# Patient Record
Sex: FEMALE | Race: WHITE | NOT HISPANIC OR LATINO | Employment: UNEMPLOYED | URBAN - NONMETROPOLITAN AREA
[De-identification: names, ages, dates, MRNs, and addresses within clinical notes are randomized per-mention and may not be internally consistent; named-entity substitution may affect disease eponyms.]

---

## 2017-12-13 ENCOUNTER — HOSPITAL ENCOUNTER (EMERGENCY)
Facility: HOSPITAL | Age: 52
Discharge: HOME | End: 2017-12-13
Attending: EMERGENCY MEDICINE | Admitting: EMERGENCY MEDICINE

## 2017-12-13 ENCOUNTER — APPOINTMENT (OUTPATIENT)
Dept: CT IMAGING | Facility: HOSPITAL | Age: 52
End: 2017-12-13

## 2017-12-13 VITALS
DIASTOLIC BLOOD PRESSURE: 88 MMHG | TEMPERATURE: 98.3 F | HEIGHT: 66 IN | HEART RATE: 104 BPM | OXYGEN SATURATION: 93 % | BODY MASS INDEX: 47.09 KG/M2 | SYSTOLIC BLOOD PRESSURE: 154 MMHG | WEIGHT: 293 LBS | RESPIRATION RATE: 20 BRPM

## 2017-12-13 DIAGNOSIS — I10 UNCONTROLLED HYPERTENSION: ICD-10-CM

## 2017-12-13 DIAGNOSIS — N39.0 ACUTE UTI: ICD-10-CM

## 2017-12-13 DIAGNOSIS — K42.9 UMBILICAL HERNIA WITHOUT OBSTRUCTION AND WITHOUT GANGRENE: Primary | ICD-10-CM

## 2017-12-13 LAB
ALBUMIN SERPL-MCNC: 4.8 G/DL (ref 3.5–5)
ALBUMIN/GLOB SERPL: 1.4 G/DL (ref 1–2)
ALP SERPL-CCNC: 63 U/L (ref 38–126)
ALT SERPL W P-5'-P-CCNC: 37 U/L (ref 13–69)
ANION GAP SERPL CALCULATED.3IONS-SCNC: 15 MMOL/L
AST SERPL-CCNC: 38 U/L (ref 15–46)
BACTERIA UR QL AUTO: ABNORMAL /HPF
BASOPHILS # BLD AUTO: 0.11 10*3/MM3 (ref 0–0.2)
BASOPHILS NFR BLD AUTO: 0.5 % (ref 0–2.5)
BILIRUB SERPL-MCNC: 0.5 MG/DL (ref 0.2–1.3)
BILIRUB UR QL STRIP: NEGATIVE
BUN BLD-MCNC: 18 MG/DL (ref 7–20)
BUN/CREAT SERPL: 22.5 (ref 7.1–23.5)
CALCIUM SPEC-SCNC: 10.3 MG/DL (ref 8.4–10.2)
CHLORIDE SERPL-SCNC: 101 MMOL/L (ref 98–107)
CLARITY UR: CLEAR
CO2 SERPL-SCNC: 30 MMOL/L (ref 26–30)
COLOR UR: ABNORMAL
CREAT BLD-MCNC: 0.8 MG/DL (ref 0.6–1.3)
D-LACTATE SERPL-SCNC: 1.8 MMOL/L (ref 0.5–2)
DEPRECATED RDW RBC AUTO: 41.1 FL (ref 37–54)
EOSINOPHIL # BLD AUTO: 0.14 10*3/MM3 (ref 0–0.7)
EOSINOPHIL NFR BLD AUTO: 0.6 % (ref 0–7)
ERYTHROCYTE [DISTWIDTH] IN BLOOD BY AUTOMATED COUNT: 13.1 % (ref 11.5–14.5)
GFR SERPL CREATININE-BSD FRML MDRD: 75 ML/MIN/1.73
GLOBULIN UR ELPH-MCNC: 3.4 GM/DL
GLUCOSE BLD-MCNC: 104 MG/DL (ref 74–98)
GLUCOSE UR STRIP-MCNC: NEGATIVE MG/DL
HCT VFR BLD AUTO: 47.5 % (ref 37–47)
HGB BLD-MCNC: 15 G/DL (ref 12–16)
HGB UR QL STRIP.AUTO: ABNORMAL
HYALINE CASTS UR QL AUTO: ABNORMAL /LPF
IMM GRANULOCYTES # BLD: 0.09 10*3/MM3 (ref 0–0.06)
IMM GRANULOCYTES NFR BLD: 0.4 % (ref 0–0.6)
KETONES UR QL STRIP: NEGATIVE
LEUKOCYTE ESTERASE UR QL STRIP.AUTO: NEGATIVE
LIPASE SERPL-CCNC: 99 U/L (ref 23–300)
LYMPHOCYTES # BLD AUTO: 1.71 10*3/MM3 (ref 0.6–3.4)
LYMPHOCYTES NFR BLD AUTO: 7.6 % (ref 10–50)
MCH RBC QN AUTO: 27.2 PG (ref 27–31)
MCHC RBC AUTO-ENTMCNC: 31.6 G/DL (ref 30–37)
MCV RBC AUTO: 86.2 FL (ref 81–99)
MONOCYTES # BLD AUTO: 1.08 10*3/MM3 (ref 0–0.9)
MONOCYTES NFR BLD AUTO: 4.8 % (ref 0–12)
NEUTROPHILS # BLD AUTO: 19.41 10*3/MM3 (ref 2–6.9)
NEUTROPHILS NFR BLD AUTO: 86.1 % (ref 37–80)
NITRITE UR QL STRIP: POSITIVE
NRBC BLD MANUAL-RTO: 0 /100 WBC (ref 0–0)
PH UR STRIP.AUTO: 5.5 [PH] (ref 5–8)
PLATELET # BLD AUTO: 355 10*3/MM3 (ref 130–400)
PMV BLD AUTO: 10.9 FL (ref 6–12)
POTASSIUM BLD-SCNC: 4 MMOL/L (ref 3.5–5.1)
PROT SERPL-MCNC: 8.2 G/DL (ref 6.3–8.2)
PROT UR QL STRIP: NEGATIVE
RBC # BLD AUTO: 5.51 10*6/MM3 (ref 4.2–5.4)
RBC # UR: ABNORMAL /HPF
REF LAB TEST METHOD: ABNORMAL
SODIUM BLD-SCNC: 142 MMOL/L (ref 137–145)
SP GR UR STRIP: 1.02 (ref 1–1.03)
SQUAMOUS #/AREA URNS HPF: ABNORMAL /HPF
UROBILINOGEN UR QL STRIP: ABNORMAL
WBC NRBC COR # BLD: 22.54 10*3/MM3 (ref 4.8–10.8)
WBC UR QL AUTO: ABNORMAL /HPF

## 2017-12-13 PROCEDURE — 25010000002 ONDANSETRON PER 1 MG: Performed by: EMERGENCY MEDICINE

## 2017-12-13 PROCEDURE — 25010000002 FENTANYL CITRATE (PF) 100 MCG/2ML SOLUTION: Performed by: EMERGENCY MEDICINE

## 2017-12-13 PROCEDURE — 85025 COMPLETE CBC W/AUTO DIFF WBC: CPT | Performed by: EMERGENCY MEDICINE

## 2017-12-13 PROCEDURE — 80053 COMPREHEN METABOLIC PANEL: CPT | Performed by: EMERGENCY MEDICINE

## 2017-12-13 PROCEDURE — 25010000002 CEFTRIAXONE IN SWFI 1 GRAM/10ML IV PUSH SYRINGE (SIMPLE): Performed by: EMERGENCY MEDICINE

## 2017-12-13 PROCEDURE — 96376 TX/PRO/DX INJ SAME DRUG ADON: CPT

## 2017-12-13 PROCEDURE — 96361 HYDRATE IV INFUSION ADD-ON: CPT

## 2017-12-13 PROCEDURE — 87086 URINE CULTURE/COLONY COUNT: CPT

## 2017-12-13 PROCEDURE — 83605 ASSAY OF LACTIC ACID: CPT | Performed by: EMERGENCY MEDICINE

## 2017-12-13 PROCEDURE — 99284 EMERGENCY DEPT VISIT MOD MDM: CPT

## 2017-12-13 PROCEDURE — 96375 TX/PRO/DX INJ NEW DRUG ADDON: CPT

## 2017-12-13 PROCEDURE — 96374 THER/PROPH/DIAG INJ IV PUSH: CPT

## 2017-12-13 PROCEDURE — 83690 ASSAY OF LIPASE: CPT | Performed by: EMERGENCY MEDICINE

## 2017-12-13 PROCEDURE — 81001 URINALYSIS AUTO W/SCOPE: CPT

## 2017-12-13 PROCEDURE — 87186 SC STD MICRODIL/AGAR DIL: CPT

## 2017-12-13 PROCEDURE — 0 IOPAMIDOL 61 % SOLUTION: Performed by: EMERGENCY MEDICINE

## 2017-12-13 PROCEDURE — 74177 CT ABD & PELVIS W/CONTRAST: CPT

## 2017-12-13 PROCEDURE — 87077 CULTURE AEROBIC IDENTIFY: CPT

## 2017-12-13 PROCEDURE — 25010000002 KETOROLAC TROMETHAMINE PER 15 MG: Performed by: EMERGENCY MEDICINE

## 2017-12-13 RX ORDER — FENTANYL CITRATE 50 UG/ML
25 INJECTION, SOLUTION INTRAMUSCULAR; INTRAVENOUS ONCE
Status: COMPLETED | OUTPATIENT
Start: 2017-12-13 | End: 2017-12-13

## 2017-12-13 RX ORDER — ONDANSETRON 2 MG/ML
4 INJECTION INTRAMUSCULAR; INTRAVENOUS ONCE
Status: COMPLETED | OUTPATIENT
Start: 2017-12-13 | End: 2017-12-13

## 2017-12-13 RX ORDER — SODIUM CHLORIDE 0.9 % (FLUSH) 0.9 %
10 SYRINGE (ML) INJECTION AS NEEDED
Status: DISCONTINUED | OUTPATIENT
Start: 2017-12-13 | End: 2017-12-13 | Stop reason: HOSPADM

## 2017-12-13 RX ORDER — FENTANYL CITRATE 50 UG/ML
75 INJECTION, SOLUTION INTRAMUSCULAR; INTRAVENOUS ONCE
Status: COMPLETED | OUTPATIENT
Start: 2017-12-13 | End: 2017-12-13

## 2017-12-13 RX ORDER — KETOROLAC TROMETHAMINE 30 MG/ML
15 INJECTION, SOLUTION INTRAMUSCULAR; INTRAVENOUS ONCE
Status: COMPLETED | OUTPATIENT
Start: 2017-12-13 | End: 2017-12-13

## 2017-12-13 RX ORDER — SODIUM CHLORIDE 9 MG/ML
125 INJECTION, SOLUTION INTRAVENOUS CONTINUOUS
Status: DISCONTINUED | OUTPATIENT
Start: 2017-12-13 | End: 2017-12-13 | Stop reason: HOSPADM

## 2017-12-13 RX ORDER — CEPHALEXIN 500 MG/1
500 CAPSULE ORAL 4 TIMES DAILY
Qty: 20 CAPSULE | Refills: 0 | Status: SHIPPED | OUTPATIENT
Start: 2017-12-13 | End: 2017-12-18

## 2017-12-13 RX ADMIN — ONDANSETRON 4 MG: 2 INJECTION INTRAMUSCULAR; INTRAVENOUS at 16:44

## 2017-12-13 RX ADMIN — IOPAMIDOL 100 ML: 612 INJECTION, SOLUTION INTRAVENOUS at 18:01

## 2017-12-13 RX ADMIN — SODIUM CHLORIDE 125 ML/HR: 9 INJECTION, SOLUTION INTRAVENOUS at 16:54

## 2017-12-13 RX ADMIN — FENTANYL CITRATE 75 MCG: 50 INJECTION INTRAMUSCULAR; INTRAVENOUS at 16:45

## 2017-12-13 RX ADMIN — CEFTRIAXONE SODIUM 1000 MG: 1 INJECTION, POWDER, FOR SOLUTION INTRAMUSCULAR; INTRAVENOUS at 19:04

## 2017-12-13 RX ADMIN — FENTANYL CITRATE 25 MCG: 50 INJECTION INTRAMUSCULAR; INTRAVENOUS at 19:00

## 2017-12-13 RX ADMIN — KETOROLAC TROMETHAMINE 15 MG: 30 INJECTION, SOLUTION INTRAMUSCULAR at 18:59

## 2017-12-13 NOTE — ED PROVIDER NOTES
Subjective   History of Present Illness  TRIAGE CHIEF COMPLAINT:   Chief Complaint   Patient presents with   • Abdominal Pain         HPI: Jamai Hinton   is a 52 y.o. female   who presents to the emergency department complaining of Abdominal pain.  Patient with a history of periumbilical hernia, , cholecystectomy, hypertension, obesity.  Patient describes abdominal pain in the periumbilical and suprapubic region ongoing for at least the past 4 hours.  Also reports difficulty urinating.  Patient states symptoms are either due to her hernia or to a UTI.  States last time she experienced pain this severe she had a UTI.  Denies fever, chills, nausea, vomiting, abdominal distention, chest pain, shortness of breath, hematuria, diarrhea, constipation.  Has not taken anything for pain prior to arrival although she did take Pyridium which was left over from prior bladder infection.            Review of Systems   All other systems reviewed and are negative.      Past Medical History:   Diagnosis Date   • Hernia, abdominal        No Known Allergies    Past Surgical History:   Procedure Laterality Date   •  SECTION     • CHOLECYSTECTOMY         History reviewed. No pertinent family history.    Social History     Social History   • Marital status:      Spouse name: N/A   • Number of children: N/A   • Years of education: N/A     Social History Main Topics   • Smoking status: Never Smoker   • Smokeless tobacco: None   • Alcohol use No   • Drug use: No   • Sexual activity: Not Asked     Other Topics Concern   • None     Social History Narrative   • None           Objective   Physical Exam    CONSTITUTIONAL: Awake, oriented, appears uncomfortable but non-toxic.  Obese   HENT: Atraumatic, normocephalic, oral mucosa pink and moist, airway patent. Nares patent without drainage. External ears normal.   EYES: Conjunctiva clear, EOMI, PERRL   NECK: Trachea midline, non-tender, supple   CARDIOVASCULAR: Normal heart  rate, Normal rhythm, No murmurs, rubs, gallops   PULMONARY/CHEST: Clear to auscultation, no rhonchi, wheezes, or rales. Symmetrical breath sounds. Non-tender.   ABDOMINAL: Non-distended, soft, umbilical hernia with some tenderness but soft and reducible, no overlying skin changes.  Moderate suprapubic tenderness - no rebound or guarding. BS normal.   NEUROLOGIC: Non-focal, moving all four extremities, no gross sensory or motor deficits.   EXTREMITIES: No clubbing, cyanosis, or edema   SKIN: Warm, Dry, No erythema, No rash.     CT Abdomen Pelvis With Contrast   Final Result   1. Fatty liver.      2.  Large fat-containing ventral hernia..      Authenticated by Morris Gonzales MD on 12/13/2017 06:13:22 PM              EKG:         Procedures         ED Course  ED Course          ED COURSE / MEDICAL DECISION MAKING:   Nursing notes reviewed.    Patient is feeling better on reevaluation.  Workup noteworthy for UTI, leukocytosis, poorly controlled blood pressure.  Umbilical hernia is soft and reducible and does not contain any bowel.  Case discussed with Dr. Rogers who will follow-up with the patient in his office tomorrow.  Plan for antibiotics, oral hydration.  Patient instructed to return if worse or with any concerns.    DECISION TO DISCHARGE/ADMIT: see ED care timeline       Electronically signed by: Jovani Green MD, 12/13/2017 6:57 PM              [facility]    Final diagnoses:   Umbilical hernia without obstruction and without gangrene   Acute UTI   Uncontrolled hypertension            Jovani Green MD  12/13/17 [id number]

## 2017-12-14 ENCOUNTER — HOSPITAL ENCOUNTER (OUTPATIENT)
Facility: HOSPITAL | Age: 52
Setting detail: HOSPITAL OUTPATIENT SURGERY
Discharge: HOME | End: 2017-12-14
Attending: SURGERY | Admitting: SURGERY

## 2017-12-14 ENCOUNTER — ANESTHESIA (OUTPATIENT)
Dept: PERIOP | Facility: HOSPITAL | Age: 52
End: 2017-12-14

## 2017-12-14 ENCOUNTER — OFFICE VISIT (OUTPATIENT)
Dept: SURGERY | Facility: CLINIC | Age: 52
End: 2017-12-14

## 2017-12-14 ENCOUNTER — ANESTHESIA EVENT (OUTPATIENT)
Dept: PERIOP | Facility: HOSPITAL | Age: 52
End: 2017-12-14

## 2017-12-14 VITALS
TEMPERATURE: 98.8 F | SYSTOLIC BLOOD PRESSURE: 150 MMHG | HEIGHT: 66 IN | DIASTOLIC BLOOD PRESSURE: 80 MMHG | WEIGHT: 293 LBS | BODY MASS INDEX: 47.09 KG/M2 | OXYGEN SATURATION: 98 % | HEART RATE: 90 BPM

## 2017-12-14 VITALS
RESPIRATION RATE: 18 BRPM | TEMPERATURE: 98.6 F | OXYGEN SATURATION: 97 % | DIASTOLIC BLOOD PRESSURE: 63 MMHG | SYSTOLIC BLOOD PRESSURE: 128 MMHG | HEART RATE: 84 BPM

## 2017-12-14 DIAGNOSIS — R10.31 RIGHT LOWER QUADRANT ABDOMINAL PAIN: Primary | ICD-10-CM

## 2017-12-14 DIAGNOSIS — K43.9 VENTRAL HERNIA WITHOUT OBSTRUCTION OR GANGRENE: ICD-10-CM

## 2017-12-14 LAB
B-HCG UR QL: NEGATIVE
INTERNAL NEGATIVE CONTROL: NEGATIVE
INTERNAL POSITIVE CONTROL: POSITIVE
Lab: NORMAL

## 2017-12-14 PROCEDURE — C1713 ANCHOR/SCREW BN/BN,TIS/BN: HCPCS | Performed by: SURGERY

## 2017-12-14 PROCEDURE — 99244 OFF/OP CNSLTJ NEW/EST MOD 40: CPT | Performed by: SURGERY

## 2017-12-14 PROCEDURE — 25010000002 ONDANSETRON PER 1 MG: Performed by: NURSE ANESTHETIST, CERTIFIED REGISTERED

## 2017-12-14 PROCEDURE — 25010000002 KETOROLAC TROMETHAMINE PER 15 MG: Performed by: NURSE ANESTHETIST, CERTIFIED REGISTERED

## 2017-12-14 PROCEDURE — 82962 GLUCOSE BLOOD TEST: CPT

## 2017-12-14 PROCEDURE — 49561 PR REPAIR INCISIONAL HERNIA,STRANG: CPT | Performed by: SURGERY

## 2017-12-14 PROCEDURE — 25010000002 PROPOFOL 200 MG/20ML EMULSION: Performed by: NURSE ANESTHETIST, CERTIFIED REGISTERED

## 2017-12-14 PROCEDURE — 94799 UNLISTED PULMONARY SVC/PX: CPT

## 2017-12-14 PROCEDURE — 25010000002 SUCCINYLCHOLINE PER 20 MG: Performed by: NURSE ANESTHETIST, CERTIFIED REGISTERED

## 2017-12-14 PROCEDURE — 49568 PR IMPLANT MESH HERNIA REPAIR/DEBRIDEMENT CLOSURE: CPT | Performed by: SURGERY

## 2017-12-14 PROCEDURE — 25010000002 HYDROMORPHONE PER 4 MG

## 2017-12-14 PROCEDURE — 25010000002 DEXAMETHASONE PER 1 MG: Performed by: NURSE ANESTHETIST, CERTIFIED REGISTERED

## 2017-12-14 PROCEDURE — 25010000002 METOCLOPRAMIDE PER 10 MG: Performed by: NURSE ANESTHETIST, CERTIFIED REGISTERED

## 2017-12-14 PROCEDURE — 25010000002 FENTANYL CITRATE (PF) 100 MCG/2ML SOLUTION: Performed by: NURSE ANESTHETIST, CERTIFIED REGISTERED

## 2017-12-14 PROCEDURE — 25010000002 HYDROMORPHONE PER 4 MG: Performed by: NURSE ANESTHETIST, CERTIFIED REGISTERED

## 2017-12-14 PROCEDURE — 25010000002 MIDAZOLAM PER 1 MG: Performed by: NURSE ANESTHETIST, CERTIFIED REGISTERED

## 2017-12-14 PROCEDURE — C1781 MESH (IMPLANTABLE): HCPCS | Performed by: SURGERY

## 2017-12-14 DEVICE — VENTRIO ST HERNIA PATCH
Type: IMPLANTABLE DEVICE | Site: ABDOMEN | Status: FUNCTIONAL
Brand: VENTRIO ST HERNIA PATCH

## 2017-12-14 RX ORDER — MIDAZOLAM HYDROCHLORIDE 1 MG/ML
INJECTION INTRAMUSCULAR; INTRAVENOUS AS NEEDED
Status: DISCONTINUED | OUTPATIENT
Start: 2017-12-14 | End: 2017-12-14 | Stop reason: SURG

## 2017-12-14 RX ORDER — FAMOTIDINE 10 MG/ML
INJECTION, SOLUTION INTRAVENOUS
Status: DISCONTINUED
Start: 2017-12-14 | End: 2017-12-14 | Stop reason: HOSPADM

## 2017-12-14 RX ORDER — IBUPROFEN 600 MG/1
600 TABLET ORAL EVERY 6 HOURS PRN
Status: DISCONTINUED | OUTPATIENT
Start: 2017-12-14 | End: 2017-12-14 | Stop reason: HOSPADM

## 2017-12-14 RX ORDER — SUCCINYLCHOLINE CHLORIDE 20 MG/ML
INJECTION INTRAMUSCULAR; INTRAVENOUS AS NEEDED
Status: DISCONTINUED | OUTPATIENT
Start: 2017-12-14 | End: 2017-12-14 | Stop reason: SURG

## 2017-12-14 RX ORDER — HYDROCODONE BITARTRATE AND ACETAMINOPHEN 7.5; 325 MG/1; MG/1
1-2 TABLET ORAL EVERY 4 HOURS PRN
Qty: 20 TABLET | Refills: 0 | Status: SHIPPED | OUTPATIENT
Start: 2017-12-14

## 2017-12-14 RX ORDER — CLINDAMYCIN PHOSPHATE 900 MG/50ML
900 INJECTION, SOLUTION INTRAVENOUS ONCE
Status: COMPLETED | OUTPATIENT
Start: 2017-12-14 | End: 2017-12-14

## 2017-12-14 RX ORDER — FENTANYL CITRATE 50 UG/ML
INJECTION, SOLUTION INTRAMUSCULAR; INTRAVENOUS AS NEEDED
Status: DISCONTINUED | OUTPATIENT
Start: 2017-12-14 | End: 2017-12-14 | Stop reason: SURG

## 2017-12-14 RX ORDER — KETOROLAC TROMETHAMINE 30 MG/ML
INJECTION, SOLUTION INTRAMUSCULAR; INTRAVENOUS AS NEEDED
Status: DISCONTINUED | OUTPATIENT
Start: 2017-12-14 | End: 2017-12-14 | Stop reason: SURG

## 2017-12-14 RX ORDER — SODIUM CHLORIDE 9 MG/ML
INJECTION, SOLUTION INTRAVENOUS CONTINUOUS PRN
Status: DISCONTINUED | OUTPATIENT
Start: 2017-12-14 | End: 2017-12-14 | Stop reason: SURG

## 2017-12-14 RX ORDER — ONDANSETRON 2 MG/ML
4 INJECTION INTRAMUSCULAR; INTRAVENOUS ONCE AS NEEDED
Status: DISCONTINUED | OUTPATIENT
Start: 2017-12-14 | End: 2017-12-14 | Stop reason: HOSPADM

## 2017-12-14 RX ORDER — TOPIRAMATE 25 MG/1
25 TABLET ORAL
COMMUNITY
Start: 2015-04-30 | End: 2017-12-14

## 2017-12-14 RX ORDER — PROPOFOL 10 MG/ML
INJECTION, EMULSION INTRAVENOUS AS NEEDED
Status: DISCONTINUED | OUTPATIENT
Start: 2017-12-14 | End: 2017-12-14 | Stop reason: SURG

## 2017-12-14 RX ORDER — DEXAMETHASONE SODIUM PHOSPHATE 4 MG/ML
INJECTION, SOLUTION INTRA-ARTICULAR; INTRALESIONAL; INTRAMUSCULAR; INTRAVENOUS; SOFT TISSUE AS NEEDED
Status: DISCONTINUED | OUTPATIENT
Start: 2017-12-14 | End: 2017-12-14 | Stop reason: SURG

## 2017-12-14 RX ORDER — MAGNESIUM HYDROXIDE 1200 MG/15ML
LIQUID ORAL AS NEEDED
Status: DISCONTINUED | OUTPATIENT
Start: 2017-12-14 | End: 2017-12-14 | Stop reason: HOSPADM

## 2017-12-14 RX ORDER — NEOSTIGMINE METHYLSULFATE 5 MG/5 ML
SYRINGE (ML) INTRAVENOUS AS NEEDED
Status: DISCONTINUED | OUTPATIENT
Start: 2017-12-14 | End: 2017-12-14 | Stop reason: SURG

## 2017-12-14 RX ORDER — ONDANSETRON 4 MG/1
4 TABLET, FILM COATED ORAL ONCE AS NEEDED
Status: DISCONTINUED | OUTPATIENT
Start: 2017-12-14 | End: 2017-12-14 | Stop reason: HOSPADM

## 2017-12-14 RX ORDER — ROCURONIUM BROMIDE 10 MG/ML
INJECTION, SOLUTION INTRAVENOUS AS NEEDED
Status: DISCONTINUED | OUTPATIENT
Start: 2017-12-14 | End: 2017-12-14 | Stop reason: SURG

## 2017-12-14 RX ORDER — LABETALOL HYDROCHLORIDE 5 MG/ML
INJECTION, SOLUTION INTRAVENOUS AS NEEDED
Status: DISCONTINUED | OUTPATIENT
Start: 2017-12-14 | End: 2017-12-14 | Stop reason: SURG

## 2017-12-14 RX ORDER — HYDROCODONE BITARTRATE AND ACETAMINOPHEN 7.5; 325 MG/1; MG/1
1 TABLET ORAL ONCE AS NEEDED
Status: DISCONTINUED | OUTPATIENT
Start: 2017-12-14 | End: 2017-12-14 | Stop reason: HOSPADM

## 2017-12-14 RX ORDER — GLYCOPYRROLATE 0.2 MG/ML
INJECTION INTRAMUSCULAR; INTRAVENOUS AS NEEDED
Status: DISCONTINUED | OUTPATIENT
Start: 2017-12-14 | End: 2017-12-14 | Stop reason: SURG

## 2017-12-14 RX ORDER — SODIUM CHLORIDE, SODIUM LACTATE, POTASSIUM CHLORIDE, CALCIUM CHLORIDE 600; 310; 30; 20 MG/100ML; MG/100ML; MG/100ML; MG/100ML
1000 INJECTION, SOLUTION INTRAVENOUS CONTINUOUS PRN
Status: DISCONTINUED | OUTPATIENT
Start: 2017-12-14 | End: 2017-12-14 | Stop reason: HOSPADM

## 2017-12-14 RX ORDER — METOCLOPRAMIDE HYDROCHLORIDE 5 MG/ML
INJECTION INTRAMUSCULAR; INTRAVENOUS AS NEEDED
Status: DISCONTINUED | OUTPATIENT
Start: 2017-12-14 | End: 2017-12-14 | Stop reason: SURG

## 2017-12-14 RX ORDER — MEPERIDINE HYDROCHLORIDE 50 MG/ML
50 INJECTION INTRAMUSCULAR; INTRAVENOUS; SUBCUTANEOUS ONCE
Status: DISCONTINUED | OUTPATIENT
Start: 2017-12-14 | End: 2017-12-14 | Stop reason: HOSPADM

## 2017-12-14 RX ORDER — ONDANSETRON 2 MG/ML
INJECTION INTRAMUSCULAR; INTRAVENOUS AS NEEDED
Status: DISCONTINUED | OUTPATIENT
Start: 2017-12-14 | End: 2017-12-14 | Stop reason: SURG

## 2017-12-14 RX ORDER — PROMETHAZINE HYDROCHLORIDE 25 MG/ML
12.5 INJECTION, SOLUTION INTRAMUSCULAR; INTRAVENOUS ONCE AS NEEDED
Status: DISCONTINUED | OUTPATIENT
Start: 2017-12-14 | End: 2017-12-14 | Stop reason: HOSPADM

## 2017-12-14 RX ORDER — SODIUM CHLORIDE 0.9 % (FLUSH) 0.9 %
3 SYRINGE (ML) INJECTION AS NEEDED
Status: DISCONTINUED | OUTPATIENT
Start: 2017-12-14 | End: 2017-12-14 | Stop reason: HOSPADM

## 2017-12-14 RX ORDER — PROMETHAZINE HYDROCHLORIDE 25 MG/ML
6.25 INJECTION, SOLUTION INTRAMUSCULAR; INTRAVENOUS EVERY 6 HOURS PRN
Status: DISCONTINUED | OUTPATIENT
Start: 2017-12-14 | End: 2017-12-14 | Stop reason: HOSPADM

## 2017-12-14 RX ADMIN — FENTANYL CITRATE 50 MCG: 50 INJECTION, SOLUTION INTRAMUSCULAR; INTRAVENOUS at 17:11

## 2017-12-14 RX ADMIN — SODIUM CHLORIDE: 9 INJECTION, SOLUTION INTRAVENOUS at 17:46

## 2017-12-14 RX ADMIN — LIDOCAINE HYDROCHLORIDE 40 MG: 20 INJECTION, SOLUTION INTRAVENOUS at 17:11

## 2017-12-14 RX ADMIN — Medication 4 MG: at 17:52

## 2017-12-14 RX ADMIN — SUCCINYLCHOLINE CHLORIDE 140 MG: 20 INJECTION, SOLUTION INTRAMUSCULAR; INTRAVENOUS at 17:12

## 2017-12-14 RX ADMIN — FENTANYL CITRATE 50 MCG: 50 INJECTION, SOLUTION INTRAMUSCULAR; INTRAVENOUS at 17:09

## 2017-12-14 RX ADMIN — ONDANSETRON 4 MG: 2 INJECTION INTRAMUSCULAR; INTRAVENOUS at 17:28

## 2017-12-14 RX ADMIN — SODIUM CHLORIDE, POTASSIUM CHLORIDE, SODIUM LACTATE AND CALCIUM CHLORIDE 1000 ML: 600; 310; 30; 20 INJECTION, SOLUTION INTRAVENOUS at 16:40

## 2017-12-14 RX ADMIN — CLINDAMYCIN PHOSPHATE 900 MG: 900 INJECTION, SOLUTION INTRAVENOUS at 17:07

## 2017-12-14 RX ADMIN — FENTANYL CITRATE 50 MCG: 50 INJECTION, SOLUTION INTRAMUSCULAR; INTRAVENOUS at 17:23

## 2017-12-14 RX ADMIN — HYDROMORPHONE HYDROCHLORIDE 0.5 MG: 1 INJECTION, SOLUTION INTRAMUSCULAR; INTRAVENOUS; SUBCUTANEOUS at 18:34

## 2017-12-14 RX ADMIN — LABETALOL HYDROCHLORIDE 5 MG: 5 INJECTION, SOLUTION INTRAVENOUS at 17:09

## 2017-12-14 RX ADMIN — HYDROMORPHONE HYDROCHLORIDE 0.5 MG: 1 INJECTION, SOLUTION INTRAMUSCULAR; INTRAVENOUS; SUBCUTANEOUS at 18:22

## 2017-12-14 RX ADMIN — FAMOTIDINE 20 MG: 10 INJECTION, SOLUTION INTRAVENOUS at 16:40

## 2017-12-14 RX ADMIN — PROPOFOL 150 MG: 10 INJECTION, EMULSION INTRAVENOUS at 17:12

## 2017-12-14 RX ADMIN — FENTANYL CITRATE 50 MCG: 50 INJECTION, SOLUTION INTRAMUSCULAR; INTRAVENOUS at 17:28

## 2017-12-14 RX ADMIN — DEXAMETHASONE SODIUM PHOSPHATE 8 MG: 4 INJECTION, SOLUTION INTRAMUSCULAR; INTRAVENOUS at 17:28

## 2017-12-14 RX ADMIN — MIDAZOLAM HYDROCHLORIDE 2 MG: 1 INJECTION, SOLUTION INTRAMUSCULAR; INTRAVENOUS at 17:07

## 2017-12-14 RX ADMIN — LABETALOL HYDROCHLORIDE 5 MG: 5 INJECTION, SOLUTION INTRAVENOUS at 17:10

## 2017-12-14 RX ADMIN — GLYCOPYRROLATE 0.8 MG: 0.2 INJECTION, SOLUTION INTRAMUSCULAR; INTRAVENOUS at 17:52

## 2017-12-14 RX ADMIN — ROCURONIUM BROMIDE 20 MG: 10 INJECTION INTRAVENOUS at 17:27

## 2017-12-14 RX ADMIN — METOCLOPRAMIDE 10 MG: 5 INJECTION, SOLUTION INTRAMUSCULAR; INTRAVENOUS at 16:40

## 2017-12-14 RX ADMIN — SUGAMMADEX 200 MG: 100 INJECTION, SOLUTION INTRAVENOUS at 17:52

## 2017-12-14 RX ADMIN — KETOROLAC TROMETHAMINE 30 MG: 30 INJECTION, SOLUTION INTRAMUSCULAR at 17:30

## 2017-12-14 RX ADMIN — ROCURONIUM BROMIDE 10 MG: 10 INJECTION INTRAVENOUS at 17:11

## 2017-12-14 NOTE — ANESTHESIA PREPROCEDURE EVALUATION
Anesthesia Evaluation     Patient summary reviewed and Nursing notes reviewed   NPO Solid Status: > 8 hours  NPO Liquid Status: > 8 hours     Airway   Mallampati: II  TM distance: >3 FB  Neck ROM: full  possible difficult intubation and difficult intubation highly probable  Dental - normal exam   (+) edentulous    Pulmonary    (+) shortness of breath, sleep apnea ( mot treated), decreased breath sounds,   (-) not a smoker  Cardiovascular - normal exam  Exercise tolerance: poor (<4 METS)    ECG reviewed  Rhythm: regular    (+) hypertension ( not treated) poorly controlled, past MI ( unknown mi per ekg) , CAD ( morb obesity, jocelyn, htn pvd dm), HUERTA, PVD,       Neuro/Psych  GI/Hepatic/Renal/Endo    (+) morbid obesity, diabetes mellitus ( morb obesity inc fbs no meds) type 2 poorly controlled,     Musculoskeletal     (+) arthralgias, back pain, chronic pain, gait problem,   Abdominal   (+) obese,    Substance History      OB/GYN          Other   (+) arthritis     ROS/Med Hx Other: Labs reviewed   fbs 104 k 4.0  ekg sr with lateral and inferior infarct  Hx of med non compliance with lifestyle changes  Pt does not go to dr. Last time was 3 yrs ago  Low pain tolerance  Surgeon aware of abnormal ekg and wishes to proceed d/t emergency nature of pt's condition                                Anesthesia Plan    ASA 4 - emergent     general   (Risks and benefits discussed including risk of aspiration, recall and dental damage. All patient questions answered. Will continue with POC.Discussed risks with pt., pt extremely high intraop and postop risk for cv, resp, and neuro events. Surgeon wishes to proceed d/t emergency nature of pt's condition, aware of pt's abnormal ekg and lack of medical care.)  intravenous induction   Anesthetic plan and risks discussed with patient.

## 2017-12-14 NOTE — ANESTHESIA PROCEDURE NOTES
Airway  Urgency: elective    Difficult airway (morb obesity 350 lbs)    General Information and Staff    Patient location during procedure: OR  CRNA: NITHYA RODRIGUEZ    Indications and Patient Condition  Indications for airway management: airway protection    Preoxygenated: yes (5 min)  Mask assessment prior to intubation: abhilash circoid applies airway establlished.    Final Airway Details  Final airway type: endotracheal airway      Successful airway: ETT  Cuffed: yes   Successful intubation technique: direct laryngoscopy  Facilitating devices/methods: cricoid pressure and intubating stylet  Endotracheal tube insertion site: oral  Blade: Jesse  Blade size: #3  ETT size: 7.5 mm  Cormack-Lehane Classification: grade IIa - partial view of glottis  Placement verified by: chest auscultation, capnometry and palpation of cuff   Cuff volume (mL): 6  Measured from: lips  ETT to lips (cm): 21  Number of attempts at approach: 1    Additional Comments  Intubated by dvnt, cuff up with mov, bbs and expansion =, + etco, taped at lips, tolerated induction and intubation without adverse rx.

## 2017-12-14 NOTE — DISCHARGE INSTRUCTIONS
Please follow all post op instructions and follow up appointment time from your physician's office included in your discharge packet.  .   No pushing, pulling, tugging,  heavy lifting, or strenuous activity.  No major decision making, driving, or drinking alcoholic beverages for 24 hours. ( due to the medications you have  received)  Always use good hand hygiene/washing techniques.  NO driving while taking pain medications.  To assist you in voiding:  Drink plenty of fluids  Listen to running water while attempting to void.    If you are unable to urinate and you have an uncomfortable urge to void or it has been   6 hours since you were discharged, return to the Emergency Room

## 2017-12-14 NOTE — PLAN OF CARE
Problem: Perioperative Period (Adult)  Goal: Signs and Symptoms of Listed Potential Problems Will be Absent or Manageable (Perioperative Period)  Outcome: Ongoing (interventions implemented as appropriate)    12/14/17 1632   Perioperative Period   Problems Assessed (Perioperative Period) all   Problems Present (Perioperative Period) pain

## 2017-12-14 NOTE — ADDENDUM NOTE
Addendum  created 12/14/17 1817 by Indra Quintanilla, CRNA    Anesthesia Review and Sign - Ready for Procedure, Anesthesia Review and Sign - Signed

## 2017-12-14 NOTE — PROGRESS NOTES
Patient: Jamia Hinton    YOB: 1965    Date: 12/14/2017    Primary Care Provider: CLAUDIA Han    Reason for Consultation: ventral hernia    Chief complaint:   Chief Complaint   Patient presents with   • Abdominal Pain     possible hernia       Subjective .     History of present illness:  I saw the patient in the office today as a consultation for evaluation and treatment of an abnormal CT scan, showing a fatty liver and a large ventral hernia.  Patient states she has had a hernia for a couple of years.  She complains pain with pressure @ right lower abdominal area.  She states the pain is worse when she goes from a sitting to standing position. Ms. Hinton denies changes in bowel habits or difficulty urinating.   Patient has never had a heart attack and is up to date on her colonoscopy. This pain has been present for the past 24 hours, sharp in nature, progressively worse, not relieved.    The following portions of the patient's history were reviewed and updated as appropriate: allergies, current medications, past family history, past medical history, past social history, past surgical history and problem list.    Review of Systems   Constitutional: Negative for chills, fever and unexpected weight change.   HENT: Negative for hearing loss, trouble swallowing and voice change.    Eyes: Negative for visual disturbance.   Respiratory: Negative for apnea, cough, chest tightness, shortness of breath and wheezing.    Cardiovascular: Negative for chest pain, palpitations and leg swelling.   Gastrointestinal: Negative for abdominal distention, abdominal pain, anal bleeding, blood in stool, constipation, diarrhea, nausea, rectal pain and vomiting.   Endocrine: Negative for cold intolerance and heat intolerance.   Genitourinary: Negative for difficulty urinating, dysuria and flank pain.   Musculoskeletal: Negative for back pain and gait problem.   Skin: Negative for color change, rash and wound.    Neurological: Negative for dizziness, syncope, speech difficulty, weakness, light-headedness, numbness and headaches.   Hematological: Negative for adenopathy. Does not bruise/bleed easily.   Psychiatric/Behavioral: Negative for confusion. The patient is not nervous/anxious.        History:  Past Medical History:   Diagnosis Date   • Hernia, abdominal        Past Surgical History:   Procedure Laterality Date   •  SECTION     • CHOLECYSTECTOMY         Family History   Problem Relation Age of Onset   • No Known Problems Mother    • No Known Problems Father        Social History   Substance Use Topics   • Smoking status: Never Smoker   • Smokeless tobacco: Never Used   • Alcohol use No       Allergies:  No Known Allergies    Medications:    Current Outpatient Prescriptions:   •  cephalexin (KEFLEX) 500 MG capsule, Take 1 capsule by mouth 4 (Four) Times a Day for 5 days., Disp: 20 capsule, Rfl: 0  No current facility-administered medications for this visit.     Objective     Vital Signs:   Temp:  [97.9 °F (36.6 °C)-98.8 °F (37.1 °C)] 98.8 °F (37.1 °C)  Heart Rate:  [] 90  Resp:  [18-20] 20  BP: (150-186)/() 150/80    Physical Exam:   General Appearance:    Alert, cooperative, she is in distress with supraumbilical pain, morbidly obese at 320 lbs per office scale   Head:    Normocephalic, without obvious abnormality, atraumatic   Eyes:            Lids and lashes normal, conjunctivae and sclerae normal, no   icterus, no pallor, corneas clear, PERRLA   Ears:    Ears appear intact with no abnormalities noted   Throat:   No oral lesions, no thrush, oral mucosa moist   Neck:   No adenopathy, supple, trachea midline, no thyromegaly, no   carotid bruit, no JVD   Lungs:     Clear to auscultation,respirations regular, even and                  unlabored    Heart:    Regular rhythm and normal rate, normal S1 and S2, no            murmur   Abdomen:     no masses, no organomegaly, soft non-tender,  non-distended, no guarding, there is evidence of a tender supraumbilical hernia incisional in nature incarcerated   Extremities:   Moves all extremities well, no edema, no cyanosis, no             redness   Pulses:   Pulses palpable and equal bilaterally   Skin:   No bleeding, bruising or rash   Lymph nodes:   No palpable adenopathy   Neurologic:   Cranial nerves 2 - 12 grossly intact, sensation intact        Results Review:   I reviewed the patient's new clinical results.  I reviewed the patient's new imaging results and agree with the interpretation.  I reviewed the patient's other test results and agree with the interpretation    Review of Systems was reviewed and confirmed as accurate today.    Assessment/Plan :    1. Right lower quadrant abdominal pain    2. Ventral hernia without obstruction or gangrene        I had a detailed and extensive discussion with the patient in the office and they understand that they need to undergo hernia repair with mesh.  Full risks and benefits of operative versus nonoperative intervention were discussed with the patient and these included things such as nonresolution of symptoms and possible worsening of symptoms without surgical intervention versus infection, bleeding, possible recurrent hernia, possible postoperative neuralgia from nerve damage or involvement with scar tissue, etc.  The patient understands, agrees, and had no questions for me at the end of the office visit.     I discussed the patients findings and my recommendations with patient. I did discuss the situation over the phone with the radiologist and reviewed her CT scan, I did discuss the situation over the phone last night with the ER physician. I did specifically talk to the patient today about the fact that her morbid obesity puts her at higher risk for perioperative complications such as hernia recurrence, DVT, pulmonary problems, etc.  She understands and agrees.    Electronically signed by Bassam CONDE  MD Ken  12/14/17      Scribed for Bassam Rogers MD by Nichelle Hill. 12/14/2017  3:37 PM

## 2017-12-14 NOTE — ANESTHESIA POSTPROCEDURE EVALUATION
Patient: Jamia Hinton    Procedure Summary     Date Anesthesia Start Anesthesia Stop Room / Location    12/14/17 1707  [facility] / [facility]       Procedure Diagnosis Surgeon Provider     INCISIONAL HERNIA REPAIR WITH MESH (N/A Abdomen) Ventral hernia without obstruction or gangrene  (Ventral hernia without obstruction or gangrene [K43.9]) MD Indra Buenrostro CRNA          Anesthesia Type: general  Last vitals  BP   160/86 (12/14/17 1625)   Temp   99.2 °F (37.3 °C) (12/14/17 1625)   Pulse   100 (12/14/17 1625)   Resp   18 (12/14/17 1625)     SpO2   97 % (12/14/17 1625)     Post Anesthesia Care and Evaluation    Patient location during evaluation: PACU  Patient participation: complete - patient participated  Level of consciousness: awake  Pain score: 0  Pain management: adequate  Airway patency: patent  Anesthetic complications: No anesthetic complications  PONV Status: none  Cardiovascular status: acceptable  Respiratory status: acceptable and face mask  Hydration status: acceptable    Comments: vsss resp spont, reflexes intact, responsive, report given to pacu nurse

## 2017-12-14 NOTE — OP NOTE
PATIENT:    Jamia Hinton    DATE OF SURGERY:   12/14/2017    PHYSICIAN:    Bassam Rogers MD    REFERRING PHYSICIAN:  CLAUDIA Han    YOB: 1965    PREOPERATIVE DIAGNOSIS:  Incarcerated incisional hernia    POSTOPERATIVE DIAGNOSIS:  Incarcerated incisional hernia    PROCEDURE:       1) Incisional herniorraphy    2) Reduction of incarceration   3) Placement of 11x14 dual sided Ventrio Bard mesh   4) Resection of the umbilicus    HISTORY:  The patient presents to me for evaluation and treatment of a history significant for an incarcerated incisional hernia, she was very tender to palpation in the office on evaluation and has been brought for surgical repair.  They are here now today for urgent repair.    ANESTHESIA:  General endotracheal.    OPERATIVE PROCEDURE:  The patient was taken to the operating room, placed in the supine position, and given general endotracheal anesthesia per the Anesthesia service.  The patient was prepped and draped in the normal sterile fashion and the patient was given preoperative IV antibiotics.  An appropriate timeout per the nursing staff was performed prior to the incision.  I did meet with the patient preoperatively and marked them accordingly.    Because of the patient's morbid obesity and the fact that the skin around the umbilicus was very thin, I decided to resect the umbilicus with a large elliptical incision and this was further dissected down the hernia defect itself. We were able to enter the hernia sac without difficulty at the area of the fascial defect, there was evidence of incarceration noted and we had to resect incarcerated omentum with Tania clamps and silk ties.  The remainder of the incarcerated hernia was reduced without difficulty. I was able to bluntly dissect some chronic attachments to the anterior abdominal wall around the hernia sac and felt I had excellent exposure performed.    I then placed a 11x14 piece of Ventrio dual sided  Marlex mesh and sutured it in place accordingly and a multi-quadrant fashion with 0 Prolene suture in a 4 quadrant fashion.  Tacker was then used to tack the mesh to the anterior abdominal wall further. I felt I had excellent hernia repair and the defect was clearly covered.    Closure was then attended to with a 3-0 Vicryl suture on the wound and then the skin was closed with interrupted 3-0 nylon vertical mattress suture.  Pressure type dressing was applied. Hemostasis was previously intact.    The patient was stable at this point in time and subsequently transferred back to the recovery room in stable condition.    PLAN:  I did have a detailed and extensive discussion with the patient's family in the consultation room postoperatively.    Bassam Rogers MD

## 2017-12-14 NOTE — PLAN OF CARE
Problem: Perioperative Period (Adult)  Intervention: Promote Pulmonary Hygiene and Secretion Clearance    12/14/17 1811   Incentive Spirometer   Administration (Incentive Spirometer) done independently per patient   Activity   Activity Type bedrest   Promote Aggressive Pulmonary Hygiene/Secretion Management   Cough And Deep Breathing done independently per patient   Positioning   Head Of Bed (HOB) Position HOB at 20-30 degrees       Intervention: Monitor/Manage Pain    12/14/17 1811   Manage Acute Burn Pain   Pain Management Interventions pillow support;cold applied       Intervention: Prevent/Manage Post-surgical Infection    12/14/17 1811   Safety Interventions   Infection Prevention rest/sleep promoted       Intervention: Prevent Jael-procedural Injury    12/14/17 1811   Positioning   Positioning semi-Fowlers;foot of bed elevated   Head Of Bed (HOB) Position HOB at 20-30 degrees       Intervention: Prevent/Manage DVT/VTE Risk    12/14/17 1811   Support Surgical/Anesthesia Recovery   Venous Thromboembolism Prevent/Manage plantar flexion performed       Intervention: Monitor/Manage Postoperative Bleeding    12/14/17 1811   Safety Interventions   Bleeding Management dressing monitored       Intervention: Promote Normothermia    12/14/17 1811   Cardiac Interventions   Warming Thermoregulation Maintenance skin exposure time minimized;warm blankets applied         Goal: Signs and Symptoms of Listed Potential Problems Will be Absent or Manageable (Perioperative Period)  Outcome: Ongoing (interventions implemented as appropriate)    12/14/17 1811   Perioperative Period   Problems Assessed (Perioperative Period) all   Problems Present (Perioperative Period) pain

## 2017-12-15 LAB
BACTERIA SPEC AEROBE CULT: ABNORMAL
BACTERIA SPEC AEROBE CULT: ABNORMAL

## 2017-12-18 LAB — GLUCOSE BLDC GLUCOMTR-MCNC: 89 MG/DL (ref 70–130)

## 2017-12-20 LAB
LAB AP CASE REPORT: NORMAL
Lab: NORMAL
PATH REPORT.FINAL DX SPEC: NORMAL

## 2017-12-28 ENCOUNTER — OFFICE VISIT (OUTPATIENT)
Dept: SURGERY | Facility: CLINIC | Age: 52
End: 2017-12-28

## 2017-12-28 VITALS
WEIGHT: 293 LBS | SYSTOLIC BLOOD PRESSURE: 130 MMHG | OXYGEN SATURATION: 98 % | HEART RATE: 80 BPM | HEIGHT: 66 IN | DIASTOLIC BLOOD PRESSURE: 77 MMHG | TEMPERATURE: 98.8 F | BODY MASS INDEX: 47.09 KG/M2

## 2017-12-28 DIAGNOSIS — Z48.89 POSTOPERATIVE VISIT: Primary | ICD-10-CM

## 2017-12-28 PROCEDURE — 99024 POSTOP FOLLOW-UP VISIT: CPT | Performed by: SURGERY

## 2017-12-28 NOTE — PROGRESS NOTES
Patient: Jamia Hinton    YOB: 1965    Date: 12/28/2017    Primary Care Provider: CLAUDIA Han    Reason for Consultation: Follow-up hernia    Chief Complaint:   Chief Complaint   Patient presents with   • Post-op     s/p umbilical hernia       History of present illness:  I saw the patient in the office today as a followup from their recent umbilical hernia repair.  They state that they have done well.  Patient is back to a normal diet and denies any nausea, pain or bowel issues. She states that she feels markedly better.    The following portions of the patient's history were reviewed and updated as appropriate: allergies, current medications, past family history, past medical history, past social history, past surgical history and problem list.    Vital Signs:   Temp:  [98.8 °F (37.1 °C)] 98.8 °F (37.1 °C)  Heart Rate:  [80] 80  BP: (130)/(77) 130/77    Physical Exam:   General Appearance:    Alert, cooperative, in no acute distress   Abdomen:     no masses, no organomegaly, soft non-tender, non-distended, no guarding, wounds are well healed, no evidence of recurrent hernia, sutures removed today in the office, wound clean and dry and without infection or drainage     Results Review:   I reviewed the patient's new clinical results.  I reviewed the patient's new imaging results and agree with the interpretation.    Assessment / Plan:    1. Postoperative visit        I did discuss the situation with the patient today in the office and they have done well from their recent herniorraphy.  I have released the patient back to normal activity, they understand that they need to be careful about heavy lifting.  I need to see the patient back in the office only if they are having further problems, they know to call me if they are.    Electronically signed by Bassam Rogers MD  12/28/17          Scribed for Bassam Rogers MD by Nichelle Hill. 12/28/2017  2:07 PM

## 2018-09-09 ENCOUNTER — OFFICE VISIT (OUTPATIENT)
Dept: PRIMARY CARE CLINIC | Age: 53
End: 2018-09-09
Payer: COMMERCIAL

## 2018-09-09 VITALS
WEIGHT: 290 LBS | SYSTOLIC BLOOD PRESSURE: 150 MMHG | BODY MASS INDEX: 46.81 KG/M2 | HEART RATE: 70 BPM | DIASTOLIC BLOOD PRESSURE: 96 MMHG | TEMPERATURE: 97.5 F | OXYGEN SATURATION: 97 %

## 2018-09-09 DIAGNOSIS — M70.31 RADIOHUMERAL BURSITIS OF RIGHT ELBOW: Primary | ICD-10-CM

## 2018-09-09 DIAGNOSIS — L73.9 FOLLICULITIS: ICD-10-CM

## 2018-09-09 PROCEDURE — 96372 THER/PROPH/DIAG INJ SC/IM: CPT | Performed by: NURSE PRACTITIONER

## 2018-09-09 PROCEDURE — 99213 OFFICE O/P EST LOW 20 MIN: CPT | Performed by: NURSE PRACTITIONER

## 2018-09-09 PROCEDURE — G8419 CALC BMI OUT NRM PARAM NOF/U: HCPCS | Performed by: NURSE PRACTITIONER

## 2018-09-09 PROCEDURE — 3017F COLORECTAL CA SCREEN DOC REV: CPT | Performed by: NURSE PRACTITIONER

## 2018-09-09 PROCEDURE — 4004F PT TOBACCO SCREEN RCVD TLK: CPT | Performed by: NURSE PRACTITIONER

## 2018-09-09 PROCEDURE — G8427 DOCREV CUR MEDS BY ELIG CLIN: HCPCS | Performed by: NURSE PRACTITIONER

## 2018-09-09 RX ORDER — METHYLPREDNISOLONE 4 MG/1
4 TABLET ORAL SEE ADMIN INSTRUCTIONS
Qty: 1 KIT | Refills: 0 | Status: SHIPPED | OUTPATIENT
Start: 2018-09-09 | End: 2018-09-15

## 2018-09-09 RX ORDER — CEFTRIAXONE 1 G/1
1 INJECTION, POWDER, FOR SOLUTION INTRAMUSCULAR; INTRAVENOUS ONCE
Status: COMPLETED | OUTPATIENT
Start: 2018-09-09 | End: 2018-09-09

## 2018-09-09 RX ORDER — METHYLPREDNISOLONE SODIUM SUCCINATE 125 MG/2ML
125 INJECTION, POWDER, LYOPHILIZED, FOR SOLUTION INTRAMUSCULAR; INTRAVENOUS ONCE
Status: COMPLETED | OUTPATIENT
Start: 2018-09-09 | End: 2018-09-09

## 2018-09-09 RX ORDER — MELOXICAM 15 MG/1
15 TABLET ORAL DAILY
Qty: 30 TABLET | Refills: 3 | Status: SHIPPED | OUTPATIENT
Start: 2018-09-09 | End: 2019-07-09 | Stop reason: ALTCHOICE

## 2018-09-09 RX ORDER — KETOROLAC TROMETHAMINE 30 MG/ML
60 INJECTION, SOLUTION INTRAMUSCULAR; INTRAVENOUS ONCE
Status: COMPLETED | OUTPATIENT
Start: 2018-09-09 | End: 2018-09-09

## 2018-09-09 RX ORDER — CEPHALEXIN 500 MG/1
500 CAPSULE ORAL 2 TIMES DAILY
Qty: 14 CAPSULE | Refills: 0 | Status: SHIPPED | OUTPATIENT
Start: 2018-09-09 | End: 2018-09-16

## 2018-09-09 RX ADMIN — METHYLPREDNISOLONE SODIUM SUCCINATE 125 MG: 125 INJECTION, POWDER, LYOPHILIZED, FOR SOLUTION INTRAMUSCULAR; INTRAVENOUS at 14:56

## 2018-09-09 RX ADMIN — CEFTRIAXONE 1 G: 1 INJECTION, POWDER, FOR SOLUTION INTRAMUSCULAR; INTRAVENOUS at 14:53

## 2018-09-09 RX ADMIN — KETOROLAC TROMETHAMINE 60 MG: 30 INJECTION, SOLUTION INTRAMUSCULAR; INTRAVENOUS at 14:54

## 2018-09-09 NOTE — PROGRESS NOTES
Subjective:      Patient ID: Thu Beard is a 45 y.o. female. HPI  C/O neck pain that started a few weeks ago. Neck area is tender to the touch. A few days ago her left elbow started hurting as well. Rates pain 8/10, constant. The sensation has progressed to sharp pains into little finger. Pain is worsened/aggravated by weight. Pain is relieved with rest on arm of chair. Denies injury or trauma. Denies N/T, trauma or injury. She has not taken anything to relieve symptoms. Review of Systems   Musculoskeletal: Positive for arthralgias and neck pain. Objective:   Physical Exam   Constitutional: She is oriented to person, place, and time. She appears well-developed and well-nourished. HENT:   Head: Normocephalic. Right Ear: External ear normal.   Left Ear: External ear normal.   Eyes: Pupils are equal, round, and reactive to light. Right eye exhibits no discharge. Left eye exhibits no discharge. No scleral icterus. Neck: Normal range of motion. Neck supple. No thyromegaly present. Cardiovascular: Normal rate, regular rhythm, normal heart sounds and intact distal pulses. Exam reveals no gallop and no friction rub. No murmur heard. Pulmonary/Chest: Effort normal and breath sounds normal. No respiratory distress. She has no wheezes. She has no rales. Abdominal: Soft. She exhibits no mass. There is no tenderness. Musculoskeletal: Normal range of motion. She exhibits no edema or deformity. Right elbow: She exhibits swelling. Tenderness found. Lateral epicondyle tenderness noted. Neurological: She is alert and oriented to person, place, and time. Skin: Skin is warm and dry. Lesion noted. No rash noted. There is erythema. Psychiatric: She has a normal mood and affect.  Her behavior is normal. Judgment and thought content normal.       Assessment:      Folliculitis, bursitis   Plan:      tordol  Rocephin  Solu medrol  Mobic  Keflex  Medrol dose pack        Latisha Ervin

## 2019-07-08 ENCOUNTER — NURSE TRIAGE (OUTPATIENT)
Dept: OTHER | Facility: CLINIC | Age: 54
End: 2019-07-08

## 2019-07-09 ENCOUNTER — HOSPITAL ENCOUNTER (OUTPATIENT)
Facility: HOSPITAL | Age: 54
Discharge: HOME | End: 2019-07-09
Payer: COMMERCIAL

## 2019-07-09 ENCOUNTER — OFFICE VISIT (OUTPATIENT)
Dept: PRIMARY CARE CLINIC | Age: 54
End: 2019-07-09
Payer: COMMERCIAL

## 2019-07-09 VITALS
DIASTOLIC BLOOD PRESSURE: 89 MMHG | SYSTOLIC BLOOD PRESSURE: 132 MMHG | HEART RATE: 72 BPM | WEIGHT: 279 LBS | HEIGHT: 66 IN | TEMPERATURE: 97.8 F | BODY MASS INDEX: 44.84 KG/M2 | OXYGEN SATURATION: 96 %

## 2019-07-09 DIAGNOSIS — Z12.31 ENCOUNTER FOR SCREENING MAMMOGRAM FOR BREAST CANCER: ICD-10-CM

## 2019-07-09 DIAGNOSIS — F32.A ANXIETY AND DEPRESSION: ICD-10-CM

## 2019-07-09 DIAGNOSIS — R07.9 CHEST PAIN, UNSPECIFIED TYPE: ICD-10-CM

## 2019-07-09 DIAGNOSIS — F41.9 ANXIETY AND DEPRESSION: Primary | ICD-10-CM

## 2019-07-09 DIAGNOSIS — G47.00 INSOMNIA, UNSPECIFIED TYPE: ICD-10-CM

## 2019-07-09 DIAGNOSIS — F41.9 ANXIETY AND DEPRESSION: ICD-10-CM

## 2019-07-09 DIAGNOSIS — R42 VERTIGO: ICD-10-CM

## 2019-07-09 DIAGNOSIS — F32.A ANXIETY AND DEPRESSION: Primary | ICD-10-CM

## 2019-07-09 LAB
A/G RATIO: 1.5 (ref 0.8–2)
ALBUMIN SERPL-MCNC: 4.6 G/DL (ref 3.4–4.8)
ALP BLD-CCNC: 71 U/L (ref 25–100)
ALT SERPL-CCNC: 22 U/L (ref 4–36)
ANION GAP SERPL CALCULATED.3IONS-SCNC: 13 MMOL/L (ref 3–16)
AST SERPL-CCNC: 26 U/L (ref 8–33)
BILIRUB SERPL-MCNC: 0.9 MG/DL (ref 0.3–1.2)
BUN BLDV-MCNC: 15 MG/DL (ref 6–20)
CALCIUM SERPL-MCNC: 9.9 MG/DL (ref 8.5–10.5)
CHLORIDE BLD-SCNC: 101 MMOL/L (ref 98–107)
CHOLESTEROL, TOTAL: 253 MG/DL (ref 0–200)
CO2: 26 MMOL/L (ref 20–30)
CREAT SERPL-MCNC: 0.9 MG/DL (ref 0.4–1.2)
GFR AFRICAN AMERICAN: >59
GFR NON-AFRICAN AMERICAN: >60
GLOBULIN: 3 G/DL
GLUCOSE BLD-MCNC: 102 MG/DL (ref 74–106)
HCT VFR BLD CALC: 45.8 % (ref 37–47)
HDLC SERPL-MCNC: 57 MG/DL (ref 40–60)
HEMOGLOBIN: 15.2 G/DL (ref 11.5–16.5)
LDL CHOLESTEROL CALCULATED: 166 MG/DL
MCH RBC QN AUTO: 27.7 PG (ref 27–32)
MCHC RBC AUTO-ENTMCNC: 33.2 G/DL (ref 31–35)
MCV RBC AUTO: 83.6 FL (ref 80–100)
PDW BLD-RTO: 13.4 % (ref 11–16)
PLATELET # BLD: 278 K/UL (ref 150–400)
PMV BLD AUTO: 10.8 FL (ref 6–10)
POTASSIUM SERPL-SCNC: 4.2 MMOL/L (ref 3.4–5.1)
RBC # BLD: 5.48 M/UL (ref 3.8–5.8)
SODIUM BLD-SCNC: 140 MMOL/L (ref 136–145)
TOTAL PROTEIN: 7.6 G/DL (ref 6.4–8.3)
TRIGL SERPL-MCNC: 149 MG/DL (ref 0–249)
TROPONIN: <0.3 NG/ML
TSH REFLEX: 2.57 UIU/ML (ref 0.35–5.5)
VLDLC SERPL CALC-MCNC: 30 MG/DL
WBC # BLD: 7.8 K/UL (ref 4–11)

## 2019-07-09 PROCEDURE — 1036F TOBACCO NON-USER: CPT | Performed by: NURSE PRACTITIONER

## 2019-07-09 PROCEDURE — 80061 LIPID PANEL: CPT

## 2019-07-09 PROCEDURE — 99214 OFFICE O/P EST MOD 30 MIN: CPT | Performed by: NURSE PRACTITIONER

## 2019-07-09 PROCEDURE — 80053 COMPREHEN METABOLIC PANEL: CPT

## 2019-07-09 PROCEDURE — G8427 DOCREV CUR MEDS BY ELIG CLIN: HCPCS | Performed by: NURSE PRACTITIONER

## 2019-07-09 PROCEDURE — G8417 CALC BMI ABV UP PARAM F/U: HCPCS | Performed by: NURSE PRACTITIONER

## 2019-07-09 PROCEDURE — 36415 COLL VENOUS BLD VENIPUNCTURE: CPT

## 2019-07-09 PROCEDURE — 84484 ASSAY OF TROPONIN QUANT: CPT

## 2019-07-09 PROCEDURE — 84443 ASSAY THYROID STIM HORMONE: CPT

## 2019-07-09 PROCEDURE — 3017F COLORECTAL CA SCREEN DOC REV: CPT | Performed by: NURSE PRACTITIONER

## 2019-07-09 PROCEDURE — 85027 COMPLETE CBC AUTOMATED: CPT

## 2019-07-09 RX ORDER — HYDROXYZINE HYDROCHLORIDE 25 MG/1
25 TABLET, FILM COATED ORAL NIGHTLY PRN
Qty: 30 TABLET | Refills: 0 | Status: SHIPPED | OUTPATIENT
Start: 2019-07-09 | End: 2019-08-07 | Stop reason: SDUPTHER

## 2019-07-09 RX ORDER — MECLIZINE HCL 12.5 MG/1
12.5 TABLET ORAL 2 TIMES DAILY PRN
Qty: 30 TABLET | Refills: 0 | Status: SHIPPED | OUTPATIENT
Start: 2019-07-09 | End: 2020-02-04

## 2019-07-09 RX ORDER — CITALOPRAM 20 MG/1
20 TABLET ORAL DAILY
Qty: 30 TABLET | Refills: 0 | Status: SHIPPED | OUTPATIENT
Start: 2019-07-09 | End: 2019-08-07 | Stop reason: SDUPTHER

## 2019-07-10 ENCOUNTER — TELEPHONE (OUTPATIENT)
Dept: PRIMARY CARE CLINIC | Age: 54
End: 2019-07-10

## 2019-07-10 NOTE — TELEPHONE ENCOUNTER
----- Message from TERRELL Ramos CNP sent at 7/9/2019 12:00 PM EDT -----  Let pt know that her labs were normal. Her cholesterol is elevated so she needs to follow low carb, low cholesterol diet and keep trying to lose weight. F/U DANIEL as scheduled for further orders if needed.

## 2019-07-12 ENCOUNTER — HOSPITAL ENCOUNTER (OUTPATIENT)
Dept: MAMMOGRAPHY | Facility: HOSPITAL | Age: 54
Discharge: HOME | End: 2019-07-12
Payer: COMMERCIAL

## 2019-07-12 DIAGNOSIS — Z12.31 ENCOUNTER FOR SCREENING MAMMOGRAM FOR BREAST CANCER: ICD-10-CM

## 2019-07-12 PROCEDURE — 77063 BREAST TOMOSYNTHESIS BI: CPT

## 2019-07-12 ASSESSMENT — ENCOUNTER SYMPTOMS
CONSTIPATION: 0
DIARRHEA: 0
EYE REDNESS: 0
ABDOMINAL PAIN: 0
WHEEZING: 0
CHEST TIGHTNESS: 1
SHORTNESS OF BREATH: 0
BACK PAIN: 0
PHOTOPHOBIA: 0
COLOR CHANGE: 0
NAUSEA: 0
ABDOMINAL DISTENTION: 0
COUGH: 0
SORE THROAT: 0

## 2019-07-19 DIAGNOSIS — R92.8 ABNORMAL MAMMOGRAM OF LEFT BREAST: Primary | ICD-10-CM

## 2019-07-22 ENCOUNTER — HOSPITAL ENCOUNTER (OUTPATIENT)
Dept: ULTRASOUND IMAGING | Facility: HOSPITAL | Age: 54
Discharge: HOME | End: 2019-07-22
Payer: COMMERCIAL

## 2019-07-22 DIAGNOSIS — R92.8 ABNORMAL MAMMOGRAM OF LEFT BREAST: ICD-10-CM

## 2019-07-22 PROCEDURE — 76642 ULTRASOUND BREAST LIMITED: CPT

## 2019-07-24 ENCOUNTER — TELEPHONE (OUTPATIENT)
Dept: SURGERY | Age: 54
End: 2019-07-24

## 2019-07-24 DIAGNOSIS — N63.0 BREAST NODULE: Primary | ICD-10-CM

## 2019-08-02 ENCOUNTER — OFFICE VISIT (OUTPATIENT)
Dept: SURGERY | Age: 54
End: 2019-08-02
Payer: COMMERCIAL

## 2019-08-02 VITALS
BODY MASS INDEX: 45.38 KG/M2 | TEMPERATURE: 98.4 F | RESPIRATION RATE: 16 BRPM | HEIGHT: 66 IN | WEIGHT: 282.4 LBS | HEART RATE: 70 BPM | DIASTOLIC BLOOD PRESSURE: 83 MMHG | SYSTOLIC BLOOD PRESSURE: 141 MMHG

## 2019-08-02 DIAGNOSIS — R92.8 ABNORMAL MAMMOGRAM OF LEFT BREAST: Primary | ICD-10-CM

## 2019-08-02 PROCEDURE — G8427 DOCREV CUR MEDS BY ELIG CLIN: HCPCS | Performed by: SURGERY

## 2019-08-02 PROCEDURE — 99213 OFFICE O/P EST LOW 20 MIN: CPT

## 2019-08-02 PROCEDURE — G8417 CALC BMI ABV UP PARAM F/U: HCPCS | Performed by: SURGERY

## 2019-08-02 PROCEDURE — 76942 ECHO GUIDE FOR BIOPSY: CPT | Performed by: SURGERY

## 2019-08-02 PROCEDURE — 99203 OFFICE O/P NEW LOW 30 MIN: CPT

## 2019-08-02 PROCEDURE — 99243 OFF/OP CNSLTJ NEW/EST LOW 30: CPT | Performed by: SURGERY

## 2019-08-07 ENCOUNTER — OFFICE VISIT (OUTPATIENT)
Dept: PRIMARY CARE CLINIC | Age: 54
End: 2019-08-07
Payer: COMMERCIAL

## 2019-08-07 VITALS
OXYGEN SATURATION: 94 % | SYSTOLIC BLOOD PRESSURE: 120 MMHG | BODY MASS INDEX: 45.52 KG/M2 | HEART RATE: 92 BPM | DIASTOLIC BLOOD PRESSURE: 80 MMHG | WEIGHT: 282 LBS

## 2019-08-07 DIAGNOSIS — M54.6 ACUTE MIDLINE THORACIC BACK PAIN: Primary | ICD-10-CM

## 2019-08-07 DIAGNOSIS — R92.8 ABNORMAL MAMMOGRAM OF LEFT BREAST: ICD-10-CM

## 2019-08-07 DIAGNOSIS — F32.A ANXIETY AND DEPRESSION: ICD-10-CM

## 2019-08-07 DIAGNOSIS — F41.9 ANXIETY AND DEPRESSION: ICD-10-CM

## 2019-08-07 DIAGNOSIS — G47.00 INSOMNIA, UNSPECIFIED TYPE: ICD-10-CM

## 2019-08-07 PROCEDURE — G8427 DOCREV CUR MEDS BY ELIG CLIN: HCPCS | Performed by: NURSE PRACTITIONER

## 2019-08-07 PROCEDURE — 3017F COLORECTAL CA SCREEN DOC REV: CPT | Performed by: NURSE PRACTITIONER

## 2019-08-07 PROCEDURE — 1036F TOBACCO NON-USER: CPT | Performed by: NURSE PRACTITIONER

## 2019-08-07 PROCEDURE — 99214 OFFICE O/P EST MOD 30 MIN: CPT | Performed by: NURSE PRACTITIONER

## 2019-08-07 PROCEDURE — G8417 CALC BMI ABV UP PARAM F/U: HCPCS | Performed by: NURSE PRACTITIONER

## 2019-08-07 RX ORDER — HYDROXYZINE HYDROCHLORIDE 25 MG/1
25 TABLET, FILM COATED ORAL EVERY 8 HOURS PRN
Qty: 90 TABLET | Refills: 5 | Status: SHIPPED | OUTPATIENT
Start: 2019-08-07 | End: 2019-09-06

## 2019-08-07 RX ORDER — HYDROCODONE BITARTRATE AND ACETAMINOPHEN 5; 325 MG/1; MG/1
1 TABLET ORAL EVERY 4 HOURS PRN
Qty: 18 TABLET | Refills: 0 | Status: SHIPPED | OUTPATIENT
Start: 2019-08-07 | End: 2019-08-10

## 2019-08-07 RX ORDER — TIZANIDINE 4 MG/1
4 TABLET ORAL EVERY 8 HOURS PRN
Qty: 30 TABLET | Refills: 2 | Status: SHIPPED | OUTPATIENT
Start: 2019-08-07 | End: 2019-10-07 | Stop reason: SDUPTHER

## 2019-08-07 RX ORDER — CITALOPRAM 20 MG/1
20 TABLET ORAL DAILY
Qty: 30 TABLET | Refills: 5 | Status: SHIPPED | OUTPATIENT
Start: 2019-08-07 | End: 2020-02-04 | Stop reason: ALTCHOICE

## 2019-08-07 ASSESSMENT — ENCOUNTER SYMPTOMS
COUGH: 0
SORE THROAT: 0
ABDOMINAL PAIN: 0
NAUSEA: 0
SHORTNESS OF BREATH: 0
EYE PAIN: 0
VOMITING: 0

## 2019-08-07 ASSESSMENT — PATIENT HEALTH QUESTIONNAIRE - PHQ9: DEPRESSION UNABLE TO ASSESS: URGENT/EMERGENT SITUATION

## 2019-08-07 NOTE — PROGRESS NOTES
Have you seen any other physician or provider since your last visit? yes - Dr. Ilana Martínez    Have you had any other diagnostic tests since your last visit? no    Have you changed or stopped any medications since your last visit including any over-the-counter medicines, vitamins, or herbal medicines? no     Are you taking all your prescribed medications? Yes  If NO, why? -  N/A      REVIEW OF SYSTEMS:  Review of Systems   Constitutional: Negative for chills and fever. HENT: Negative for ear pain and sore throat. Eyes: Negative for pain and visual disturbance. Respiratory: Negative for cough and shortness of breath. Cardiovascular: Negative for chest pain, palpitations and leg swelling. Gastrointestinal: Negative for abdominal pain, nausea and vomiting. Genitourinary: Negative for dysuria and hematuria. Musculoskeletal: Positive for back pain. Negative for joint swelling. Skin: Negative for rash. Neurological: Negative for dizziness and weakness. Psychiatric/Behavioral: Negative for sleep disturbance. The patient is nervous/anxious.

## 2019-08-14 ENCOUNTER — HOSPITAL ENCOUNTER (OUTPATIENT)
Dept: ULTRASOUND IMAGING | Facility: HOSPITAL | Age: 54
Discharge: HOME | End: 2019-08-14
Payer: COMMERCIAL

## 2019-08-14 ENCOUNTER — PROCEDURE VISIT (OUTPATIENT)
Dept: SURGERY | Age: 54
End: 2019-08-14
Payer: COMMERCIAL

## 2019-08-14 DIAGNOSIS — N63.20 LEFT BREAST MASS: ICD-10-CM

## 2019-08-14 DIAGNOSIS — R92.8 ABNORMAL MAMMOGRAM OF LEFT BREAST: Primary | ICD-10-CM

## 2019-08-14 PROCEDURE — 19083 BX BREAST 1ST LESION US IMAG: CPT | Performed by: SURGERY

## 2019-08-14 PROCEDURE — 19083 BX BREAST 1ST LESION US IMAG: CPT

## 2019-08-14 PROCEDURE — 76942 ECHO GUIDE FOR BIOPSY: CPT

## 2019-08-14 NOTE — PROGRESS NOTES
After obtaining appropriate consent fromthe patient the patient was placed in supine position. The left breast was prepped and draped in normal sterile fashion. Under ultrasound guidance the lesion at 2 o'clock was identified. Using 1% xylocaine injected with 25g needle into skin and subcutaneous tissue, local anesthesia was applied. 11 blade was used to make small skin incision and the subcutaneous tissue was dissected bluntly with hemastat under ultrasound guidance. An eleven gauge mammotome was next inserted through the skin incision to a point just inferior and adjacent to the mass under ultrasound guidance. Multiple passes were made to extract the mass until it was almost completely removed. These specimens were submitted to pathology. At this point a biodegradable clip was placed via the mammotome under ultrasound guidance in the biopsy cavity. Mammotome was removed and pressure applied to biopsy cavity externally for 5 minutes. Steri-strips were then used for skin closure and a sterile compressive dressing was placed. The patient tolerated the procedure well and will follow up with me next week for pathology report and to determine furthur workup and/or treatment.     Electronically signed by Gui Hernández MD on 8/14/2019 at 12:19 PM

## 2019-08-19 ASSESSMENT — ENCOUNTER SYMPTOMS: BACK PAIN: 1

## 2019-08-21 ENCOUNTER — OFFICE VISIT (OUTPATIENT)
Dept: SURGERY | Age: 54
End: 2019-08-21
Payer: COMMERCIAL

## 2019-08-21 VITALS
WEIGHT: 282.9 LBS | TEMPERATURE: 97.9 F | HEIGHT: 67 IN | SYSTOLIC BLOOD PRESSURE: 168 MMHG | BODY MASS INDEX: 44.4 KG/M2 | HEART RATE: 73 BPM | DIASTOLIC BLOOD PRESSURE: 83 MMHG | RESPIRATION RATE: 16 BRPM

## 2019-08-21 DIAGNOSIS — D24.1 FIBROADENOMA OF BREAST, RIGHT: Primary | ICD-10-CM

## 2019-08-21 PROCEDURE — G8417 CALC BMI ABV UP PARAM F/U: HCPCS | Performed by: SURGERY

## 2019-08-21 PROCEDURE — 3017F COLORECTAL CA SCREEN DOC REV: CPT | Performed by: SURGERY

## 2019-08-21 PROCEDURE — 99212 OFFICE O/P EST SF 10 MIN: CPT | Performed by: SURGERY

## 2019-08-21 PROCEDURE — G8427 DOCREV CUR MEDS BY ELIG CLIN: HCPCS | Performed by: SURGERY

## 2019-08-21 PROCEDURE — 1036F TOBACCO NON-USER: CPT | Performed by: SURGERY

## 2019-08-21 PROCEDURE — 99212 OFFICE O/P EST SF 10 MIN: CPT

## 2019-09-04 ENCOUNTER — OFFICE VISIT (OUTPATIENT)
Dept: PRIMARY CARE CLINIC | Age: 54
End: 2019-09-04
Payer: COMMERCIAL

## 2019-09-04 VITALS
WEIGHT: 280 LBS | HEART RATE: 97 BPM | OXYGEN SATURATION: 96 % | BODY MASS INDEX: 43.85 KG/M2 | DIASTOLIC BLOOD PRESSURE: 80 MMHG | SYSTOLIC BLOOD PRESSURE: 114 MMHG

## 2019-09-04 DIAGNOSIS — F32.A ANXIETY AND DEPRESSION: Primary | ICD-10-CM

## 2019-09-04 DIAGNOSIS — F41.9 ANXIETY AND DEPRESSION: Primary | ICD-10-CM

## 2019-09-04 DIAGNOSIS — D24.1 FIBROADENOMA OF BREAST, RIGHT: ICD-10-CM

## 2019-09-04 PROCEDURE — G8417 CALC BMI ABV UP PARAM F/U: HCPCS | Performed by: NURSE PRACTITIONER

## 2019-09-04 PROCEDURE — 3017F COLORECTAL CA SCREEN DOC REV: CPT | Performed by: NURSE PRACTITIONER

## 2019-09-04 PROCEDURE — 1036F TOBACCO NON-USER: CPT | Performed by: NURSE PRACTITIONER

## 2019-09-04 PROCEDURE — G8427 DOCREV CUR MEDS BY ELIG CLIN: HCPCS | Performed by: NURSE PRACTITIONER

## 2019-09-04 PROCEDURE — 99213 OFFICE O/P EST LOW 20 MIN: CPT | Performed by: NURSE PRACTITIONER

## 2019-09-04 ASSESSMENT — ENCOUNTER SYMPTOMS
EYE PAIN: 0
COUGH: 0
SHORTNESS OF BREATH: 0
VOMITING: 0
NAUSEA: 0
ABDOMINAL PAIN: 0
SORE THROAT: 0

## 2019-09-04 NOTE — PROGRESS NOTES
Have you seen any other physician or provider since your last visit? [facility]    Have you had any other diagnostic tests since your last visit? no    Have you changed or stopped any medications since your last visit including any over-the-counter medicines, vitamins, or herbal medicines? no     Are you taking all your prescribed medications? Yes  If NO, why? -  N/A      REVIEW OF SYSTEMS:  Review of Systems   Constitutional: Negative for chills and fever. HENT: Negative for ear pain and sore throat. Eyes: Negative for pain and visual disturbance. Respiratory: Negative for cough and shortness of breath. Cardiovascular: Negative for chest pain, palpitations and leg swelling. Gastrointestinal: Negative for abdominal pain, nausea and vomiting. Genitourinary: Negative for dysuria and hematuria. Musculoskeletal: Negative for joint swelling. Skin: Negative for rash. Neurological: Negative for dizziness and weakness. Psychiatric/Behavioral: Negative for sleep disturbance.

## 2019-10-07 RX ORDER — TIZANIDINE 4 MG/1
TABLET ORAL
Qty: 30 TABLET | Refills: 2 | Status: SHIPPED | OUTPATIENT
Start: 2019-10-07 | End: 2020-02-04

## 2020-02-04 ENCOUNTER — OFFICE VISIT (OUTPATIENT)
Dept: PRIMARY CARE CLINIC | Age: 55
End: 2020-02-04
Payer: MEDICAID

## 2020-02-04 ENCOUNTER — HOSPITAL ENCOUNTER (OUTPATIENT)
Facility: HOSPITAL | Age: 55
Discharge: HOME | End: 2020-02-04
Payer: MEDICAID

## 2020-02-04 VITALS
BODY MASS INDEX: 44.48 KG/M2 | SYSTOLIC BLOOD PRESSURE: 130 MMHG | OXYGEN SATURATION: 98 % | DIASTOLIC BLOOD PRESSURE: 80 MMHG | WEIGHT: 284 LBS | HEART RATE: 88 BPM

## 2020-02-04 LAB
A/G RATIO: 1.6 (ref 0.8–2)
ALBUMIN SERPL-MCNC: 4.4 G/DL (ref 3.4–4.8)
ALP BLD-CCNC: 61 U/L (ref 25–100)
ALT SERPL-CCNC: 19 U/L (ref 4–36)
ANION GAP SERPL CALCULATED.3IONS-SCNC: 11 MMOL/L (ref 3–16)
AST SERPL-CCNC: 23 U/L (ref 8–33)
BASOPHILS ABSOLUTE: 0.1 K/UL (ref 0–0.1)
BASOPHILS RELATIVE PERCENT: 1 %
BILIRUB SERPL-MCNC: 0.6 MG/DL (ref 0.3–1.2)
BUN BLDV-MCNC: 13 MG/DL (ref 6–20)
CALCIUM SERPL-MCNC: 9.7 MG/DL (ref 8.5–10.5)
CHLORIDE BLD-SCNC: 102 MMOL/L (ref 98–107)
CO2: 27 MMOL/L (ref 20–30)
CREAT SERPL-MCNC: 0.9 MG/DL (ref 0.4–1.2)
EOSINOPHILS ABSOLUTE: 0.1 K/UL (ref 0–0.4)
EOSINOPHILS RELATIVE PERCENT: 2 %
GFR AFRICAN AMERICAN: >59
GFR NON-AFRICAN AMERICAN: >60
GLOBULIN: 2.7 G/DL
GLUCOSE BLD-MCNC: 92 MG/DL (ref 74–106)
HCT VFR BLD CALC: 47 % (ref 37–47)
HEMOGLOBIN: 15 G/DL (ref 11.5–16.5)
IMMATURE GRANULOCYTES #: 0 K/UL
IMMATURE GRANULOCYTES %: 0.3 % (ref 0–5)
LYMPHOCYTES ABSOLUTE: 1 K/UL (ref 1.5–4)
LYMPHOCYTES RELATIVE PERCENT: 16.8 %
MAGNESIUM: 2.2 MG/DL (ref 1.7–2.4)
MCH RBC QN AUTO: 27.1 PG (ref 27–32)
MCHC RBC AUTO-ENTMCNC: 31.9 G/DL (ref 31–35)
MCV RBC AUTO: 84.8 FL (ref 80–100)
MONOCYTES ABSOLUTE: 0.8 K/UL (ref 0.2–0.8)
MONOCYTES RELATIVE PERCENT: 13.5 %
NEUTROPHILS ABSOLUTE: 4 K/UL (ref 2–7.5)
NEUTROPHILS RELATIVE PERCENT: 66.4 %
PDW BLD-RTO: 13.1 % (ref 11–16)
PLATELET # BLD: 282 K/UL (ref 150–400)
PMV BLD AUTO: 11.7 FL (ref 6–10)
POTASSIUM SERPL-SCNC: 4.7 MMOL/L (ref 3.4–5.1)
RBC # BLD: 5.54 M/UL (ref 3.8–5.8)
SODIUM BLD-SCNC: 140 MMOL/L (ref 136–145)
TOTAL PROTEIN: 7.1 G/DL (ref 6.4–8.3)
VITAMIN B-12: 416 PG/ML (ref 211–911)
WBC # BLD: 6.1 K/UL (ref 4–11)

## 2020-02-04 PROCEDURE — G8484 FLU IMMUNIZE NO ADMIN: HCPCS | Performed by: NURSE PRACTITIONER

## 2020-02-04 PROCEDURE — 85025 COMPLETE CBC W/AUTO DIFF WBC: CPT

## 2020-02-04 PROCEDURE — G8427 DOCREV CUR MEDS BY ELIG CLIN: HCPCS | Performed by: NURSE PRACTITIONER

## 2020-02-04 PROCEDURE — 3017F COLORECTAL CA SCREEN DOC REV: CPT | Performed by: NURSE PRACTITIONER

## 2020-02-04 PROCEDURE — 99213 OFFICE O/P EST LOW 20 MIN: CPT | Performed by: NURSE PRACTITIONER

## 2020-02-04 PROCEDURE — G8417 CALC BMI ABV UP PARAM F/U: HCPCS | Performed by: NURSE PRACTITIONER

## 2020-02-04 PROCEDURE — 83735 ASSAY OF MAGNESIUM: CPT

## 2020-02-04 PROCEDURE — 80053 COMPREHEN METABOLIC PANEL: CPT

## 2020-02-04 PROCEDURE — 82607 VITAMIN B-12: CPT

## 2020-02-04 PROCEDURE — 1036F TOBACCO NON-USER: CPT | Performed by: NURSE PRACTITIONER

## 2020-02-04 RX ORDER — HYDROXYZINE HYDROCHLORIDE 25 MG/1
TABLET, FILM COATED ORAL
COMMUNITY
Start: 2019-11-05 | End: 2021-04-14 | Stop reason: ALTCHOICE

## 2020-02-04 ASSESSMENT — PATIENT HEALTH QUESTIONNAIRE - PHQ9
1. LITTLE INTEREST OR PLEASURE IN DOING THINGS: 0
SUM OF ALL RESPONSES TO PHQ QUESTIONS 1-9: 0
SUM OF ALL RESPONSES TO PHQ9 QUESTIONS 1 & 2: 0
SUM OF ALL RESPONSES TO PHQ QUESTIONS 1-9: 0
2. FEELING DOWN, DEPRESSED OR HOPELESS: 0

## 2020-02-04 ASSESSMENT — ENCOUNTER SYMPTOMS
SHORTNESS OF BREATH: 0
EYE PAIN: 0
ABDOMINAL PAIN: 0
COUGH: 0
SORE THROAT: 0
VOMITING: 0
NAUSEA: 0

## 2020-02-04 NOTE — PROGRESS NOTES
Chief Complaint   Patient presents with    Numbness     Patient here today states her face goes numb occasionally and swells only on the left side, every now and then she states her left arm is kind of jumpy excepcially if she is driving. Have you seen any other physician or provider since your last visit? no    Have you had any other diagnostic tests since your last visit? no    Have you changed or stopped any medications since your last visit including any over-the-counter medicines, vitamins, or herbal medicines? no     Are you taking all your prescribed medications? Yes  If NO, why? -  N/A    I have recommended that this patient have a flu shot but she declines at this time. I have discussed the risks and benefits of this examination with her. The patient verbalizes understanding. REVIEW OF SYSTEMS:  Review of Systems   Constitutional: Negative for chills and fever. HENT: Negative for ear pain and sore throat. Eyes: Negative for pain and visual disturbance. Respiratory: Negative for cough and shortness of breath. Cardiovascular: Negative for chest pain, palpitations and leg swelling. Gastrointestinal: Negative for abdominal pain, nausea and vomiting. Genitourinary: Negative for dysuria and hematuria. Musculoskeletal: Negative for joint swelling. Skin: Negative for rash. Neurological: Positive for numbness. Negative for dizziness and weakness. Psychiatric/Behavioral: Negative for sleep disturbance.
84.8 02/04/2020     02/04/2020     Lab Results   Component Value Date    TSH 3.43 01/20/2015       Prior to Visit Medications    Medication Sig Taking? Authorizing Provider   hydrOXYzine (ATARAX) 25 MG tablet  Yes Historical Provider, MD   Cyanocobalamin 2500 MCG SUBL Place 2,500 mcg under the tongue daily  TERRELL Rousseau       ASSESSMENT:  1. Eye muscle twitches    2. Numbness and tingling of left side of face    3. Arm paresthesia, left        PLAN:  No orders of the defined types were placed in this encounter. Orders Placed This Encounter   Procedures    CBC WITH AUTO DIFFERENTIAL    COMPREHENSIVE METABOLIC PANEL    MAGNESIUM    VITAMIN B12     There are no Patient Instructions on file for this visit. No follow-ups on file.

## 2020-02-06 RX ORDER — VITAMIN B COMPLEX
2500 TABLET ORAL DAILY
Qty: 30 TABLET | Refills: 5 | Status: SHIPPED | OUTPATIENT
Start: 2020-02-06 | End: 2021-03-17 | Stop reason: ALTCHOICE

## 2020-02-14 ENCOUNTER — TELEPHONE (OUTPATIENT)
Dept: PRIMARY CARE CLINIC | Age: 55
End: 2020-02-14

## 2020-02-14 ENCOUNTER — HOSPITAL ENCOUNTER (OUTPATIENT)
Dept: CT IMAGING | Facility: HOSPITAL | Age: 55
Discharge: HOME | End: 2020-02-14
Payer: MEDICAID

## 2020-02-14 PROCEDURE — 70460 CT HEAD/BRAIN W/DYE: CPT

## 2020-02-14 PROCEDURE — 6360000004 HC RX CONTRAST MEDICATION

## 2020-02-14 RX ADMIN — IOPAMIDOL 100 ML: 755 INJECTION, SOLUTION INTRAVENOUS at 09:37

## 2020-02-14 NOTE — TELEPHONE ENCOUNTER
----- Message from TERRELL Moreno sent at 2/14/2020 12:48 PM EST -----  Please call patient and inform results of last test were normal

## 2020-07-31 ENCOUNTER — TELEPHONE (OUTPATIENT)
Dept: SURGERY | Facility: CLINIC | Age: 55
End: 2020-07-31

## 2020-07-31 NOTE — TELEPHONE ENCOUNTER
Called pt and left message she needed to have a follow up appt after she gets her mammogram ,ask pt to call our office

## 2021-03-17 ENCOUNTER — OFFICE VISIT (OUTPATIENT)
Dept: PRIMARY CARE CLINIC | Age: 56
End: 2021-03-17
Payer: MEDICAID

## 2021-03-17 VITALS
DIASTOLIC BLOOD PRESSURE: 89 MMHG | WEIGHT: 289.4 LBS | BODY MASS INDEX: 45.42 KG/M2 | HEART RATE: 72 BPM | SYSTOLIC BLOOD PRESSURE: 149 MMHG | OXYGEN SATURATION: 96 % | HEIGHT: 67 IN | TEMPERATURE: 97.6 F

## 2021-03-17 DIAGNOSIS — E53.8 VITAMIN B 12 DEFICIENCY: ICD-10-CM

## 2021-03-17 DIAGNOSIS — J01.40 ACUTE PANSINUSITIS, RECURRENCE NOT SPECIFIED: Primary | ICD-10-CM

## 2021-03-17 DIAGNOSIS — J30.89 ENVIRONMENTAL AND SEASONAL ALLERGIES: ICD-10-CM

## 2021-03-17 DIAGNOSIS — R25.3 EYE MUSCLE TWITCHES: ICD-10-CM

## 2021-03-17 PROCEDURE — 99213 OFFICE O/P EST LOW 20 MIN: CPT | Performed by: NURSE PRACTITIONER

## 2021-03-17 RX ORDER — CYANOCOBALAMIN 1000 UG/ML
1000 INJECTION INTRAMUSCULAR; SUBCUTANEOUS ONCE
Status: COMPLETED | OUTPATIENT
Start: 2021-03-17 | End: 2021-03-17

## 2021-03-17 RX ORDER — MONTELUKAST SODIUM 10 MG/1
10 TABLET ORAL NIGHTLY
Qty: 30 TABLET | Refills: 1 | Status: SHIPPED | OUTPATIENT
Start: 2021-03-17 | End: 2021-04-14 | Stop reason: SDUPTHER

## 2021-03-17 RX ORDER — CEFDINIR 300 MG/1
300 CAPSULE ORAL 2 TIMES DAILY
Qty: 20 CAPSULE | Refills: 0 | Status: SHIPPED | OUTPATIENT
Start: 2021-03-17 | End: 2021-03-27

## 2021-03-17 RX ADMIN — CYANOCOBALAMIN 1000 MCG: 1000 INJECTION INTRAMUSCULAR; SUBCUTANEOUS at 11:55

## 2021-03-17 ASSESSMENT — PATIENT HEALTH QUESTIONNAIRE - PHQ9
2. FEELING DOWN, DEPRESSED OR HOPELESS: 0
SUM OF ALL RESPONSES TO PHQ QUESTIONS 1-9: 0
1. LITTLE INTEREST OR PLEASURE IN DOING THINGS: 0

## 2021-03-17 NOTE — PROGRESS NOTES
SUBJECTIVE:    Patient ID: Kimberlyn Linares is a 60 y. o.female. Chief Complaint   Patient presents with    Sinus Problem    Eye Problem     left         HPI:    Patient presents to clinic with c/o nasal and sinus congestion for the past few months. Associated symptoms include frontal headache, fatigue, postnasal drainage, mild dry cough, sinus pressure, left eye twitching few months too. No vision changes Has not been to eye doc due to covid. Denies fevers, chills, n/v/d, sick contact, SOB. No relieving or worsening factors reported. Symptoms worsening despite OTC meds. Reports the left eye twitching which has occurred in the past. Got better then returned for few months now. MRI in past negative acute findings. Has not been to eye doc. No injury or drainage or redness of eye. Patient's medications, allergies, past medical, surgical, social and family histories were reviewed and updated as appropriate in electronic medical record. No outpatient medications have been marked as taking for the 3/17/21 encounter (Office Visit) with TERRELL Hanna CNP. Review of Systems   Constitutional: Negative for chills, diaphoresis, fatigue and fever. HENT: Positive for congestion, postnasal drip, sinus pressure, sneezing and sore throat. Negative for ear pain. Eyes: Negative for pain, discharge, redness, itching and visual disturbance. Left eye twitching few months   Respiratory: Negative. Cardiovascular: Negative. Gastrointestinal: Negative. Musculoskeletal: Negative. Allergic/Immunologic: Positive for environmental allergies. Neurological: Negative.         Past Medical History:   Diagnosis Date    Asthma     Cancer [facility]) 1970[facility]    [facility]     Past Surgical History:   Procedure Laterality Date     SECTION      CHOLECYSTECTOMY  9978'Y    [facility] [place]    COLONOSCOPY  2014    HERNIA REPAIR      umbilical     Family History   Problem Relation Age of Onset    Diabetes Mother     High Blood Pressure Mother     Diabetes Father     Heart Disease Father     Cancer Father     Diabetes Brother       Social History     Tobacco Use   Smoking Status Never Smoker   Smokeless Tobacco Never Used       OBJECTIVE:   Wt Readings from Last 3 Encounters:   03/17/21 289 lb 6.4 oz (131.3 kg)   02/04/20 284 lb (128.8 kg)   09/04/19 280 lb (127 kg)     BP Readings from Last 3 Encounters:   03/17/21 (!) 149/89   02/04/20 130/80   09/04/19 114/80       BP (!) 149/89 (Site: Left Upper Arm, Position: Sitting)   Pulse 72   Temp 97.6 °F (36.4 °C) (Temporal)   Ht 5' 7\" (1.702 m)   Wt 289 lb 6.4 oz (131.3 kg)   SpO2 96%   BMI 45.33 kg/m²      Physical Exam  Vitals signs and nursing note reviewed. Constitutional:       General: She is not in acute distress. HENT:      Head: Normocephalic. Right Ear: Tympanic membrane and external ear normal.      Left Ear: Tympanic membrane and external ear normal.      Nose: Mucosal edema and congestion present. Right Sinus: Maxillary sinus tenderness and frontal sinus tenderness present. Left Sinus: Maxillary sinus tenderness and frontal sinus tenderness present. Mouth/Throat:      Mouth: Mucous membranes are moist.      Pharynx: Oropharynx is clear. Uvula midline. Posterior oropharyngeal erythema present. No oropharyngeal exudate. Eyes:      General: No scleral icterus. Right eye: No discharge. Left eye: No discharge. Extraocular Movements: Extraocular movements intact. Conjunctiva/sclera: Conjunctivae normal.      Pupils: Pupils are equal, round, and reactive to light. Neck:      Musculoskeletal: Normal range of motion. Cardiovascular:      Rate and Rhythm: Normal rate and regular rhythm. Pulmonary:      Effort: Pulmonary effort is normal.      Breath sounds: Normal breath sounds. Lymphadenopathy:      Cervical: No cervical adenopathy.    Neurological:      General: No focal deficit present. Mental Status: She is alert and oriented to person, place, and time. Psychiatric:         Mood and Affect: Mood normal.         Behavior: Behavior normal.         Lab Results   Component Value Date     02/04/2020    K 4.7 02/04/2020     02/04/2020    CO2 27 02/04/2020    GLUCOSE 92 02/04/2020    BUN 13 02/04/2020    CREATININE 0.9 02/04/2020    CALCIUM 9.7 02/04/2020    PROT 7.1 02/04/2020    LABALBU 4.4 02/04/2020    BILITOT 0.6 02/04/2020    ALT 19 02/04/2020    AST 23 02/04/2020       Hemoglobin A1C (%)   Date Value   01/20/2015 5.6     LDL Calculated (mg/dL)   Date Value   07/09/2019 166 (H)         Lab Results   Component Value Date    WBC 6.1 02/04/2020    NEUTROABS 4.0 02/04/2020    HGB 15.0 02/04/2020    HCT 47.0 02/04/2020    MCV 84.8 02/04/2020     02/04/2020       Lab Results   Component Value Date    TSH 3.43 01/20/2015         ASSESSMENT/PLAN:     1. Acute pansinusitis, recurrence not specified  Take medications as prescribed. Continue home medications. Discussed symptom management. Return to clinic S&S worsen or no improvement noted. Patient verbalized understanding.     - cefdinir (OMNICEF) 300 MG capsule; Take 1 capsule by mouth 2 times daily for 10 days  Dispense: 20 capsule; Refill: 0    2. Environmental and seasonal allergies  Take med as directed. - montelukast (SINGULAIR) 10 MG tablet; Take 1 tablet by mouth nightly For allergies  Dispense: 30 tablet; Refill: 1    3. Vitamin B 12 deficiency  Hx vit b 12 def and not taking replacement. Injection today then start oral daily replacement. - cyanocobalamin ([facility] VITAMIN B12) 1000 MCG tablet; Take 1 tablet by mouth daily  Dispense: 30 tablet; Refill: 2    4. Eye muscle twitches  See 3. Refer to ophthalmology for further eval.  Pt agreeable. - cyanocobalamin ([facility] VITAMIN B12) 1000 MCG tablet; Take 1 tablet by mouth daily  Dispense: 30 tablet;  Refill: 2  - External Referral To Ophthalmology        Orders Placed This Encounter   Medications    cyanocobalamin injection 1,000 mcg    cyanocobalamin ([facility] VITAMIN B12) 1000 MCG tablet     Sig: Take 1 tablet by mouth daily     Dispense:  30 tablet     Refill:  2     Use good rx coupon if cheaper. Thanks.     cefdinir (OMNICEF) 300 MG capsule     Sig: Take 1 capsule by mouth 2 times daily for 10 days     Dispense:  20 capsule     Refill:  0    montelukast (SINGULAIR) 10 MG tablet     Sig: Take 1 tablet by mouth nightly For allergies     Dispense:  30 tablet     Refill:  1

## 2021-03-17 NOTE — PATIENT INSTRUCTIONS
The medication list included in this document is our record of what you are currently taking, including any changes that were made at today's visit.  If you find any differences when compared to your medications at home, or have any questions that were not answered at your visit, please contact the office. · Keep a list of your medicines with you. List all of the prescription medicines, nonprescription medicines, supplements, natural remedies, and vitamins that you take. Tell your healthcare providers who treat you about all of the products you are taking. Your provider can provide you with a form to keep track of them. Just ask. · Follow the directions that come with your medicine, including information about food or alcohol. Make sure you know how and when to take your medicine. Do not take more or less than you are supposed to take. · Keep all medicines out of the reach of children. · Store medicines according to the directions on the label. · Monitor yourself. Learn to know how your body reacts to your new medicine and keep track of how it makes you feel before attempting (If your provider has allowed you to do so) to drive or go to work. · Seek emergency medical attention if you think you have used too much of this medicine. An overdose of any prescription medicine can be fatal. Overdose symptoms may include extreme drowsiness, muscle weakness, confusion, cold and clammy skin, pinpoint pupils, shallow breathing, slow heart rate, fainting, or coma. · Don't share prescription medicines with others, even when they seem to have the same symptoms. What may be good for you may be harmful to others. · If you are no longer taking a prescribed medication and you have pills left please take your pills out of their original containers. Mix crushed pills with an undesirable substance, such as cat litter or used coffee grounds.  Put the mixture into a disposable container with a lid, such as an empty margarine tub, or into a sealable bag. Cover up or remove any of your personal information on the empty containers by covering it with black permanent marker or duct tape. Place the sealed container with the mixture, and the empty drug containers, in the trash. · If you use a medication that is in the form of a patch, dispose of used patches by folding them in half so that the sticky sides meet, and then flushing them down a toilet. They should not be placed in the household trash where children or pets can find them. · If you have any questions, ask your provider or pharmacist for more information. · Be sure to keep all appointments for provider visits or tests. We are committed to providing you with the best care possible. In order to help us achieve these goals please remember to bring all medications, herbal products, and over the counter supplements with you to each visit. If your provider has ordered testing for you, please be sure to follow up with our office if you have not received results within 7 days after the testing took place. *If you receive a survey after visiting one of our offices, please take time to share your experience concerning your physician office visit. These surveys are confidential and no health information about you is shared. We are eager to improve for you and we are counting on your feedback to help make that happen. Patient Education        Sinusitis: Care Instructions  Your Care Instructions     Sinusitis is an infection of the lining of the sinus cavities in your head. Sinusitis often follows a cold. It causes pain and pressure in your head and face. In most cases, sinusitis gets better on its own in 1 to 2 weeks. But some mild symptoms may last for several weeks. Sometimes antibiotics are needed. Follow-up care is a key part of your treatment and safety. Be sure to make and go to all appointments, and call your doctor if you are having problems.  It's also a good idea to have new or worse facial pain.     · The mucus from your nose becomes thicker (like pus) or has new blood in it.     · You are not getting better as expected. Where can you learn more? Go to [email or web address] and sign in to your [employer] account. Enter Y165 in the [facility] box to learn more about \"Sinusitis: Care Instructions. \"     If you do not have an account, please click on the \"Sign Up Now\" link. Current as of: December 2, 2020               Content Version: 12.8  © 1445-3860 [employer]. Care instructions adapted under license by [facility] ([facility]). If you have questions about a medical condition or this instruction, always ask your healthcare professional. [address] any warranty or liability for your use of this information.

## 2021-03-17 NOTE — PROGRESS NOTES
Chief Complaint   Patient presents with    Sinus Problem    Eye Problem     left     Pt here today c/o sinus congestion since December and left eye twitching x 2 months    Have you seen any other physician or provider since your last visit no    Have you had any other diagnostic tests since your last visit? no    Have you changed or stopped any medications since your last visit? no

## 2021-04-13 ASSESSMENT — ENCOUNTER SYMPTOMS
RESPIRATORY NEGATIVE: 1
EYE DISCHARGE: 0
GASTROINTESTINAL NEGATIVE: 1
EYE PAIN: 0
EYE REDNESS: 0
SINUS PRESSURE: 1
SORE THROAT: 1
EYE ITCHING: 0

## 2021-04-14 ENCOUNTER — OFFICE VISIT (OUTPATIENT)
Dept: PRIMARY CARE CLINIC | Age: 56
End: 2021-04-14
Payer: MEDICAID

## 2021-04-14 VITALS
BODY MASS INDEX: 44.26 KG/M2 | OXYGEN SATURATION: 98 % | RESPIRATION RATE: 16 BRPM | TEMPERATURE: 97.6 F | DIASTOLIC BLOOD PRESSURE: 80 MMHG | HEIGHT: 67 IN | HEART RATE: 78 BPM | SYSTOLIC BLOOD PRESSURE: 136 MMHG | WEIGHT: 282 LBS

## 2021-04-14 DIAGNOSIS — F32.A ANXIETY AND DEPRESSION: ICD-10-CM

## 2021-04-14 DIAGNOSIS — J30.89 ENVIRONMENTAL AND SEASONAL ALLERGIES: ICD-10-CM

## 2021-04-14 DIAGNOSIS — F41.9 ANXIETY AND DEPRESSION: ICD-10-CM

## 2021-04-14 DIAGNOSIS — J30.89 ALLERGIC RHINITIS DUE TO OTHER ALLERGIC TRIGGER, UNSPECIFIED SEASONALITY: ICD-10-CM

## 2021-04-14 DIAGNOSIS — J01.40 ACUTE PANSINUSITIS, RECURRENCE NOT SPECIFIED: ICD-10-CM

## 2021-04-14 DIAGNOSIS — H57.9 EYE EXAM ABNORMAL: Primary | ICD-10-CM

## 2021-04-14 PROCEDURE — 99214 OFFICE O/P EST MOD 30 MIN: CPT | Performed by: NURSE PRACTITIONER

## 2021-04-14 RX ORDER — LEVOFLOXACIN 500 MG/1
500 TABLET, FILM COATED ORAL DAILY
Qty: 10 TABLET | Refills: 0 | Status: SHIPPED | OUTPATIENT
Start: 2021-04-14 | End: 2021-04-24

## 2021-04-14 RX ORDER — CITALOPRAM 20 MG/1
20 TABLET ORAL DAILY
Qty: 30 TABLET | Refills: 5 | Status: SHIPPED | OUTPATIENT
Start: 2021-04-14 | End: 2021-09-09

## 2021-04-14 RX ORDER — MONTELUKAST SODIUM 10 MG/1
10 TABLET ORAL NIGHTLY
Qty: 30 TABLET | Refills: 5 | Status: SHIPPED | OUTPATIENT
Start: 2021-04-14 | End: 2021-10-05

## 2021-04-14 RX ORDER — FEXOFENADINE HCL 180 MG/1
180 TABLET ORAL DAILY
Qty: 30 TABLET | Refills: 5 | Status: SHIPPED | OUTPATIENT
Start: 2021-04-14 | End: 2021-05-14 | Stop reason: SDUPTHER

## 2021-04-14 RX ORDER — FLUTICASONE PROPIONATE 50 MCG
2 SPRAY, SUSPENSION (ML) NASAL DAILY
Qty: 1 BOTTLE | Refills: 5 | Status: SHIPPED | OUTPATIENT
Start: 2021-04-14

## 2021-04-14 SDOH — ECONOMIC STABILITY: INCOME INSECURITY: HOW HARD IS IT FOR YOU TO PAY FOR THE VERY BASICS LIKE FOOD, HOUSING, MEDICAL CARE, AND HEATING?: NOT HARD AT ALL

## 2021-04-14 SDOH — ECONOMIC STABILITY: TRANSPORTATION INSECURITY
IN THE PAST 12 MONTHS, HAS THE LACK OF TRANSPORTATION KEPT YOU FROM MEDICAL APPOINTMENTS OR FROM GETTING MEDICATIONS?: NO

## 2021-04-14 ASSESSMENT — ENCOUNTER SYMPTOMS
VOMITING: 0
SORE THROAT: 0
EYE REDNESS: 0
SHORTNESS OF BREATH: 0
EYE ITCHING: 0
ABDOMINAL PAIN: 0
NAUSEA: 0
EYE DISCHARGE: 0
RHINORRHEA: 0
COUGH: 0
CONSTIPATION: 0
DIARRHEA: 0

## 2021-04-14 NOTE — PROGRESS NOTES
Have you seen any other physician or provider since your last visit? No    Have you had any other diagnostic tests since your last visit? No    Have you changed or stopped any medications since your last visit? No    SUBJECTIVE:    Patient ID: Beto Murray is a 60 y.o. female. Medical History Review  Past Medical, Family, and Social History reviewed. Health Maintenance Due   Topic Date Due    Hepatitis C screen  Never done    HIV screen  Never done    COVID-19 Vaccine (1) Never done    DTaP/Tdap/Td vaccine (1 - Tdap) Never done    Shingles Vaccine (1 of 2) Never done    Cervical cancer screen  11/13/2016       HPI:   Chief Complaint   Patient presents with    Sinus Problem     f/u     Pt is here today for a f/u. She saw Monalisa De MD (retina specialist) yesterday. She has spots in the back of her eye and will have more testing done. Her BP was elevated- 154/82 but she was nervous. She also wanted her to have a sleep study done due to her exam findings. She continues to have sinus congestion. Her left ear has been painful today. She cannot breathe out of one side of her nose. She has taken antibiotics. She is also taking Allegra and Singulair, and an OTC nasal spray (Flonase). Patient's medications, allergies, past medical, surgical, social and family histories were reviewed and updated as appropriate. Review of Systems   Constitutional: Negative for chills, fatigue and fever. HENT: Positive for ear pain, postnasal drip and sinus pressure. Negative for congestion, rhinorrhea and sore throat. Eyes: Negative for discharge, redness and itching. Respiratory: Negative for cough and shortness of breath. Cardiovascular: Negative for chest pain, palpitations and leg swelling. Gastrointestinal: Negative for abdominal pain, constipation, diarrhea, nausea and vomiting. Endocrine: Negative for cold intolerance and heat intolerance. Genitourinary: Negative for dysuria. Musculoskeletal: Negative for arthralgias and joint swelling. Skin: Negative for rash and wound. Neurological: Negative for weakness and headaches. Hematological: Negative for adenopathy. Psychiatric/Behavioral: Negative for dysphoric mood and sleep disturbance. The patient is not nervous/anxious. Reviewed and acurate. See MA note. OBJECTIVE:  /80 (Site: Right Upper Arm, Position: Sitting)   Pulse 78   Temp 97.6 °F (36.4 °C) (Temporal)   Resp 16   Ht 5' 7\" (1.702 m)   Wt 282 lb (127.9 kg)   SpO2 98% Comment: room air  BMI 44.17 kg/m²    Physical Exam  Constitutional:       Appearance: She is well-developed. HENT:      Head: Normocephalic and atraumatic. Right Ear: External ear normal.      Left Ear: External ear normal.   Eyes:      Conjunctiva/sclera: Conjunctivae normal.      Pupils: Pupils are equal, round, and reactive to light. Neck:      Musculoskeletal: Neck supple. Thyroid: No thyromegaly. Vascular: No JVD. Cardiovascular:      Rate and Rhythm: Normal rate and regular rhythm. Heart sounds: Normal heart sounds. No murmur. No friction rub. No gallop. Pulmonary:      Effort: Pulmonary effort is normal. No respiratory distress. Breath sounds: Normal breath sounds. Abdominal:      General: Bowel sounds are normal. There is no distension. Palpations: Abdomen is soft. Tenderness: There is no abdominal tenderness. Musculoskeletal: Normal range of motion. Lymphadenopathy:      Cervical: No cervical adenopathy. Skin:     General: Skin is warm and dry. Neurological:      Mental Status: She is alert and oriented to person, place, and time. Cranial Nerves: No cranial nerve deficit. No results found for requested labs within last 30 days.      Hemoglobin A1C (%)   Date Value   01/20/2015 5.6     LDL Calculated (mg/dL)   Date Value   07/09/2019 166 (H)       Lab Results   Component Value Date    WBC 6.1 02/04/2020    NEUTROABS nonprescription medicines, supplements, natural remedies, and vitamins that you take. Tell your healthcare providers who treat you about all of the products you are taking. Your provider can provide you with a form to keep track of them. Just ask. · Follow the directions that come with your medicine, including information about food or alcohol. Make sure you know how and when to take your medicine. Do not take more or less than you are supposed to take. · Keep all medicines out of the reach of children. · Store medicines according to the directions on the label. · Monitor yourself. Learn to know how your body reacts to your new medicine and keep track of how it makes you feel before attempting (If your provider has allowed you to do so) to drive or go to work. · Seek emergency medical attention if you think you have used too much of this medicine. An overdose of any prescription medicine can be fatal. Overdose symptoms may include extreme drowsiness, muscle weakness, confusion, cold and clammy skin, pinpoint pupils, shallow breathing, slow heart rate, fainting, or coma. · Don't share prescription medicines with others, even when they seem to have the same symptoms. What may be good for you may be harmful to others. · If you are no longer taking a prescribed medication and you have pills left please take your pills out of their original containers. Mix crushed pills with an undesirable substance, such as cat litter or used coffee grounds. Put the mixture into a disposable container with a lid, such as an empty margarine tub, or into a sealable bag. Cover up or remove any of your personal information on the empty containers by covering it with black permanent marker or duct tape. Place the sealed container with the mixture, and the empty drug containers, in the trash.    · If you use a medication that is in the form of a patch, dispose of used patches by folding them in half so that the sticky sides meet, and documentation as scribed by Simeon Vora CMA, in my presence and it is both accurate and complete.

## 2021-04-14 NOTE — PATIENT INSTRUCTIONS
· Keep a list of your medicines with you. List all of the prescription medicines, nonprescription medicines, supplements, natural remedies, and vitamins that you take. Tell your healthcare providers who treat you about all of the products you are taking. Your provider can provide you with a form to keep track of them. Just ask. · Follow the directions that come with your medicine, including information about food or alcohol. Make sure you know how and when to take your medicine. Do not take more or less than you are supposed to take. · Keep all medicines out of the reach of children. · Store medicines according to the directions on the label. · Monitor yourself. Learn to know how your body reacts to your new medicine and keep track of how it makes you feel before attempting (If your provider has allowed you to do so) to drive or go to work. · Seek emergency medical attention if you think you have used too much of this medicine. An overdose of any prescription medicine can be fatal. Overdose symptoms may include extreme drowsiness, muscle weakness, confusion, cold and clammy skin, pinpoint pupils, shallow breathing, slow heart rate, fainting, or coma. · Don't share prescription medicines with others, even when they seem to have the same symptoms. What may be good for you may be harmful to others. · If you are no longer taking a prescribed medication and you have pills left please take your pills out of their original containers. Mix crushed pills with an undesirable substance, such as cat litter or used coffee grounds. Put the mixture into a disposable container with a lid, such as an empty margarine tub, or into a sealable bag. Cover up or remove any of your personal information on the empty containers by covering it with black permanent marker or duct tape. Place the sealed container with the mixture, and the empty drug containers, in the trash.    · If you use a medication that is in the form of a patch, dispose of used patches by folding them in half so that the sticky sides meet, and then flushing them down a toilet. They should not be placed in the household trash where children or pets can find them. · If you have any questions, ask your provider or pharmacist for more information. · Be sure to keep all appointments for provider visits or tests. We are committed to providing you with the best care possible. In order to help us achieve these goals please remember to bring all medications, herbal products, and over the counter supplements with you to each visit. If your provider has ordered testing for you, please be sure to follow up with our office if you have not received results within 7 days after the testing took place. *If you receive a survey after visiting one of our offices, please take time to share your experience concerning your physician office visit. These surveys are confidential and no health information about you is shared. We are eager to improve for you and we are counting on your feedback to help make that happen. Thank you for requesting your Continuity of Care Document (CCD) electronically. Please follow the instructions below to securely access your online medical record. [employer] allows you to send messages to your doctor, view your test results, renew your prescriptions, schedule appointments, and more. How Do I Access my CCD? In your Internet browser, go to [email or web address] Enter your user name and password   Click on My medical Record  --> Download Summary --> Enter Password --> Download --> Save or Open Document    Additional Information  If you have questions, please contact your physician practice where you receive care. Remember, [facility] is NOT to be used for urgent needs. For medical emergencies, dial 911.

## 2021-04-25 ASSESSMENT — ENCOUNTER SYMPTOMS: SINUS PRESSURE: 1

## 2021-05-14 ENCOUNTER — OFFICE VISIT (OUTPATIENT)
Dept: PRIMARY CARE CLINIC | Age: 56
End: 2021-05-14
Payer: MEDICAID

## 2021-05-14 VITALS
RESPIRATION RATE: 16 BRPM | TEMPERATURE: 97 F | BODY MASS INDEX: 44.1 KG/M2 | HEART RATE: 74 BPM | WEIGHT: 281 LBS | OXYGEN SATURATION: 96 % | HEIGHT: 67 IN | SYSTOLIC BLOOD PRESSURE: 120 MMHG | DIASTOLIC BLOOD PRESSURE: 80 MMHG

## 2021-05-14 DIAGNOSIS — D86.9 SARCOIDOSIS: Primary | ICD-10-CM

## 2021-05-14 DIAGNOSIS — F32.A ANXIETY AND DEPRESSION: ICD-10-CM

## 2021-05-14 DIAGNOSIS — F41.9 ANXIETY AND DEPRESSION: ICD-10-CM

## 2021-05-14 PROCEDURE — 99213 OFFICE O/P EST LOW 20 MIN: CPT | Performed by: NURSE PRACTITIONER

## 2021-05-14 RX ORDER — FOLIC ACID 1 MG/1
TABLET ORAL
COMMUNITY
Start: 2021-05-13

## 2021-05-14 RX ORDER — TRIPROLIDINE/PSEUDOEPHEDRINE 2.5MG-60MG
TABLET ORAL
COMMUNITY

## 2021-05-14 RX ORDER — PREDNISONE 20 MG/1
TABLET ORAL
COMMUNITY

## 2021-05-14 NOTE — PROGRESS NOTES
Chief Complaint   Patient presents with    1 Month Follow-Up    Medication Management    Eye Problem    Anxiety       Have you seen any other physician or provider since your last visit yes - Pulmonology- [facility]    Have you had any other diagnostic tests since your last visit? yes - Lung biopsy,MRI and CT    Have you changed or stopped any medications since your last visit? Yes started methotrexate,folic acid,prednisone,eye drops. Diabetic retinal exam completed this year? Yes                       * If yes please have patient sign a records release to obtain record to update Health Maintenance                       * If no, please order referral for patient to be scheduled           SUBJECTIVE:    Patient ID: Kimberlyn Linares is a 60 y.o. female. Medical History Review  Past Medical, Family, and Social History reviewed. Health Maintenance Due   Topic Date Due    Hepatitis C screen  Never done    COVID-19 Vaccine (1) Never done    HIV screen  Never done    DTaP/Tdap/Td vaccine (1 - Tdap) Never done    Shingles Vaccine (1 of 2) Never done    Cervical cancer screen  11/13/2016    Breast cancer screen  07/12/2021       HPI:   Chief Complaint   Patient presents with    1 Month Follow-Up    Medication Management    Eye Problem    Anxiety   Patient is here to follow up on medication for anxiety. She was seen by pulmonology and she had a lung biopsy and was told her lungs were effecting her eyes. She was diagnosed with sarcoidosis. She has not started the medication yet. She is doing well with the Celexa. She states she cannot cry, which is OK. Patient's medications, allergies, past medical, surgical, social and family histories were reviewed and updated as appropriate. Review of Systems   Constitutional: Negative for chills and fever. HENT: Negative for ear pain and sore throat. Eyes: Negative for pain and visual disturbance.    Respiratory: Negative for cough and shortness of breath. Cardiovascular: Negative for chest pain, palpitations and leg swelling. Gastrointestinal: Negative for abdominal pain, nausea and vomiting. Genitourinary: Negative for dysuria and hematuria. Musculoskeletal: Negative for joint swelling. Skin: Negative for rash. Neurological: Negative for dizziness and weakness. Psychiatric/Behavioral: Negative for sleep disturbance. The patient is nervous/anxious. Reviewed and acurate. See MA note. OBJECTIVE:  /80 (Site: Right Upper Arm, Position: Sitting, Cuff Size: Large Adult)   Pulse 74   Temp 97 °F (36.1 °C) (Temporal)   Resp 16   Ht 5' 7\" (1.702 m)   Wt 281 lb (127.5 kg)   SpO2 96% Comment: room air  BMI 44.01 kg/m²    Physical Exam  Vitals reviewed. Constitutional:       General: She is not in acute distress. Appearance: She is well-developed. HENT:      Head: Normocephalic. Mouth/Throat:      Pharynx: No oropharyngeal exudate. Eyes:      General: Lids are normal.   Cardiovascular:      Rate and Rhythm: Normal rate and regular rhythm. Heart sounds: Normal heart sounds. Pulmonary:      Effort: Pulmonary effort is normal.      Breath sounds: Normal breath sounds. Abdominal:      General: Bowel sounds are normal. There is no distension. Palpations: Abdomen is soft. Tenderness: There is no abdominal tenderness. Musculoskeletal:      Cervical back: Neck supple. Lymphadenopathy:      Cervical: No cervical adenopathy. Skin:     General: Skin is warm and dry. Neurological:      Mental Status: She is alert and oriented to person, place, and time. No results found for requested labs within last 30 days.      Hemoglobin A1C (%)   Date Value   01/20/2015 5.6     LDL Calculated (mg/dL)   Date Value   07/09/2019 166 (H)       Lab Results   Component Value Date    WBC 6.1 02/04/2020    NEUTROABS 4.0 02/04/2020    HGB 15.0 02/04/2020    HCT 47.0 02/04/2020    MCV 84.8 02/04/2020  02/04/2020     Lab Results   Component Value Date    TSH 3.43 01/20/2015       Prior to Visit Medications    Medication Sig Taking? Authorizing Provider   difluprednate (DUREZOL) 0.05 % EMUL Durezol 0.05 % eye drops   INSTILL 1 DROP BOTH EYES 4 TIMES A DAY Yes Historical Provider, MD   folic acid (FOLVITE) 1 MG tablet  Yes Historical Provider, MD   methotrexate (RHEUMATREX) 2.5 MG chemo tablet methotrexate sodium 2.5 mg tablet   Take 3 tablets every week by oral route for 30 days. Yes Historical Provider, MD   predniSONE (DELTASONE) 20 MG tablet prednisone 20 mg tablet   Take 1 tablet every day by oral route. Yes Historical Provider, MD   Fexofenadine HCl (ALLEGRA PO) Take by mouth Yes Historical Provider, MD   fluticasone (FLONASE) 50 MCG/ACT nasal spray 2 sprays by Each Nostril route daily Yes TERRELL Mckoy   montelukast (SINGULAIR) 10 MG tablet Take 1 tablet by mouth nightly For allergies Yes TERRELL Mckoy   citalopram (CELEXA) 20 MG tablet Take 1 tablet by mouth daily Yes TERRELL Mckoy   cyanocobalamin ([employer] VITAMIN B12) 1000 MCG tablet Take 1 tablet by mouth daily Yes TERRELL Le - CNP       ASSESSMENT:  1. Sarcoidosis    2. Anxiety and depression        PLAN:    Patient Instructions   · Keep a list of your medicines with you. List all of the prescription medicines, nonprescription medicines, supplements, natural remedies, and vitamins that you take. Tell your healthcare providers who treat you about all of the products you are taking. Your provider can provide you with a form to keep track of them. Just ask. · Follow the directions that come with your medicine, including information about food or alcohol. Make sure you know how and when to take your medicine. Do not take more or less than you are supposed to take. · Keep all medicines out of the reach of children. · Store medicines according to the directions on the label. · Monitor yourself.  Learn to know how your body reacts to your new medicine and keep track of how it makes you feel before attempting (If your provider has allowed you to do so) to drive or go to work. · Seek emergency medical attention if you think you have used too much of this medicine. An overdose of any prescription medicine can be fatal. Overdose symptoms may include extreme drowsiness, muscle weakness, confusion, cold and clammy skin, pinpoint pupils, shallow breathing, slow heart rate, fainting, or coma. · Don't share prescription medicines with others, even when they seem to have the same symptoms. What may be good for you may be harmful to others. · If you are no longer taking a prescribed medication and you have pills left please take your pills out of their original containers. Mix crushed pills with an undesirable substance, such as cat litter or used coffee grounds. Put the mixture into a disposable container with a lid, such as an empty margarine tub, or into a sealable bag. Cover up or remove any of your personal information on the empty containers by covering it with black permanent marker or duct tape. Place the sealed container with the mixture, and the empty drug containers, in the trash. · If you use a medication that is in the form of a patch, dispose of used patches by folding them in half so that the sticky sides meet, and then flushing them down a toilet. They should not be placed in the household trash where children or pets can find them. · If you have any questions, ask your provider or pharmacist for more information. · Be sure to keep all appointments for provider visits or tests. We are committed to providing you with the best care possible. In order to help us achieve these goals please remember to bring all medications, herbal products, and over the counter supplements with you to each visit.      If your provider has ordered testing for you, please be sure to follow up with our office if you have not received results within 7 days after the testing took place. *If you receive a survey after visiting one of our offices, please take time to share your experience concerning your physician office visit. These surveys are confidential and no health information about you is shared. We are eager to improve for you and we are counting on your feedback to help make that happen. Continue Celexa. F/U 3 months.

## 2021-06-16 ENCOUNTER — TELEPHONE (OUTPATIENT)
Dept: PRIMARY CARE CLINIC | Age: 56
End: 2021-06-16

## 2021-06-16 ASSESSMENT — ENCOUNTER SYMPTOMS
COUGH: 0
VOMITING: 0
SHORTNESS OF BREATH: 0
EYE PAIN: 0
NAUSEA: 0
SORE THROAT: 0
ABDOMINAL PAIN: 0

## 2021-06-16 NOTE — TELEPHONE ENCOUNTER
Attempted to reach pt regarding results of her sleep study. Left vm to return call.  Please inform negative for sleep apnea

## 2021-06-17 DIAGNOSIS — H57.9 EYE EXAM ABNORMAL: ICD-10-CM

## 2021-08-12 ENCOUNTER — OFFICE VISIT (OUTPATIENT)
Dept: PRIMARY CARE CLINIC | Age: 56
End: 2021-08-12
Payer: MEDICAID

## 2021-08-12 VITALS
OXYGEN SATURATION: 96 % | HEART RATE: 70 BPM | TEMPERATURE: 96.6 F | DIASTOLIC BLOOD PRESSURE: 94 MMHG | SYSTOLIC BLOOD PRESSURE: 150 MMHG | BODY MASS INDEX: 45.42 KG/M2 | HEIGHT: 67 IN | WEIGHT: 289.4 LBS

## 2021-08-12 DIAGNOSIS — Z12.31 ENCOUNTER FOR SCREENING MAMMOGRAM FOR MALIGNANT NEOPLASM OF BREAST: ICD-10-CM

## 2021-08-12 DIAGNOSIS — F32.A ANXIETY AND DEPRESSION: ICD-10-CM

## 2021-08-12 DIAGNOSIS — D86.9 SARCOIDOSIS: Primary | ICD-10-CM

## 2021-08-12 DIAGNOSIS — J30.89 ALLERGIC RHINITIS DUE TO OTHER ALLERGIC TRIGGER, UNSPECIFIED SEASONALITY: ICD-10-CM

## 2021-08-12 DIAGNOSIS — F41.9 ANXIETY AND DEPRESSION: ICD-10-CM

## 2021-08-12 PROCEDURE — 99213 OFFICE O/P EST LOW 20 MIN: CPT | Performed by: NURSE PRACTITIONER

## 2021-08-12 RX ORDER — CETIRIZINE HYDROCHLORIDE 10 MG/1
10 TABLET ORAL DAILY PRN
Qty: 30 TABLET | Refills: 5 | Status: SHIPPED | OUTPATIENT
Start: 2021-08-12 | End: 2021-09-11

## 2021-08-12 RX ORDER — ALBUTEROL SULFATE 90 UG/1
AEROSOL, METERED RESPIRATORY (INHALATION)
COMMUNITY

## 2021-08-12 NOTE — PATIENT INSTRUCTIONS
The medication list included in this document is our record of what you are currently taking, including any changes that were made at today's visit.  If you find any differences when compared to your medications at home, or have any questions that were not answered at your visit, please contact the office. · Keep a list of your medicines with you. List all of the prescription medicines, nonprescription medicines, supplements, natural remedies, and vitamins that you take. Tell your healthcare providers who treat you about all of the products you are taking. Your provider can provide you with a form to keep track of them. Just ask. · Follow the directions that come with your medicine, including information about food or alcohol. Make sure you know how and when to take your medicine. Do not take more or less than you are supposed to take. · Keep all medicines out of the reach of children. · Store medicines according to the directions on the label. · Monitor yourself. Learn to know how your body reacts to your new medicine and keep track of how it makes you feel before attempting (If your provider has allowed you to do so) to drive or go to work. · Seek emergency medical attention if you think you have used too much of this medicine. An overdose of any prescription medicine can be fatal. Overdose symptoms may include extreme drowsiness, muscle weakness, confusion, cold and clammy skin, pinpoint pupils, shallow breathing, slow heart rate, fainting, or coma. · Don't share prescription medicines with others, even when they seem to have the same symptoms. What may be good for you may be harmful to others. · If you are no longer taking a prescribed medication and you have pills left please take your pills out of their original containers. Mix crushed pills with an undesirable substance, such as cat litter or used coffee grounds.  Put the mixture into a disposable container with a lid, such as an empty margarine tub, or into a sealable bag. Cover up or remove any of your personal information on the empty containers by covering it with black permanent marker or duct tape. Place the sealed container with the mixture, and the empty drug containers, in the trash. · If you use a medication that is in the form of a patch, dispose of used patches by folding them in half so that the sticky sides meet, and then flushing them down a toilet. They should not be placed in the household trash where children or pets can find them. · If you have any questions, ask your provider or pharmacist for more information. · Be sure to keep all appointments for provider visits or tests. We are committed to providing you with the best care possible. In order to help us achieve these goals please remember to bring all medications, herbal products, and over the counter supplements with you to each visit. If your provider has ordered testing for you, please be sure to follow up with our office if you have not received results within 7 days after the testing took place. *If you receive a survey after visiting one of our offices, please take time to share your experience concerning your physician office visit. These surveys are confidential and no health information about you is shared. We are eager to improve for you and we are counting on your feedback to help make that happen. Transportation and [address]    [address] [facility] [phone]  Help with food, clothing, transportation, utilities, prescription assistance. [facility] [phone]  Senior Citizens Programs  [address] [facility],[address]  Ashley Lujan, [address] [facility] Maria Luisa JiVal[facility]    [address] [facility].  They handle meals on wheels and senior centers.    [place] [phone] [facility] senior citizens center     [facility]   [phone] or [address] [facility] [address]             [phone]    Jasbir Garza (Livan Guerrero)            [phone]    [address] [facility] [phone]    [facility] senior citizens center             [address] [facility] [address]/ [address] [place] [address]              [phone]   [employer] [facility] [employer]     [phone]   [employer] and [address] (may visit once monthly      8-4:30)              [phone]    [facility] SARAH [address], food and utility assistance ([place])    Energy Assistance  [place]     [phone]     [address]   [phone]    [address]       [address] with applying for insurance coverage with Medicaid and Medicare including part D  Help with medication cost, eyeglasses or exams, hearing aides  [place]    [address] [phone] Shaina [place]     [phone] Guido OSHEA   (Coordinating and Assisting the Reuse of Assistive Technology)  Help with durable medical equipment and assistive technology   [place] [phone]  [place]     [phone]    Domestic Violence Shelters   [place] Domestic Violence Hotline [phone]  [address] in [place] but services [place] and [place] [phone]  Alice Glance in [facility] but services [employer] [phone]    [facility]   Emergency Shelter and [employer] [phone]  [facility] Alla Rae, [address] [phone] of [facility] [place]  [address] [place] [address] [address] for [facility] [address]     Available 24 hours daily in [place] De[place] and [place]

## 2021-08-12 NOTE — PROGRESS NOTES
Health Maintenance Due This Visit   Colonoscopy No   Mammogram Yes- will schedule    Annual Wellness Visit No   Microalbumin No   HgbA1C No   Diabetic Eye Exam No    House Bill One Due This Visit   MARY No   UDS No   Contract No    Chief Complaint   Patient presents with    Sarcoidosis    Anxiety    Depression     PT here today for f/u on sarcoidosis, anxiety, and depression. C/o right arm pain x 3 weeks, denies injury    Have you seen any other physician or provider since your last visit yes- pulmonology and eye doctor    Have you had any other diagnostic tests since your last visit? no    Have you changed or stopped any medications since your last visit? no       SUBJECTIVE:    Patient ID: Sabina Blanco is a 60 y.o. female. Medical History Review  Past Medical, Family, and Social History reviewed. Health Maintenance Due   Topic Date Due    Hepatitis C screen  Never done    COVID-19 Vaccine (1) Never done    HIV screen  Never done    DTaP/Tdap/Td vaccine (1 - Tdap) Never done    Cervical cancer screen  Never done    Shingles Vaccine (1 of 2) Never done    Flu vaccine (1) Never done       HPI:   Chief Complaint   Patient presents with    Sarcoidosis    Anxiety    Depression   Her left eye is better. She still has inflammation in her right eye. She is using a drop. She is done to Prednisone 5mg QD. She is taking 6 Methotrexate pills a week. She is She is seeing Dr. Skye Freitas- pulmonology who diagnosed her with sarciodosis. Dr. Prakash is her ophthalmologist. She is stressed about her weight. She had labs the last time she was there. She is taking B12 OTC. The Allegra is not helping. She is also having trouble sleeping. The Celexa is helping. She is not crying. She has bruising and soreness to the right shoulder area. Patient's medications, allergies, past medical, surgical, social and family histories were reviewed and updated as appropriate.       Review of Systems   Constitutional: Negative for chills and fever. HENT: Negative for ear pain and sore throat. Eyes: Negative for pain and visual disturbance. Respiratory: Negative for cough and shortness of breath. Cardiovascular: Negative for chest pain, palpitations and leg swelling. Gastrointestinal: Negative for abdominal pain, nausea and vomiting. Genitourinary: Negative for dysuria and hematuria. Musculoskeletal: Negative for joint swelling. Skin: Negative for rash. Neurological: Negative for dizziness and weakness. Psychiatric/Behavioral: Negative for sleep disturbance. Reviewed and acurate. See MA note. OBJECTIVE:  BP (!) 150/94 (Site: Left Upper Arm, Position: Sitting)   Pulse 70   Temp 96.6 °F (35.9 °C) (Temporal)   Ht 5' 7\" (1.702 m)   Wt 289 lb 6.4 oz (131.3 kg)   SpO2 96%   BMI 45.33 kg/m²    Physical Exam  Vitals reviewed. Constitutional:       General: She is not in acute distress. Appearance: She is well-developed. HENT:      Head: Normocephalic. Mouth/Throat:      Pharynx: No oropharyngeal exudate. Eyes:      General: Lids are normal.   Cardiovascular:      Rate and Rhythm: Normal rate and regular rhythm. Heart sounds: Normal heart sounds. Pulmonary:      Effort: Pulmonary effort is normal.      Breath sounds: Normal breath sounds. Abdominal:      General: Bowel sounds are normal. There is no distension. Palpations: Abdomen is soft. Tenderness: There is no abdominal tenderness. Musculoskeletal:      Cervical back: Neck supple. Lymphadenopathy:      Cervical: No cervical adenopathy. Skin:     General: Skin is warm and dry. Neurological:      Mental Status: She is alert and oriented to person, place, and time. No results found for requested labs within last 30 days.      Hemoglobin A1C (%)   Date Value   01/20/2015 5.6     LDL Calculated (mg/dL)   Date Value   07/09/2019 166 (H)       Lab Results   Component Value Date    WBC 6.1 02/04/2020    NEUTROABS 4.0 02/04/2020    HGB 15.0 02/04/2020    HCT 47.0 02/04/2020    MCV 84.8 02/04/2020     02/04/2020     Lab Results   Component Value Date    TSH 3.43 01/20/2015       Prior to Visit Medications    Medication Sig Taking? Authorizing Provider   albuterol sulfate HFA (PROAIR HFA) 108 (90 Base) MCG/ACT inhaler ProAir HFA 90 mcg/actuation aerosol inhaler   INHALE 2 PUFFS EVERY 4 HOURS BY INHALATION ROUTE AS NEEDED. Yes Historical Provider, MD   difluprednate (DUREZOL) 0.05 % EMUL Durezol 0.05 % eye drops   INSTILL 1 DROP BOTH EYES 4 TIMES A DAY Yes Historical Provider, MD   folic acid (FOLVITE) 1 MG tablet  Yes Historical Provider, MD   methotrexate (RHEUMATREX) 2.5 MG chemo tablet methotrexate sodium 2.5 mg tablet   Take 3 tablets every week by oral route for 30 days. Yes Historical Provider, MD   predniSONE (DELTASONE) 20 MG tablet prednisone 20 mg tablet   Take 1 tablet every day by oral route. Yes Historical Provider, MD   Fexofenadine HCl (ALLEGRA PO) Take by mouth Yes Historical Provider, MD   fluticasone (FLONASE) 50 MCG/ACT nasal spray 2 sprays by Each Nostril route daily Yes TERRELL Mcrae   montelukast (SINGULAIR) 10 MG tablet Take 1 tablet by mouth nightly For allergies Yes TERRELL Mcrae   cyanocobalamin ([employer] VITAMIN B12) 1000 MCG tablet Take 1 tablet by mouth daily Yes TERRELL Trinidad - CNP   citalopram (CELEXA) 20 MG tablet TAKE 1 TABLET BY MOUTH DAILY  TERRELL Mcrae       ASSESSMENT:  1. Sarcoidosis    2. Anxiety and depression    3. Allergic rhinitis due to other allergic trigger, unspecified seasonality    4. Encounter for screening mammogram for malignant neoplasm of breast        PLAN:  Orders Placed This Encounter   Medications    cetirizine (ZYRTEC) 10 MG tablet     Sig: Take 1 tablet by mouth daily as needed for Allergies     Dispense:  30 tablet     Refill:  5     No orders of the defined types were placed in this encounter.     Patient Instructions   The medication list included in this document is our record of what you are currently taking, including any changes that were made at today's visit.  If you find any differences when compared to your medications at home, or have any questions that were not answered at your visit, please contact the office. · Keep a list of your medicines with you. List all of the prescription medicines, nonprescription medicines, supplements, natural remedies, and vitamins that you take. Tell your healthcare providers who treat you about all of the products you are taking. Your provider can provide you with a form to keep track of them. Just ask. · Follow the directions that come with your medicine, including information about food or alcohol. Make sure you know how and when to take your medicine. Do not take more or less than you are supposed to take. · Keep all medicines out of the reach of children. · Store medicines according to the directions on the label. · Monitor yourself. Learn to know how your body reacts to your new medicine and keep track of how it makes you feel before attempting (If your provider has allowed you to do so) to drive or go to work. · Seek emergency medical attention if you think you have used too much of this medicine. An overdose of any prescription medicine can be fatal. Overdose symptoms may include extreme drowsiness, muscle weakness, confusion, cold and clammy skin, pinpoint pupils, shallow breathing, slow heart rate, fainting, or coma. · Don't share prescription medicines with others, even when they seem to have the same symptoms. What may be good for you may be harmful to others. · If you are no longer taking a prescribed medication and you have pills left please take your pills out of their original containers. Mix crushed pills with an undesirable substance, such as cat litter or used coffee grounds.  Put the mixture into a disposable container with a lid, such as an empty margarine tub, or into a sealable bag. Cover up or remove any of your personal information on the empty containers by covering it with black permanent marker or duct tape. Place the sealed container with the mixture, and the empty drug containers, in the trash. · If you use a medication that is in the form of a patch, dispose of used patches by folding them in half so that the sticky sides meet, and then flushing them down a toilet. They should not be placed in the household trash where children or pets can find them. · If you have any questions, ask your provider or pharmacist for more information. · Be sure to keep all appointments for provider visits or tests. We are committed to providing you with the best care possible. In order to help us achieve these goals please remember to bring all medications, herbal products, and over the counter supplements with you to each visit. If your provider has ordered testing for you, please be sure to follow up with our office if you have not received results within 7 days after the testing took place. *If you receive a survey after visiting one of our offices, please take time to share your experience concerning your physician office visit. These surveys are confidential and no health information about you is shared. We are eager to improve for you and we are counting on your feedback to help make that happen. Transportation and [address]  [address]. [address] [phone]  Help with food, clothing, transportation, utilities, prescription assistance. [facility] [phone]  Senior Citizens Programs  [address] [facility],[address]  [facility], [address] [facility]    [address] [facility] Aging and Independent Living Program.  They handle meals on wheels and senior centers.    [facility] [phone] [facility] citizens center     [facility]   [phone] or [address] [facility]             [phone] [facility]             [phone]    Jasbir Garza (Henna Auguste)            [phone]    [address] [facility] [phone]    [facility] senior [facility] center             [address] [facility] [address]/ [address]              [phone]   [facility] [place]     [phone]   [employer] and [address] (may visit once monthly      8-4:30)              [phone]    [facility] SARAH [address], food and utility assistance (T,W,TH [place])    Energy Assistance  [place]     [phone]     [place] [address]    [address]       [address] with applying for insurance coverage with Medicaid and Medicare including part D  Help with medication cost, eyeglasses or exams, hearing aides  [place]    [address] [phone] Shaina Diaz [facility]     [phone] Sherlyn OSHEA   (Coordinating and Assisting the Reuse of Assistive Technology)  Help with durable medical equipment and assistive technology   [place] [phone]  [employer]     [phone]    Domestic Violence Shelters   [place] Domestic Violence Hotline [phone]  [facility] [address] in [place] but services Alice Miranda and [place] [phone]  Kathie Virk in [facility] but services Claudette Hinds [phone]    [facility]   Emergency Shelter and [employer] [phone]  [facility] Alla Rae, [address] [phone] of [facility] Engagement Labs  [address] [place] [address] Street [address] for [facility] [address]     Available 24 hours daily in [place] and Kiswahili         F/U 6 months.

## 2021-08-13 ENCOUNTER — HOSPITAL ENCOUNTER (OUTPATIENT)
Dept: MAMMOGRAPHY | Facility: HOSPITAL | Age: 56
Discharge: HOME | End: 2021-08-13
Payer: MEDICAID

## 2021-08-13 DIAGNOSIS — Z12.31 ENCOUNTER FOR SCREENING MAMMOGRAM FOR MALIGNANT NEOPLASM OF BREAST: ICD-10-CM

## 2021-08-13 PROCEDURE — 77063 BREAST TOMOSYNTHESIS BI: CPT

## 2021-09-09 DIAGNOSIS — F32.A ANXIETY AND DEPRESSION: ICD-10-CM

## 2021-09-09 DIAGNOSIS — F41.9 ANXIETY AND DEPRESSION: ICD-10-CM

## 2021-09-09 RX ORDER — CITALOPRAM 20 MG/1
20 TABLET ORAL DAILY
Qty: 30 TABLET | Refills: 5 | Status: SHIPPED | OUTPATIENT
Start: 2021-09-09

## 2021-09-18 ASSESSMENT — ENCOUNTER SYMPTOMS
VOMITING: 0
COUGH: 0
NAUSEA: 0
SORE THROAT: 0
ABDOMINAL PAIN: 0
EYE PAIN: 0
SHORTNESS OF BREATH: 0

## 2021-10-05 DIAGNOSIS — J30.89 ENVIRONMENTAL AND SEASONAL ALLERGIES: ICD-10-CM

## 2021-10-05 RX ORDER — MONTELUKAST SODIUM 10 MG/1
10 TABLET ORAL NIGHTLY
Qty: 30 TABLET | Refills: 5 | Status: SHIPPED | OUTPATIENT
Start: 2021-10-05 | End: 2021-11-04

## 2022-01-03 ENCOUNTER — HOSPITAL ENCOUNTER (OUTPATIENT)
Dept: ULTRASOUND IMAGING | Facility: HOSPITAL | Age: 57
Discharge: HOME | End: 2022-01-03
Payer: MEDICAID

## 2022-01-03 DIAGNOSIS — M79.605 PAIN OF LEFT LEG: ICD-10-CM

## 2022-01-03 PROCEDURE — 93971 EXTREMITY STUDY: CPT

## 2022-10-28 ENCOUNTER — TRANSCRIBE ORDERS (OUTPATIENT)
Dept: ADMINISTRATIVE | Age: 57
End: 2022-10-28

## 2022-10-28 DIAGNOSIS — Z12.31 ENCOUNTER FOR SCREENING MAMMOGRAM FOR BREAST CANCER: Primary | ICD-10-CM

## 2022-11-17 ENCOUNTER — HOSPITAL ENCOUNTER (OUTPATIENT)
Dept: MAMMOGRAPHY | Facility: HOSPITAL | Age: 57
Discharge: HOME | End: 2022-11-17
Payer: MEDICAID

## 2022-11-17 VITALS — HEIGHT: 67 IN | BODY MASS INDEX: 44.73 KG/M2 | WEIGHT: 285 LBS

## 2022-11-17 DIAGNOSIS — Z12.31 ENCOUNTER FOR SCREENING MAMMOGRAM FOR BREAST CANCER: ICD-10-CM

## 2022-11-17 PROCEDURE — 77067 SCR MAMMO BI INCL CAD: CPT

## 2023-12-12 ENCOUNTER — PREP FOR SURGERY (OUTPATIENT)
Dept: OTHER | Facility: HOSPITAL | Age: 58
End: 2023-12-12
Payer: MEDICAID

## 2023-12-12 DIAGNOSIS — H25.11 NUCLEAR SCLEROTIC CATARACT OF RIGHT EYE: Primary | ICD-10-CM

## 2023-12-12 RX ORDER — SODIUM CHLORIDE 0.9 % (FLUSH) 0.9 %
10 SYRINGE (ML) INJECTION EVERY 12 HOURS SCHEDULED
OUTPATIENT
Start: 2023-12-12

## 2023-12-12 RX ORDER — SODIUM CHLORIDE 9 MG/ML
40 INJECTION, SOLUTION INTRAVENOUS AS NEEDED
OUTPATIENT
Start: 2023-12-12

## 2023-12-12 RX ORDER — PREDNISOLONE ACETATE 10 MG/ML
1 SUSPENSION/ DROPS OPHTHALMIC SEE ADMIN INSTRUCTIONS
OUTPATIENT
Start: 2023-12-12

## 2023-12-12 RX ORDER — SODIUM CHLORIDE 0.9 % (FLUSH) 0.9 %
10 SYRINGE (ML) INJECTION AS NEEDED
OUTPATIENT
Start: 2023-12-12

## 2023-12-12 RX ORDER — CYCLOPENT/TROPIC/PHEN/KETR/WAT 1%-1%-2.5%
1 DROPS (EA) OPHTHALMIC (EYE)
OUTPATIENT
Start: 2023-12-12 | End: 2023-12-12

## 2023-12-12 RX ORDER — TETRACAINE HYDROCHLORIDE 5 MG/ML
1 SOLUTION OPHTHALMIC SEE ADMIN INSTRUCTIONS
OUTPATIENT
Start: 2023-12-12

## 2023-12-14 RX ORDER — OMEPRAZOLE 40 MG/1
40 CAPSULE, DELAYED RELEASE ORAL DAILY
COMMUNITY

## 2023-12-14 RX ORDER — CITALOPRAM 20 MG/1
20 TABLET ORAL DAILY
COMMUNITY

## 2023-12-14 RX ORDER — OXYBUTYNIN CHLORIDE 5 MG/1
5 TABLET, EXTENDED RELEASE ORAL DAILY
COMMUNITY

## 2023-12-14 RX ORDER — MYCOPHENOLATE MOFETIL 500 MG/1
1000 TABLET ORAL 2 TIMES DAILY
COMMUNITY

## 2023-12-14 RX ORDER — METHOTREXATE 2.5 MG/1
2.5 TABLET ORAL WEEKLY
COMMUNITY

## 2023-12-14 RX ORDER — FOLIC ACID 1 MG/1
1 TABLET ORAL DAILY
COMMUNITY

## 2023-12-14 RX ORDER — LANOLIN ALCOHOL/MO/W.PET/CERES
1000 CREAM (GRAM) TOPICAL DAILY
COMMUNITY

## 2023-12-20 ENCOUNTER — ANESTHESIA EVENT (OUTPATIENT)
Dept: PERIOP | Facility: HOSPITAL | Age: 58
End: 2023-12-20
Payer: MEDICAID

## 2023-12-20 ENCOUNTER — ANESTHESIA (OUTPATIENT)
Dept: PERIOP | Facility: HOSPITAL | Age: 58
End: 2023-12-20
Payer: MEDICAID

## 2023-12-20 ENCOUNTER — HOSPITAL ENCOUNTER (OUTPATIENT)
Facility: HOSPITAL | Age: 58
Setting detail: HOSPITAL OUTPATIENT SURGERY
Discharge: HOME | End: 2023-12-20
Attending: OPHTHALMOLOGY | Admitting: OPHTHALMOLOGY
Payer: MEDICAID

## 2023-12-20 VITALS
WEIGHT: 271 LBS | BODY MASS INDEX: 42.53 KG/M2 | SYSTOLIC BLOOD PRESSURE: 150 MMHG | RESPIRATION RATE: 16 BRPM | HEIGHT: 67 IN | TEMPERATURE: 97.4 F | OXYGEN SATURATION: 98 % | HEART RATE: 67 BPM | DIASTOLIC BLOOD PRESSURE: 98 MMHG

## 2023-12-20 DIAGNOSIS — H25.11 NUCLEAR SCLEROTIC CATARACT OF RIGHT EYE: ICD-10-CM

## 2023-12-20 PROCEDURE — V2632 POST CHMBR INTRAOCULAR LENS: HCPCS | Performed by: OPHTHALMOLOGY

## 2023-12-20 PROCEDURE — 25810000003 LACTATED RINGERS PER 1000 ML: Performed by: NURSE ANESTHETIST, CERTIFIED REGISTERED

## 2023-12-20 PROCEDURE — 25810000003 LACTATED RINGERS PER 1000 ML: Performed by: OPHTHALMOLOGY

## 2023-12-20 PROCEDURE — 25010000002 MIDAZOLAM PER 1MG: Performed by: NURSE ANESTHETIST, CERTIFIED REGISTERED

## 2023-12-20 DEVICE — LENS MONOFOCAL IQ CC60WF235: Type: IMPLANTABLE DEVICE | Site: POSTERIOR CHAMBER | Status: FUNCTIONAL

## 2023-12-20 RX ORDER — PREDNISOLONE ACETATE 10 MG/ML
1 SUSPENSION/ DROPS OPHTHALMIC SEE ADMIN INSTRUCTIONS
Status: DISCONTINUED | OUTPATIENT
Start: 2023-12-20 | End: 2023-12-20 | Stop reason: HOSPADM

## 2023-12-20 RX ORDER — SODIUM CHLORIDE 9 MG/ML
40 INJECTION, SOLUTION INTRAVENOUS AS NEEDED
Status: DISCONTINUED | OUTPATIENT
Start: 2023-12-20 | End: 2023-12-20 | Stop reason: HOSPADM

## 2023-12-20 RX ORDER — SODIUM CHLORIDE 0.9 % (FLUSH) 0.9 %
10 SYRINGE (ML) INJECTION AS NEEDED
Status: DISCONTINUED | OUTPATIENT
Start: 2023-12-20 | End: 2023-12-20 | Stop reason: HOSPADM

## 2023-12-20 RX ORDER — TETRACAINE HYDROCHLORIDE 5 MG/ML
SOLUTION OPHTHALMIC AS NEEDED
Status: DISCONTINUED | OUTPATIENT
Start: 2023-12-20 | End: 2023-12-20 | Stop reason: HOSPADM

## 2023-12-20 RX ORDER — SODIUM CHLORIDE, SODIUM LACTATE, POTASSIUM CHLORIDE, CALCIUM CHLORIDE 600; 310; 30; 20 MG/100ML; MG/100ML; MG/100ML; MG/100ML
INJECTION, SOLUTION INTRAVENOUS CONTINUOUS PRN
Status: DISCONTINUED | OUTPATIENT
Start: 2023-12-20 | End: 2023-12-20 | Stop reason: SURG

## 2023-12-20 RX ORDER — MIDAZOLAM HYDROCHLORIDE 2 MG/2ML
INJECTION, SOLUTION INTRAMUSCULAR; INTRAVENOUS AS NEEDED
Status: DISCONTINUED | OUTPATIENT
Start: 2023-12-20 | End: 2023-12-20 | Stop reason: SURG

## 2023-12-20 RX ORDER — LIDOCAINE HYDROCHLORIDE 40 MG/ML
INJECTION, SOLUTION RETROBULBAR; TOPICAL AS NEEDED
Status: DISCONTINUED | OUTPATIENT
Start: 2023-12-20 | End: 2023-12-20 | Stop reason: HOSPADM

## 2023-12-20 RX ORDER — PREDNISOLONE ACETATE 10 MG/ML
SUSPENSION/ DROPS OPHTHALMIC AS NEEDED
Status: DISCONTINUED | OUTPATIENT
Start: 2023-12-20 | End: 2023-12-20 | Stop reason: HOSPADM

## 2023-12-20 RX ORDER — BALANCED SALT SOLUTION 6.4; .75; .48; .3; 3.9; 1.7 MG/ML; MG/ML; MG/ML; MG/ML; MG/ML; MG/ML
SOLUTION OPHTHALMIC AS NEEDED
Status: DISCONTINUED | OUTPATIENT
Start: 2023-12-20 | End: 2023-12-20 | Stop reason: HOSPADM

## 2023-12-20 RX ORDER — CYCLOPENT/TROPIC/PHEN/KETR/WAT 1%-1%-2.5%
1 DROPS (EA) OPHTHALMIC (EYE)
Status: COMPLETED | OUTPATIENT
Start: 2023-12-20 | End: 2023-12-20

## 2023-12-20 RX ORDER — SODIUM CHLORIDE 0.9 % (FLUSH) 0.9 %
10 SYRINGE (ML) INJECTION EVERY 12 HOURS SCHEDULED
Status: DISCONTINUED | OUTPATIENT
Start: 2023-12-20 | End: 2023-12-20 | Stop reason: HOSPADM

## 2023-12-20 RX ORDER — TETRACAINE HYDROCHLORIDE 5 MG/ML
1 SOLUTION OPHTHALMIC SEE ADMIN INSTRUCTIONS
Status: DISCONTINUED | OUTPATIENT
Start: 2023-12-20 | End: 2023-12-20 | Stop reason: HOSPADM

## 2023-12-20 RX ORDER — SODIUM CHLORIDE, SODIUM LACTATE, POTASSIUM CHLORIDE, CALCIUM CHLORIDE 600; 310; 30; 20 MG/100ML; MG/100ML; MG/100ML; MG/100ML
1000 INJECTION, SOLUTION INTRAVENOUS CONTINUOUS
Status: DISCONTINUED | OUTPATIENT
Start: 2023-12-20 | End: 2023-12-20 | Stop reason: HOSPADM

## 2023-12-20 RX ADMIN — Medication 1 DROP: at 09:25

## 2023-12-20 RX ADMIN — Medication 1 DROP: at 09:15

## 2023-12-20 RX ADMIN — TETRACAINE HYDROCHLORIDE 1 DROP: 5 SOLUTION OPHTHALMIC at 09:13

## 2023-12-20 RX ADMIN — MIDAZOLAM HYDROCHLORIDE 2 MG: 1 INJECTION, SOLUTION INTRAMUSCULAR; INTRAVENOUS at 10:23

## 2023-12-20 RX ADMIN — SODIUM CHLORIDE, POTASSIUM CHLORIDE, SODIUM LACTATE AND CALCIUM CHLORIDE 1000 ML: 600; 310; 30; 20 INJECTION, SOLUTION INTRAVENOUS at 09:18

## 2023-12-20 RX ADMIN — Medication 1 DROP: at 09:20

## 2023-12-20 RX ADMIN — SODIUM CHLORIDE, POTASSIUM CHLORIDE, SODIUM LACTATE AND CALCIUM CHLORIDE: 600; 310; 30; 20 INJECTION, SOLUTION INTRAVENOUS at 10:05

## 2023-12-20 RX ADMIN — TETRACAINE HYDROCHLORIDE 1 DROP: 5 SOLUTION OPHTHALMIC at 09:14

## 2023-12-20 NOTE — H&P
[facility]         History and Physical    Patient Name: Jamia Hinton  MRN: [id number]  : 1965  Gender: female     HPI: Patient complaint of PPLOV Right eye diagnosed with cataract. Patient requests PHACO PCIOL for Increase of VA/ADL.    History:    Past Medical History:   Diagnosis Date    Arthritis     Hernia, abdominal        Past Surgical History:   Procedure Laterality Date     SECTION      CHOLECYSTECTOMY      VENTRAL/INCISIONAL HERNIA REPAIR N/A 2017    Procedure:  INCISIONAL HERNIA REPAIR WITH MESH;  Surgeon: Bassam Rogers MD;  Location: [facility];  Service:        Social History     Socioeconomic History    Marital status:    Tobacco Use    Smoking status: Never    Smokeless tobacco: Never   Substance and Sexual Activity    Alcohol use: No    Drug use: No    Sexual activity: Defer       Family History   Problem Relation Age of Onset    No Known Problems Mother     No Known Problems Father        Prior to Admission Medications:  Medications Prior to Admission   Medication Sig Dispense Refill Last Dose    citalopram (CeleXA) 20 MG tablet Take 1 tablet by mouth Daily.   2023 at 0800    folic acid (FOLVITE) 1 MG tablet Take 1 tablet by mouth Daily.   2023 at 0800    methotrexate 2.5 MG tablet Take 1 tablet by mouth 1 (One) Time Per Week. 6 pills   2023 at 0800    mycophenolate (CELLCEPT) 500 MG tablet Take 2 tablets by mouth 2 (Two) Times a Day.   2023 at 2100    omeprazole (priLOSEC) 40 MG capsule Take 1 capsule by mouth Daily.   2023 at 0800    oxybutynin XL (DITROPAN-XL) 5 MG 24 hr tablet Take 1 tablet by mouth Daily.   2023 at 0800    vitamin B-12 (CYANOCOBALAMIN) 1000 MCG tablet Take 1 tablet by mouth Daily.   2023 at 0800    vitamin D3 125 MCG (5000 UT) capsule capsule Take 1 capsule by mouth Daily.   2023 at 0800       Allergies:  No Known Allergies     Vitals: Temp:  [97.5 °F (36.4 °C)] 97.5 °F  (36.4 °C)  Heart Rate:  [74] 74  Resp:  [18] 18  BP: (159)/(92) 159/92    Review of Systems:   Within Normal Limits Abnormal   HEENT [x]    []     Cardiovascular [x]   []     Gastrointestinal [x]   []     Genitourinary [x]   []     Neurologic [x]   []     Pulmonary [x]   []       Physical Exam:   Within Normal Limits Abnormal   HEENT [x]    []     Heart [x]   []     Lungs [x]   []     Abdomen [x]   []     Extremities [x]   []       Impression: Right nuclear sclerotic cataract.     Plan: CATARACT PHACO EXTRACTION WITH INTRAOCULAR LENS IMPLANT RIGHT (Right)     Christ Kerr MD  12/20/2023

## 2023-12-20 NOTE — ANESTHESIA PREPROCEDURE EVALUATION
Anesthesia Evaluation     Patient summary reviewed and Nursing notes reviewed   NPO Solid Status: > 8 hours  NPO Liquid Status: > 8 hours           Airway   Mallampati: II  TM distance: >3 FB  Neck ROM: full  possible difficult intubation and difficult intubation highly probable  Dental - normal exam   (+) edentulous    Pulmonary    (+) ,shortness of breath, sleep apnea ( mot treated), decreased breath sounds  (-) not a smoker  Cardiovascular - normal exam  Exercise tolerance: poor (<4 METS)    ECG reviewed  Rhythm: regular    (+) hypertension ( not treated) poorly controlled, past MI ( unknown mi per ekg) , CAD ( morb obesity, jocelyn, htn pvd dm), HUERTA, PVD      Neuro/Psych  GI/Hepatic/Renal/Endo    (+) morbid obesity, diabetes mellitus ( morb obesity inc fbs no meds) type 2 poorly controlled    Musculoskeletal     (+) arthralgias, back pain, chronic pain, gait problem  Abdominal   (+) obese   Substance History      OB/GYN          Other   arthritis,     ROS/Med Hx Other: Labs reviewed   fbs 104 k 4.0  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  Pre-procedure education provided

## 2023-12-20 NOTE — ANESTHESIA POSTPROCEDURE EVALUATION
Patient: Jamia Hinton    Procedure Summary       Date: 12/20/23 Room / Location: [facility] OR 3 /  [facility] OR    Anesthesia Start: 1021 Anesthesia Stop: 1040    Procedure: CATARACT PHACO EXTRACTION WITH INTRAOCULAR LENS IMPLANT RIGHT (Right: Eye) Diagnosis:       Nuclear sclerotic cataract of right eye      (Nuclear sclerotic cataract of right eye [H25.11])    Surgeons: Christ Kerr MD Provider: Indra Quintanilla CRNA    Anesthesia Type: MAC ASA Status: 4            Anesthesia Type: MAC    Vitals  No vitals data found for the desired time range.          Post Anesthesia Care and Evaluation    Patient location during evaluation: PHASE II  Patient participation: complete - patient participated  Level of consciousness: awake  Pain score: 0  Pain management: adequate    Airway patency: patent  Anesthetic complications: No anesthetic complications  PONV Status: none  Cardiovascular status: acceptable  Respiratory status: acceptable and spontaneous ventilation  Hydration status: acceptable    Comments: See R.N. note for postop vital signs.vsss resp spont, reflexes intact, responsive, report given to pacu nurse

## 2023-12-20 NOTE — OP NOTE
OPERATIVE NOTE    Date of Procedure: 12/20/2023  Patient Name: Jamia Hinton  Patient MRN: [id number]  YOB: 1965     Preoperative Diagnosis: Right nuclear sclerotic cataract.     Postoperative Diagnosis: Right nuclear sclerotic cataract.     Procedure Performed: Phacoemulsification with implantation of a  foldable posterior chamber intraocular lens, Right eye.     Surgeon: Christ Kerr MD     Anesthesia:  Monitored Anesthesia Care (MAC)      Brief History and Indication: The patient presents with a history of past progressive loss of vision.  Patient was diagnosed with cataract and requests removal for increased ability to read and see.     Operation Description: The patient was taken to the OR and prepped and draped in the usual sterile ophthalmic fashion. A lid speculum was placed in the eye.  A 0.8 mm blade was then used to make a stab incision two o’clock hours from the intended temporal clear cornea groove. The anterior chamber was then inflated with a Viscoelastic. A metal microkeratome blade was then used to enter the anterior chamber at the temporal clear cornea site. A three level tunnel incision was made. A curvilinear capsulorrhexis was then performed with a bent cystotome needle and capsulorrhexis forceps.  BSS on a 30 gauge bent cannula was used to hydro-dissect the lens. Good fluid waves were noted. Phacoemulsification was then used to remove nuclear material without complications. The residual cortical and lenticular material was then removed with irrigation and aspiration. Viscoelastics were then used to inflate the bag in a soft shell technique. A PCIOL was injected into the bag. Post-implantation, there were no rents or tears in the bag and the lens was noted to be stable and centered. The residual Viscoelastic was then removed with irrigation and aspiration.  The wound was checked and found to be without leaks. One drop of a Prednisilone was placed in the eye.     Implant  Information:   Implant Name Type Inv. Item Serial No.  Lot No. LRB No. Used Action   LENS MONOFOCAL IQ [id number] - [id number] - [id number] Implant LENS MONOFOCAL IQ [id number]  Right 1 Implanted       Complications: None    Estimated Blood Loss:  Less than 1 cc.      Discharge and Condition  The patient was transported to same day surgery in excellent condition and scheduled for follow-up appointment. The patient was given instructions on use of eye drops for the operative eye and was specifically instructed to call for any concerns.    Christ Kerr MD  12/20/2023

## 2023-12-20 NOTE — DISCHARGE INSTRUCTIONS
Post Operative Cataract Instructions     Right Eye  POST OPERATIVE INSTRUCTIONS  You have received anesthesia today. DO NOT drive, drink alcohol, sign legal documents.   After surgery, your eye will not hurt. It may feel scratchy, sticky or uncomfortable. Your eye will be sensitive to light.  Most people see better 1-3 days after the procedure, but it could take 3 weeks to get the full benefits and reach your visual potential. If your vision is blurry for a few days it is normal, and means you may have swelling outside or inside the eye. For some patients, a bubble is placed and there will be blurriness.   You should receive a post-op kit with tape and an eye shield. Wear the shield for the first 3 nights after surgery to keep you from rubbing the eye.  Most people are able to return to their normal routine 1-3 days after surgery, however, due to the brain adjusting to your new vision you may have trouble judging distances and want to be careful when driving and going up and downstairs.   You can shower and wash your hair the day after surgery. Keep water, shampoo, hair spray and shaving lotion out of the eye, especially for the first week.  During the first week, you should AVOID:   Rubbing or putting pressure on your eye.  Eye make-up, face cream or lotion, hair coloring or perms  Strenuous activities, such as running or lifting weights, as to avoid sweat from getting in the eye. Avoid swimming, hot tubs, fumes or chantal conditions.   Keep your head above your heart (no hanging the head down for periods of time).    Some discomfort and blurred vision after surgery are normal, but if you have any unusual pain, swelling, bleeding or sudden decrease in vision, contact our office immediately.     Emergency assistance is available at any time by calling:    Dr.Mark Loco Aadn  [phone]    If unable to reach call [facility] @  [phone]    You have been prescribed an eye drop to use after surgery, please follow these instructions:    Prednisolone Acetate 1% (Pred-Forte)    USE 1 DROP 4 (FOUR) TIMES A DAY FOR 1 WEEK, WEEK 2: USE 3 (THREE) TIMES A DAY FOR 1 WEEK, WEEK 3: USE 2 (TWO) TIMES A DAY FOR 1 WEEK, WEEK 4: USE 1 (ONE) TIME A DAY FOR 1 WEEK THEN STOP.    PLACE A JUAN JOSE IN THE DAY COLUMN EACH TIME YOU USE TO KEEP ON SCHEDULE    WEEK 1-USE 4  (FOUR) TIMES A DAY DAY 1   DAY 2 DAY 3 DAY 4 DAY 5 DAY 6 DAY 7     WEEK 2-USE 3 (THREE)  TIMES A DAY DAY 1 DAY 2 DAY 3 DAY 4 DAY 5 DAY 6 DAY 7     WEEK 3-USE 2 (TWO) TIMES A DAY DAY 1 DAY 2 DAY 3 DAY 4 DAY 5 DAY 6 DAY 7     WEEK 4-USE 1 (ONE)TIME A DAY DAY 1 DAY 2 DAY 3 DAY 4 DAY 5 DAY 6 DAY 7

## 2024-01-10 ENCOUNTER — PREP FOR SURGERY (OUTPATIENT)
Dept: OTHER | Facility: HOSPITAL | Age: 59
End: 2024-01-10
Payer: MEDICAID

## 2024-01-10 DIAGNOSIS — H25.12 NUCLEAR SCLEROTIC CATARACT OF LEFT EYE: Primary | ICD-10-CM

## 2024-01-10 RX ORDER — SODIUM CHLORIDE 0.9 % (FLUSH) 0.9 %
10 SYRINGE (ML) INJECTION EVERY 12 HOURS SCHEDULED
OUTPATIENT
Start: 2024-01-10

## 2024-01-10 RX ORDER — CYCLOPENT/TROPIC/PHEN/KETR/WAT 1%-1%-2.5%
1 DROPS (EA) OPHTHALMIC (EYE)
OUTPATIENT
Start: 2024-01-10 | End: 2024-01-10

## 2024-01-10 RX ORDER — PREDNISOLONE ACETATE 10 MG/ML
1 SUSPENSION/ DROPS OPHTHALMIC SEE ADMIN INSTRUCTIONS
OUTPATIENT
Start: 2024-01-10

## 2024-01-10 RX ORDER — SODIUM CHLORIDE 9 MG/ML
40 INJECTION, SOLUTION INTRAVENOUS AS NEEDED
OUTPATIENT
Start: 2024-01-10

## 2024-01-10 RX ORDER — TETRACAINE HYDROCHLORIDE 5 MG/ML
1 SOLUTION OPHTHALMIC SEE ADMIN INSTRUCTIONS
OUTPATIENT
Start: 2024-01-10

## 2024-01-10 RX ORDER — SODIUM CHLORIDE 0.9 % (FLUSH) 0.9 %
10 SYRINGE (ML) INJECTION AS NEEDED
OUTPATIENT
Start: 2024-01-10

## 2024-01-12 PROBLEM — H25.12 NUCLEAR SCLEROTIC CATARACT OF LEFT EYE: Status: ACTIVE | Noted: 2024-01-10

## 2024-02-05 ENCOUNTER — PREP FOR SURGERY (OUTPATIENT)
Dept: OTHER | Facility: HOSPITAL | Age: 59
End: 2024-02-05
Payer: MEDICAID

## 2024-02-05 DIAGNOSIS — H25.12 NUCLEAR SCLEROTIC CATARACT OF LEFT EYE: Primary | ICD-10-CM

## 2024-02-05 RX ORDER — TETRACAINE HYDROCHLORIDE 5 MG/ML
1 SOLUTION OPHTHALMIC SEE ADMIN INSTRUCTIONS
Status: CANCELLED | OUTPATIENT
Start: 2024-02-05

## 2024-02-05 RX ORDER — SODIUM CHLORIDE 0.9 % (FLUSH) 0.9 %
10 SYRINGE (ML) INJECTION EVERY 12 HOURS SCHEDULED
Status: CANCELLED | OUTPATIENT
Start: 2024-02-05

## 2024-02-05 RX ORDER — SODIUM CHLORIDE 0.9 % (FLUSH) 0.9 %
10 SYRINGE (ML) INJECTION AS NEEDED
Status: CANCELLED | OUTPATIENT
Start: 2024-02-05

## 2024-02-05 RX ORDER — PREDNISOLONE ACETATE 10 MG/ML
1 SUSPENSION/ DROPS OPHTHALMIC SEE ADMIN INSTRUCTIONS
Status: CANCELLED | OUTPATIENT
Start: 2024-02-05

## 2024-02-05 RX ORDER — CYCLOPENT/TROPIC/PHEN/KETR/WAT 1%-1%-2.5%
1 DROPS (EA) OPHTHALMIC (EYE)
Status: CANCELLED | OUTPATIENT
Start: 2024-02-05 | End: 2024-02-05

## 2024-02-05 RX ORDER — SODIUM CHLORIDE 9 MG/ML
40 INJECTION, SOLUTION INTRAVENOUS AS NEEDED
Status: CANCELLED | OUTPATIENT
Start: 2024-02-05

## 2024-02-14 NOTE — PRE-PROCEDURE INSTRUCTIONS
PAT phone history completed with patient for upcoming procedure on .    PAT PASS reviewed with patient and they verbalize understanding of the following:     Do not eat or drink anything after midnight the night before procedure unless otherwise instructed by physician/surgeon's office, this includes no gum, candy, mints, tobacco products or e-cigarettes.  Do not shave the area to be operated on at least 48 hours prior to procedure.  Do not wear makeup, lotion, hair products, or nail polish.  Do not wear any jewelry and remove all piercings.  Do not wear any adhesive if you wear dentures.  Do not wear contacts; bring in glasses if needed.  Only take medications on the morning of procedure as instructed by PAT nurse per anesthesia guidelines or as instructed by physician's office.  If you are on any blood thinners reach out to the physician/surgeon's office for instructions on when/if they will need to be stopped prior to procedure.  Bring in picture ID and insurance card, advanced directive copies if applicable, CPAP/BIPAP/Inhalers if indicated morning of procedure, leave any other valuables at home.  Ensure you have arranged for someone to drive you home the day of your procedure and someone to care for you at home afterwards. It is recommended that you do not drive, drink alcohol, or make any major legal decisions for at least 24 hours after your procedure is complete.    Instructions given on hospital entrance and registration location.

## 2024-02-16 ENCOUNTER — HOSPITAL ENCOUNTER (OUTPATIENT)
Facility: HOSPITAL | Age: 59
Setting detail: HOSPITAL OUTPATIENT SURGERY
Discharge: HOME | End: 2024-02-16
Attending: OPHTHALMOLOGY | Admitting: OPHTHALMOLOGY
Payer: MEDICAID

## 2024-02-16 ENCOUNTER — ANESTHESIA EVENT (OUTPATIENT)
Dept: PERIOP | Facility: HOSPITAL | Age: 59
End: 2024-02-16
Payer: MEDICAID

## 2024-02-16 ENCOUNTER — ANESTHESIA (OUTPATIENT)
Dept: PERIOP | Facility: HOSPITAL | Age: 59
End: 2024-02-16
Payer: MEDICAID

## 2024-02-16 VITALS
HEIGHT: 67 IN | HEART RATE: 61 BPM | SYSTOLIC BLOOD PRESSURE: 143 MMHG | WEIGHT: 271 LBS | BODY MASS INDEX: 42.53 KG/M2 | TEMPERATURE: 97 F | OXYGEN SATURATION: 98 % | RESPIRATION RATE: 18 BRPM | DIASTOLIC BLOOD PRESSURE: 81 MMHG

## 2024-02-16 DIAGNOSIS — H25.12 NUCLEAR SCLEROTIC CATARACT OF LEFT EYE: ICD-10-CM

## 2024-02-16 PROCEDURE — V2632 POST CHMBR INTRAOCULAR LENS: HCPCS | Performed by: OPHTHALMOLOGY

## 2024-02-16 PROCEDURE — 25010000002 MIDAZOLAM PER 1MG: Performed by: NURSE ANESTHETIST, CERTIFIED REGISTERED

## 2024-02-16 PROCEDURE — 25810000003 LACTATED RINGERS PER 1000 ML: Performed by: OPHTHALMOLOGY

## 2024-02-16 PROCEDURE — 25010000002 FENTANYL CITRATE (PF) 50 MCG/ML SOLUTION PREFILLED SYRINGE: Performed by: NURSE ANESTHETIST, CERTIFIED REGISTERED

## 2024-02-16 DEVICE — LENS MONOFOCAL IQ CC60WF235: Type: IMPLANTABLE DEVICE | Site: POSTERIOR CHAMBER | Status: FUNCTIONAL

## 2024-02-16 RX ORDER — TETRACAINE HYDROCHLORIDE 5 MG/ML
SOLUTION OPHTHALMIC AS NEEDED
Status: DISCONTINUED | OUTPATIENT
Start: 2024-02-16 | End: 2024-02-16 | Stop reason: HOSPADM

## 2024-02-16 RX ORDER — CYCLOPENT/TROPIC/PHEN/KETR/WAT 1%-1%-2.5%
1 DROPS (EA) OPHTHALMIC (EYE)
Status: COMPLETED | OUTPATIENT
Start: 2024-02-16 | End: 2024-02-16

## 2024-02-16 RX ORDER — PREDNISOLONE ACETATE 10 MG/ML
1 SUSPENSION/ DROPS OPHTHALMIC SEE ADMIN INSTRUCTIONS
Status: DISCONTINUED | OUTPATIENT
Start: 2024-02-16 | End: 2024-02-16 | Stop reason: HOSPADM

## 2024-02-16 RX ORDER — BALANCED SALT SOLUTION 6.4; .75; .48; .3; 3.9; 1.7 MG/ML; MG/ML; MG/ML; MG/ML; MG/ML; MG/ML
SOLUTION OPHTHALMIC AS NEEDED
Status: DISCONTINUED | OUTPATIENT
Start: 2024-02-16 | End: 2024-02-16 | Stop reason: HOSPADM

## 2024-02-16 RX ORDER — SODIUM CHLORIDE 0.9 % (FLUSH) 0.9 %
10 SYRINGE (ML) INJECTION EVERY 12 HOURS SCHEDULED
Status: DISCONTINUED | OUTPATIENT
Start: 2024-02-16 | End: 2024-02-16 | Stop reason: HOSPADM

## 2024-02-16 RX ORDER — MIDAZOLAM HYDROCHLORIDE 2 MG/2ML
INJECTION, SOLUTION INTRAMUSCULAR; INTRAVENOUS AS NEEDED
Status: DISCONTINUED | OUTPATIENT
Start: 2024-02-16 | End: 2024-02-16 | Stop reason: SURG

## 2024-02-16 RX ORDER — SODIUM CHLORIDE, SODIUM LACTATE, POTASSIUM CHLORIDE, CALCIUM CHLORIDE 600; 310; 30; 20 MG/100ML; MG/100ML; MG/100ML; MG/100ML
1000 INJECTION, SOLUTION INTRAVENOUS CONTINUOUS
Status: DISCONTINUED | OUTPATIENT
Start: 2024-02-16 | End: 2024-02-16 | Stop reason: HOSPADM

## 2024-02-16 RX ORDER — PREDNISOLONE ACETATE 10 MG/ML
SUSPENSION/ DROPS OPHTHALMIC AS NEEDED
Status: DISCONTINUED | OUTPATIENT
Start: 2024-02-16 | End: 2024-02-16 | Stop reason: HOSPADM

## 2024-02-16 RX ORDER — FENTANYL CITRATE 50 UG/ML
INJECTION, SOLUTION INTRAMUSCULAR; INTRAVENOUS AS NEEDED
Status: DISCONTINUED | OUTPATIENT
Start: 2024-02-16 | End: 2024-02-16 | Stop reason: SURG

## 2024-02-16 RX ORDER — TETRACAINE HYDROCHLORIDE 5 MG/ML
1 SOLUTION OPHTHALMIC SEE ADMIN INSTRUCTIONS
Status: COMPLETED | OUTPATIENT
Start: 2024-02-16 | End: 2024-02-16

## 2024-02-16 RX ORDER — SODIUM CHLORIDE 9 MG/ML
40 INJECTION, SOLUTION INTRAVENOUS AS NEEDED
Status: DISCONTINUED | OUTPATIENT
Start: 2024-02-16 | End: 2024-02-16 | Stop reason: HOSPADM

## 2024-02-16 RX ORDER — SODIUM CHLORIDE 0.9 % (FLUSH) 0.9 %
10 SYRINGE (ML) INJECTION AS NEEDED
Status: DISCONTINUED | OUTPATIENT
Start: 2024-02-16 | End: 2024-02-16 | Stop reason: HOSPADM

## 2024-02-16 RX ADMIN — Medication 1 DROP: at 12:22

## 2024-02-16 RX ADMIN — MIDAZOLAM HYDROCHLORIDE 2 MG: 1 INJECTION, SOLUTION INTRAMUSCULAR; INTRAVENOUS at 13:11

## 2024-02-16 RX ADMIN — Medication 1 DROP: at 12:27

## 2024-02-16 RX ADMIN — TETRACAINE HYDROCHLORIDE 1 DROP: 5 SOLUTION OPHTHALMIC at 12:21

## 2024-02-16 RX ADMIN — TETRACAINE HYDROCHLORIDE 1 DROP: 5 SOLUTION OPHTHALMIC at 12:20

## 2024-02-16 RX ADMIN — Medication 1 DROP: at 12:32

## 2024-02-16 RX ADMIN — FENTANYL CITRATE 50 MCG: 50 INJECTION, SOLUTION INTRAMUSCULAR; INTRAVENOUS at 13:11

## 2024-02-16 RX ADMIN — SODIUM CHLORIDE, POTASSIUM CHLORIDE, SODIUM LACTATE AND CALCIUM CHLORIDE 1000 ML: 600; 310; 30; 20 INJECTION, SOLUTION INTRAVENOUS at 12:27

## 2024-02-16 NOTE — DISCHARGE INSTRUCTIONS
[facility]  [address] [place] [address]  (P): [phone]           (F): [phone]    Jamia ARSHAD Mary Jane  PATIENT NAME:__________________________________    Left Eye    POST OPERATIVE INSTRUCTIONS    You have received anesthesia today. DO NOT drive, drink alcohol, sign legal documents.   After surgery, your eye will not hurt. It may feel scratchy, sticky or uncomfortable. Your eye will be sensitive to light.  Most people see better 1-3 days after the procedure, but it could take 3 weeks to get the full benefits and reach your visual potential. If your vision is blurry for a few days it is normal, and means you may have swelling outside or inside the eye. For some patients, a bubble is placed and there will be blurriness.   You should receive a post-op kit with tape and an eye shield. Wear the shield for the first 3 nights after surgery to keep you from rubbing the eye.  Most people are able to return to their normal routine 1-3 days after surgery, however, due to the brain adjusting to your new vision you may have trouble judging distances and want to be careful when driving and going up and downstairs.   You can shower and wash your hair the day after surgery. Keep water, shampoo, hair spray and shaving lotion out of the eye, especially for the first week.  During the first week, you should AVOID:   Rubbing or putting pressure on your eye.  Eye make-up, face cream or lotion, hair coloring or perms  Strenuous activities, such as running or lifting weights, as to avoid sweat from getting in the eye. Avoid swimming, hot tubs, fumes or chantal conditions.   Keep your head above your heart (no hanging the head down for periods of time).  Some discomfort and blurred vision after surgery are normal, but if you have any unusual pain, swelling, bleeding or sudden decrease in vision, contact our office immediately. Emergency assistance is available at any time by calling:    Dr. Santiago Méndez  [place]    [phone]  If unable to reach call [facility] @ [phone]      POST OPERATIVE DROP INSTRUCTIONS  You have been prescribed eye drops to use after surgery, please follow these instructions:  PLACE A JUAN JOSE IN THE DAY COLUMN EACH TIME YOU USE TO KEEP ON SCHEDULE. WAIT 5 MINUTES IN BETWEEN DROPS    Prednisolone (PINK TOP) SHAKE WELL BEFORE USE   GIVEN TO YOU BY THE HOSPITAL    WEEK 1-USE 4  (FOUR) TIMES A DAY DAY 1   DAY 2 DAY 3 DAY 4 DAY 5 DAY 6 DAY 7     WEEK 2-USE 3 (THREE)  TIMES A DAY DAY 1 DAY 2 DAY 3 DAY 4 DAY 5 DAY 6 DAY 7     WEEK 3-USE 2 (TWO) TIMES A DAY DAY 1 DAY 2 DAY 3 DAY 4 DAY 5 DAY 6 DAY 7     WEEK 4-USE 1 (ONE)TIME A DAY DAY 1 DAY 2 DAY 3 DAY 4 DAY 5 DAY 6 DAY 7         Ketorolac (GRAY TOP) PRESCRIBED TO YOUR PHARMACY    WEEK 1-USE 4  (FOUR) TIMES A DAY DAY 1   DAY 2 DAY 3 DAY 4 DAY 5 DAY 6 DAY 7     WEEK 2-USE 3 (THREE)  TIMES A DAY DAY 1 DAY 2 DAY 3 DAY 4 DAY 5 DAY 6 DAY 7     WEEK 3-USE 2 (TWO) TIMES A DAY DAY 1 DAY 2 DAY 3 DAY 4 DAY 5 DAY 6 DAY 7     WEEK 4-USE 1 (ONE)TIME A DAY DAY 1 DAY 2 DAY 3 DAY 4 DAY 5 DAY 6 DAY 7       Moxifloxacin (TAN TOP) PRESCRIBED TO YOUR PHARMACY    WEEK 1-USE 4  (FOUR) TIMES A DAY DAY 1   DAY 2 DAY 3 DAY 4 DAY 5 DAY 6 DAY 7       Please follow all post op instructions and follow up appointment time from your physician's office included in your discharge packet.  . No pushing, pulling, tugging,  heavy lifting, or strenuous activity.  No major decision making, driving, or drinking alcoholic beverages for 24 hours. ( due to the medications you have  received)  Always use good hand hygiene/washing techniques.  NO driving while taking pain medications.    * if you have an incision:  Check your incision area every day for signs of infection.   Check for:  * more redness, swelling, or pain  *more fluid or blood  *warmth  *pus or bad smell    To assist you in voiding:  Drink plenty of fluids  Listen to  running water while attempting to void.    If you are unable to urinate and you have an uncomfortable urge to void or it has been   6 hours since you were discharged, return to the Emergency Room

## 2024-02-16 NOTE — OP NOTE
OPERATIVE NOTE    Date of Procedure: 2/16/2024  Patient Name: Jamia Hinton  Patient MRN: [id number]  YOB: 1965     Preoperative Diagnosis: Left nuclear sclerotic cataract.     Postoperative Diagnosis: Left nuclear sclerotic cataract.     Procedure Performed: Phacoemulsification with implantation of a  foldable posterior chamber intraocular lens, Left eye.     Surgeon: Christ Kerr MD     Anesthesia:  Monitored Anesthesia Care (MAC)      Brief History and Indication: The patient presents with a history of past progressive loss of vision.  Patient was diagnosed with cataract and requests removal for increased ability to read and see.     Operation Description: The patient was taken to the OR and prepped and draped in the usual sterile ophthalmic fashion. A lid speculum was placed in the eye.  A 0.8 mm blade was then used to make a stab incision two o’clock hours from the intended temporal clear cornea groove. The anterior chamber was then inflated with a Viscoelastic. A metal microkeratome blade was then used to enter the anterior chamber at the temporal clear cornea site. A three level tunnel incision was made. A curvilinear capsulorrhexis was then performed with a bent cystotome needle and capsulorrhexis forceps.  BSS on a 30 gauge bent cannula was used to hydro-dissect the lens. Good fluid waves were noted. Phacoemulsification was then used to remove nuclear material without complications. The residual cortical and lenticular material was then removed with irrigation and aspiration. Viscoelastics were then used to inflate the bag in a soft shell technique. A PCIOL was injected into the bag. Post-implantation, there were no rents or tears in the bag and the lens was noted to be stable and centered. The residual Viscoelastic was then removed with irrigation and aspiration.  The wound was checked and found to be without leaks. One drop of a Prednisilone was placed in the eye.     Implant  Information:   Implant Name Type Inv. Item Serial No.  Lot No. LRB No. Used Action   LENS MONOFOCAL IQ [id number] - [id number] - [id number] Implant LENS MONOFOCAL IQ [id number]  Left 1 Implanted       Complications: None    Estimated Blood Loss:  Less than 1 cc.      Discharge and Condition  The patient was transported to same day surgery in excellent condition and scheduled for follow-up appointment. The patient was given instructions on use of eye drops for the operative eye and was specifically instructed to call for any concerns.    Christ Kerr MD  2/16/2024

## 2024-02-16 NOTE — H&P
[facility]         History and Physical    Patient Name: Jamia Hinton  MRN: [id number]  : 1965  Gender: female     HPI: Patient complaint of PPLOV Left eye diagnosed with cataract. Patient requests PHACO PCIOL for Increase of VA/ADL.    History:    Past Medical History:   Diagnosis Date    Arthritis     Hernia, abdominal        Past Surgical History:   Procedure Laterality Date    CATARACT EXTRACTION W/ INTRAOCULAR LENS IMPLANT Right 2023    Procedure: CATARACT PHACO EXTRACTION WITH INTRAOCULAR LENS IMPLANT RIGHT;  Surgeon: Christ Kerr MD;  Location: [facility];  Service: Ophthalmology;  Laterality: Right;     SECTION      CHOLECYSTECTOMY      VENTRAL/INCISIONAL HERNIA REPAIR N/A 2017    Procedure:  INCISIONAL HERNIA REPAIR WITH MESH;  Surgeon: Bassam Rogers MD;  Location: [facility];  Service:        Social History     Socioeconomic History    Marital status:    Tobacco Use    Smoking status: Never    Smokeless tobacco: Never   Substance and Sexual Activity    Alcohol use: No    Drug use: No    Sexual activity: Defer       Family History   Problem Relation Age of Onset    No Known Problems Mother     No Known Problems Father        Prior to Admission Medications:  Medications Prior to Admission   Medication Sig Dispense Refill Last Dose    citalopram (CeleXA) 20 MG tablet Take 1 tablet by mouth Daily.   2/15/2024    folic acid (FOLVITE) 1 MG tablet Take 1 tablet by mouth Daily.   2/15/2024    methotrexate 2.5 MG tablet Take 1 tablet by mouth 1 (One) Time Per Week. 6 pills   2/15/2024    mycophenolate (CELLCEPT) 500 MG tablet Take 2 tablets by mouth 2 (Two) Times a Day.   2/15/2024    omeprazole (priLOSEC) 40 MG capsule Take 1 capsule by mouth Daily.   2/15/2024    oxybutynin XL (DITROPAN-XL) 5 MG 24 hr tablet Take 1 tablet by mouth Daily.   2/15/2024    vitamin B-12 (CYANOCOBALAMIN) 1000 MCG tablet Take 1 tablet by mouth Daily.    2/15/2024    vitamin D3 125 MCG (5000 UT) capsule capsule Take 1 capsule by mouth Daily.   2/15/2024       Allergies:  No Known Allergies     Vitals: Temp:  [97.3 °F (36.3 °C)] 97.3 °F (36.3 °C)  Heart Rate:  [52] 52  Resp:  [18] 18  BP: (166)/(76) 166/76    Review of Systems:   Within Normal Limits Abnormal   HEENT [x]    []     Cardiovascular [x]   []     Gastrointestinal [x]   []     Genitourinary [x]   []     Neurologic [x]   []     Pulmonary [x]   []       Physical Exam:   Within Normal Limits Abnormal   HEENT [x]    []     Heart [x]   []     Lungs [x]   []     Abdomen [x]   []     Extremities [x]   []       Impression: Left nuclear sclerotic cataract.     Plan: CATARACT PHACO EXTRACTION WITH INTRAOCULAR LENS IMPLANT LEFT (Left)     Christ Kerr MD  2/16/2024

## 2024-03-18 ENCOUNTER — TELEPHONE (OUTPATIENT)
Dept: SURGERY | Facility: CLINIC | Age: 59
End: 2024-03-18
Payer: MEDICAID

## 2024-03-18 NOTE — TELEPHONE ENCOUNTER
A user error has taken place: encounter opened in error, closed for administrative reasons, orders placed in error, not carried out on this patient, medication ordered in error, not dispensed to this patient, charting done on wrong patient and has been corrected.

## 2024-03-21 NOTE — PROGRESS NOTES
"Patient: Jamia Hinton    YOB: 1965    Date: 03/27/2024    Primary Care Provider: Jaycee Deal APRN    Chief Complaint   Patient presents with    Abdominal Pain       SUBJECTIVE:    History of present illness:  I saw the patient in the office today as a consultation for evaluation and treatment of abdominal pain with \"bloating,\" unexplained weight loss and nausea.    She does have epigastric abdominal discomfort and some aspects of reflux.  There has been unexplained weight loss recently.    The following portions of the patient's history were reviewed and updated as appropriate: allergies, current medications, past family history, past medical history, past social history, past surgical history and problem list.    Review of Systems   Constitutional:  Positive for unexpected weight change. Negative for chills and fever.   HENT:  Negative for hearing loss, trouble swallowing and voice change.    Eyes:  Negative for visual disturbance.   Respiratory:  Negative for apnea, cough, chest tightness, shortness of breath and wheezing.    Cardiovascular:  Negative for chest pain, palpitations and leg swelling.   Gastrointestinal:  Positive for abdominal distention, abdominal pain and nausea. Negative for anal bleeding, blood in stool, constipation, diarrhea, rectal pain and vomiting.   Endocrine: Negative for cold intolerance and heat intolerance.   Genitourinary:  Negative for difficulty urinating, dysuria and flank pain.   Musculoskeletal:  Negative for back pain and gait problem.   Skin:  Negative for color change, rash and wound.   Neurological:  Negative for dizziness, syncope, speech difficulty, weakness, light-headedness, numbness and headaches.   Hematological:  Negative for adenopathy. Does not bruise/bleed easily.   Psychiatric/Behavioral:  Negative for confusion. The patient is not nervous/anxious.        History:  Past Medical History:   Diagnosis Date    Arthritis     Hernia, abdominal  "       Past Surgical History:   Procedure Laterality Date    CATARACT EXTRACTION W/ INTRAOCULAR LENS IMPLANT Right 2023    Procedure: CATARACT PHACO EXTRACTION WITH INTRAOCULAR LENS IMPLANT RIGHT;  Surgeon: Christ Kerr MD;  Location: [facility];  Service: Ophthalmology;  Laterality: Right;    CATARACT EXTRACTION W/ INTRAOCULAR LENS IMPLANT Left 2024    Procedure: CATARACT PHACO EXTRACTION WITH INTRAOCULAR LENS IMPLANT LEFT;  Surgeon: Christ Kerr MD;  Location: [facility];  Service: Ophthalmology;  Laterality: Left;     SECTION      CHOLECYSTECTOMY      VENTRAL/INCISIONAL HERNIA REPAIR N/A 2017    Procedure:  INCISIONAL HERNIA REPAIR WITH MESH;  Surgeon: Bassam Rogers MD;  Location: [facility];  Service:        Family History   Problem Relation Age of Onset    No Known Problems Mother     No Known Problems Father        Social History     Tobacco Use    Smoking status: Never    Smokeless tobacco: Never   Vaping Use    Vaping status: Never Used   Substance Use Topics    Alcohol use: No    Drug use: No       Allergies:  Allergies   Allergen Reactions    Citalopram Hydrobromide Unknown - High Severity     Couldn't function       Medications:    Current Outpatient Medications:     ondansetron ODT (ZOFRAN-ODT) 4 MG disintegrating tablet, Place 1 tablet on the tongue Every 6 (Six) Hours As Needed for Nausea or Vomiting., Disp: 9 tablet, Rfl: 0    citalopram (CeleXA) 20 MG tablet, Take 1 tablet by mouth Daily. (Patient not taking: Reported on 3/27/2024), Disp: , Rfl:     folic acid (FOLVITE) 1 MG tablet, Take 1 tablet by mouth Daily. (Patient not taking: Reported on 3/27/2024), Disp: , Rfl:     methotrexate 2.5 MG tablet, Take 1 tablet by mouth 1 (One) Time Per Week. 6 pills (Patient not taking: Reported on 3/27/2024), Disp: , Rfl:     mycophenolate (CELLCEPT) 500 MG tablet, Take 2 tablets by mouth 2 (Two) Times a Day. (Patient not taking: Reported on 3/27/2024), Disp: , Rfl:      "omeprazole (priLOSEC) 40 MG capsule, Take 1 capsule by mouth Daily. (Patient not taking: Reported on 3/27/2024), Disp: , Rfl:     oxybutynin XL (DITROPAN-XL) 5 MG 24 hr tablet, Take 1 tablet by mouth Daily. (Patient not taking: Reported on 3/27/2024), Disp: , Rfl:     vitamin B-12 (CYANOCOBALAMIN) 1000 MCG tablet, Take 1 tablet by mouth Daily. (Patient not taking: Reported on 3/27/2024), Disp: , Rfl:     vitamin D3 125 MCG (5000 UT) capsule capsule, Take 1 capsule by mouth Daily. (Patient not taking: Reported on 3/27/2024), Disp: , Rfl:     OBJECTIVE:    Vital Signs:   Vitals:    03/27/24 1424   BP: 134/82   Pulse: 105   Temp: 98.2 °F (36.8 °C)   SpO2: 97%   Weight: 114 kg (251 lb)   Height: 167.6 cm (65.98\")       Physical Exam:   General Appearance:    Alert, cooperative, in no acute distress   Head:    Normocephalic, without obvious abnormality, atraumatic   Eyes:            Lids and lashes normal, conjunctivae and sclerae normal, no   icterus, no pallor, corneas clear, PERRLA   Ears:    Ears appear intact with no abnormalities noted   Throat:   No oral lesions, no thrush, oral mucosa moist   Neck:   No adenopathy, supple, trachea midline, no thyromegaly, no   carotid bruit, no JVD   Lungs:     Clear to auscultation,respirations regular, even and                  unlabored    Heart:    Regular rhythm and normal rate, normal S1 and S2, no            murmur, no gallop, no rub, no click   Chest Wall:    No abnormalities observed   Abdomen:     Normal bowel sounds, no masses, no organomegaly, soft        non-tender, non-distended, no guarding, there is evidence of epigastric  tenderness, no peritoneal signs   Extremities:   Moves all extremities well, no edema, no cyanosis, no             redness   Pulses:   Pulses palpable and equal bilaterally   Skin:   No bleeding, bruising or rash   Lymph nodes:   No palpable adenopathy   Neurologic:   Cranial nerves 2 - 12 grossly intact, sensation intact     Results Review:   I " reviewed the patient's new clinical results.  I reviewed the patient's new imaging results and agree with the interpretation.  I reviewed the patient's other test results and agree with the interpretation    Review of Systems was reviewed and confirmed as accurate as documented by the MA.    ASSESSMENT/PLAN:    1. Epigastric pain    2. Gastroesophageal reflux disease with esophagitis without hemorrhage        I did have a detailed and extensive discussion with the patient in the office and they understand that they need to undergo upper endoscopy. Full risks and benefits of operative versus nonoperative intervention were discussed with the patient and these include bleeding and esophageal injury. The patient understands, agrees, and wishes to proceed with the surgical treatment plan as mentioned above. The patient had no questions for me at the end of the discussion.       I discussed the patients findings and my recommendations with patient.     Electronically signed by Bassam Rogers MD  03/28/24 10:11 EDT

## 2024-03-22 ENCOUNTER — APPOINTMENT (OUTPATIENT)
Dept: CT IMAGING | Facility: HOSPITAL | Age: 59
End: 2024-03-22
Payer: MEDICAID

## 2024-03-22 ENCOUNTER — HOSPITAL ENCOUNTER (EMERGENCY)
Facility: HOSPITAL | Age: 59
Discharge: HOME | End: 2024-03-22
Attending: EMERGENCY MEDICINE
Payer: MEDICAID

## 2024-03-22 VITALS
SYSTOLIC BLOOD PRESSURE: 150 MMHG | RESPIRATION RATE: 22 BRPM | TEMPERATURE: 98.6 F | BODY MASS INDEX: 43.74 KG/M2 | HEIGHT: 66 IN | HEART RATE: 76 BPM | OXYGEN SATURATION: 97 % | DIASTOLIC BLOOD PRESSURE: 92 MMHG

## 2024-03-22 DIAGNOSIS — R11.2 NAUSEA AND VOMITING, UNSPECIFIED VOMITING TYPE: Primary | ICD-10-CM

## 2024-03-22 LAB
ALBUMIN SERPL-MCNC: 4.3 G/DL (ref 3.5–5.2)
ALBUMIN/GLOB SERPL: 1.4 G/DL
ALP SERPL-CCNC: 89 U/L (ref 39–117)
ALT SERPL W P-5'-P-CCNC: 24 U/L (ref 1–33)
ANION GAP SERPL CALCULATED.3IONS-SCNC: 13.2 MMOL/L (ref 5–15)
AST SERPL-CCNC: 19 U/L (ref 1–32)
BACTERIA UR QL AUTO: ABNORMAL /HPF
BASOPHILS # BLD AUTO: 0.04 10*3/MM3 (ref 0–0.2)
BASOPHILS NFR BLD AUTO: 0.8 % (ref 0–1.5)
BILIRUB SERPL-MCNC: 0.7 MG/DL (ref 0–1.2)
BILIRUB UR QL STRIP: NEGATIVE
BUN SERPL-MCNC: 17 MG/DL (ref 8–23)
BUN/CREAT SERPL: 7.4 (ref 7–25)
CALCIUM SPEC-SCNC: 11 MG/DL (ref 8.6–10.5)
CHLORIDE SERPL-SCNC: 98 MMOL/L (ref 98–107)
CLARITY UR: ABNORMAL
CO2 SERPL-SCNC: 24.8 MMOL/L (ref 22–29)
COLOR UR: YELLOW
CREAT SERPL-MCNC: 2.29 MG/DL (ref 0.57–1)
D-LACTATE SERPL-SCNC: 1.8 MMOL/L (ref 0.5–2)
DEPRECATED RDW RBC AUTO: 49.2 FL (ref 37–54)
EGFRCR SERPLBLD CKD-EPI 2021: 23.9 ML/MIN/1.73
EOSINOPHIL # BLD AUTO: 0.11 10*3/MM3 (ref 0–0.4)
EOSINOPHIL NFR BLD AUTO: 2.2 % (ref 0.3–6.2)
ERYTHROCYTE [DISTWIDTH] IN BLOOD BY AUTOMATED COUNT: 15.8 % (ref 12.3–15.4)
GLOBULIN UR ELPH-MCNC: 3.1 GM/DL
GLUCOSE SERPL-MCNC: 115 MG/DL (ref 65–99)
GLUCOSE UR STRIP-MCNC: NEGATIVE MG/DL
GRAN CASTS URNS QL MICRO: ABNORMAL /LPF
HCT VFR BLD AUTO: 32.7 % (ref 34–46.6)
HGB BLD-MCNC: 10.8 G/DL (ref 12–15.9)
HGB UR QL STRIP.AUTO: NEGATIVE
HOLD SPECIMEN: NORMAL
HOLD SPECIMEN: NORMAL
HYALINE CASTS UR QL AUTO: ABNORMAL /LPF
IMM GRANULOCYTES # BLD AUTO: 0.01 10*3/MM3 (ref 0–0.05)
IMM GRANULOCYTES NFR BLD AUTO: 0.2 % (ref 0–0.5)
KETONES UR QL STRIP: NEGATIVE
LEUKOCYTE ESTERASE UR QL STRIP.AUTO: NEGATIVE
LIPASE SERPL-CCNC: 21 U/L (ref 13–60)
LYMPHOCYTES # BLD AUTO: 0.42 10*3/MM3 (ref 0.7–3.1)
LYMPHOCYTES NFR BLD AUTO: 8.4 % (ref 19.6–45.3)
MCH RBC QN AUTO: 28.8 PG (ref 26.6–33)
MCHC RBC AUTO-ENTMCNC: 33 G/DL (ref 31.5–35.7)
MCV RBC AUTO: 87.2 FL (ref 79–97)
MONOCYTES # BLD AUTO: 0.92 10*3/MM3 (ref 0.1–0.9)
MONOCYTES NFR BLD AUTO: 18.4 % (ref 5–12)
NEUTROPHILS NFR BLD AUTO: 3.49 10*3/MM3 (ref 1.7–7)
NEUTROPHILS NFR BLD AUTO: 70 % (ref 42.7–76)
NITRITE UR QL STRIP: NEGATIVE
NRBC BLD AUTO-RTO: 0 /100 WBC (ref 0–0.2)
PH UR STRIP.AUTO: 8 [PH] (ref 5–8)
PLATELET # BLD AUTO: 274 10*3/MM3 (ref 140–450)
PMV BLD AUTO: 10.5 FL (ref 6–12)
POTASSIUM SERPL-SCNC: 3.6 MMOL/L (ref 3.5–5.2)
PROT SERPL-MCNC: 7.4 G/DL (ref 6–8.5)
PROT UR QL STRIP: ABNORMAL
RBC # BLD AUTO: 3.75 10*6/MM3 (ref 3.77–5.28)
RBC # UR STRIP: ABNORMAL /HPF
REF LAB TEST METHOD: ABNORMAL
SODIUM SERPL-SCNC: 136 MMOL/L (ref 136–145)
SP GR UR STRIP: 1.01 (ref 1–1.03)
SQUAMOUS #/AREA URNS HPF: ABNORMAL /HPF
T4 FREE SERPL-MCNC: 1.27 NG/DL (ref 0.93–1.7)
TROPONIN T SERPL HS-MCNC: 15 NG/L
TROPONIN T SERPL HS-MCNC: 21 NG/L
TSH SERPL DL<=0.05 MIU/L-ACNC: 2.24 UIU/ML (ref 0.27–4.2)
UROBILINOGEN UR QL STRIP: ABNORMAL
WBC # UR STRIP: ABNORMAL /HPF
WBC NRBC COR # BLD AUTO: 4.99 10*3/MM3 (ref 3.4–10.8)
WHOLE BLOOD HOLD COAG: NORMAL
WHOLE BLOOD HOLD SPECIMEN: NORMAL

## 2024-03-22 PROCEDURE — 83690 ASSAY OF LIPASE: CPT | Performed by: EMERGENCY MEDICINE

## 2024-03-22 PROCEDURE — 36415 COLL VENOUS BLD VENIPUNCTURE: CPT

## 2024-03-22 PROCEDURE — 81001 URINALYSIS AUTO W/SCOPE: CPT | Performed by: EMERGENCY MEDICINE

## 2024-03-22 PROCEDURE — 93005 ELECTROCARDIOGRAM TRACING: CPT | Performed by: EMERGENCY MEDICINE

## 2024-03-22 PROCEDURE — 25010000002 ONDANSETRON PER 1 MG: Performed by: EMERGENCY MEDICINE

## 2024-03-22 PROCEDURE — 99284 EMERGENCY DEPT VISIT MOD MDM: CPT

## 2024-03-22 PROCEDURE — 85025 COMPLETE CBC W/AUTO DIFF WBC: CPT | Performed by: EMERGENCY MEDICINE

## 2024-03-22 PROCEDURE — 84439 ASSAY OF FREE THYROXINE: CPT | Performed by: EMERGENCY MEDICINE

## 2024-03-22 PROCEDURE — 74176 CT ABD & PELVIS W/O CONTRAST: CPT

## 2024-03-22 PROCEDURE — 83605 ASSAY OF LACTIC ACID: CPT | Performed by: EMERGENCY MEDICINE

## 2024-03-22 PROCEDURE — 84484 ASSAY OF TROPONIN QUANT: CPT | Performed by: EMERGENCY MEDICINE

## 2024-03-22 PROCEDURE — 25810000003 SODIUM CHLORIDE 0.9 % SOLUTION: Performed by: EMERGENCY MEDICINE

## 2024-03-22 PROCEDURE — 80053 COMPREHEN METABOLIC PANEL: CPT | Performed by: EMERGENCY MEDICINE

## 2024-03-22 PROCEDURE — 96374 THER/PROPH/DIAG INJ IV PUSH: CPT

## 2024-03-22 PROCEDURE — 84443 ASSAY THYROID STIM HORMONE: CPT | Performed by: EMERGENCY MEDICINE

## 2024-03-22 RX ORDER — ONDANSETRON 2 MG/ML
4 INJECTION INTRAMUSCULAR; INTRAVENOUS ONCE
Status: COMPLETED | OUTPATIENT
Start: 2024-03-22 | End: 2024-03-22

## 2024-03-22 RX ORDER — SODIUM CHLORIDE 0.9 % (FLUSH) 0.9 %
10 SYRINGE (ML) INJECTION AS NEEDED
Status: DISCONTINUED | OUTPATIENT
Start: 2024-03-22 | End: 2024-03-22 | Stop reason: HOSPADM

## 2024-03-22 RX ORDER — ONDANSETRON 4 MG/1
4 TABLET, ORALLY DISINTEGRATING ORAL EVERY 6 HOURS PRN
Qty: 9 TABLET | Refills: 0 | Status: SHIPPED | OUTPATIENT
Start: 2024-03-22

## 2024-03-22 RX ADMIN — SODIUM CHLORIDE 1000 ML: 9 INJECTION, SOLUTION INTRAVENOUS at 14:14

## 2024-03-22 RX ADMIN — ONDANSETRON 4 MG: 2 INJECTION INTRAMUSCULAR; INTRAVENOUS at 14:14

## 2024-03-22 NOTE — ED PROVIDER NOTES
Subjective   History of Present Illness  Patient presents for evaluation of nausea vomiting and chills which have been present over the past 24 hours.  She has not experienced any abdominal pain.  No fevers.  No diarrhea, dysuria.  She has some urinary frequency though states that this is a more chronic problem for her.  No new medications or travel.    Subsequent history obtained from the patient after laboratory data is obtained patient reports that she has had elevated creatinines on multiple occasions over the past couple of months and that she is scheduled to see a nephrologist for this problem.  She reports that she has not been eating and drinking much due to premature satiety after only a small amount of food.  Patient states that she had all of her medications for her sarcoidosis stopped a few weeks ago.    History provided by:  Patient      Review of Systems    Past Medical History:   Diagnosis Date    Arthritis     Hernia, abdominal        No Known Allergies    Past Surgical History:   Procedure Laterality Date    CATARACT EXTRACTION W/ INTRAOCULAR LENS IMPLANT Right 2023    Procedure: CATARACT PHACO EXTRACTION WITH INTRAOCULAR LENS IMPLANT RIGHT;  Surgeon: Christ Kerr MD;  Location: [facility];  Service: Ophthalmology;  Laterality: Right;    CATARACT EXTRACTION W/ INTRAOCULAR LENS IMPLANT Left 2024    Procedure: CATARACT PHACO EXTRACTION WITH INTRAOCULAR LENS IMPLANT LEFT;  Surgeon: Christ Kerr MD;  Location: [facility] OR;  Service: Ophthalmology;  Laterality: Left;     SECTION      CHOLECYSTECTOMY      VENTRAL/INCISIONAL HERNIA REPAIR N/A 2017    Procedure:  INCISIONAL HERNIA REPAIR WITH MESH;  Surgeon: Bassam Rogers MD;  Location: [facility] OR;  Service:        Family History   Problem Relation Age of Onset    No Known Problems Mother     No Known Problems Father        Social History     Socioeconomic History    Marital status:    Tobacco Use    Smoking  status: Never    Smokeless tobacco: Never   Substance and Sexual Activity    Alcohol use: No    Drug use: No    Sexual activity: Defer           Objective   Physical Exam  Constitutional:       General: She is not in acute distress.  HENT:      Head: Normocephalic and atraumatic.      Mouth/Throat:      Mouth: Mucous membranes are dry.   Eyes:      Conjunctiva/sclera: Conjunctivae normal.      Pupils: Pupils are equal, round, and reactive to light.   Cardiovascular:      Rate and Rhythm: Normal rate and regular rhythm.      Pulses: Normal pulses.      Heart sounds: No murmur heard.     No gallop.   Pulmonary:      Effort: Pulmonary effort is normal. No respiratory distress.   Abdominal:      General: Abdomen is flat. There is no distension.      Tenderness: There is no abdominal tenderness.   Musculoskeletal:         General: No swelling or deformity. Normal range of motion.   Skin:     General: Skin is warm and dry.      Capillary Refill: Capillary refill takes less than 2 seconds.   Neurological:      General: No focal deficit present.      Mental Status: She is alert and oriented to person, place, and time.   Psychiatric:         Mood and Affect: Mood normal.         Behavior: Behavior normal.         Procedures           ED Course  ED Course as of 03/22/24 2136   Fri Mar 22, 2024   1348 Twelve-lead ECG independently interpreted by myself demonstrates normal sinus rhythm with a rate of 85, no ST segment elevation or depression. [KB]   2134 Laboratory workup independently interpreted by myself demonstrates elevated creatinine, no other substantial abnormalities [KB]   2135 CT scan of the abdomen and pelvis independently interpreted by myself demonstrates no acute intracranial abnormality [KB]      ED Course User Index  [KB] Ralph Kelly MD                                             Medical Decision Making  Differential diagnosis includes gastritis, pancreatitis, urinary tract infection, viral  syndrome.    Patient was reassessed on multiple occasions in the ER.  She is feeling improved on each subsequent reassessment.  She is able to tolerate oral intake in the emergency room.  When discussing patient's lab abnormalities specifically her elevated creatinine this has been noted on multiple occasions over the past few months by her primary doctor and patient is working to get into see a nephrologist.  She reports her numbers have been around 2.4-2.5 which is similar to what she has today.    Ultimately patient is appropriate for outpatient treatment, was discharged from the ER in good condition    Problems Addressed:  Nausea and vomiting, unspecified vomiting type: complicated acute illness or injury    Amount and/or Complexity of Data Reviewed  Independent Historian:      Details: Some portions of HPI given by patient's son at the bedside  Labs: ordered.  Radiology: ordered.  ECG/medicine tests: ordered.    Risk  Prescription drug management.        Final diagnoses:   Nausea and vomiting, unspecified vomiting type       ED Disposition  ED Disposition       ED Disposition   Discharge    Condition   Stable    Comment   --             Recent Results (from the past 24 hour(s))   Comprehensive Metabolic Panel    Collection Time: 03/22/24  1:43 PM    Specimen: Blood   Result Value Ref Range    Glucose 115 (H) 65 - 99 mg/dL    BUN 17 8 - 23 mg/dL    Creatinine 2.29 (H) 0.57 - 1.00 mg/dL    Sodium 136 136 - 145 mmol/L    Potassium 3.6 3.5 - 5.2 mmol/L    Chloride 98 98 - 107 mmol/L    CO2 24.8 22.0 - 29.0 mmol/L    Calcium 11.0 (H) 8.6 - 10.5 mg/dL    Total Protein 7.4 6.0 - 8.5 g/dL    Albumin 4.3 3.5 - 5.2 g/dL    ALT (SGPT) 24 1 - 33 U/L    AST (SGOT) 19 1 - 32 U/L    Alkaline Phosphatase 89 39 - 117 U/L    Total Bilirubin 0.7 0.0 - 1.2 mg/dL    Globulin 3.1 gm/dL    A/G Ratio 1.4 g/dL    BUN/Creatinine Ratio 7.4 7.0 - 25.0    Anion Gap 13.2 5.0 - 15.0 mmol/L    eGFR 23.9 (L) >60.0 mL/min/1.73   Lipase     Collection Time: 03/22/24  1:43 PM    Specimen: Blood   Result Value Ref Range    Lipase 21 13 - 60 U/L   Lactic Acid, Plasma    Collection Time: 03/22/24  1:43 PM    Specimen: Blood   Result Value Ref Range    Lactate 1.8 0.5 - 2.0 mmol/L   Green Top (Gel)    Collection Time: 03/22/24  1:43 PM   Result Value Ref Range    Extra Tube Hold for add-ons.    Lavender Top    Collection Time: 03/22/24  1:43 PM   Result Value Ref Range    Extra Tube hold for add-on    Gold Top - SST    Collection Time: 03/22/24  1:43 PM   Result Value Ref Range    Extra Tube Hold for add-ons.    Light Blue Top    Collection Time: 03/22/24  1:43 PM   Result Value Ref Range    Extra Tube Hold for add-ons.    CBC Auto Differential    Collection Time: 03/22/24  1:43 PM    Specimen: Blood   Result Value Ref Range    WBC 4.99 3.40 - 10.80 10*3/mm3    RBC 3.75 (L) 3.77 - 5.28 10*6/mm3    Hemoglobin 10.8 (L) 12.0 - 15.9 g/dL    Hematocrit 32.7 (L) 34.0 - 46.6 %    MCV 87.2 79.0 - 97.0 fL    MCH 28.8 26.6 - 33.0 pg    MCHC 33.0 31.5 - 35.7 g/dL    RDW 15.8 (H) 12.3 - 15.4 %    RDW-SD 49.2 37.0 - 54.0 fl    MPV 10.5 6.0 - 12.0 fL    Platelets 274 140 - 450 10*3/mm3    Neutrophil % 70.0 42.7 - 76.0 %    Lymphocyte % 8.4 (L) 19.6 - 45.3 %    Monocyte % 18.4 (H) 5.0 - 12.0 %    Eosinophil % 2.2 0.3 - 6.2 %    Basophil % 0.8 0.0 - 1.5 %    Immature Grans % 0.2 0.0 - 0.5 %    Neutrophils, Absolute 3.49 1.70 - 7.00 10*3/mm3    Lymphocytes, Absolute 0.42 (L) 0.70 - 3.10 10*3/mm3    Monocytes, Absolute 0.92 (H) 0.10 - 0.90 10*3/mm3    Eosinophils, Absolute 0.11 0.00 - 0.40 10*3/mm3    Basophils, Absolute 0.04 0.00 - 0.20 10*3/mm3    Immature Grans, Absolute 0.01 0.00 - 0.05 10*3/mm3    nRBC 0.0 0.0 - 0.2 /100 WBC   Single High Sensitivity Troponin T    Collection Time: 03/22/24  1:43 PM    Specimen: Blood   Result Value Ref Range    HS Troponin T 21 (H) <14 ng/L   TSH    Collection Time: 03/22/24  1:43 PM    Specimen: Blood   Result Value Ref Range    TSH  2.240 0.270 - 4.200 uIU/mL   T4, Free    Collection Time: 03/22/24  1:43 PM    Specimen: Blood   Result Value Ref Range    Free T4 1.27 0.93 - 1.70 ng/dL   Urinalysis With Microscopic If Indicated (No Culture) - Urine, Clean Catch    Collection Time: 03/22/24  1:50 PM    Specimen: Urine, Clean Catch   Result Value Ref Range    Color, UA Yellow Yellow, Straw    Appearance, UA Cloudy (A) Clear    pH, UA 8.0 5.0 - 8.0    Specific Gravity, UA 1.012 1.005 - 1.030    Glucose, UA Negative Negative    Ketones, UA Negative Negative    Bilirubin, UA Negative Negative    Blood, UA Negative Negative    Protein, UA 30 mg/dL (1+) (A) Negative    Leuk Esterase, UA Negative Negative    Nitrite, UA Negative Negative    Urobilinogen, UA 1.0 E.U./dL 0.2 - 1.0 E.U./dL   Urinalysis, Microscopic Only - Urine, Clean Catch    Collection Time: 03/22/24  1:50 PM    Specimen: Urine, Clean Catch   Result Value Ref Range    RBC, UA 0-2 None Seen, 0-2 /HPF    WBC, UA 0-2 None Seen, 0-2 /HPF    Bacteria, UA 1+ (A) None Seen /HPF    Squamous Epithelial Cells, UA 3-6 (A) None Seen, 0-2 /HPF    Hyaline Casts, UA 0-2 None Seen /LPF    Granular Casts, UA 0-2 None Seen /LPF    Methodology Manual Light Microscopy    Single High Sensitivity Troponin T    Collection Time: 03/22/24  3:57 PM    Specimen: Blood   Result Value Ref Range    HS Troponin T 15 (H) <14 ng/L     Note: In addition to lab results from this visit, the labs listed above may include labs taken at another facility or during a different encounter within the last 24 hours. Please correlate lab times with ED admission and discharge times for further clarification of the services performed during this visit.    CT Abdomen Pelvis Without Contrast   Final Result   1. Tiny nonobstructing right renal stones.   2. No evidence of bowel obstruction       This study was performed with techniques to keep radiation doses as low   as reasonably achievable (ALARA). Individualized dose reduction    techniques using automated exposure control or adjustment of vA and/or   kV according to the patient size were employed.        This report was signed and finalized on 3/22/2024 3:55 PM by Frederick Jimenez MD.            Vitals:    03/22/24 1531 03/22/24 1551 03/22/24 1629 03/22/24 1659   BP: 155/91  135/70 150/92   BP Location:       Patient Position:       Pulse: 75 73 77 76   Resp:       Temp:       TempSrc:       SpO2: 100% 99% 99% 97%   Height:         Medications   sodium chloride 0.9 % bolus 1,000 mL (0 mL Intravenous Stopped 3/22/24 1551)   ondansetron (ZOFRAN) injection 4 mg (4 mg Intravenous Given 3/22/24 1414)     ECG/EMG Results (last 24 hours)       Procedure Component Value Units Date/Time    ECG 12 Lead Other; vomiting [[id number]] Resulted: 03/22/24 1347     Updated: 03/22/24 1349          ECG 12 Lead Other; vomiting   Final Result              No follow-up provider specified.       Medication List        New Prescriptions      ondansetron ODT 4 MG disintegrating tablet  Commonly known as: ZOFRAN-ODT  Place 1 tablet on the tongue Every 6 (Six) Hours As Needed for Nausea or Vomiting.               Where to Get Your Medications        These medications were sent to [facility] - [address] [phone] [place] [id number] [address] [address]      Phone: [phone]   ondansetron ODT 4 MG disintegrating tablet            Ralph Kelly MD  03/22/24 7593

## 2024-03-22 NOTE — DISCHARGE INSTRUCTIONS
Take prescribed zofran as needed for nausea and vomiting.  Although up with your primary care doctor.  Return to the ER as needed for new or worsening symptoms.   fall

## 2024-03-27 ENCOUNTER — OFFICE VISIT (OUTPATIENT)
Dept: SURGERY | Facility: CLINIC | Age: 59
End: 2024-03-27
Payer: MEDICAID

## 2024-03-27 VITALS
BODY MASS INDEX: 40.34 KG/M2 | HEIGHT: 66 IN | OXYGEN SATURATION: 97 % | SYSTOLIC BLOOD PRESSURE: 134 MMHG | WEIGHT: 251 LBS | DIASTOLIC BLOOD PRESSURE: 82 MMHG | TEMPERATURE: 98.2 F | HEART RATE: 105 BPM

## 2024-03-27 DIAGNOSIS — K21.00 GASTROESOPHAGEAL REFLUX DISEASE WITH ESOPHAGITIS WITHOUT HEMORRHAGE: ICD-10-CM

## 2024-03-27 DIAGNOSIS — R10.13 EPIGASTRIC PAIN: Primary | ICD-10-CM

## 2024-03-27 PROCEDURE — 99243 OFF/OP CNSLTJ NEW/EST LOW 30: CPT | Performed by: SURGERY

## 2024-03-27 PROCEDURE — 1159F MED LIST DOCD IN RCRD: CPT | Performed by: SURGERY

## 2024-03-27 PROCEDURE — 1160F RVW MEDS BY RX/DR IN RCRD: CPT | Performed by: SURGERY

## 2024-04-05 ENCOUNTER — OUTSIDE FACILITY SERVICE (OUTPATIENT)
Dept: SURGERY | Facility: CLINIC | Age: 59
End: 2024-04-05
Payer: MEDICAID

## 2024-04-07 ENCOUNTER — HOSPITAL ENCOUNTER (INPATIENT)
Facility: HOSPITAL | Age: 59
LOS: 7 days | Discharge: HOME | DRG: 683 | End: 2024-04-14
Attending: STUDENT IN AN ORGANIZED HEALTH CARE EDUCATION/TRAINING PROGRAM | Admitting: FAMILY MEDICINE
Payer: MEDICAID

## 2024-04-07 ENCOUNTER — APPOINTMENT (OUTPATIENT)
Dept: CT IMAGING | Facility: HOSPITAL | Age: 59
DRG: 683 | End: 2024-04-07
Payer: MEDICAID

## 2024-04-07 ENCOUNTER — APPOINTMENT (OUTPATIENT)
Dept: GENERAL RADIOLOGY | Facility: HOSPITAL | Age: 59
DRG: 683 | End: 2024-04-07
Payer: MEDICAID

## 2024-04-07 ENCOUNTER — APPOINTMENT (OUTPATIENT)
Dept: ULTRASOUND IMAGING | Facility: HOSPITAL | Age: 59
DRG: 683 | End: 2024-04-07
Payer: MEDICAID

## 2024-04-07 DIAGNOSIS — N17.9 ACUTE KIDNEY INJURY: Primary | ICD-10-CM

## 2024-04-07 DIAGNOSIS — E86.0 DEHYDRATION: ICD-10-CM

## 2024-04-07 PROBLEM — N18.9 ACUTE-ON-CHRONIC KIDNEY INJURY: Status: ACTIVE | Noted: 2024-04-07

## 2024-04-07 LAB
ALBUMIN SERPL-MCNC: 3.7 G/DL (ref 3.5–5.2)
ALBUMIN/GLOB SERPL: 1 G/DL
ALP SERPL-CCNC: 88 U/L (ref 39–117)
ALT SERPL W P-5'-P-CCNC: 12 U/L (ref 1–33)
ANION GAP SERPL CALCULATED.3IONS-SCNC: 12.4 MMOL/L (ref 5–15)
AST SERPL-CCNC: 21 U/L (ref 1–32)
BASOPHILS # BLD AUTO: 0.16 10*3/MM3 (ref 0–0.2)
BASOPHILS NFR BLD AUTO: 1.5 % (ref 0–1.5)
BILIRUB SERPL-MCNC: 0.4 MG/DL (ref 0–1.2)
BUN SERPL-MCNC: 29 MG/DL (ref 8–23)
BUN/CREAT SERPL: 7.8 (ref 7–25)
CALCIUM SPEC-SCNC: 13.9 MG/DL (ref 8.6–10.5)
CHLORIDE SERPL-SCNC: 99 MMOL/L (ref 98–107)
CO2 SERPL-SCNC: 26.6 MMOL/L (ref 22–29)
CREAT SERPL-MCNC: 3.72 MG/DL (ref 0.57–1)
DEPRECATED RDW RBC AUTO: 47.9 FL (ref 37–54)
EGFRCR SERPLBLD CKD-EPI 2021: 13.4 ML/MIN/1.73
EOSINOPHIL # BLD AUTO: 0.43 10*3/MM3 (ref 0–0.4)
EOSINOPHIL NFR BLD AUTO: 4.1 % (ref 0.3–6.2)
ERYTHROCYTE [DISTWIDTH] IN BLOOD BY AUTOMATED COUNT: 15.1 % (ref 12.3–15.4)
GLOBULIN UR ELPH-MCNC: 3.8 GM/DL
GLUCOSE SERPL-MCNC: 91 MG/DL (ref 65–99)
HCT VFR BLD AUTO: 32.6 % (ref 34–46.6)
HGB BLD-MCNC: 10.2 G/DL (ref 12–15.9)
IMM GRANULOCYTES # BLD AUTO: 0.08 10*3/MM3 (ref 0–0.05)
IMM GRANULOCYTES NFR BLD AUTO: 0.8 % (ref 0–0.5)
LIPASE SERPL-CCNC: 28 U/L (ref 13–60)
LYMPHOCYTES # BLD AUTO: 1.18 10*3/MM3 (ref 0.7–3.1)
LYMPHOCYTES NFR BLD AUTO: 11.2 % (ref 19.6–45.3)
MAGNESIUM SERPL-MCNC: 2.4 MG/DL (ref 1.6–2.4)
MCH RBC QN AUTO: 27.5 PG (ref 26.6–33)
MCHC RBC AUTO-ENTMCNC: 31.3 G/DL (ref 31.5–35.7)
MCV RBC AUTO: 87.9 FL (ref 79–97)
MONOCYTES # BLD AUTO: 1.55 10*3/MM3 (ref 0.1–0.9)
MONOCYTES NFR BLD AUTO: 14.7 % (ref 5–12)
NEUTROPHILS NFR BLD AUTO: 67.7 % (ref 42.7–76)
NEUTROPHILS NFR BLD AUTO: 7.17 10*3/MM3 (ref 1.7–7)
NRBC BLD AUTO-RTO: 0 /100 WBC (ref 0–0.2)
PLATELET # BLD AUTO: 334 10*3/MM3 (ref 140–450)
PMV BLD AUTO: 11 FL (ref 6–12)
POTASSIUM SERPL-SCNC: 4.1 MMOL/L (ref 3.5–5.2)
PROT SERPL-MCNC: 7.5 G/DL (ref 6–8.5)
RBC # BLD AUTO: 3.71 10*6/MM3 (ref 3.77–5.28)
SODIUM SERPL-SCNC: 138 MMOL/L (ref 136–145)
TROPONIN T SERPL HS-MCNC: 18 NG/L
TSH SERPL DL<=0.05 MIU/L-ACNC: 2.77 UIU/ML (ref 0.27–4.2)
WBC NRBC COR # BLD AUTO: 10.57 10*3/MM3 (ref 3.4–10.8)

## 2024-04-07 PROCEDURE — 74176 CT ABD & PELVIS W/O CONTRAST: CPT

## 2024-04-07 PROCEDURE — 25810000003 SODIUM CHLORIDE 0.9 % SOLUTION: Performed by: INTERNAL MEDICINE

## 2024-04-07 PROCEDURE — 83735 ASSAY OF MAGNESIUM: CPT | Performed by: FAMILY MEDICINE

## 2024-04-07 PROCEDURE — 84484 ASSAY OF TROPONIN QUANT: CPT | Performed by: STUDENT IN AN ORGANIZED HEALTH CARE EDUCATION/TRAINING PROGRAM

## 2024-04-07 PROCEDURE — 25810000003 SODIUM CHLORIDE 0.9 % SOLUTION: Performed by: FAMILY MEDICINE

## 2024-04-07 PROCEDURE — 83690 ASSAY OF LIPASE: CPT | Performed by: STUDENT IN AN ORGANIZED HEALTH CARE EDUCATION/TRAINING PROGRAM

## 2024-04-07 PROCEDURE — 99285 EMERGENCY DEPT VISIT HI MDM: CPT

## 2024-04-07 PROCEDURE — 93005 ELECTROCARDIOGRAM TRACING: CPT | Performed by: STUDENT IN AN ORGANIZED HEALTH CARE EDUCATION/TRAINING PROGRAM

## 2024-04-07 PROCEDURE — 80053 COMPREHEN METABOLIC PANEL: CPT | Performed by: STUDENT IN AN ORGANIZED HEALTH CARE EDUCATION/TRAINING PROGRAM

## 2024-04-07 PROCEDURE — 71250 CT THORAX DX C-: CPT

## 2024-04-07 PROCEDURE — G0378 HOSPITAL OBSERVATION PER HR: HCPCS

## 2024-04-07 PROCEDURE — 84156 ASSAY OF PROTEIN URINE: CPT | Performed by: INTERNAL MEDICINE

## 2024-04-07 PROCEDURE — 76775 US EXAM ABDO BACK WALL LIM: CPT

## 2024-04-07 PROCEDURE — 71045 X-RAY EXAM CHEST 1 VIEW: CPT

## 2024-04-07 PROCEDURE — 25810000003 SODIUM CHLORIDE 0.9 % SOLUTION: Performed by: STUDENT IN AN ORGANIZED HEALTH CARE EDUCATION/TRAINING PROGRAM

## 2024-04-07 PROCEDURE — 81001 URINALYSIS AUTO W/SCOPE: CPT | Performed by: INTERNAL MEDICINE

## 2024-04-07 PROCEDURE — 99223 1ST HOSP IP/OBS HIGH 75: CPT | Performed by: FAMILY MEDICINE

## 2024-04-07 PROCEDURE — 82570 ASSAY OF URINE CREATININE: CPT | Performed by: INTERNAL MEDICINE

## 2024-04-07 PROCEDURE — 84443 ASSAY THYROID STIM HORMONE: CPT | Performed by: STUDENT IN AN ORGANIZED HEALTH CARE EDUCATION/TRAINING PROGRAM

## 2024-04-07 PROCEDURE — 82652 VIT D 1 25-DIHYDROXY: CPT | Performed by: FAMILY MEDICINE

## 2024-04-07 PROCEDURE — 85025 COMPLETE CBC W/AUTO DIFF WBC: CPT | Performed by: STUDENT IN AN ORGANIZED HEALTH CARE EDUCATION/TRAINING PROGRAM

## 2024-04-07 RX ORDER — SODIUM CHLORIDE 0.9 % (FLUSH) 0.9 %
10 SYRINGE (ML) INJECTION AS NEEDED
Status: DISCONTINUED | OUTPATIENT
Start: 2024-04-07 | End: 2024-04-14 | Stop reason: HOSPADM

## 2024-04-07 RX ORDER — POLYETHYLENE GLYCOL 3350 17 G/17G
17 POWDER, FOR SOLUTION ORAL DAILY PRN
Status: DISCONTINUED | OUTPATIENT
Start: 2024-04-07 | End: 2024-04-14 | Stop reason: HOSPADM

## 2024-04-07 RX ORDER — ACETAMINOPHEN 650 MG/1
650 SUPPOSITORY RECTAL EVERY 4 HOURS PRN
Status: DISCONTINUED | OUTPATIENT
Start: 2024-04-07 | End: 2024-04-14 | Stop reason: HOSPADM

## 2024-04-07 RX ORDER — ACETAMINOPHEN 325 MG/1
650 TABLET ORAL EVERY 4 HOURS PRN
Status: DISCONTINUED | OUTPATIENT
Start: 2024-04-07 | End: 2024-04-14 | Stop reason: HOSPADM

## 2024-04-07 RX ORDER — ACETAMINOPHEN 160 MG/5ML
650 SOLUTION ORAL EVERY 4 HOURS PRN
Status: DISCONTINUED | OUTPATIENT
Start: 2024-04-07 | End: 2024-04-14 | Stop reason: HOSPADM

## 2024-04-07 RX ORDER — BISACODYL 10 MG
10 SUPPOSITORY, RECTAL RECTAL DAILY PRN
Status: DISCONTINUED | OUTPATIENT
Start: 2024-04-07 | End: 2024-04-14 | Stop reason: HOSPADM

## 2024-04-07 RX ORDER — CHOLECALCIFEROL (VITAMIN D3) 125 MCG
5 CAPSULE ORAL NIGHTLY PRN
Status: DISCONTINUED | OUTPATIENT
Start: 2024-04-07 | End: 2024-04-14 | Stop reason: HOSPADM

## 2024-04-07 RX ORDER — ONDANSETRON 2 MG/ML
4 INJECTION INTRAMUSCULAR; INTRAVENOUS EVERY 6 HOURS PRN
Status: DISCONTINUED | OUTPATIENT
Start: 2024-04-07 | End: 2024-04-14 | Stop reason: HOSPADM

## 2024-04-07 RX ORDER — SODIUM CHLORIDE 9 MG/ML
40 INJECTION, SOLUTION INTRAVENOUS AS NEEDED
Status: DISCONTINUED | OUTPATIENT
Start: 2024-04-07 | End: 2024-04-14 | Stop reason: HOSPADM

## 2024-04-07 RX ORDER — ALUMINA, MAGNESIA, AND SIMETHICONE 2400; 2400; 240 MG/30ML; MG/30ML; MG/30ML
15 SUSPENSION ORAL EVERY 6 HOURS PRN
Status: DISCONTINUED | OUTPATIENT
Start: 2024-04-07 | End: 2024-04-14 | Stop reason: HOSPADM

## 2024-04-07 RX ORDER — BISACODYL 5 MG/1
5 TABLET, DELAYED RELEASE ORAL DAILY PRN
Status: DISCONTINUED | OUTPATIENT
Start: 2024-04-07 | End: 2024-04-14 | Stop reason: HOSPADM

## 2024-04-07 RX ORDER — SODIUM CHLORIDE 0.9 % (FLUSH) 0.9 %
10 SYRINGE (ML) INJECTION EVERY 12 HOURS SCHEDULED
Status: DISCONTINUED | OUTPATIENT
Start: 2024-04-07 | End: 2024-04-14 | Stop reason: HOSPADM

## 2024-04-07 RX ORDER — NITROGLYCERIN 0.4 MG/1
0.4 TABLET SUBLINGUAL
Status: DISCONTINUED | OUTPATIENT
Start: 2024-04-07 | End: 2024-04-14 | Stop reason: HOSPADM

## 2024-04-07 RX ORDER — SODIUM CHLORIDE 9 MG/ML
100 INJECTION, SOLUTION INTRAVENOUS CONTINUOUS
Status: DISCONTINUED | OUTPATIENT
Start: 2024-04-07 | End: 2024-04-14 | Stop reason: HOSPADM

## 2024-04-07 RX ORDER — AMOXICILLIN 250 MG
2 CAPSULE ORAL 2 TIMES DAILY
Status: DISCONTINUED | OUTPATIENT
Start: 2024-04-07 | End: 2024-04-14 | Stop reason: HOSPADM

## 2024-04-07 RX ORDER — ONDANSETRON 4 MG/1
4 TABLET, ORALLY DISINTEGRATING ORAL EVERY 6 HOURS PRN
Status: DISCONTINUED | OUTPATIENT
Start: 2024-04-07 | End: 2024-04-14 | Stop reason: HOSPADM

## 2024-04-07 RX ORDER — ALPRAZOLAM 0.5 MG/1
0.5 TABLET ORAL EVERY 8 HOURS PRN
Status: DISCONTINUED | OUTPATIENT
Start: 2024-04-07 | End: 2024-04-14 | Stop reason: HOSPADM

## 2024-04-07 RX ADMIN — SODIUM CHLORIDE 125 ML/HR: 9 INJECTION, SOLUTION INTRAVENOUS at 20:41

## 2024-04-07 RX ADMIN — SODIUM CHLORIDE 1000 ML: 9 INJECTION, SOLUTION INTRAVENOUS at 13:46

## 2024-04-07 RX ADMIN — ALUMINUM HYDROXIDE, MAGNESIUM HYDROXIDE, AND DIMETHICONE: 400; 400; 40 SUSPENSION ORAL at 13:52

## 2024-04-07 RX ADMIN — Medication 10 ML: at 20:45

## 2024-04-07 NOTE — ED PROVIDER NOTES
EMERGENCY DEPARTMENT ENCOUNTER    Pt Name: Jamia Hinton  MRN: [id number]  Pt :   1965  Room Number:    Date of encounter:  2024  PCP: Jaycee Deal APRN  ED Provider: Skyler Rogers DO      HPI:  Chief Complaint: Generalized weakness    Context: Jamia Hinton is a 58 y.o. female with past medical history listed below who presents to the ED with generalized weakness.  Patient's daughter is at bedside contributes to HPI.  Gradual onset of symptoms over the past several days.  Generalized weakness described as extreme fatigue and tiredness.  Patient also reports associated shortness of breath, nausea, and constipation.  Symptoms seem to be worse with exertion.  No fever, chills, chest pain, headache, neck pain, back pain, abdominal pain, bloody stools, problems urination, leg pain or swelling, numbness or weakness in extremities.  Patient had an EGD performed with general surgery on Friday, which showed esophagitis.  Past medical history of sarcoidosis and chronic kidney disease.    PAST MEDICAL HISTORY  Past Medical History:   Diagnosis Date    Arthritis     Hernia, abdominal     Sarcoidosis      Current Outpatient Medications   Medication Instructions    citalopram (CELEXA) 20 mg, Daily    folic acid (FOLVITE) 1 mg, Daily    methotrexate 2.5 mg, Weekly    mycophenolate (CELLCEPT) 1,000 mg, 2 Times Daily    omeprazole (PRILOSEC) 40 mg, Daily    ondansetron ODT (ZOFRAN-ODT) 4 mg, Translingual, Every 6 Hours PRN    oxybutynin XL (DITROPAN-XL) 5 mg, Daily    vitamin B-12 (CYANOCOBALAMIN) 1,000 mcg, Daily    vitamin D3 5,000 Units, Daily        PAST SURGICAL HISTORY  Past Surgical History:   Procedure Laterality Date    CATARACT EXTRACTION W/ INTRAOCULAR LENS IMPLANT Right 2023    Procedure: CATARACT PHACO EXTRACTION WITH INTRAOCULAR LENS IMPLANT RIGHT;  Surgeon: Christ Kerr MD;  Location: [facility];  Service: Ophthalmology;  Laterality: Right;    CATARACT EXTRACTION W/ INTRAOCULAR  LENS IMPLANT Left 2024    Procedure: CATARACT PHACO EXTRACTION WITH INTRAOCULAR LENS IMPLANT LEFT;  Surgeon: Christ Kerr MD;  Location: [facility];  Service: Ophthalmology;  Laterality: Left;     SECTION      CHOLECYSTECTOMY      VENTRAL/INCISIONAL HERNIA REPAIR N/A 2017    Procedure:  INCISIONAL HERNIA REPAIR WITH MESH;  Surgeon: Bassam Rogers MD;  Location: [facility];  Service:        FAMILY HISTORY  Family History   Problem Relation Age of Onset    No Known Problems Mother     No Known Problems Father        SOCIAL HISTORY  Social History     Socioeconomic History    Marital status:    Tobacco Use    Smoking status: Never    Smokeless tobacco: Never   Vaping Use    Vaping status: Never Used   Substance and Sexual Activity    Alcohol use: No    Drug use: No    Sexual activity: Defer       ALLERGIES  Citalopram hydrobromide    REVIEW OF SYSTEMS  All systems reviewed and negative except for those discussed in HPI.     PHYSICAL EXAM  ED Triage Vitals [24 1315]   Temp Heart Rate Resp BP SpO2   98 °F (36.7 °C) 70 18 157/90 99 %      Temp src Heart Rate Source Patient Position BP Location FiO2 (%)   -- -- -- -- --     I have reviewed the triage vital signs and nursing notes.    General: Obese female.  Alert.  Nontoxic appearance.  No acute distress.  Head: Normocephalic.  Atraumatic.  Eyes: No scleral icterus.  ENT: Moist mucous membranes.  Cardiovascular: Regular rate and rhythm.  No murmurs.  No rubs.  2+ distal pulses bilaterally.  Respiratory: Equal breath sounds bilaterally.  No rales.  No rhonchi.  No wheezing.  GI: Abdomen is soft.  Nondistended.  Nontender to palpation.  No rebound.  No guarding.  No CVA tenderness.  MSK: Moves all 4 extremities.  Neurologic: Oriented x 3.  No focal deficits.  Skin: No erythema.  No edema. No pallor. No cyanosis.  Psych: Normal mood and affect.    LAB RESULTS  Recent Results (from the past 24 hour(s))   Comprehensive Metabolic Panel     Collection Time: 04/07/24  1:46 PM    Specimen: Blood   Result Value Ref Range    Glucose 91 65 - 99 mg/dL    BUN 29 (H) 8 - 23 mg/dL    Creatinine 3.72 (H) 0.57 - 1.00 mg/dL    Sodium 138 136 - 145 mmol/L    Potassium 4.1 3.5 - 5.2 mmol/L    Chloride 99 98 - 107 mmol/L    CO2 26.6 22.0 - 29.0 mmol/L    Calcium 13.9 (C) 8.6 - 10.5 mg/dL    Total Protein 7.5 6.0 - 8.5 g/dL    Albumin 3.7 3.5 - 5.2 g/dL    ALT (SGPT) 12 1 - 33 U/L    AST (SGOT) 21 1 - 32 U/L    Alkaline Phosphatase 88 39 - 117 U/L    Total Bilirubin 0.4 0.0 - 1.2 mg/dL    Globulin 3.8 gm/dL    A/G Ratio 1.0 g/dL    BUN/Creatinine Ratio 7.8 7.0 - 25.0    Anion Gap 12.4 5.0 - 15.0 mmol/L    eGFR 13.4 (L) >60.0 mL/min/1.73   Lipase    Collection Time: 04/07/24  1:46 PM    Specimen: Blood   Result Value Ref Range    Lipase 28 13 - 60 U/L   Single High Sensitivity Troponin T    Collection Time: 04/07/24  1:46 PM    Specimen: Blood   Result Value Ref Range    HS Troponin T 18 (H) <14 ng/L   TSH    Collection Time: 04/07/24  1:46 PM    Specimen: Blood   Result Value Ref Range    TSH 2.770 0.270 - 4.200 uIU/mL   CBC Auto Differential    Collection Time: 04/07/24  1:46 PM    Specimen: Blood   Result Value Ref Range    WBC 10.57 3.40 - 10.80 10*3/mm3    RBC 3.71 (L) 3.77 - 5.28 10*6/mm3    Hemoglobin 10.2 (L) 12.0 - 15.9 g/dL    Hematocrit 32.6 (L) 34.0 - 46.6 %    MCV 87.9 79.0 - 97.0 fL    MCH 27.5 26.6 - 33.0 pg    MCHC 31.3 (L) 31.5 - 35.7 g/dL    RDW 15.1 12.3 - 15.4 %    RDW-SD 47.9 37.0 - 54.0 fl    MPV 11.0 6.0 - 12.0 fL    Platelets 334 140 - 450 10*3/mm3    Neutrophil % 67.7 42.7 - 76.0 %    Lymphocyte % 11.2 (L) 19.6 - 45.3 %    Monocyte % 14.7 (H) 5.0 - 12.0 %    Eosinophil % 4.1 0.3 - 6.2 %    Basophil % 1.5 0.0 - 1.5 %    Immature Grans % 0.8 (H) 0.0 - 0.5 %    Neutrophils, Absolute 7.17 (H) 1.70 - 7.00 10*3/mm3    Lymphocytes, Absolute 1.18 0.70 - 3.10 10*3/mm3    Monocytes, Absolute 1.55 (H) 0.10 - 0.90 10*3/mm3    Eosinophils, Absolute 0.43 (H)  0.00 - 0.40 10*3/mm3    Basophils, Absolute 0.16 0.00 - 0.20 10*3/mm3    Immature Grans, Absolute 0.08 (H) 0.00 - 0.05 10*3/mm3    nRBC 0.0 0.0 - 0.2 /100 WBC       RADIOLOGY  CT Abdomen Pelvis Without Contrast    Result Date: 4/7/2024  PROCEDURE: CT ABDOMEN PELVIS WO CONTRAST-  HISTORY: Nausea, weakness s/p endoscopy  COMPARISON: March 22, 2024..  PROCEDURE: Axial images were obtained from the lung bases to the pubic symphysis by computed tomography. This study was performed with techniques to keep radiation doses as low as reasonably achievable, (ALARA). Individualized dose reduction techniques using automated exposure control or adjustment of mA and/or kV according to the patient size were employed.  FINDINGS:  ABDOMEN: The lung bases are clear. The heart is proper size. The limited non-contrast images of the liver demonstrate no focal abnormality but the liver is enlarged at 18.5 cm, similar to prior. There are metallic surgical clips in the right upper quadrant consistent with previous cholecystectomy. The spleen mildly enlarged at 13 cm, stable from prior. No focal mass identified. Fullness of the left adrenal gland is stable. Right adrenal gland is normal. There are metallic surgical clips in the right upper quadrant consistent with previous cholecystectomy. The aorta is normal in caliber. There is no significant free fluid or adenopathy. There is no left nephrolithiasis. Small nonobstructing stones on the right are stable. There is no hydronephrosis. No bony destructive lesion seen. Small periaortic lymph nodes are stable from prior.  PELVIS: The GI tract demonstrate no obstruction. The appendix is identified and appears normal. The urinary bladder is almost completely collapsed. Uterus is midline. There is no fluid or adenopathy. No suspicious inguinal adenopathy seen. No free air identified.      Right nephrolithiasis without hydronephrosis.  Hepatosplenomegaly similar to prior study.   CTDI: 7.44 mGy  DLP:1288.51 mGy.cm  This report was signed and finalized on 4/7/2024 3:42 PM by Nova Koch MD.      CT Chest Without Contrast Diagnostic    Result Date: 4/7/2024  PROCEDURE: CT CHEST WO CONTRAST DIAGNOSTIC-  HISTORY: Weakness, hx of sarcoidosis, s/p endoscopy  COMPARISON: No previous chest CT for comparison.  PROCEDURE: Axial images were obtained from the lung apex to the mid abdomen by computed tomography. This study was performed with techniques to keep radiation doses as low as reasonably achievable, (ALARA). Individualized dose reduction techniques using automated exposure control or adjustment of mA and/or kV according to the patient size were employed.  FINDINGS:  CHEST: There is no suspicious axillary adenopathy. Vascular calcifications noted. There is mildly prominent right pretracheal lymph node in question fullness of the right hilum. Chest CT with contrast would be helpful for further evaluation in this patient with known sarcoidosis. The heart is proper size. There is no pericardial or pleural effusion.No suspicious infiltrate or nodule identified. Limited images of the upper abdomen demonstrate cholecystectomy clips. Spleen is incompletely visualized but appears at least mildly enlarged to 13 cm. No bony destructive lesion seen. No mediastinal air identified. No significant wall thickening of the esophagus identified. No pneumothorax seen.      No acute infiltrate identified.  Question mild mediastinal and hilar adenopathy, consider chest CT with intravenous contrast.    CTDI: 7.44 mGy DLP:1288.51 mGy.cm  This report was signed and finalized on 4/7/2024 3:36 PM by Nova Kohc MD.      XR Chest 1 View    Result Date: 4/7/2024  PROCEDURE: XR CHEST 1 VW-  HISTORY: Gen weakness, worsening since yesterday with shortness of breath and dry heaves.  COMPARISON: None.  FINDINGS: The heart is normal in size. The lungs are clear. The mediastinum is unremarkable. There is no pneumothorax.  There are no acute  osseous abnormalities. Apical lordotic positioning noted.      No acute cardiopulmonary process.     This report was signed and finalized on 4/7/2024 2:30 PM by Nova Koch MD.       PROCEDURES  Critical Care    Performed by: Skyler Rogers DO  Authorized by: Skyler Rogers DO    Comments:      CRITICAL CARE PROCEDURE NOTE  Authorized and performed by: Dr. Rogers  Total critical care time: Approximately 35 minutes.  Patient was critically ill due to: Acute kidney injury  Intervention: IV fluids, close airway/mental status/hemodynamic monitoring    Due to a high probability of clinically significant, life threatening deterioration, the patient required my highest level of preparedness to intervene emergently and I personally spent this critical care time directly and personally managing the patient.  This critical care time included obtaining a history; examining the patient; pulse oximetry; ordering and review of studies; arranging urgent treatment with development of a management plan; evaluation of patient's response to treatment; frequent reassessment; and, discussions with other providers.    This critical care time was performed to assess and manage the high probability of imminent, life-threatening deterioration that could result in multiorgan failure.  It was exclusive of separately billable procedures and treating other patients and teaching time.    Please see MDM section and the rest of the note for further information on patient assessment and interventions.        MEDICATIONS GIVEN IN ER  Medications   sodium chloride 0.9 % flush 10 mL (has no administration in time range)   sodium chloride 0.9 % bolus 1,000 mL (0 mL Intravenous Stopped 4/7/24 1513)   aluminum-magnesium hydroxide-simethicone (MAALOX MAX) 400-400-40 MG/5ML 15 mL suspension ( Oral Given 4/7/24 1352)       MEDICAL DECISION MAKING  58 y.o. female with past medical history listed above who presents with generalized weakness, shortness  of breath, nausea. Vital signs remarkable for hypertension, otherwise within normal limits. Based on clinical presentation and physical exam, differential diagnosis includes, but is not limited to, exacerbation of esophagitis, dehydration, metabolic derangement, worsening sarcoidosis, pneumonia, pneumothorax, myocardial infarction, cardiac arrhythmia.     At least 3 different tests have been ordered on this patient.    Please see ED course below for my interpretation of the ED workup.  ED Course as of 04/07/24 1657   Sun Apr 07, 2024   1351 ECG 12 Lead Dyspnea  EKG from interpretation sinus rhythm, rate 62, normal axis, no ST segment elevation or depression, normal QRS QTc intervals. [JS]   1633 I reviewed the labs listed above. Notable abnormalities are highlighted below.    Old laboratory data was reviewed from the medical records and compared to today's results.   [JS]   1633 Hemoglobin(!): 10.2 [JS]   1633 HS Troponin T(!): 18 [JS]   1633 Creatinine(!): 3.72  From 2.2 baseline. [JS]   1633 Calcium(!!): 13.9 [JS]   1634 I have independently reviewed and interpreted the imaging listed above.  My interpretations are listed below:    -Chest x-ray negative for pneumonia.    -CT chest negative for pneumonia.    -CT abdomen pelvis negative for small bowel obstruction.    Radiologist also notes questionable mild mediastinal and hilar adenopathy.  Hepatosplenomegaly similar to prior study. [JS]      ED Course User Index  [JS] Skyler Rogers DO      On re-evaluation, patient resting comfortably.  Vital signs remained stable on room air. Will proceed with medical admission for further workup and management.  I discussed the findings of the ED workup with the patient including my recommendation for admission.  Patient agreeable with plan and disposition.    16:55: Case discussed with Dr. Kay (hospitalist) who agrees to evaluate and admit the patient.  We discussed the HPI, pertinent PMHx, ED course and  workup.    Chronic conditions affecting care: CKD    Social determinants of health impacting treatment or disposition: None    REPEAT VITAL SIGNS  AS OF 16:57 EDT VITALS:  BP - (!) 147/104  HR - 74  TEMP - 98 °F (36.7 °C)  O2 SATS - 98%    DIAGNOSIS  Final diagnoses:   Acute kidney injury   Dehydration       DISPOSITION  ED Disposition       ED Disposition   Decision to Admit    Condition   --    Comment   Level of Care: Telemetry [5]   Diagnosis: Acute-on-chronic kidney injury [794299]   Admitting Physician: MAIKEL PATEL [[id number]]   Attending Physician: SKYLER ROGERS [[id number]]                       Please note that portions of this document were completed with voice recognition software.        Skyler Rogers DO  04/07/24 2501

## 2024-04-07 NOTE — PLAN OF CARE
Goal Outcome Evaluation:  Plan of Care Reviewed With: patient                  Problem: Adult Inpatient Plan of Care  Goal: Plan of Care Review  Outcome: Ongoing, Progressing  Flowsheets (Taken 4/7/2024 7612)  Plan of Care Reviewed With: patient  Goal: Patient-Specific Goal (Individualized)  Outcome: Ongoing, Progressing  Goal: Absence of Hospital-Acquired Illness or Injury  Outcome: Ongoing, Progressing  Intervention: Identify and Manage Fall Risk  Description: Perform standard risk assessment on admission using a validated tool or comprehensive approach appropriate to the patient; reassess fall risk frequently, with change in status or transfer to another level of care.  Communicate fall injury risk to interprofessional healthcare team.  Determine need for increased observation, equipment and environmental modification, such as low bed, signage and supportive, nonskid footwear.  Adjust safety measures to individual developmental age, stage and identified risk factors.  Reinforce the importance of safety and physical activity with patient and family.  Perform regular intentional rounding to assess need for position change, pain assessment and personal needs, including assistance with toileting.  Recent Flowsheet Documentation  Taken 4/7/2024 3036 by Jesse, Viktoria, RN  Safety Promotion/Fall Prevention:   safety round/check completed   room organization consistent   nonskid shoes/slippers when out of bed   muscle strengthening facilitated   fall prevention program maintained   clutter free environment maintained   assistive device/personal items within reach   activity supervised  Intervention: Prevent Skin Injury  Description: Perform a screening for skin injury risk, such as pressure or moisture associated skin damage on admission and at regular intervals throughout hospital stay.  Keep all areas of skin (especially folds) clean and dry.  Maintain adequate skin hydration.  Relieve and redistribute pressure  and protect bony prominences; implement measures based on patient-specific risk factors.  Match turning and repositioning schedule to clinical condition.  Encourage weight shift frequently; assist with reposition if unable to complete independently.  Float heels off bed; avoid pressure on the Achilles tendon.  Keep skin free from extended contact with medical devices.  Encourage functional activity and mobility, as early as tolerated.  Use aids (e.g., slide boards, mechanical lift) during transfer.  Recent Flowsheet Documentation  Taken 4/7/2024 1746 by Viktoria Molina RN  Body Position: sitting up in bed  Skin Protection:   adhesive use limited   skin-to-device areas padded   transparent dressing maintained   tubing/devices free from skin contact  Intervention: Prevent and Manage VTE (Venous Thromboembolism) Risk  Description: Assess for VTE (venous thromboembolism) risk.  Encourage and assist with early ambulation.  Initiate and maintain compression or other therapy, as indicated, based on identified risk in accordance with organizational protocol and provider order.  Encourage both active and passive leg exercises while in bed, if unable to ambulate.  Recent Flowsheet Documentation  Taken 4/7/2024 1746 by Viktoria Molina RN  Activity Management: up ad magalie  VTE Prevention/Management:   bilateral   sequential compression devices off  Goal: Optimal Comfort and Wellbeing  Outcome: Ongoing, Progressing  Intervention: Provide Person-Centered Care  Description: Use a family-focused approach to care.  Develop trust and rapport by proactively providing information, encouraging questions, addressing concerns and offering reassurance.  Acknowledge emotional response to hospitalization.  Recognize and utilize personal coping strategies.  Honor spiritual and cultural preferences.  Recent Flowsheet Documentation  Taken 4/7/2024 1746 by Viktoria Molina RN  Trust Relationship/Rapport:   care explained   choices  provided   emotional support provided   empathic listening provided   questions answered   questions encouraged   reassurance provided   thoughts/feelings acknowledged  Goal: Readiness for Transition of Care  Outcome: Ongoing, Progressing  Intervention: Mutually Develop Transition Plan  Description: Identify available resources for support (e.g., family, friends, community).  Identify and address barriers to ongoing treatment and home management (e.g., environmental, financial).  Provide opportunities to practice self-management skills.  Assess and monitor emotional readiness for transition.  Establish or reconnect linkage with outpatient providers or community-based services.  Recent Flowsheet Documentation  Taken 4/7/2024 1817 by Viktoria Molina, RN  Equipment Currently Used at Home: none  Transportation Anticipated: family or friend will provide  Patient/Family Anticipated Services at Transition:   Patient/Family Anticipates Transition to:   home   home with family     Problem: Fluid and Electrolyte Imbalance (Acute Kidney Injury/Impairment)  Goal: Fluid and Electrolyte Balance  Outcome: Ongoing, Progressing     Problem: Oral Intake Inadequate (Acute Kidney Injury/Impairment)  Goal: Optimal Nutrition Intake  Outcome: Ongoing, Progressing     Problem: Renal Function Impairment (Acute Kidney Injury/Impairment)  Goal: Effective Renal Function  Outcome: Ongoing, Progressing     Problem: Electrolyte Imbalance  Goal: Electrolyte Balance  Outcome: Ongoing, Progressing     Problem: Depression  Goal: Improved Mood  Outcome: Ongoing, Progressing  Intervention: Monitor and Manage Depressive Symptoms  Description: Provide opportunity for patient and family/caregiver to express feelings, stressors and self-perception (e.g., verbalization, journaling).  Convey caring approach, empathy and potential for change; hope is key for recovery.  Utilize existing coping strategies and assist in developing new strategies,  such as relaxation, music and problem-solving; assess for ineffective strategies (e.g., substance use, isolation).  Recognize past and present achievements, successes and personal strengths.  Empower participation in planning care and activities, as well as development of attainable goals, to increase self-esteem and feelings of control.  Promote self-care; support balanced sleep, physical activity and nutrition.  Utilize a standardized tool along with clinical interview to obtain baseline intensity of depressive symptoms.  Assess and monitor risk for suicide, including but not limited to thoughts and ideation as patient may not disclose voluntarily; consider need for safety measures.  Consider referral for a comprehensive assessment if there are concerns about the number, severity and duration of symptoms; degree of distress; functional impairment or excessive substance use.  Recent Flowsheet Documentation  Taken 4/7/2024 1710 by Viktoria Molina, RN  Family/Support System Care:   support provided   self-care encouraged

## 2024-04-07 NOTE — H&P
[facility]   HISTORY AND PHYSICAL      Name:  Jamia Hinton   Age:  58 y.o.  Sex:  female  :  1965  MRN:  [id number]   Visit Number:  [id number]  Admission Date:  2024  Date Of Service:  24  Primary Care Physician:  Jaycee Deal APRN    Chief Complaint:     Weakness, fatigue, nausea, constipation    History Of Presenting Illness:      The patient is a 58-year-old female with a history of sarcoidosis, obesity, arthritis, who presented from home with multiple complaints.  History obtained from ER record, the patient's daughter, the patient at bedside.  She states that she has had diagnosis of sarcoidosis for about 3 years.  She recently last  had switched pulmonologist.  She had noted essentially worsening symptoms of decreased oral intake, early satiety, weakness, nausea which have all been progressing for the last few months.  She followed up with her primary care provider about 6 weeks ago, was told her kidney function had decreased.  She was actually referred to nephrology at that time.  She was also instructed to stop her medications which included CellCept, methotrexate, multivitamins, vitamin D, and folic acid.  She actually come to the emergency room with complaints of nausea and vomiting about 2 weeks ago and was given fluids and outpatient follow-up.  She also underwent a recent EGD with Dr. Rogers last week where she was told she may have inflammation/esophagitis.  She denies any fevers, chills, trauma.  Denies any prior history of cancer.  She is unaware of any prior history of kidney disease.  Denies use of NSAIDs.    In the ER, she was overall hemodynamically stable.  Her labs were notable for creatinine 3.72 and a calcium of 13.9.  Her potassium was 4.1.  She had TSH of 2.7 white count 10,000 hemoglobin 10.2 and platelets of 334.  Chest x-ray was unremarkable.  A CT scan of the chest without contrast was showing mild mediastinal and hilar  adenopathy, consider chest CT with IV contrast.  CT abdomen pelvis without contrast with right-sided nephrolithiasis and no hydronephrosis.  There was similar HSM compared to prior scans.  The patient was given a normal saline bolus.  Secondary to acute kidney injury and hypercalcemia we were asked to admit.    Review Of Systems:    All systems were reviewed and negative except as mentioned in history of presenting illness, assessment and plan.    Past Medical History: Patient  has a past medical history of Arthritis, Hernia, abdominal, and Sarcoidosis.    Past Surgical History: Patient  has a past surgical history that includes  section; Cholecystectomy; ventral/incisional hernia repair (N/A, 2017); Cataract extraction w/ intraocular lens implant (Right, 2023); and Cataract extraction w/ intraocular lens implant (Left, 2024).    Social History: Patient  reports that she has never smoked. She has never used smokeless tobacco. She reports that she does not drink alcohol and does not use drugs.    Family History:  Patient's family history has been reviewed and found to be noncontributory.     Allergies:      Citalopram hydrobromide    Home Medications:    Prior to Admission Medications       Prescriptions Last Dose Informant Patient Reported? Taking?    citalopram (CeleXA) 20 MG tablet   Yes No    Take 1 tablet by mouth Daily.    Patient not taking:  Reported on 3/27/2024    folic acid (FOLVITE) 1 MG tablet   Yes No    Take 1 tablet by mouth Daily.    Patient not taking:  Reported on 3/27/2024    methotrexate 2.5 MG tablet   Yes No    Take 1 tablet by mouth 1 (One) Time Per Week. 6 pills    Patient not taking:  Reported on 3/27/2024    mycophenolate (CELLCEPT) 500 MG tablet   Yes No    Take 2 tablets by mouth 2 (Two) Times a Day.    Patient not taking:  Reported on 3/27/2024    omeprazole (priLOSEC) 40 MG capsule   Yes No    Take 1 capsule by mouth Daily.    Patient not taking:  Reported on  "3/27/2024    ondansetron ODT (ZOFRAN-ODT) 4 MG disintegrating tablet   No No    Place 1 tablet on the tongue Every 6 (Six) Hours As Needed for Nausea or Vomiting.    oxybutynin XL (DITROPAN-XL) 5 MG 24 hr tablet   Yes No    Take 1 tablet by mouth Daily.    Patient not taking:  Reported on 3/27/2024    vitamin B-12 (CYANOCOBALAMIN) 1000 MCG tablet   Yes No    Take 1 tablet by mouth Daily.    Patient not taking:  Reported on 3/27/2024    vitamin D3 125 MCG (5000 UT) capsule capsule   Yes No    Take 1 capsule by mouth Daily.    Patient not taking:  Reported on 3/27/2024          ED Medications:    Medications   sodium chloride 0.9 % flush 10 mL (has no administration in time range)   sodium chloride 0.9 % bolus 1,000 mL (0 mL Intravenous Stopped 4/7/24 1513)   aluminum-magnesium hydroxide-simethicone (MAALOX MAX) 400-400-40 MG/5ML 15 mL suspension ( Oral Given 4/7/24 1352)     Vital Signs:  Temp:  [98 °F (36.7 °C)] 98 °F (36.7 °C)  Heart Rate:  [66-74] 66  Resp:  [18] 18  BP: (147-163)/() 163/95        04/07/24  1315   Weight: 109 kg (241 lb)     Body mass index is 38.9 kg/m².    Physical Exam:     Most recent vital Signs: /95   Pulse 66   Temp 98 °F (36.7 °C)   Resp 18   Ht 167.6 cm (66\")   Wt 109 kg (241 lb)   LMP  (LMP Unknown) Comment: posyt menopausal  SpO2 99%   BMI 38.90 kg/m²     Physical Exam  Constitutional:       General: She is not in acute distress.     Appearance: She is obese. She is not toxic-appearing.   HENT:      Mouth/Throat:      Mouth: Mucous membranes are dry.      Pharynx: Oropharynx is clear.   Eyes:      Extraocular Movements: Extraocular movements intact.      Pupils: Pupils are equal, round, and reactive to light.   Cardiovascular:      Rate and Rhythm: Normal rate and regular rhythm.      Pulses: Normal pulses.      Heart sounds: No murmur heard.     No gallop.   Pulmonary:      Effort: Pulmonary effort is normal. No respiratory distress.      Breath sounds: No wheezing. " "  Abdominal:      General: Abdomen is flat. Bowel sounds are normal. There is no distension.      Tenderness: There is no abdominal tenderness. There is no guarding.   Musculoskeletal:         General: Normal range of motion.      Right lower leg: No edema.      Left lower leg: No edema.   Skin:     General: Skin is warm and dry.      Capillary Refill: Capillary refill takes less than 2 seconds.      Findings: No bruising or lesion.   Neurological:      General: No focal deficit present.      Mental Status: She is alert. Mental status is at baseline.      Motor: Weakness present.   Psychiatric:         Mood and Affect: Mood normal.         Thought Content: Thought content normal.         Laboratory data:    I have reviewed the labs done in the emergency room.    Results from last 7 days   Lab Units 04/07/24  1346   SODIUM mmol/L 138   POTASSIUM mmol/L 4.1   CHLORIDE mmol/L 99   CO2 mmol/L 26.6   BUN mg/dL 29*   CREATININE mg/dL 3.72*   CALCIUM mg/dL 13.9*   BILIRUBIN mg/dL 0.4   ALK PHOS U/L 88   ALT (SGPT) U/L 12   AST (SGOT) U/L 21   GLUCOSE mg/dL 91     Results from last 7 days   Lab Units 04/07/24  1346   WBC 10*3/mm3 10.57   HEMOGLOBIN g/dL 10.2*   HEMATOCRIT % 32.6*   PLATELETS 10*3/mm3 334         Results from last 7 days   Lab Units 04/07/24  1346   HSTROP T ng/L 18*             Results from last 7 days   Lab Units 04/07/24  1346   LIPASE U/L 28               Invalid input(s): \"USDES\", \"NITRITITE\", \"BACT\", \"EP\"    Pain Management Panel           No data to display                EKG:          Radiology:    CT Abdomen Pelvis Without Contrast    Result Date: 4/7/2024  PROCEDURE: CT ABDOMEN PELVIS WO CONTRAST-  HISTORY: Nausea, weakness s/p endoscopy  COMPARISON: March 22, 2024..  PROCEDURE: Axial images were obtained from the lung bases to the pubic symphysis by computed tomography. This study was performed with techniques to keep radiation doses as low as reasonably achievable, (ALARA). Individualized dose " reduction techniques using automated exposure control or adjustment of mA and/or kV according to the patient size were employed.  FINDINGS:  ABDOMEN: The lung bases are clear. The heart is proper size. The limited non-contrast images of the liver demonstrate no focal abnormality but the liver is enlarged at 18.5 cm, similar to prior. There are metallic surgical clips in the right upper quadrant consistent with previous cholecystectomy. The spleen mildly enlarged at 13 cm, stable from prior. No focal mass identified. Fullness of the left adrenal gland is stable. Right adrenal gland is normal. There are metallic surgical clips in the right upper quadrant consistent with previous cholecystectomy. The aorta is normal in caliber. There is no significant free fluid or adenopathy. There is no left nephrolithiasis. Small nonobstructing stones on the right are stable. There is no hydronephrosis. No bony destructive lesion seen. Small periaortic lymph nodes are stable from prior.  PELVIS: The GI tract demonstrate no obstruction. The appendix is identified and appears normal. The urinary bladder is almost completely collapsed. Uterus is midline. There is no fluid or adenopathy. No suspicious inguinal adenopathy seen. No free air identified.      Right nephrolithiasis without hydronephrosis.  Hepatosplenomegaly similar to prior study.   CTDI: 7.44 mGy DLP:1288.51 mGy.cm  This report was signed and finalized on 4/7/2024 3:42 PM by Nova Koch MD.      CT Chest Without Contrast Diagnostic    Result Date: 4/7/2024  PROCEDURE: CT CHEST WO CONTRAST DIAGNOSTIC-  HISTORY: Weakness, hx of sarcoidosis, s/p endoscopy  COMPARISON: No previous chest CT for comparison.  PROCEDURE: Axial images were obtained from the lung apex to the mid abdomen by computed tomography. This study was performed with techniques to keep radiation doses as low as reasonably achievable, (ALARA). Individualized dose reduction techniques using automated exposure  control or adjustment of mA and/or kV according to the patient size were employed.  FINDINGS:  CHEST: There is no suspicious axillary adenopathy. Vascular calcifications noted. There is mildly prominent right pretracheal lymph node in question fullness of the right hilum. Chest CT with contrast would be helpful for further evaluation in this patient with known sarcoidosis. The heart is proper size. There is no pericardial or pleural effusion.No suspicious infiltrate or nodule identified. Limited images of the upper abdomen demonstrate cholecystectomy clips. Spleen is incompletely visualized but appears at least mildly enlarged to 13 cm. No bony destructive lesion seen. No mediastinal air identified. No significant wall thickening of the esophagus identified. No pneumothorax seen.      No acute infiltrate identified.  Question mild mediastinal and hilar adenopathy, consider chest CT with intravenous contrast.    CTDI: 7.44 mGy DLP:1288.51 mGy.cm  This report was signed and finalized on 4/7/2024 3:36 PM by Nova Koch MD.      XR Chest 1 View    Result Date: 4/7/2024  PROCEDURE: XR CHEST 1 VW-  HISTORY: Gen weakness, worsening since yesterday with shortness of breath and dry heaves.  COMPARISON: None.  FINDINGS: The heart is normal in size. The lungs are clear. The mediastinum is unremarkable. There is no pneumothorax.  There are no acute osseous abnormalities. Apical lordotic positioning noted.      No acute cardiopulmonary process.     This report was signed and finalized on 4/7/2024 2:30 PM by Nova Koch MD.       Assessment:    Acute on chronic renal failure, POA unsure stage  Hypercalcemia, POA  History of sarcoidosis, POA  Obesity    Plan:    Admit for observation and workup    Kidney failure/hypercalcemia:  Definitely appears to have acute kidney injury when compared to labs obtained just 2 weeks ago in the emergency room.  Most recent chemistry panel I can find otherwise in epic currently was from 2020 and  her creatinine was 0.9 at that time.  She does have symptomatology of hypercalcemia.  I will consult with nephrology for further workup and management.  I will place patient on IV fluid resuscitation.  Will order labs to workup hypercalcemia further.  Monitor urine output.    I discussed with patient there is some concern of sarcoid affecting renal function.  She may end up needing renal biopsy at some point depending upon further workup and management.    Sarcoidosis:  Patient previously under the care of Dr. Da Silva, currently seeing Dr. Crespo.  Patient's primary care provider did stop her medications including CellCept and methotrexate about a month ago due to her kidney failure.    Patient currently meets observation level of care, anticipated stay less than 2 midnights.  Further recommendations are predicated upon improvement in clinical condition.  Plan of care discussed with the patient and a daughter at bedside.    Risk Assessment: High  DVT Prophylaxis: SCDs  Code Status: Full  Diet: Renal    Advance Care Planning   ACP discussion was held with the patient during this visit. Patient does not have an advance directive, declines further assistance.           Jackie Kay DO  04/07/24  17:34 EDT    Dictated utilizing Dragon dictation.

## 2024-04-07 NOTE — CONSULTS
[facility]      Nephrology Consultation      Referring Provider:   No ref. provider found    Reason for Consultation:  Acute kidney injury and associated problems  Hypercalcemia    Subjective:  Chief complaint   Chief Complaint   Patient presents with    Weakness - Generalized     Pt reports worsening weakness since yesterday with dry heaves. Pt reports feeling SOA. Denies CP      History of present illness:    Ms. Hinton is a 58-year-old female with PMH significant for sarcoidosis, rheumatoid arthritis, morbid obesity and renal insufficiency who presented to the ED yesterday complaining of progressive weakness and nausea, been going on for the last couple months and getting worse. Also reports decreased PO intake and poor appetite. Patient's daughter reports her PCP informed the patient a couple weeks ago that her kidney function had worsened, at this time sCr was 2.29; reports no known prior history of CKD. Secondary to those findings, PCP discontinued CellCept and methotrexate, as well as her vitamin D and folate supplements.   Workup in the ED notable for sCr 3.72, Ca++ 13.9; other electrolytes normal.  Hgb 10.2. WBC normal. CXR negative for acute process. CT chest WO notable for mild mediastinal and hilar adenopathy. CT A/P WO shows right nephrolithiasis without hydronephrosis.  Renal US with small 3mm non-obstructing stone in right renal pole. She received 1L NS bolus in the ED secondary to hypovolemia however she was hemodynamically stable. She was admitted by the hospitalist secondary to acute kidney injury, hypercalcemia. Nephrology has been asked to see her for further management of this.    I have reviewed labs/imaging/records from this hospitalization, including ER staff and admitting/attending physicians H/P's and progress notes to establish a comprehensive understanding of this patient's clinical hospital course, as well as to establish plan of care appropriately.   Past Medical  History:   Diagnosis Date    Arthritis     Hernia, abdominal     Sarcoidosis        Past Surgical History:   Procedure Laterality Date    CATARACT EXTRACTION W/ INTRAOCULAR LENS IMPLANT Right 2023    Procedure: CATARACT PHACO EXTRACTION WITH INTRAOCULAR LENS IMPLANT RIGHT;  Surgeon: Christ Kerr MD;  Location: [facility];  Service: Ophthalmology;  Laterality: Right;    CATARACT EXTRACTION W/ INTRAOCULAR LENS IMPLANT Left 2024    Procedure: CATARACT PHACO EXTRACTION WITH INTRAOCULAR LENS IMPLANT LEFT;  Surgeon: Christ Kerr MD;  Location: [facility];  Service: Ophthalmology;  Laterality: Left;     SECTION      CHOLECYSTECTOMY      VENTRAL/INCISIONAL HERNIA REPAIR N/A 2017    Procedure:  INCISIONAL HERNIA REPAIR WITH MESH;  Surgeon: Bassam Rogers MD;  Location: [facility];  Service:      Family History   Problem Relation Age of Onset    No Known Problems Mother     No Known Problems Father      negative h/o ESRD     Social History     Tobacco Use    Smoking status: Never    Smokeless tobacco: Never   Vaping Use    Vaping status: Never Used   Substance Use Topics    Alcohol use: No    Drug use: No     Home medications:   Prior to Admission Medications       Prescriptions Last Dose Informant Patient Reported? Taking?    citalopram (CeleXA) 20 MG tablet   Yes No    Take 1 tablet by mouth Daily.    Patient not taking:  Reported on 3/27/2024    folic acid (FOLVITE) 1 MG tablet   Yes No    Take 1 tablet by mouth Daily.    Patient not taking:  Reported on 3/27/2024    methotrexate 2.5 MG tablet   Yes No    Take 1 tablet by mouth 1 (One) Time Per Week. 6 pills    Patient not taking:  Reported on 3/27/2024    mycophenolate (CELLCEPT) 500 MG tablet   Yes No    Take 2 tablets by mouth 2 (Two) Times a Day.    Patient not taking:  Reported on 3/27/2024    omeprazole (priLOSEC) 40 MG capsule 2024  Yes Yes    Take 1 capsule by mouth Daily.    ondansetron ODT (ZOFRAN-ODT) 4 MG  disintegrating tablet Past Month  No Yes    Place 1 tablet on the tongue Every 6 (Six) Hours As Needed for Nausea or Vomiting.    oxybutynin XL (DITROPAN-XL) 5 MG 24 hr tablet   Yes No    Take 1 tablet by mouth Daily.    Patient not taking:  Reported on 3/27/2024    vitamin B-12 (CYANOCOBALAMIN) 1000 MCG tablet   Yes No    Take 1 tablet by mouth Daily.    Patient not taking:  Reported on 3/27/2024    vitamin D3 125 MCG (5000 UT) capsule capsule   Yes No    Take 1 capsule by mouth Daily.    Patient not taking:  Reported on 3/27/2024          Emergency department medications:   Medications   sodium chloride 0.9 % flush 10 mL (has no administration in time range)   sodium chloride 0.9 % flush 10 mL (10 mL Intravenous Given 4/7/24 2045)   sodium chloride 0.9 % flush 10 mL (has no administration in time range)   sodium chloride 0.9 % infusion 40 mL (has no administration in time range)   nitroglycerin (NITROSTAT) SL tablet 0.4 mg (has no administration in time range)   sodium chloride 0.9 % infusion (125 mL/hr Intravenous Currently Infusing 4/8/24 0604)   sennosides-docusate (PERICOLACE) 8.6-50 MG per tablet 2 tablet (2 tablets Oral Not Given 4/7/24 2045)     And   polyethylene glycol (MIRALAX) packet 17 g (has no administration in time range)     And   bisacodyl (DULCOLAX) EC tablet 5 mg (has no administration in time range)     And   bisacodyl (DULCOLAX) suppository 10 mg (has no administration in time range)   acetaminophen (TYLENOL) tablet 650 mg (has no administration in time range)     Or   acetaminophen (TYLENOL) 160 MG/5ML oral solution 650 mg (has no administration in time range)     Or   acetaminophen (TYLENOL) suppository 650 mg (has no administration in time range)   melatonin tablet 5 mg (has no administration in time range)   ALPRAZolam (XANAX) tablet 0.5 mg (has no administration in time range)   aluminum-magnesium hydroxide-simethicone (MAALOX MAX) 400-400-40 MG/5ML suspension 15 mL (has no administration  "in time range)   ondansetron ODT (ZOFRAN-ODT) disintegrating tablet 4 mg (has no administration in time range)     Or   ondansetron (ZOFRAN) injection 4 mg (has no administration in time range)   sodium chloride 0.9 % bolus 1,000 mL (0 mL Intravenous Stopped 4/7/24 1513)   aluminum-magnesium hydroxide-simethicone (MAALOX MAX) 400-400-40 MG/5ML 15 mL suspension ( Oral Given 4/7/24 1352)       Allergies:  Citalopram hydrobromide    Review of Systems  14 point review of system were done and are negative except as mentioned in the history of present illness and assessment and plan.      Physical Exam:  Objective:  Vital Signs  /77 (BP Location: Right arm, Patient Position: Lying)   Pulse 62   Temp 97.6 °F (36.4 °C) (Oral)   Resp 18   Ht 167.6 cm (66\")   Wt 113 kg (249 lb 9 oz)   LMP  (LMP Unknown) Comment: posyt menopausal  SpO2 97%   BMI 40.28 kg/m²   Objective    I/O this shift:  In: -   Out: 200 [Urine:200]    Intake/Output Summary (Last 24 hours) at 4/8/2024 0712  Last data filed at 4/8/2024 0702  Gross per 24 hour   Intake 2000 ml   Output 2000 ml   Net 0 ml        Physical Exam     Constitutional: Awake, alert  Eyes: sclerae anicteric, no conjunctival injection  HEENT: mucous membranes dry  Neck: Supple, no thyromegaly, no lymphadenopathy, trachea midline, No JVD  Respiratory: Dminished to auscultation bilaterally, nonlabored respirations   Cardiovascular: RRR, no murmurs, rubs, or gallops.  Gastrointestinal: Positive bowel sounds, obese, soft, nontender, nondistended  Musculoskeletal: No edema, no clubbing or cyanosis  Psychiatric: Appropriate affect, cooperative  Neurologic: Oriented x 3, moving all extremities, Cranial Nerves grossly intact, speech clear, generalized weakness  Skin: warm and dry, no rashes            Results Review:   Results from last 7 days   Lab Units 04/08/24  0529 04/07/24  1346   SODIUM mmol/L 138 138   POTASSIUM mmol/L 4.1 4.1   CHLORIDE mmol/L 103 99   CO2 mmol/L 25.6 26.6 " "  BUN mg/dL 25* 29*   CREATININE mg/dL 3.75* 3.72*   CALCIUM mg/dL 12.0* 13.9*   ALBUMIN g/dL  --  3.7   BILIRUBIN mg/dL  --  0.4   ALK PHOS U/L  --  88   ALT (SGPT) U/L  --  12   AST (SGOT) U/L  --  21   GLUCOSE mg/dL 88 91     Estimated Creatinine Clearance: 20.3 mL/min (A) (by C-G formula based on SCr of 3.75 mg/dL (H)).  Results from last 7 days   Lab Units 04/07/24  1346   MAGNESIUM mg/dL 2.4         Results from last 7 days   Lab Units 04/07/24  1346   WBC 10*3/mm3 10.57   HEMOGLOBIN g/dL 10.2*   PLATELETS 10*3/mm3 334         Brief Urine Lab Results  (Last result in the past 365 days)        Color   Clarity   Blood   Leuk Est   Nitrite   Protein   CREAT   Urine HCG        04/07/24 2357 Yellow   Clear   Trace   Negative   Negative   Trace                 No results found for: \"UTPCR\"  Imaging Results (Last 24 Hours)       Procedure Component Value Units Date/Time    US Renal Bilateral [[id number]] Collected: 04/07/24 1948     Updated: 04/07/24 1951    Narrative:      PROCEDURE: US RENAL BILATERAL-     HISTORY: buddy, hypercalcemia; N17.9-Acute kidney failure, unspecified;  E86.0-Dehydration     FINDINGS: Kidneys show a normal echo pattern. .  Right kidney measures  12.22 cm in length. Left kidney measures 13.2 cm in length.  Size is  normal.     No mass or cyst is seen. No obstructive changes are present. A  questionable small nonobstructing 3 mm stone is seen of the lower pole  right kidney.       Impression:      Unremarkable renal ultrasound.        This report was signed and finalized on 4/7/2024 7:49 PM by Frederick Jimenez MD.       CT Abdomen Pelvis Without Contrast [[id number]] Collected: 04/07/24 1537     Updated: 04/07/24 1545    Narrative:      PROCEDURE: CT ABDOMEN PELVIS WO CONTRAST-     HISTORY: Nausea, weakness s/p endoscopy     COMPARISON: March 22, 2024..     PROCEDURE: Axial images were obtained from the lung bases to the pubic  symphysis by computed tomography. This study was performed " with  techniques to keep radiation doses as low as reasonably achievable,  (ALARA). Individualized dose reduction techniques using automated  exposure control or adjustment of mA and/or kV according to the patient  size were employed.     FINDINGS:     ABDOMEN: The lung bases are clear. The heart is proper size. The limited  non-contrast images of the liver demonstrate no focal abnormality but  the liver is enlarged at 18.5 cm, similar to prior. There are metallic  surgical clips in the right upper quadrant consistent with previous  cholecystectomy. The spleen mildly enlarged at 13 cm, stable from prior.  No focal mass identified. Fullness of the left adrenal gland is stable.  Right adrenal gland is normal. There are metallic surgical clips in the  right upper quadrant consistent with previous cholecystectomy. The aorta  is normal in caliber. There is no significant free fluid or adenopathy.   There is no left nephrolithiasis. Small nonobstructing stones on the  right are stable. There is no hydronephrosis. No bony destructive lesion  seen. Small periaortic lymph nodes are stable from prior.     PELVIS: The GI tract demonstrate no obstruction. The appendix is  identified and appears normal. The urinary bladder is almost completely  collapsed. Uterus is midline. There is no fluid or adenopathy. No  suspicious inguinal adenopathy seen. No free air identified.       Impression:      Right nephrolithiasis without hydronephrosis.     Hepatosplenomegaly similar to prior study.        CTDI: 7.44 mGy  DLP:1288.51 mGy.cm     This report was signed and finalized on 4/7/2024 3:42 PM by Nova Koch MD.       CT Chest Without Contrast Diagnostic [[id number]] Collected: 04/07/24 1530     Updated: 04/07/24 1538    Narrative:      PROCEDURE: CT CHEST WO CONTRAST DIAGNOSTIC-     HISTORY: Weakness, hx of sarcoidosis, s/p endoscopy     COMPARISON: No previous chest CT for comparison.     PROCEDURE: Axial images were obtained from the  lung apex to the mid  abdomen by computed tomography. This study was performed with techniques  to keep radiation doses as low as reasonably achievable, (ALARA).  Individualized dose reduction techniques using automated exposure  control or adjustment of mA and/or kV according to the patient size were  employed.     FINDINGS:     CHEST: There is no suspicious axillary adenopathy. Vascular  calcifications noted. There is mildly prominent right pretracheal lymph  node in question fullness of the right hilum. Chest CT with contrast  would be helpful for further evaluation in this patient with known  sarcoidosis. The heart is proper size. There is no pericardial or  pleural effusion.No suspicious infiltrate or nodule identified. Limited  images of the upper abdomen demonstrate cholecystectomy clips. Spleen is  incompletely visualized but appears at least mildly enlarged to 13 cm.  No bony destructive lesion seen. No mediastinal air identified. No  significant wall thickening of the esophagus identified. No pneumothorax  seen.       Impression:      No acute infiltrate identified.     Question mild mediastinal and hilar adenopathy, consider chest CT with  intravenous contrast.           CTDI: 7.44 mGy  DLP:1288.51 mGy.cm     This report was signed and finalized on 4/7/2024 3:36 PM by Nova Koch MD.       XR Chest 1 View [[id number]] Collected: 04/07/24 1430     Updated: 04/07/24 1432    Narrative:      PROCEDURE: XR CHEST 1 VW-     HISTORY: Gen weakness, worsening since yesterday with shortness of  breath and dry heaves.     COMPARISON: None.     FINDINGS: The heart is normal in size. The lungs are clear. The  mediastinum is unremarkable. There is no pneumothorax.  There are no  acute osseous abnormalities. Apical lordotic positioning noted.        Impression:      No acute cardiopulmonary process.              This report was signed and finalized on 4/7/2024 2:30 PM by Nova Koch MD.             [facility]  sodium, 2 tablet, Oral, BID  sodium chloride, 10 mL, Intravenous, Q12H      sodium chloride, 125 mL/hr, Last Rate: 125 mL/hr (04/08/24 0658)        Assessment/Plan:    Acute kidney injury: With possible h/o CKD, baseline unknown. Last month patient noted with worsened renal function, sCr 2.29 at that time. Previous to that, labs in chart from 2020 demonstrate normal renal function. sCr 3.7 upon presentation with hypercalcemia; other e'lytes normal. Component of dehydration, progressively poor oral intake. No obstruction on imaging. UA bland. Further concern for nephrocalcinosis s/t sarcoidosis with hypercalcemia and nephrolithiasis.  Hypercalcemia: Routine workup has been ordered including PTH, PTH related peptide, vitamin D levels, 24-hour urine protein electrophoresis, as well as ionized calcium.  Recent hx of Vitamin D supplementation but recently discontinued.   Sarcoidosis: Known history, recently cellcept and methotrexate stopped s/t worsened renal function. No known previous renal manifestation.  It is not very clear if she has been following up with the pulmonary, will need pulmonary evaluation.  Consider steroids.  Rheumatoid arthritis: She was on methotrexate and CellCept, currently she has been off for about a month.  May need close follow-up with rheumatology.  Anemia: Check iron stores.  May be secondary to methotrexate.      Risk and complexity: High      Plan:  Continue IV fluid rehydration, increase NS to 200mL/hr. Hypercalcemia routine workup pending. Strict intake and output. Avoid nephrotoxic medications.  If renal function improves will consider Aredia, once her volume is replete will consider diuretics.  She will need routine cancer screening, will defer it to the primary care.  Continue with rest of the current treatment plan and surveillance labs.  Details were discussed with the patient as well as family in the room.  Daughter at the bedside.  Details were also discussed with the hospitalist  service.   Further recommendations will depend on clinical course of the patient during the current hospitalization.    I also discussed the details with the nursing staff.  Rest as ordered.    In closing, I sincerely appreciate opportunity to participate in care of this patient. If I can be of any further assistance with the management of this patient, please don’t hesitate to contact me.    Panfilo Jo MD, RUDY    04/08/24  07:12 EDT    Dictated using Dragon.

## 2024-04-08 LAB
25(OH)D3 SERPL-MCNC: 29.4 NG/ML (ref 30–100)
ANION GAP SERPL CALCULATED.3IONS-SCNC: 9.4 MMOL/L (ref 5–15)
BACTERIA UR QL AUTO: ABNORMAL /HPF
BILIRUB UR QL STRIP: NEGATIVE
BUN SERPL-MCNC: 25 MG/DL (ref 8–23)
BUN/CREAT SERPL: 6.7 (ref 7–25)
CA-I BLD-MCNC: 6.9 MG/DL (ref 4.6–5.4)
CA-I SERPL ISE-MCNC: 1.72 MMOL/L (ref 1.15–1.35)
CALCIUM SPEC-SCNC: 12 MG/DL (ref 8.6–10.5)
CHLORIDE SERPL-SCNC: 103 MMOL/L (ref 98–107)
CHOLEST SERPL-MCNC: 147 MG/DL (ref 0–200)
CLARITY UR: CLEAR
CO2 SERPL-SCNC: 25.6 MMOL/L (ref 22–29)
COLOR UR: YELLOW
CREAT SERPL-MCNC: 3.75 MG/DL (ref 0.57–1)
CREAT UR-MCNC: 24.8 MG/DL
CREAT UR-MCNC: 29.9 MG/DL
EGFRCR SERPLBLD CKD-EPI 2021: 13.2 ML/MIN/1.73
GLUCOSE SERPL-MCNC: 88 MG/DL (ref 65–99)
GLUCOSE UR STRIP-MCNC: NEGATIVE MG/DL
HBA1C MFR BLD: 5.2 % (ref 4.8–5.6)
HDLC SERPL-MCNC: 44 MG/DL (ref 40–60)
HGB UR QL STRIP.AUTO: ABNORMAL
HYALINE CASTS UR QL AUTO: ABNORMAL /LPF
IRON 24H UR-MRATE: 27 MCG/DL (ref 37–145)
IRON SATN MFR SERPL: 11 % (ref 20–50)
KETONES UR QL STRIP: NEGATIVE
LDLC SERPL CALC-MCNC: 83 MG/DL (ref 0–100)
LDLC/HDLC SERPL: 1.85 {RATIO}
LEUKOCYTE ESTERASE UR QL STRIP.AUTO: NEGATIVE
NITRITE UR QL STRIP: NEGATIVE
PH UR STRIP.AUTO: 8 [PH] (ref 5–8)
POTASSIUM SERPL-SCNC: 4.1 MMOL/L (ref 3.5–5.2)
PROT ?TM UR-MCNC: 13 MG/DL
PROT ?TM UR-MCNC: 9.2 MG/DL
PROT UR QL STRIP: ABNORMAL
PROT/CREAT UR: 371 MG/G CREA (ref 0–200)
PTH-INTACT SERPL-MCNC: 7.3 PG/ML (ref 15–65)
RBC # UR STRIP: ABNORMAL /HPF
REF LAB TEST METHOD: ABNORMAL
SODIUM SERPL-SCNC: 138 MMOL/L (ref 136–145)
SODIUM UR-SCNC: 86 MMOL/L
SP GR UR STRIP: 1.01 (ref 1–1.03)
SQUAMOUS #/AREA URNS HPF: ABNORMAL /HPF
TIBC SERPL-MCNC: 243 MCG/DL (ref 298–536)
TRANSFERRIN SERPL-MCNC: 163 MG/DL (ref 200–360)
TRIGL SERPL-MCNC: 109 MG/DL (ref 0–150)
URATE SERPL-MCNC: 10.8 MG/DL (ref 2.4–5.7)
UROBILINOGEN UR QL STRIP: ABNORMAL
VLDLC SERPL-MCNC: 20 MG/DL (ref 5–40)
WBC # UR STRIP: ABNORMAL /HPF

## 2024-04-08 PROCEDURE — 25010000002 ONDANSETRON PER 1 MG: Performed by: FAMILY MEDICINE

## 2024-04-08 PROCEDURE — 63710000001 CALCITONIN PER 400 UNITS: Performed by: INTERNAL MEDICINE

## 2024-04-08 PROCEDURE — 80061 LIPID PANEL: CPT | Performed by: FAMILY MEDICINE

## 2024-04-08 PROCEDURE — 82306 VITAMIN D 25 HYDROXY: CPT | Performed by: FAMILY MEDICINE

## 2024-04-08 PROCEDURE — 82570 ASSAY OF URINE CREATININE: CPT | Performed by: FAMILY MEDICINE

## 2024-04-08 PROCEDURE — G0378 HOSPITAL OBSERVATION PER HR: HCPCS

## 2024-04-08 PROCEDURE — 84466 ASSAY OF TRANSFERRIN: CPT | Performed by: INTERNAL MEDICINE

## 2024-04-08 PROCEDURE — 99232 SBSQ HOSP IP/OBS MODERATE 35: CPT | Performed by: FAMILY MEDICINE

## 2024-04-08 PROCEDURE — 84550 ASSAY OF BLOOD/URIC ACID: CPT | Performed by: INTERNAL MEDICINE

## 2024-04-08 PROCEDURE — 80048 BASIC METABOLIC PNL TOTAL CA: CPT | Performed by: FAMILY MEDICINE

## 2024-04-08 PROCEDURE — 82330 ASSAY OF CALCIUM: CPT | Performed by: FAMILY MEDICINE

## 2024-04-08 PROCEDURE — 84300 ASSAY OF URINE SODIUM: CPT | Performed by: FAMILY MEDICINE

## 2024-04-08 PROCEDURE — 83036 HEMOGLOBIN GLYCOSYLATED A1C: CPT | Performed by: FAMILY MEDICINE

## 2024-04-08 PROCEDURE — 83970 ASSAY OF PARATHORMONE: CPT | Performed by: FAMILY MEDICINE

## 2024-04-08 PROCEDURE — 97165 OT EVAL LOW COMPLEX 30 MIN: CPT

## 2024-04-08 PROCEDURE — 84156 ASSAY OF PROTEIN URINE: CPT | Performed by: FAMILY MEDICINE

## 2024-04-08 PROCEDURE — 25810000003 SODIUM CHLORIDE 0.9 % SOLUTION: Performed by: FAMILY MEDICINE

## 2024-04-08 PROCEDURE — 82397 CHEMILUMINESCENT ASSAY: CPT | Performed by: FAMILY MEDICINE

## 2024-04-08 PROCEDURE — 83540 ASSAY OF IRON: CPT | Performed by: INTERNAL MEDICINE

## 2024-04-08 PROCEDURE — 25810000003 SODIUM CHLORIDE 0.9 % SOLUTION: Performed by: INTERNAL MEDICINE

## 2024-04-08 PROCEDURE — 97161 PT EVAL LOW COMPLEX 20 MIN: CPT

## 2024-04-08 RX ORDER — CALCITONIN SALMON 200 [USP'U]/ML
100 INJECTION, SOLUTION INTRAMUSCULAR; SUBCUTANEOUS ONCE
Status: COMPLETED | OUTPATIENT
Start: 2024-04-08 | End: 2024-04-08

## 2024-04-08 RX ADMIN — ONDANSETRON 4 MG: 2 INJECTION INTRAMUSCULAR; INTRAVENOUS at 19:35

## 2024-04-08 RX ADMIN — CALCITONIN SALMON 100 UNITS: 200 INJECTION, SOLUTION INTRAMUSCULAR; SUBCUTANEOUS at 17:58

## 2024-04-08 RX ADMIN — SODIUM CHLORIDE 125 ML/HR: 9 INJECTION, SOLUTION INTRAVENOUS at 04:42

## 2024-04-08 NOTE — THERAPY EVALUATION
Patient Name: Jamia Hinton  : 1965    MRN: [id number]                              Today's Date: 2024       Admit Date: 2024    Visit Dx:     ICD-10-CM ICD-9-CM   1. Acute kidney injury  N17.9 584.9   2. Dehydration  E86.0 276.51     Patient Active Problem List   Diagnosis    Ventral hernia without obstruction or gangrene    Nuclear sclerotic cataract of right eye    Nuclear sclerotic cataract of left eye    Acute-on-chronic kidney injury     Past Medical History:   Diagnosis Date    Arthritis     Hernia, abdominal     Sarcoidosis      Past Surgical History:   Procedure Laterality Date    CATARACT EXTRACTION W/ INTRAOCULAR LENS IMPLANT Right 2023    Procedure: CATARACT PHACO EXTRACTION WITH INTRAOCULAR LENS IMPLANT RIGHT;  Surgeon: Christ Kerr MD;  Location: [facility];  Service: Ophthalmology;  Laterality: Right;    CATARACT EXTRACTION W/ INTRAOCULAR LENS IMPLANT Left 2024    Procedure: CATARACT PHACO EXTRACTION WITH INTRAOCULAR LENS IMPLANT LEFT;  Surgeon: Christ Kerr MD;  Location: [facility];  Service: Ophthalmology;  Laterality: Left;     SECTION      CHOLECYSTECTOMY      VENTRAL/INCISIONAL HERNIA REPAIR N/A 2017    Procedure:  INCISIONAL HERNIA REPAIR WITH MESH;  Surgeon: Bassam Rogers MD;  Location: [facility];  Service:       General Information       Row Name 24 1313          OT Time and Intention    Document Type evaluation  -SP     Mode of Treatment occupational therapy  -SP       Row Name 24 1313          General Information    Patient Profile Reviewed yes  -SP     Prior Level of Function independent:;all household mobility;community mobility;ADL's  -SP     Existing Precautions/Restrictions no known precautions/restrictions  -SP     Barriers to Rehab medically complex  -SP       Row Name 24 1313          Living Environment    People in Home spouse  has been taking care of her mother living with her  -SP       Row Name 24  1313          Home Main Entrance    Number of Stairs, Main Entrance --  her house has a ramp to enter and her mothers house has 6 CISCO with B hand rails  -SP       Row Name 04/08/24 1313          Cognition    Orientation Status (Cognition) oriented x 4  -SP       Row Name 04/08/24 1313          Safety Issues, Functional Mobility    Impairments Affecting Function (Mobility) endurance/activity tolerance  -               User Key  (r) = Recorded By, (t) = Taken By, (c) = Cosigned By      Initials Name Provider Type    SP Namrata Blackmon OT Occupational Therapist                     Mobility/ADL's       Row Name 04/08/24 1334          Bed Mobility    Comment, (Bed Mobility) pt received up in the chair and returned to the chair post session  -       Row Name 04/08/24 1334          Transfers    Transfers sit-stand transfer;stand-sit transfer  -[facility] Name 04/08/24 1334          Sit-Stand Transfer    Sit-Stand [place] (Transfers) independent  -SP       Row Name 04/08/24 1334          Stand-Sit Transfer    Stand-Sit [place] (Transfers) independent  -       Row Name 04/08/24 1334          Functional Mobility    Functional Mobility- Ind. Level independent  -SP     Functional Mobility-Distance (Feet) 400  -       Row Name 04/08/24 1334          Activities of Daily Living    BADL Assessment/Intervention upper body dressing;bathing;lower body dressing;grooming;feeding;toileting  -SP       Row Name 04/08/24 1334          Upper Body Dressing Assessment/Training    [place] Level (Upper Body Dressing) upper body dressing skills;independent  -SP       Row Name 04/08/24 1334          Bathing Assessment/Intervention    [place] Level (Bathing) bathing skills;set up  -       Row Name 04/08/24 1334          Lower Body Dressing Assessment/Training    [place] Level (Lower Body Dressing) lower body dressing skills;independent  -[facility] Name 04/08/24 1334          Grooming Assessment/Training     [place] Level (Grooming) grooming skills;independent  -SP       Row Name 04/08/24 1334          Self-Feeding Assessment/Training    [place] Level (Feeding) feeding skills;independent  -SP       [facility] Name 04/08/24 1334          Toileting Assessment/Training    [place] Level (Toileting) toileting skills;independent  -SP               User Key  (r) = Recorded By, (t) = Taken By, (c) = Cosigned By      Initials Name Provider Type    Namrata Robbins OT Occupational Therapist                   Obj/Interventions       [facility] Name 04/08/24 1339          Sensory Assessment (Somatosensory)    Sensory Assessment (Somatosensory) sensation intact  -SP       [facility] Name 04/08/24 1339          Vision Assessment/Intervention    Visual Impairment/Limitations WFL  -SP       [facility] Name 04/08/24 1339          Range of Motion Comprehensive    General Range of Motion no range of motion deficits identified  -SP       Row Name 04/08/24 1339          Strength Comprehensive (MMT)    General Manual Muscle Testing (MMT) Assessment no strength deficits identified  -SP               User Key  (r) = Recorded By, (t) = Taken By, (c) = Cosigned By      Initials Name Provider Type    Namrata Robbins OT Occupational Therapist                   Goals/Plan    No documentation.                  Clinical Impression       Row Name 04/08/24 1339          Pain Assessment    Pretreatment Pain Rating 0/10 - no pain  -SP     Posttreatment Pain Rating 0/10 - no pain  -SP       Row Name 04/08/24 1339          Plan of Care Review    Plan of Care Reviewed With patient  -SP     Progress no change  -SP     Outcome Evaluation Pt received in the chair for OT evaluation. She reports that she lives with her  who is disabled in a single story home with a ramp to enter but she has been living with her mother since her father's passing in a single story home with 6 CISCO and providing care for her mother. On assessment pt is A&O x4 and denies pain. She safely  engaged in ADLs and functional ambulation household distances >400ft independently without an AD. She is not a candidate for OT services at this time and OT will sign off. Pt was educated regarding evaluation and discharge from therapy and is agreeable. Pt encouraged to request a re-consult should anything change. Pt is safe to return home when medically ready from an OT perspective.  -SP       Row Name 04/08/24 1339          Therapy Assessment/Plan (OT)    Criteria for Skilled Therapeutic Interventions Met (OT) no;no problems identified which require skilled intervention  -SP       Row Name 04/08/24 1339          Therapy Plan Review/Discharge Plan (OT)    Anticipated Discharge Disposition (OT) home  -SP       Row Name 04/08/24 1339          Vital Signs    Pre SpO2 (%) 97  -SP     O2 Delivery Pre Treatment room air  -SP     O2 Delivery Intra Treatment room air  -SP     Post SpO2 (%) 98  -SP     O2 Delivery Post Treatment room air  -SP     Pre Patient Position Sitting  -SP     Post Patient Position Sitting  -SP       Row Name 04/08/24 1339          Positioning and Restraints    Pre-Treatment Position sitting in chair/recliner  -SP     Post Treatment Position chair  -SP     In Chair sitting;call light within reach;encouraged to call for assist  -SP               User Key  (r) = Recorded By, (t) = Taken By, (c) = Cosigned By      Initials Name Provider Type    Namrata Robbins, NOE Occupational Therapist                   Outcome Measures       Row Name 04/08/24 1342          How much help from another is currently needed...    Putting on and taking off regular lower body clothing? 4  -SP     Bathing (including washing, rinsing, and drying) 4  -SP     Toileting (which includes using toilet bed pan or urinal) 4  -SP     Putting on and taking off regular upper body clothing 4  -SP     Taking care of personal grooming (such as brushing teeth) 4  -SP     Eating meals 4  -SP     AM-PAC 6 Clicks Score (OT) 24  -SP       Row  Name 04/08/24 1342          Functional Assessment    Outcome Measure Options AM-PAC 6 Clicks Daily Activity (OT)  -SP               User Key  (r) = Recorded By, (t) = Taken By, (c) = Cosigned By      Initials Name Provider Type    SP Namrata Blackmon OT Occupational Therapist                    Occupational Therapy Education       Title: PT OT SLP Therapies (In Progress)       Topic: Occupational Therapy (In Progress)       Point: ADL training (Done)       Description:   Instruct learner(s) on proper safety adaptation and remediation techniques during self care or transfers.   Instruct in proper use of assistive devices.                  Learning Progress Summary             Patient Acceptance, E, VU by SP at 4/8/2024 1342    Comment: Pt educated on purpose of evaluation and dsicharge recs. Pt is agreeable.                         Point: Home exercise program (Not Started)       Description:   Instruct learner(s) on appropriate technique for monitoring, assisting and/or progressing therapeutic exercises/activities.                  Learner Progress:  Not documented in this visit.              Point: Precautions (Not Started)       Description:   Instruct learner(s) on prescribed precautions during self-care and functional transfers.                  Learner Progress:  Not documented in this visit.              Point: Body mechanics (Not Started)       Description:   Instruct learner(s) on proper positioning and spine alignment during self-care, functional mobility activities and/or exercises.                  Learner Progress:  Not documented in this visit.                              User Key       Initials Effective Dates Name Provider Type Discipline    SP 09/08/22 -  Namrata Blackmon OT Occupational Therapist OT                  OT Recommendation and Plan     Plan of Care Review  Plan of Care Reviewed With: patient  Progress: no change  Outcome Evaluation: Pt received in the chair for OT evaluation. She reports that  she lives with her  who is disabled in a single story home with a ramp to enter but she has been living with her mother since her father's passing in a single story home with 6 CISCO and providing care for her mother. On assessment pt is A&O x4 and denies pain. She safely engaged in ADLs and functional ambulation household distances >400ft independently without an AD. She is not a candidate for OT services at this time and OT will sign off. Pt was educated regarding evaluation and discharge from therapy and is agreeable. Pt encouraged to request a re-consult should anything change. Pt is safe to return home when medically ready from an OT perspective.     Time Calculation:   Evaluation Complexity (OT)  Review Occupational Profile/Medical/Therapy History Complexity: brief/low complexity  Assessment, Occupational Performance/Identification of Deficit Complexity: 1-3 performance deficits  Clinical Decision Making Complexity (OT): problem focused assessment/low complexity  Overall Complexity of Evaluation (OT): low complexity     Time Calculation- OT       Row Name 04/08/24 1343             Time Calculation- OT    OT Stop Time 1157  -SP         Untimed Charges    OT Eval/Re-eval Minutes 40  -SP         Total Minutes    Untimed Charges Total Minutes 40  -SP       Total Minutes 40  -SP                User Key  (r) = Recorded By, (t) = Taken By, (c) = Cosigned By      Initials Name Provider Type    SP Namrata Blackmon OT Occupational Therapist                  Therapy Charges for Today       Code Description Service Date Service Provider Modifiers Qty    20608172759 HC OT EVAL LOW COMPLEXITY 3 4/8/2024 Namrata Blackmon OT GO 1                 Namrata Blackmon OT  4/8/2024

## 2024-04-08 NOTE — PLAN OF CARE
Goal Outcome Evaluation:  Plan of Care Reviewed With: patient        Progress: improving  Outcome Evaluation: Pt agreeable to PT eval this date. Pt reports she lives with her , but has been caring and staying with her mom who lives 1 mile down the road. 6 steps and B rails to enter her moms home. States she is independent at baseline. Pt ambulated 400ft with no AD independently. Pt demonstrates no skilled PT needs at this time, PT to sign off.      Anticipated Discharge Disposition (PT): home

## 2024-04-08 NOTE — PROGRESS NOTES
[facility] HOSPITALIST    PROGRESS NOTE    Name:  Jamia Hinton   Age:  58 y.o.  Sex:  female  :  1965  MRN:  [id number]   Visit Number:  [id number]  Admission Date:  2024  Date Of Service:  24  Primary Care Physician:  Jaycee Deal APRN     LOS: 0 days :    Chief Complaint:      Follow-up renal failure, hypercalcemia    Subjective:    Patient seen at bedside.  Overnight feeling well, urinating frequently.  Discussed her labs, nephrology consultation    Hospital Course:    58-year-old female with history of sarcoidosis, arthritis, obesity, who presented to the emergency room with complaints of decreased oral intake, early satiety, weakness and nausea for multiple months now.  Recently told about decreased kidney function with primary care provider and all of her medications including CellCept and methotrexate were discontinued.  Recently underwent an outpatient EGD with Dr. Rogers, reportedly had esophagitis.    Workup in the emergency room was showing acute on chronic renal failure with creatinine 3.72 and also with a calcium of 13.9.  Potassium was 4.  Rest of her labs were stable.  A CT scan of the chest without contrast was showing mild mediastinal and hilar adenopathy.  CT scan abdomen pelvis showing right-sided nephrolithiasis without hydronephrosis.    Patient mated to the hospitalist service and given IV fluid resuscitation.  Calcium is slowly improving.  Dr. Jo is following.    Review of Systems:     All systems were reviewed and negative except as mentioned in subjective, assessment and plan.    Vital Signs:    Temp:  [97.3 °F (36.3 °C)-98.2 °F (36.8 °C)] 97.6 °F (36.4 °C)  Heart Rate:  [62-79] 62  Resp:  [18] 18  BP: (145-177)/() 145/77    Intake and output:    I/O last 3 completed shifts:  In:  [I.V.:]  Out: 1800 [Urine:1800]  I/O this shift:  In: 120 [P.O.:120]  Out: 200 [Urine:200]    Physical Examination:    General Appearance:  Alert and  "cooperative.  NAD   Head:  Atraumatic and normocephalic.   Eyes: Conjunctivae and sclerae normal, no icterus. No pallor.   Throat: No oral lesions, no thrush, oral mucosa moist.   Neck: Supple, trachea midline, no thyromegaly.   Lungs:   Breath sounds heard bilaterally equally.  No wheezing or crackles. No Pleural rub or bronchial breathing.   Heart:  Normal S1 and S2, no murmur, no gallop, no rub. No JVD.   Abdomen:   Normal bowel sounds, no masses, no organomegaly. Soft, nontender, nondistended, no rebound tenderness.   Extremities: Supple, no edema, no cyanosis, no clubbing.   Skin: No bleeding or rash.   Neurologic: Alert and oriented x 3. No facial asymmetry. Moves all four limbs. No tremors.      Laboratory results:    Results from last 7 days   Lab Units 04/08/24  0529 04/07/24  1346   SODIUM mmol/L 138 138   POTASSIUM mmol/L 4.1 4.1   CHLORIDE mmol/L 103 99   CO2 mmol/L 25.6 26.6   BUN mg/dL 25* 29*   CREATININE mg/dL 3.75* 3.72*   CALCIUM mg/dL 12.0* 13.9*   BILIRUBIN mg/dL  --  0.4   ALK PHOS U/L  --  88   ALT (SGPT) U/L  --  12   AST (SGOT) U/L  --  21   GLUCOSE mg/dL 88 91     Results from last 7 days   Lab Units 04/07/24  1346   WBC 10*3/mm3 10.57   HEMOGLOBIN g/dL 10.2*   HEMATOCRIT % 32.6*   PLATELETS 10*3/mm3 334         Results from last 7 days   Lab Units 04/07/24  1346   HSTROP T ng/L 18*         No results for input(s): \"PHART\", \"SQP9JZR\", \"PO2ART\", \"CRV3AKK\", \"BASEEXCESS\" in the last 8760 hours.   I have reviewed the patient's laboratory results.    Radiology results:    US Renal Bilateral    Result Date: 4/7/2024  PROCEDURE: US RENAL BILATERAL-  HISTORY: buddy, hypercalcemia; N17.9-Acute kidney failure, unspecified; E86.0-Dehydration  FINDINGS: Kidneys show a normal echo pattern. .  Right kidney measures 12.22 cm in length. Left kidney measures 13.2 cm in length.  Size is normal.  No mass or cyst is seen. No obstructive changes are present. A questionable small nonobstructing 3 mm stone is seen " of the lower pole right kidney.      Impression: Unremarkable renal ultrasound.   This report was signed and finalized on 4/7/2024 7:49 PM by Frederick Jimenez MD.      CT Abdomen Pelvis Without Contrast    Result Date: 4/7/2024  PROCEDURE: CT ABDOMEN PELVIS WO CONTRAST-  HISTORY: Nausea, weakness s/p endoscopy  COMPARISON: March 22, 2024..  PROCEDURE: Axial images were obtained from the lung bases to the pubic symphysis by computed tomography. This study was performed with techniques to keep radiation doses as low as reasonably achievable, (ALARA). Individualized dose reduction techniques using automated exposure control or adjustment of mA and/or kV according to the patient size were employed.  FINDINGS:  ABDOMEN: The lung bases are clear. The heart is proper size. The limited non-contrast images of the liver demonstrate no focal abnormality but the liver is enlarged at 18.5 cm, similar to prior. There are metallic surgical clips in the right upper quadrant consistent with previous cholecystectomy. The spleen mildly enlarged at 13 cm, stable from prior. No focal mass identified. Fullness of the left adrenal gland is stable. Right adrenal gland is normal. There are metallic surgical clips in the right upper quadrant consistent with previous cholecystectomy. The aorta is normal in caliber. There is no significant free fluid or adenopathy. There is no left nephrolithiasis. Small nonobstructing stones on the right are stable. There is no hydronephrosis. No bony destructive lesion seen. Small periaortic lymph nodes are stable from prior.  PELVIS: The GI tract demonstrate no obstruction. The appendix is identified and appears normal. The urinary bladder is almost completely collapsed. Uterus is midline. There is no fluid or adenopathy. No suspicious inguinal adenopathy seen. No free air identified.      Impression: Right nephrolithiasis without hydronephrosis.  Hepatosplenomegaly similar to prior study.   CTDI: 7.44 mGy  DLP:1288.51 mGy.cm  This report was signed and finalized on 4/7/2024 3:42 PM by Nova Koch MD.      CT Chest Without Contrast Diagnostic    Result Date: 4/7/2024  PROCEDURE: CT CHEST WO CONTRAST DIAGNOSTIC-  HISTORY: Weakness, hx of sarcoidosis, s/p endoscopy  COMPARISON: No previous chest CT for comparison.  PROCEDURE: Axial images were obtained from the lung apex to the mid abdomen by computed tomography. This study was performed with techniques to keep radiation doses as low as reasonably achievable, (ALARA). Individualized dose reduction techniques using automated exposure control or adjustment of mA and/or kV according to the patient size were employed.  FINDINGS:  CHEST: There is no suspicious axillary adenopathy. Vascular calcifications noted. There is mildly prominent right pretracheal lymph node in question fullness of the right hilum. Chest CT with contrast would be helpful for further evaluation in this patient with known sarcoidosis. The heart is proper size. There is no pericardial or pleural effusion.No suspicious infiltrate or nodule identified. Limited images of the upper abdomen demonstrate cholecystectomy clips. Spleen is incompletely visualized but appears at least mildly enlarged to 13 cm. No bony destructive lesion seen. No mediastinal air identified. No significant wall thickening of the esophagus identified. No pneumothorax seen.      Impression: No acute infiltrate identified.  Question mild mediastinal and hilar adenopathy, consider chest CT with intravenous contrast.    CTDI: 7.44 mGy DLP:1288.51 mGy.cm  This report was signed and finalized on 4/7/2024 3:36 PM by Nova Koch MD.      XR Chest 1 View    Result Date: 4/7/2024  PROCEDURE: XR CHEST 1 VW-  HISTORY: Gen weakness, worsening since yesterday with shortness of breath and dry heaves.  COMPARISON: None.  FINDINGS: The heart is normal in size. The lungs are clear. The mediastinum is unremarkable. There is no pneumothorax.  There are  no acute osseous abnormalities. Apical lordotic positioning noted.      Impression: No acute cardiopulmonary process.     This report was signed and finalized on 4/7/2024 2:30 PM by Nova Koch MD.     I have reviewed the patient's radiology reports.    Medication Review:     I have reviewed the patient's active and prn medications.     Problem List:      Acute-on-chronic kidney injury      Assessment:    Acute on chronic renal failure, POA unsure stage  Hypercalcemia, POA  History of sarcoidosis, POA  Obesity    Plan:    Kidney failure/hypercalcemia:  Definitely appears to have acute kidney injury when compared to labs obtained just 2 weeks ago in the emergency room.  Most recent chemistry panel I can find otherwise in epic currently was from 2020 and her creatinine was 0.9 at that time.  She does have symptomatology of hypercalcemia.  Calcium is improving.  Will continue with IV fluid resuscitation.  Have talked with Dr. Jo.  I have ordered other labs to workup hypercalcemia.  Definitely concerned that sarcoidosis is led to hypercalcemia.  She may end up needing steroid treatment.     Sarcoidosis:  Patient previously under the care of Dr. Da Silva, currently seeing Dr. Crespo.  She is requesting to see somebody at [facility] if possible, will place referral for Dr. Gloria's office.  Patient's primary care provider did stop her medications including CellCept and methotrexate about a month ago due to her kidney failure.  Uncontrolled, worsening sarcoidosis could be leading to the hypercalcemia.    DVT Prophylaxis: SCDs  Code Status: Full  Diet: Renal  Discharge Plan: Couple of days with improved    Jackie Kay DO  04/08/24  09:14 EDT    Dictated utilizing Dragon dictation.

## 2024-04-08 NOTE — CASE MANAGEMENT/SOCIAL WORK
Discharge Planning Assessment   [place]     Patient Name: Jamia Hinton  MRN: [id number]  Today's Date: 4/8/2024    Admit Date: 4/7/2024    Plan: Plans tor return home at [place] with family   Discharge Needs Assessment       Row Name 04/08/24 1128       Living Environment    People in Home spouse    Current Living Arrangements home    Duration at Residence 19 years    Potentially Unsafe Housing Conditions other (see comments);water leaks  roof leaks some, Pt reported her son will be fixing it    Primary Care Provided by self    Provides Primary Care For no one, unable/limited ability to care for self    Family Caregiver if Needed child(kathya), adult    Family Caregiver Names Veronica Mary Jane/daughter    Quality of Family Relationships supportive    Able to Return to Prior Arrangements yes       Resource/Environmental Concerns    Resource/Environmental Concerns none    Transportation Concerns none       Transportation Needs    In the past 12 months, has lack of transportation kept you from medical appointments or from getting medications? no    In the past 12 months, has lack of transportation kept you from meetings, work, or from getting things needed for daily living? No       Food Insecurity    Within the past 12 months, you worried that your food would run out before you got the money to buy more. Never true    Within the past 12 months, the food you bought just didn't last and you didn't have money to get more. Never true       Transition Planning    Patient/Family Anticipates Transition to home with family    Patient/Family Anticipated Services at Transition     Transportation Anticipated family or friend will provide       Discharge Needs Assessment    Readmission Within the Last 30 Days no previous admission in last 30 days    Equipment Currently Used at Home none    Concerns to be Addressed denies needs/concerns at this time    Equipment Needed After Discharge none    Provided Post Acute Provider  List? N/A    Provided Post Acute Provider Quality & Resource List? N/A    Current Discharge Risk chronically ill                   Discharge Plan       Row Name 04/08/24 1138       Plan    Plan Plans tor return home at [place] with family    Plan Comments Pt awake and alert sitting up in chair at time of visit.  Pt's daugher in room and pt agreeable to daughter remaining during conversation.  PT confirmed address, PCP, telepone mumbers and .  Pt reports she does not have a POA or Living Will.  Informed her we are able to assist with completing a Living Will if she wished.  She understoond with the LW she will be  able to asign a person of her choice to speak for her regarding medical decisions if she is not able to speak for herself.  Pt voiced interest in Living Will.  Informed her I would notify her  and/or .   Pt reported she is currently staying with her mother at nighttime due to recent death of her mother's S.O..   Pt denies having any equipment or HH following.  She denies DC needs at this time .  Pt plans to return home at [place].   SDOH completed.                  Continued Care and Services - Admitted Since 4/7/2024    No active coordination exists for this encounter.          Demographic Summary       Row Name 04/08/24 1123       General Information    Admission Type observation    Arrived From home    Expected Length of Stay (LOS) 48 hr    Referral Source admission list    Reason for Consult discharge planning    Preferred Language English       Contact Information    Permission Granted to Share Info With ;family/designee    Contact Information Obtained for     Contact Information Comments Veronica Hinton/daughter/[phone]                   Functional Status       Row Name 04/08/24 1126       Functional Status    Usual Activity Tolerance moderate    Current Activity Tolerance fair    Functional Status Comments Reported having difficulty due to SOA        Physical Activity    On average, how many days per week do you engage in moderate to strenuous exercise (like a brisk walk)? 0 days    On average, how many minutes do you engage in exercise at this level? 0 min    Number of minutes of exercise per week 0       Functional Status, IADL    Medications independent    Meal Preparation independent    Housekeeping independent    Laundry independent    Shopping independent    IADL Comments Reports independent of ADLs.  Daughter able to help if needed       Employment/    Employment Status other (see comments)  Pt reported is not able to work at this time but did not want to draw disability      Row Name 04/08/24 1125       Functional Status    Usual Activity Tolerance moderate    Current Activity Tolerance fair    Functional Status Comments Reported having difficulty due to SOA       Physical Activity    On average, how many days per week do you engage in moderate to strenuous exercise (like a brisk walk)? 0 days  Previously more active until recently    On average, how many minutes do you engage in exercise at this level? 0 min    Number of minutes of exercise per week 0                   Psychosocial    No documentation.                  Abuse/Neglect    No documentation.                  Legal    No documentation.                  Substance Abuse    No documentation.                  Patient Forms    No documentation.                     Korin Adams RN

## 2024-04-08 NOTE — PLAN OF CARE
Goal Outcome Evaluation:            Interventions implemented as appropriate

## 2024-04-08 NOTE — THERAPY EVALUATION
Patient Name: Jamia Hinton  : 1965    MRN: [id number]                              Today's Date: 2024       Admit Date: 2024    Visit Dx:     ICD-10-CM ICD-9-CM   1. Acute kidney injury  N17.9 584.9   2. Dehydration  E86.0 276.51     Patient Active Problem List   Diagnosis    Ventral hernia without obstruction or gangrene    Nuclear sclerotic cataract of right eye    Nuclear sclerotic cataract of left eye    Acute-on-chronic kidney injury     Past Medical History:   Diagnosis Date    Arthritis     Hernia, abdominal     Sarcoidosis      Past Surgical History:   Procedure Laterality Date    CATARACT EXTRACTION W/ INTRAOCULAR LENS IMPLANT Right 2023    Procedure: CATARACT PHACO EXTRACTION WITH INTRAOCULAR LENS IMPLANT RIGHT;  Surgeon: Christ Kerr MD;  Location: [facility];  Service: Ophthalmology;  Laterality: Right;    CATARACT EXTRACTION W/ INTRAOCULAR LENS IMPLANT Left 2024    Procedure: CATARACT PHACO EXTRACTION WITH INTRAOCULAR LENS IMPLANT LEFT;  Surgeon: Christ Kerr MD;  Location: [facility];  Service: Ophthalmology;  Laterality: Left;     SECTION      CHOLECYSTECTOMY      VENTRAL/INCISIONAL HERNIA REPAIR N/A 2017    Procedure:  INCISIONAL HERNIA REPAIR WITH MESH;  Surgeon: Bassam Rogers MD;  Location: [facility];  Service:       General Information       Row Name 24 1429          Physical Therapy Time and Intention    Document Type evaluation  -MS     Mode of Treatment physical therapy  -MS       Row Name 24 1429          General Information    Patient Profile Reviewed yes  -MS     Prior Level of Function independent:;all household mobility  -MS     Existing Precautions/Restrictions no known precautions/restrictions  -MS     Barriers to Rehab medically complex  -MS       Row Name 24 1429          Living Environment    People in Home spouse  has been taking care of her mother who lives down the street  -MS       Row Name 24 1429           Home Main Entrance    Number of Stairs, Main Entrance other (see comments)  her mothers home has 6 steps with B rails to enter  -MS       Row Name 04/08/24 1429          Stairs Within Home, Primary    Number of Stairs, Within Home, Primary none  -MS       Row Name 04/08/24 1429          Cognition    Orientation Status (Cognition) oriented x 4  -MS       Row Name 04/08/24 1429          Safety Issues, Functional Mobility    Impairments Affecting Function (Mobility) endurance/activity tolerance  -MS               User Key  (r) = Recorded By, (t) = Taken By, (c) = Cosigned By      Initials Name Provider Type    MS Dontrell Chauhan, PT Physical Therapist                   Mobility       Row Name 04/08/24 1430          Sit-Stand Transfer    Sit-Stand [place] (Transfers) independent  -MS       Row Name 04/08/24 1430          Gait/Stairs (Locomotion)    [place] Level (Gait) independent  -MS     Assistive Device (Gait) other (see comments)  gait belt  -MS     Patient was able to Ambulate yes  -MS     Distance in Feet (Gait) 400  -MS               User Key  (r) = Recorded By, (t) = Taken By, (c) = Cosigned By      Initials Name Provider Type    Dontrell Shaikh, PT Physical Therapist                   Obj/Interventions       Row Name 04/08/24 1433          Range of Motion Comprehensive    General Range of Motion no range of motion deficits identified  -MS       Row Name 04/08/24 1433          Strength Comprehensive (MMT)    General Manual Muscle Testing (MMT) Assessment no strength deficits identified  -MS               User Key  (r) = Recorded By, (t) = Taken By, (c) = Cosigned By      Initials Name Provider Type    Dontrell Shaikh, PT Physical Therapist                   Goals/Plan    No documentation.                  Clinical Impression       Row Name 04/08/24 1433          Pain    Pretreatment Pain Rating 0/10 - no pain  -MS     Posttreatment Pain Rating 0/10 - no pain  -MS       Row Name  04/08/24 1433          Plan of Care Review    Plan of Care Reviewed With patient  -MS     Progress improving  -MS     Outcome Evaluation Pt agreeable to PT eval this date. Pt reports she lives with her , but has been caring and staying with her mom who lives 1 mile down the road. 6 steps and B rails to enter her moms home. States she is independent at baseline. Pt ambulated 400ft with no AD independently. Pt demonstrates no skilled PT needs at this time, PT to sign off.  -MS       Row Name 04/08/24 1433          Therapy Assessment/Plan (PT)    Patient/Family Therapy Goals Statement (PT) to go home  -MS     Rehab Potential (PT) good, to achieve stated therapy goals  -MS     Criteria for Skilled Interventions Met (PT) yes;meets criteria  -MS     Therapy Frequency (PT) 5 times/wk  -MS       Row Name 04/08/24 1433          Vital Signs    Pre SpO2 (%) 97  -MS     O2 Delivery Pre Treatment room air  -MS     O2 Delivery Intra Treatment room air  -MS     Post SpO2 (%) 98  -MS     O2 Delivery Post Treatment room air  -MS     Pre Patient Position Sitting  -MS     Intra Patient Position Standing  -MS     Post Patient Position Sitting  -MS       Row Name 04/08/24 1433          Positioning and Restraints    Pre-Treatment Position sitting in chair/recliner  -MS     Post Treatment Position chair  -MS     In Chair sitting;call light within reach;encouraged to call for assist  -MS               User Key  (r) = Recorded By, (t) = Taken By, (c) = Cosigned By      Initials Name Provider Type    Dontrell Shaikh, PT Physical Therapist                   Outcome Measures       Row Name 04/08/24 1434          How much help from another person do you currently need...    Turning from your back to your side while in flat bed without using bedrails? 4  -MS     Moving from lying on back to sitting on the side of a flat bed without bedrails? 4  -MS     Moving to and from a bed to a chair (including a wheelchair)? 4  -MS     Standing  up from a chair using your arms (e.g., wheelchair, bedside chair)? 4  -MS     Climbing 3-5 steps with a railing? 4  -MS     To walk in hospital room? 4  -MS     AM-PAC 6 Clicks Score (PT) 24  -MS     Highest Level of Mobility Goal 8 --> Walked 250 feet or more  -MS       Row Name 04/08/24 1342          Functional Assessment    Outcome Measure Options AM-PAC 6 Clicks Daily Activity (OT)  -SP               User Key  (r) = Recorded By, (t) = Taken By, (c) = Cosigned By      Initials Name Provider Type    MS Dontrell Chauhan, PT Physical Therapist    SP Namrata Blackmon, OT Occupational Therapist                                 Physical Therapy Education       Title: PT OT SLP Therapies (In Progress)       Topic: Physical Therapy (In Progress)       Point: Mobility training (Done)       Learning Progress Summary             Patient Acceptance, E, VU by MS at 4/8/2024 3146    Comment: importance of continued mobility throughout hospital stay                         Point: Home exercise program (Not Started)       Learner Progress:  Not documented in this visit.              Point: Body mechanics (Not Started)       Learner Progress:  Not documented in this visit.              Point: Precautions (Not Started)       Learner Progress:  Not documented in this visit.                              User Key       Initials Effective Dates Name Provider Type Discipline    MS 08/22/23 -  Dontrell Chauhan, PT Physical Therapist PT                  PT Recommendation and Plan     Plan of Care Reviewed With: patient  Progress: improving  Outcome Evaluation: Pt agreeable to PT eval this date. Pt reports she lives with her , but has been caring and staying with her mom who lives 1 mile down the road. 6 steps and B rails to enter her moms home. States she is independent at baseline. Pt ambulated 400ft with no AD independently. Pt demonstrates no skilled PT needs at this time, PT to sign off.     Time Calculation:   PT Evaluation  Complexity  History, PT Evaluation Complexity: 1-2 personal factors and/or comorbidities  Examination of Body Systems (PT Eval Complexity): 1-2 elements  Clinical Presentation (PT Evaluation Complexity): evolving  Clinical Decision Making (PT Evaluation Complexity): low complexity  Overall Complexity (PT Evaluation Complexity): low complexity     PT Charges       Row Name 04/08/24 1435             Time Calculation    Start Time 1130  -MS      PT Received On 04/08/24  -MS      PT Goal Re-Cert Due Date 04/18/24  -MS         Untimed Charges    PT Eval/Re-eval Minutes 45  -MS         Total Minutes    Untimed Charges Total Minutes 45  -MS       Total Minutes 45  -MS                User Key  (r) = Recorded By, (t) = Taken By, (c) = Cosigned By      Initials Name Provider Type    Dontrell Shaikh, PT Physical Therapist                  Therapy Charges for Today       Code Description Service Date Service Provider Modifiers Qty    69897464193 HC PT EVAL LOW COMPLEXITY 3 4/8/2024 Dontrell Chauhan PT GP 1            PT G-Codes  Outcome Measure Options: AM-PAC 6 Clicks Daily Activity (OT)  AM-PAC 6 Clicks Score (PT): 24  AM-PAC 6 Clicks Score (OT): 24  PT Discharge Summary  Anticipated Discharge Disposition (PT): home    Dontrell Chauhan PT  4/8/2024

## 2024-04-08 NOTE — PLAN OF CARE
Goal Outcome Evaluation:  Plan of Care Reviewed With: patient        Progress: no change  Outcome Evaluation: Pt received in the chair for OT evaluation. She reports that she lives with her  who is disabled in a single story home with a ramp to enter but she has been living with her mother since her father's passing in a single story home with 6 [employer] and providing care for her mother. On assessment pt is A&O x4 and denies pain. She safely engaged in ADLs and functional ambulation household distances >400ft independently without an AD. She is not a candidate for OT services at this time and OT will sign off. Pt was educated regarding evaluation and discharge from therapy and is agreeable. Pt encouraged to request a re-consult should anything change. Pt is safe to return home when medically ready from an OT perspective.      Anticipated Discharge Disposition (OT): home

## 2024-04-08 NOTE — CONSULTS
Patient seen to complete advanced care plan, patient designated her daughter Veronica Hinton as her healthcare surrogate. ACP faxed to HIM, copy placed in the chart.

## 2024-04-09 LAB
ANION GAP SERPL CALCULATED.3IONS-SCNC: 10.4 MMOL/L (ref 5–15)
BUN SERPL-MCNC: 28 MG/DL (ref 8–23)
BUN/CREAT SERPL: 8.2 (ref 7–25)
CALCIUM SPEC-SCNC: 10.3 MG/DL (ref 8.6–10.5)
CHLORIDE SERPL-SCNC: 110 MMOL/L (ref 98–107)
CO2 SERPL-SCNC: 20.6 MMOL/L (ref 22–29)
CREAT SERPL-MCNC: 3.4 MG/DL (ref 0.57–1)
EGFRCR SERPLBLD CKD-EPI 2021: 14.9 ML/MIN/1.73
GLUCOSE SERPL-MCNC: 88 MG/DL (ref 65–99)
POTASSIUM SERPL-SCNC: 4.4 MMOL/L (ref 3.5–5.2)
SODIUM SERPL-SCNC: 141 MMOL/L (ref 136–145)

## 2024-04-09 PROCEDURE — 84166 PROTEIN E-PHORESIS/URINE/CSF: CPT | Performed by: FAMILY MEDICINE

## 2024-04-09 PROCEDURE — 80048 BASIC METABOLIC PNL TOTAL CA: CPT | Performed by: FAMILY MEDICINE

## 2024-04-09 PROCEDURE — 25810000003 SODIUM CHLORIDE 0.9 % SOLUTION: Performed by: INTERNAL MEDICINE

## 2024-04-09 PROCEDURE — 99232 SBSQ HOSP IP/OBS MODERATE 35: CPT | Performed by: NURSE PRACTITIONER

## 2024-04-09 PROCEDURE — 25810000003 SODIUM CHLORIDE 0.9 % SOLUTION

## 2024-04-09 PROCEDURE — 84156 ASSAY OF PROTEIN URINE: CPT | Performed by: FAMILY MEDICINE

## 2024-04-09 PROCEDURE — G0378 HOSPITAL OBSERVATION PER HR: HCPCS

## 2024-04-09 RX ORDER — HEPARIN SODIUM 5000 [USP'U]/ML
5000 INJECTION, SOLUTION INTRAVENOUS; SUBCUTANEOUS EVERY 8 HOURS SCHEDULED
Status: DISCONTINUED | OUTPATIENT
Start: 2024-04-09 | End: 2024-04-14 | Stop reason: HOSPADM

## 2024-04-09 RX ORDER — IRON POLYSACCHARIDE COMPLEX 150 MG
150 CAPSULE ORAL DAILY
Status: DISCONTINUED | OUTPATIENT
Start: 2024-04-09 | End: 2024-04-14 | Stop reason: HOSPADM

## 2024-04-09 RX ADMIN — Medication 10 ML: at 10:24

## 2024-04-09 RX ADMIN — SODIUM CHLORIDE 200 ML/HR: 9 INJECTION, SOLUTION INTRAVENOUS at 15:03

## 2024-04-09 RX ADMIN — SODIUM CHLORIDE 200 ML/HR: 9 INJECTION, SOLUTION INTRAVENOUS at 20:34

## 2024-04-09 RX ADMIN — SODIUM CHLORIDE 200 ML/HR: 9 INJECTION, SOLUTION INTRAVENOUS at 03:28

## 2024-04-09 RX ADMIN — Medication 150 MG: at 12:50

## 2024-04-09 RX ADMIN — SODIUM CHLORIDE 200 ML/HR: 9 INJECTION, SOLUTION INTRAVENOUS at 10:24

## 2024-04-09 NOTE — PLAN OF CARE
Goal Outcome Evaluation:         Interventions implemented as appropriate

## 2024-04-09 NOTE — PROGRESS NOTES
[facility] HOSPITALIST    PROGRESS NOTE    Name:  Jamia Hinton   Age:  58 y.o.  Sex:  female  :  1965  MRN:  [id number]   Visit Number:  [id number]  Admission Date:  2024  Date Of Service:  24  Primary Care Physician:  Jaycee Deal APRN     LOS: 0 days :    Chief Complaint:      Fatigue/poor appetite    Subjective:    Patient seen and examined with daughter at bedside.  She is very tearful and grateful for her care here.  She does want to initiate care with Dr. Gloria upon discharge.  Otherwise feeling improved with better appetite.  Denied any further concerns.    Hospital Course:    58-year-old female with history of sarcoidosis, rheumatoid arthritis, obesity, who presented to the emergency room with complaints of decreased oral intake, early satiety, weakness and nausea for multiple months now.  Recently told about decreased kidney function with primary care provider and all of her medications including CellCept and methotrexate were discontinued.  Recently underwent an outpatient EGD with Dr. Rogers, reportedly had esophagitis.     Workup in the emergency room was showing acute on chronic renal failure with creatinine 3.72 and also with a calcium of 13.9.  Potassium was 4.  A CT scan of the chest without contrast was showing mild mediastinal and hilar adenopathy.  CT scan abdomen pelvis showing right-sided nephrolithiasis without hydronephrosis.  Hospitalist consulted for further medical management.     Patient admitted to the hospitalist service and given IV fluid resuscitation.  Calcium slowly improved.  Dr. Jo followed.  IV hydration continued.    Review of Systems:     All systems were reviewed and negative except as mentioned in subjective, assessment and plan.    Vital Signs:    Temp:  [97.8 °F (36.6 °C)-98 °F (36.7 °C)] 97.9 °F (36.6 °C)  Heart Rate:  [62-77] 62  Resp:  [14-18] 18  BP: (128-158)/(69-85) 158/82    Intake and output:    I/O last 3 completed  "shifts:  In: 2840 [P.O.:840; I.V.:2000]  Out: 4150 [Urine:4150]  I/O this shift:  In: 480 [P.O.:480]  Out: -     Physical Examination:    General Appearance:  Alert and cooperative.  Middle-age female.  Pleasant.   Head:  Atraumatic and normocephalic.   Eyes: Conjunctivae and sclerae normal, no icterus. No pallor.   Throat: No oral lesions, no thrush, oral mucosa moist.   Neck: Supple, trachea midline, no thyromegaly.   Lungs:   Breath sounds heard bilaterally equally.  No wheezing or crackles. No Pleural rub or bronchial breathing.  On room air unlabored.   Heart:  Normal S1 and S2, no murmur, no gallop, no rub. No JVD.   Abdomen:   Normal bowel sounds, no masses, no organomegaly. Soft, nontender, nondistended, no rebound tenderness.   Extremities: Supple, trace bilateral lower extremity edema, no cyanosis, no clubbing.   Skin: No bleeding or rash.   Neurologic: Alert and oriented x 3. No facial asymmetry. Moves all four limbs. No tremors.      Laboratory results:    Results from last 7 days   Lab Units 04/09/24  0639 04/08/24  0529 04/07/24  1346   SODIUM mmol/L 141 138 138   POTASSIUM mmol/L 4.4 4.1 4.1   CHLORIDE mmol/L 110* 103 99   CO2 mmol/L 20.6* 25.6 26.6   BUN mg/dL 28* 25* 29*   CREATININE mg/dL 3.40* 3.75* 3.72*   CALCIUM mg/dL 10.3 12.0* 13.9*   BILIRUBIN mg/dL  --   --  0.4   ALK PHOS U/L  --   --  88   ALT (SGPT) U/L  --   --  12   AST (SGOT) U/L  --   --  21   GLUCOSE mg/dL 88 88 91     Results from last 7 days   Lab Units 04/07/24  1346   WBC 10*3/mm3 10.57   HEMOGLOBIN g/dL 10.2*   HEMATOCRIT % 32.6*   PLATELETS 10*3/mm3 334         Results from last 7 days   Lab Units 04/07/24  1346   HSTROP T ng/L 18*         No results for input(s): \"PHART\", \"HPG8WWV\", \"PO2ART\", \"LOL0GON\", \"BASEEXCESS\" in the last 8760 hours.   I have reviewed the patient's laboratory results.    Radiology results:    US Renal Bilateral    Result Date: 4/7/2024  PROCEDURE: US RENAL BILATERAL-  HISTORY: buddy, hypercalcemia; " N17.9-Acute kidney failure, unspecified; E86.0-Dehydration  FINDINGS: Kidneys show a normal echo pattern. .  Right kidney measures 12.22 cm in length. Left kidney measures 13.2 cm in length.  Size is normal.  No mass or cyst is seen. No obstructive changes are present. A questionable small nonobstructing 3 mm stone is seen of the lower pole right kidney.      Impression: Unremarkable renal ultrasound.   This report was signed and finalized on 4/7/2024 7:49 PM by Frederick Jimenez MD.      CT Abdomen Pelvis Without Contrast    Result Date: 4/7/2024  PROCEDURE: CT ABDOMEN PELVIS WO CONTRAST-  HISTORY: Nausea, weakness s/p endoscopy  COMPARISON: March 22, 2024..  PROCEDURE: Axial images were obtained from the lung bases to the pubic symphysis by computed tomography. This study was performed with techniques to keep radiation doses as low as reasonably achievable, (ALARA). Individualized dose reduction techniques using automated exposure control or adjustment of mA and/or kV according to the patient size were employed.  FINDINGS:  ABDOMEN: The lung bases are clear. The heart is proper size. The limited non-contrast images of the liver demonstrate no focal abnormality but the liver is enlarged at 18.5 cm, similar to prior. There are metallic surgical clips in the right upper quadrant consistent with previous cholecystectomy. The spleen mildly enlarged at 13 cm, stable from prior. No focal mass identified. Fullness of the left adrenal gland is stable. Right adrenal gland is normal. There are metallic surgical clips in the right upper quadrant consistent with previous cholecystectomy. The aorta is normal in caliber. There is no significant free fluid or adenopathy. There is no left nephrolithiasis. Small nonobstructing stones on the right are stable. There is no hydronephrosis. No bony destructive lesion seen. Small periaortic lymph nodes are stable from prior.  PELVIS: The GI tract demonstrate no obstruction. The appendix is  identified and appears normal. The urinary bladder is almost completely collapsed. Uterus is midline. There is no fluid or adenopathy. No suspicious inguinal adenopathy seen. No free air identified.      Impression: Right nephrolithiasis without hydronephrosis.  Hepatosplenomegaly similar to prior study.   CTDI: 7.44 mGy DLP:1288.51 mGy.cm  This report was signed and finalized on 4/7/2024 3:42 PM by Nova Koch MD.      CT Chest Without Contrast Diagnostic    Result Date: 4/7/2024  PROCEDURE: CT CHEST WO CONTRAST DIAGNOSTIC-  HISTORY: Weakness, hx of sarcoidosis, s/p endoscopy  COMPARISON: No previous chest CT for comparison.  PROCEDURE: Axial images were obtained from the lung apex to the mid abdomen by computed tomography. This study was performed with techniques to keep radiation doses as low as reasonably achievable, (ALARA). Individualized dose reduction techniques using automated exposure control or adjustment of mA and/or kV according to the patient size were employed.  FINDINGS:  CHEST: There is no suspicious axillary adenopathy. Vascular calcifications noted. There is mildly prominent right pretracheal lymph node in question fullness of the right hilum. Chest CT with contrast would be helpful for further evaluation in this patient with known sarcoidosis. The heart is proper size. There is no pericardial or pleural effusion.No suspicious infiltrate or nodule identified. Limited images of the upper abdomen demonstrate cholecystectomy clips. Spleen is incompletely visualized but appears at least mildly enlarged to 13 cm. No bony destructive lesion seen. No mediastinal air identified. No significant wall thickening of the esophagus identified. No pneumothorax seen.      Impression: No acute infiltrate identified.  Question mild mediastinal and hilar adenopathy, consider chest CT with intravenous contrast.    CTDI: 7.44 mGy DLP:1288.51 mGy.cm  This report was signed and finalized on 4/7/2024 3:36 PM by Nova  MD August.      XR Chest 1 View    Result Date: 4/7/2024  PROCEDURE: XR CHEST 1 VW-  HISTORY: Gen weakness, worsening since yesterday with shortness of breath and dry heaves.  COMPARISON: None.  FINDINGS: The heart is normal in size. The lungs are clear. The mediastinum is unremarkable. There is no pneumothorax.  There are no acute osseous abnormalities. Apical lordotic positioning noted.      Impression: No acute cardiopulmonary process.     This report was signed and finalized on 4/7/2024 2:30 PM by Noav Koch MD.     I have reviewed the patient's radiology reports.    Medication Review:     I have reviewed the patient's active and prn medications.     Problem List:      Acute-on-chronic kidney injury      Assessment:    Acute kidney injury, likely chronic kidney disease, baseline unknown, POA  Hypercalcemia, POA  History of sarcoidosis, POA  Rheumatoid arthritis  Obesity    Plan:    Kidney failure/hypercalcemia:  -Definitely appears to have acute kidney injury when compared to labs obtained just 2 weeks ago in the emergency room with creatinine at that time 2.2  -Most recent chemistry panel I can find otherwise in epic currently was from 2020 and her creatinine was 0.9 at that time.    She does have symptomatology of hypercalcemia.  Calcium is improving.  Will continue with IV fluid resuscitation per nephrology recommendations.    - Definitely concerned that sarcoidosis is led to hypercalcemia.  Consider steroid treatment.     Sarcoidosis:  Patient previously under the care of Dr. Da Silva, currently seeing Dr. Crespo.    -Requesting pulmonology in [place].  Will order upon discharge with Dr. Elkins.   - Patient's primary care provider did stop her medications including CellCept and methotrexate about a month ago due to her kidney failure.  Uncontrolled, worsening sarcoidosis could be leading to the hypercalcemia.     DVT Prophylaxis: Heparin  Code Status: Full  Diet: Renal  Discharge Plan: Likely home  in 1 to 2 days.    I have reviewed the copied text and it is accurate as of 4/9/2024       Daniela Ji, APRN  04/09/24  13:54 EDT    Dictated utilizing Dragon dictation.

## 2024-04-09 NOTE — PROGRESS NOTES
[facility] Progress Note  [facility]. [place]        Patient Name: Jamia Hinton  : 1965  MRN: [id number]   LOS: 0 days    Patient Care Team:  Jaycee Deal APRN as PCP - General (Family Medicine)  Bassam Rogers MD as Consulting Physician (General Surgery)    Chief Complaint:    Chief Complaint   Patient presents with    Weakness - Generalized     Pt reports worsening weakness since yesterday with dry heaves. Pt reports feeling SOA. Denies CP      Primary Care Physician:  Jaycee Deal APRN  Date of admission: 2024    Subjective     Interval History:   Follow-up acute kidney injury and hypercalcemia  Events noted from last 24 hours.    I reviewed the chart and other providers notes, labs and procedures done since my last note.  She is doing well this morning. She is sitting up in the chair, daughter at bedside. She denies shortness of breath, chest pain, abdominal pain, dysuria or swelling.     Review of Systems:   As noted above.    Objective     Vitals:   Temp:  [97.8 °F (36.6 °C)-98 °F (36.7 °C)] 97.9 °F (36.6 °C)  Heart Rate:  [62-77] 62  Resp:  [14-18] 18  BP: (128-158)/(69-85) 158/82    Intake/Output Summary (Last 24 hours) at 2024 1135  Last data filed at 2024 0728  Gross per 24 hour   Intake 960 ml   Output 2150 ml   Net -1190 ml       Physical Exam:    General Appearance: alert, oriented x 3, no acute distress   Skin: warm and dry  HEENT: oral mucosa normal, nonicteric sclera  Neck: supple, no JVD  Lungs: Diminished bases bilaterally  Heart: RRR, normal S1 and S2  Abdomen: obese, soft, nontender, non distended and positive bowel sounds.  : no palpable bladder  Extremities: Trace edema, no cyanosis or clubbing  Neuro: normal speech and mental status     Scheduled Meds:     Current Facility-Administered Medications   Medication Dose Route Frequency Provider Last Rate Last Admin    acetaminophen (TYLENOL) tablet 650 mg  650 mg Oral Q4H PRN  Jackie Kay DO        Or    acetaminophen (TYLENOL) 160 MG/5ML oral solution 650 mg  650 mg Oral Q4H PRN Jackie Kay DO        Or    acetaminophen (TYLENOL) suppository 650 mg  650 mg Rectal Q4H PRN Jackie Kay DO        ALPRAZolam (XANAX) tablet 0.5 mg  0.5 mg Oral Q8H PRN Jackie Kay DO        aluminum-magnesium hydroxide-simethicone (MAALOX MAX) 400-400-40 MG/5ML suspension 15 mL  15 mL Oral Q6H PRN Jackie Kay DO        sennosides-docusate (PERICOLACE) 8.6-50 MG per tablet 2 tablet  2 tablet Oral BID Jackie Kay DO        And    polyethylene glycol (MIRALAX) packet 17 g  17 g Oral Daily PRN Jackie Kay DO        And    bisacodyl (DULCOLAX) EC tablet 5 mg  5 mg Oral Daily PRN Jackie Kay DO        And    bisacodyl (DULCOLAX) suppository 10 mg  10 mg Rectal Daily PRN Jackie Kay DO        melatonin tablet 5 mg  5 mg Oral Nightly PRN Jackie Kay DO        nitroglycerin (NITROSTAT) SL tablet 0.4 mg  0.4 mg Sublingual Q5 Min PRN Jackie Kay DO        ondansetron ODT (ZOFRAN-ODT) disintegrating tablet 4 mg  4 mg Oral Q6H PRN Jackie Kay DO        Or    ondansetron (ZOFRAN) injection 4 mg  4 mg Intravenous Q6H PRN Jackie Kay DO   4 mg at 04/08/24 1935    sodium chloride 0.9 % flush 10 mL  10 mL Intravenous PRN Jackie Kay DO        sodium chloride 0.9 % flush 10 mL  10 mL Intravenous Q12H Jackie Kay, DO   10 mL at 04/09/24 1024    sodium chloride 0.9 % flush 10 mL  10 mL Intravenous PRN Jackie Kay,         sodium chloride 0.9 % infusion 40 mL  40 mL Intravenous PRN Jackie Kay DO        sodium chloride 0.9 % infusion  200 mL/hr Intravenous Continuous Ezio Corrales APRN 200 mL/hr at 04/09/24 1024 200 mL/hr at 04/09/24 1024       senna-docusate sodium, 2 tablet, Oral, BID  sodium chloride, 10 mL, Intravenous,  Q12H        IV Meds:   sodium chloride, 200 mL/hr, Last Rate: 200 mL/hr (04/09/24 1024)        Results Reviewed:   I have personally reviewed the results from the time of this admission to 4/9/2024 11:35 EDT     Results from last 7 days   Lab Units 04/09/24  0639 04/08/24  0529 04/07/24  1346   SODIUM mmol/L 141 138 138   POTASSIUM mmol/L 4.4 4.1 4.1   CHLORIDE mmol/L 110* 103 99   CO2 mmol/L 20.6* 25.6 26.6   BUN mg/dL 28* 25* 29*   CREATININE mg/dL 3.40* 3.75* 3.72*   CALCIUM mg/dL 10.3 12.0* 13.9*   BILIRUBIN mg/dL  --   --  0.4   ALK PHOS U/L  --   --  88   ALT (SGPT) U/L  --   --  12   AST (SGOT) U/L  --   --  21   GLUCOSE mg/dL 88 88 91       Estimated Creatinine Clearance: 22.6 mL/min (A) (by C-G formula based on SCr of 3.4 mg/dL (H)).    Results from last 7 days   Lab Units 04/07/24  1346   MAGNESIUM mg/dL 2.4       Results from last 7 days   Lab Units 04/08/24  0529   URIC ACID mg/dL 10.8*       Results from last 7 days   Lab Units 04/07/24  1346   WBC 10*3/mm3 10.57   HEMOGLOBIN g/dL 10.2*   PLATELETS 10*3/mm3 334      Latest Reference Range & Units 04/08/24 05:29   Iron 37 - 145 mcg/dL 27 (L)   Iron Saturation (TSAT) 20 - 50 % 11 (L)   Transferrin 200 - 360 mg/dL 163 (L)   TIBC 298 - 536 mcg/dL 243 (L)   (L): Data is abnormally low        Brief Urine Lab Results  (Last result in the past 365 days)        Color   Clarity   Blood   Leuk Est   Nitrite   Protein   CREAT   Urine HCG        04/08/24 0707             29.9                 Protein/Creatinine Ratio, Urine   Date Value Ref Range Status   04/07/2024 371.0 (H) 0.0 - 200.0 mg/G Crea Final       Imaging Results (Last 24 Hours)       ** No results found for the last 24 hours. **                Assessment / Plan     ASSESSMENT:  Acute kidney injury: With possible h/o CKD, baseline unknown. Last month patient noted with worsened renal function, sCr 2.29 at that time. Previous to that, labs in chart from 2020 demonstrate normal renal function. sCr 3.7 upon  presentation with hypercalcemia; other e'lytes normal. Component of dehydration, progressively poor oral intake. No obstruction on imaging. UA bland. Further concern for nephrocalcinosis s/t sarcoidosis with hypercalcemia and nephrolithiasis.  Hypercalcemia: Routine workup has been ordered including PTH, PTH related peptide, vitamin D levels, 24-hour urine protein electrophoresis, as well as ionized calcium. PTH adequately suppressed; Vitamin D was low normal. Calcium since stabilized secondary to calcitonin.   Sarcoidosis: Known history, recently cellcept and methotrexate stopped s/t worsened renal function. No known previous renal manifestation.  It is not very clear if she has been following up with the pulmonary, will need pulmonary evaluation.  Consider steroids.  Rheumatoid arthritis: She was on methotrexate and CellCept, currently she has been off for about a month.  May need close follow-up with rheumatology.  Anemia: Appears secondary to low iron stores. Will go ahead and start her on oral iron supplementation        PLAN:  Continue IV fluid rehydration with NS at 200mL/hr. Hypercalcemia routine workup noted. Strict intake and output. Avoid nephrotoxic medications.  If renal function improves will consider Aredia, once her volume is replete will consider diuretics.  Her renal function appears to be stabilizing, some improvement noted this morning; she is still in a negative fluid balance will hold on diuresing   She will need routine cancer screening due to hypercalcemia, will defer it to the primary care.  Details were discussed with the patient as well as family in the room.    Details were also discussed with the hospitalist service and or other providers as needed.   Continue with rest of the current treatment plan, and monitor with surveillance labs.  Further recommendations will depend on clinical course of the patient during the current hospitalization.   I have reviewed the copied text to this note,  it was edited and the changes made as needed.  It is accurate to the point, when the note was signed today.     Thank you for involving us in the care of Jamia Hinton.  Please feel free to call with any questions.    Panfilo Jo MD, RUDY  04/09/24  11:35 EDT    [facility]  [phone]      Part of this note may be an electronic transcription/translation of spoken language to printed text using the Dragon Dictation System.

## 2024-04-10 LAB
ALBUMIN SERPL-MCNC: 3.1 G/DL (ref 3.5–5.2)
ALBUMIN SERPL-MCNC: 3.7 G/DL (ref 3.5–5.2)
ANION GAP SERPL CALCULATED.3IONS-SCNC: 12.4 MMOL/L (ref 5–15)
ANION GAP SERPL CALCULATED.3IONS-SCNC: 9.6 MMOL/L (ref 5–15)
BUN SERPL-MCNC: 27 MG/DL (ref 8–23)
BUN SERPL-MCNC: 29 MG/DL (ref 8–23)
BUN/CREAT SERPL: 8.5 (ref 7–25)
BUN/CREAT SERPL: 9.5 (ref 7–25)
CALCIUM SPEC-SCNC: 10.5 MG/DL (ref 8.6–10.5)
CALCIUM SPEC-SCNC: 11.2 MG/DL (ref 8.6–10.5)
CHLORIDE SERPL-SCNC: 107 MMOL/L (ref 98–107)
CHLORIDE SERPL-SCNC: 111 MMOL/L (ref 98–107)
CO2 SERPL-SCNC: 18.4 MMOL/L (ref 22–29)
CO2 SERPL-SCNC: 18.6 MMOL/L (ref 22–29)
CREAT SERPL-MCNC: 3.04 MG/DL (ref 0.57–1)
CREAT SERPL-MCNC: 3.17 MG/DL (ref 0.57–1)
EGFRCR SERPLBLD CKD-EPI 2021: 16.2 ML/MIN/1.73
EGFRCR SERPLBLD CKD-EPI 2021: 17 ML/MIN/1.73
GLUCOSE SERPL-MCNC: 153 MG/DL (ref 65–99)
GLUCOSE SERPL-MCNC: 87 MG/DL (ref 65–99)
PHOSPHATE SERPL-MCNC: 2.8 MG/DL (ref 2.5–4.5)
PHOSPHATE SERPL-MCNC: 3.6 MG/DL (ref 2.5–4.5)
POTASSIUM SERPL-SCNC: 4.2 MMOL/L (ref 3.5–5.2)
POTASSIUM SERPL-SCNC: 4.3 MMOL/L (ref 3.5–5.2)
SODIUM SERPL-SCNC: 138 MMOL/L (ref 136–145)
SODIUM SERPL-SCNC: 139 MMOL/L (ref 136–145)

## 2024-04-10 PROCEDURE — 25010000002 FUROSEMIDE PER 20 MG: Performed by: INTERNAL MEDICINE

## 2024-04-10 PROCEDURE — 99232 SBSQ HOSP IP/OBS MODERATE 35: CPT | Performed by: NURSE PRACTITIONER

## 2024-04-10 PROCEDURE — 25810000003 SODIUM CHLORIDE 0.9 % SOLUTION

## 2024-04-10 PROCEDURE — 80069 RENAL FUNCTION PANEL: CPT | Performed by: NURSE PRACTITIONER

## 2024-04-10 PROCEDURE — 80069 RENAL FUNCTION PANEL: CPT | Performed by: INTERNAL MEDICINE

## 2024-04-10 PROCEDURE — 25810000003 SODIUM CHLORIDE 0.9 % SOLUTION: Performed by: INTERNAL MEDICINE

## 2024-04-10 PROCEDURE — 25010000002 HEPARIN (PORCINE) PER 1000 UNITS: Performed by: NURSE PRACTITIONER

## 2024-04-10 PROCEDURE — 25010000002 METHYLPREDNISOLONE PER 125 MG: Performed by: INTERNAL MEDICINE

## 2024-04-10 RX ORDER — FUROSEMIDE 10 MG/ML
40 INJECTION INTRAMUSCULAR; INTRAVENOUS ONCE
Status: COMPLETED | OUTPATIENT
Start: 2024-04-10 | End: 2024-04-10

## 2024-04-10 RX ORDER — METHYLPREDNISOLONE SODIUM SUCCINATE 125 MG/2ML
80 INJECTION, POWDER, LYOPHILIZED, FOR SOLUTION INTRAMUSCULAR; INTRAVENOUS DAILY
Status: COMPLETED | OUTPATIENT
Start: 2024-04-10 | End: 2024-04-11

## 2024-04-10 RX ORDER — FUROSEMIDE 10 MG/ML
80 INJECTION INTRAMUSCULAR; INTRAVENOUS ONCE
Status: COMPLETED | OUTPATIENT
Start: 2024-04-10 | End: 2024-04-10

## 2024-04-10 RX ADMIN — Medication 150 MG: at 08:13

## 2024-04-10 RX ADMIN — FUROSEMIDE 40 MG: 10 INJECTION, SOLUTION INTRAMUSCULAR; INTRAVENOUS at 10:09

## 2024-04-10 RX ADMIN — SODIUM CHLORIDE 200 ML/HR: 9 INJECTION, SOLUTION INTRAVENOUS at 01:59

## 2024-04-10 RX ADMIN — ACETAMINOPHEN 650 MG: 325 TABLET, FILM COATED ORAL at 02:04

## 2024-04-10 RX ADMIN — Medication 10 ML: at 08:13

## 2024-04-10 RX ADMIN — HEPARIN SODIUM 5000 UNITS: 5000 INJECTION INTRAVENOUS; SUBCUTANEOUS at 14:00

## 2024-04-10 RX ADMIN — SODIUM CHLORIDE 200 ML/HR: 9 INJECTION, SOLUTION INTRAVENOUS at 08:12

## 2024-04-10 RX ADMIN — FUROSEMIDE 80 MG: 10 INJECTION, SOLUTION INTRAMUSCULAR; INTRAVENOUS at 16:43

## 2024-04-10 RX ADMIN — METHYLPREDNISOLONE SODIUM SUCCINATE 80 MG: 125 INJECTION, POWDER, FOR SOLUTION INTRAMUSCULAR; INTRAVENOUS at 08:13

## 2024-04-10 RX ADMIN — Medication 10 ML: at 21:01

## 2024-04-10 NOTE — PLAN OF CARE
Goal Outcome Evaluation:  Plan of Care Reviewed With: patient        Progress: improving     VSS, pt maintaining o2 >90% on RA, and pt SB and NSR on telemetry throughout the shift. Medications and fluids administered per MAR/orders. I & O monitored during shift per orders. Discharge is pending for 1-2 days.        Problem: Adult Inpatient Plan of Care  Goal: Plan of Care Review  Outcome: Ongoing, Progressing  Flowsheets (Taken 4/10/2024 1650)  Progress: improving  Plan of Care Reviewed With: patient  Goal: Patient-Specific Goal (Individualized)  Outcome: Ongoing, Progressing  Goal: Absence of Hospital-Acquired Illness or Injury  Outcome: Ongoing, Progressing  Intervention: Identify and Manage Fall Risk  Recent Flowsheet Documentation  Taken 4/10/2024 1600 by Katherine Deal RN  Safety Promotion/Fall Prevention:   activity supervised   assistive device/personal items within reach   clutter free environment maintained   toileting scheduled   safety round/check completed   room organization consistent   nonskid shoes/slippers when out of bed   lighting adjusted  Taken 4/10/2024 1426 by Katherine Deal, RN  Safety Promotion/Fall Prevention:   activity supervised   assistive device/personal items within reach   clutter free environment maintained   toileting scheduled   safety round/check completed   room organization consistent   lighting adjusted   nonskid shoes/slippers when out of bed  Taken 4/10/2024 1234 by Katherine Deal, DIMITRI  Safety Promotion/Fall Prevention:   assistive device/personal items within reach   activity supervised   clutter free environment maintained   toileting scheduled   safety round/check completed   room organization consistent   fall prevention program maintained   lighting adjusted   nonskid shoes/slippers when out of bed  Taken 4/10/2024 1012 by Katherine Deal, DIMITRI  Safety Promotion/Fall Prevention:   activity supervised   assistive device/personal items within reach   clutter free environment  maintained   toileting scheduled   safety round/check completed   room organization consistent   fall prevention program maintained   lighting adjusted   nonskid shoes/slippers when out of bed  Taken 4/10/2024 0817 by Katherine Deal, RN  Safety Promotion/Fall Prevention:   activity supervised   assistive device/personal items within reach   clutter free environment maintained   toileting scheduled   safety round/check completed   room organization consistent   lighting adjusted   nonskid shoes/slippers when out of bed  Intervention: Prevent Skin Injury  Recent Flowsheet Documentation  Taken 4/10/2024 1600 by Katherine Deal RN  Body Position:   position changed independently   sitting up in bed  Taken 4/10/2024 1012 by Katherine Deal RN  Body Position:   position changed independently   weight shifting  Skin Protection:   adhesive use limited   skin-to-device areas padded   skin-to-skin areas padded   tubing/devices free from skin contact  Taken 4/10/2024 0817 by Katherine Deal, RN  Skin Protection:   adhesive use limited   tubing/devices free from skin contact   skin-to-skin areas padded   skin-to-device areas padded  Intervention: Prevent and Manage VTE (Venous Thromboembolism) Risk  Recent Flowsheet Documentation  Taken 4/10/2024 1600 by Katherine Deal, RN  Activity Management: activity encouraged  Taken 4/10/2024 1426 by Katherine Deal, RN  Activity Management:   up ad magalie   activity encouraged   ambulated to bathroom   ambulated in room  Taken 4/10/2024 1234 by Katherine Deal, RN  Activity Management:   up ad magalie   activity encouraged   ambulated in room   ambulated to bathroom  Taken 4/10/2024 1012 by Katherine Deal, RN  Activity Management:   activity encouraged   ambulated in room   ambulated to bathroom   ambulated outside room  Taken 4/10/2024 0817 by Katherine Deal, RN  Activity Management:   up in chair   activity encouraged   ambulated in room   ambulated to bathroom  Range of Motion: active ROM (range of  motion) encouraged  Intervention: Prevent Infection  Recent Flowsheet Documentation  Taken 4/10/2024 1600 by Katherine Deal RN  Infection Prevention:   single patient room provided   rest/sleep promoted   hand hygiene promoted   equipment surfaces disinfected   environmental surveillance performed  Taken 4/10/2024 1426 by Katherine Deal RN  Infection Prevention:   single patient room provided   rest/sleep promoted   hand hygiene promoted   equipment surfaces disinfected   environmental surveillance performed  Taken 4/10/2024 1234 by Katherine Deal RN  Infection Prevention:   single patient room provided   rest/sleep promoted   hand hygiene promoted   equipment surfaces disinfected   environmental surveillance performed  Taken 4/10/2024 1012 by Katherine Deal RN  Infection Prevention:   single patient room provided   rest/sleep promoted   hand hygiene promoted   equipment surfaces disinfected   environmental surveillance performed  Taken 4/10/2024 0817 by Katherine Deal RN  Infection Prevention:   single patient room provided   rest/sleep promoted   hand hygiene promoted   equipment surfaces disinfected   environmental surveillance performed  Goal: Optimal Comfort and Wellbeing  Outcome: Ongoing, Progressing  Intervention: Provide Person-Centered Care  Recent Flowsheet Documentation  Taken 4/10/2024 0817 by Katherine Deal RN  Trust Relationship/Rapport:   care explained   choices provided   emotional support provided   questions answered   empathic listening provided   questions encouraged   reassurance provided   thoughts/feelings acknowledged  Goal: Readiness for Transition of Care  Outcome: Ongoing, Progressing     Problem: Fluid and Electrolyte Imbalance (Acute Kidney Injury/Impairment)  Goal: Fluid and Electrolyte Balance  Outcome: Ongoing, Progressing     Problem: Oral Intake Inadequate (Acute Kidney Injury/Impairment)  Goal: Optimal Nutrition Intake  Outcome: Ongoing, Progressing     Problem: Renal Function  Impairment (Acute Kidney Injury/Impairment)  Goal: Effective Renal Function  Outcome: Ongoing, Progressing  Intervention: Monitor and Support Renal Function  Recent Flowsheet Documentation  Taken 4/10/2024 1600 by Katherine Deal RN  Medication Review/Management: medications reviewed  Taken 4/10/2024 1426 by Katherine Deal RN  Medication Review/Management: medications reviewed  Taken 4/10/2024 1234 by Katherine Deal RN  Medication Review/Management: medications reviewed  Taken 4/10/2024 1012 by Katherine Deal RN  Medication Review/Management: medications reviewed  Taken 4/10/2024 0817 by Katherine Deal RN  Medication Review/Management: medications reviewed     Problem: Electrolyte Imbalance  Goal: Electrolyte Balance  Outcome: Ongoing, Progressing     Problem: Depression  Goal: Improved Mood  Outcome: Ongoing, Progressing  Intervention: Monitor and Manage Depressive Symptoms  Recent Flowsheet Documentation  Taken 4/10/2024 0817 by Katherine Deal RN  Supportive Measures: active listening utilized  Family/Support System Care:   self-care encouraged   support provided     Problem: Fall Injury Risk  Goal: Absence of Fall and Fall-Related Injury  Outcome: Ongoing, Progressing  Intervention: Identify and Manage Contributors  Recent Flowsheet Documentation  Taken 4/10/2024 1600 by Katherine Deal RN  Medication Review/Management: medications reviewed  Self-Care Promotion:   BADL personal routines maintained   BADL personal objects within reach   independence encouraged   meal set-up provided  Taken 4/10/2024 1426 by Katherine Deal RN  Medication Review/Management: medications reviewed  Taken 4/10/2024 1234 by Katherine Deal RN  Medication Review/Management: medications reviewed  Taken 4/10/2024 1012 by Katherine Deal RN  Medication Review/Management: medications reviewed  Taken 4/10/2024 0817 by Katherine Deal RN  Medication Review/Management: medications reviewed  Self-Care Promotion:   independence encouraged   BADL  personal objects within reach   BADL personal routines maintained   meal set-up provided  Intervention: Promote Injury-Free Environment  Recent Flowsheet Documentation  Taken 4/10/2024 1600 by Katherine Deal RN  Safety Promotion/Fall Prevention:   activity supervised   assistive device/personal items within reach   clutter free environment maintained   toileting scheduled   safety round/check completed   room organization consistent   nonskid shoes/slippers when out of bed   lighting adjusted  Taken 4/10/2024 1426 by Katherine Deal RN  Safety Promotion/Fall Prevention:   activity supervised   assistive device/personal items within reach   clutter free environment maintained   toileting scheduled   safety round/check completed   room organization consistent   lighting adjusted   nonskid shoes/slippers when out of bed  Taken 4/10/2024 1234 by Katherine Dael RN  Safety Promotion/Fall Prevention:   assistive device/personal items within reach   activity supervised   clutter free environment maintained   toileting scheduled   safety round/check completed   room organization consistent   fall prevention program maintained   lighting adjusted   nonskid shoes/slippers when out of bed  Taken 4/10/2024 1012 by Katherine Deal, DIMITRI  Safety Promotion/Fall Prevention:   activity supervised   assistive device/personal items within reach   clutter free environment maintained   toileting scheduled   safety round/check completed   room organization consistent   fall prevention program maintained   lighting adjusted   nonskid shoes/slippers when out of bed  Taken 4/10/2024 0817 by Katherine Deal, RN  Safety Promotion/Fall Prevention:   activity supervised   assistive device/personal items within reach   clutter free environment maintained   toileting scheduled   safety round/check completed   room organization consistent   lighting adjusted   nonskid shoes/slippers when out of bed

## 2024-04-10 NOTE — PAYOR COMM NOTE
"To:  [employer]  From: Brigida Beckwith RN  Phone: [phone]  Fax: [phone]  NPI: [id number]  TIN: [id number]  Member ID: [id number]   MRN: [id number]    Jamia Hinton (58 y.o. Female)       Date of Birth   1965    Social Security Number       Address   [address] [place], [place] [address]    Home Phone   [phone]    MRN   [id number]       Spiritism   Islam    Marital Status                               Admission Date   24    Admission Type   Emergency    Admitting Provider   Jackie Kay DO    Attending Provider   Jackie Kay DO    Department, Room/Bed   [facility] TELEMETRY        Discharge Date       Discharge Disposition       Discharge Destination                                 Attending Provider: Jackie Kay DO    Allergies: Citalopram Hydrobromide    Isolation: None   Infection: None   Code Status: CPR    Ht: 167.6 cm (66\")   Wt: 117 kg (258 lb 9.6 oz)    Admission Cmt: None   Principal Problem: Acute-on-chronic kidney injury [N17.9,N18.9]                   Active Insurance as of 2024       Primary Coverage       Payor Plan Insurance Group Employer/Plan Group    [place] [id number]       Payor Plan Address Payor Plan Phone Number Payor Plan Fax Number Effective Dates    [employer] [address] [address] [phone]  2020 - None Entered    [facility] [id number]         Subscriber Name Subscriber Birth Date Member ID       JAMIA HINTON 1965 [id number]                     Emergency Contacts        (Rel.) Home Phone Work Phone Mobile Phone    Veronica Hinton (Daughter) [phone] -- [phone]                 History & Physical        Jackie Kay DO at 24 [facility]4            [facility]   HISTORY AND PHYSICAL      Name:  Jamia Hinton   Age:  58 y.o.  Sex:  female  :  1965  MRN:  [id number]   Visit Number:  " [id number]  Admission Date:  4/7/2024  Date Of Service:  04/07/24  Primary Care Physician:  Jaycee Deal APRN    Chief Complaint:     Weakness, fatigue, nausea, constipation    History Of Presenting Illness:      The patient is a 58-year-old female with a history of sarcoidosis, obesity, arthritis, who presented from home with multiple complaints.  History obtained from ER record, the patient's daughter, the patient at bedside.  She states that she has had diagnosis of sarcoidosis for about 3 years.  She recently last fall had switched pulmonologist.  She had noted essentially worsening symptoms of decreased oral intake, early satiety, weakness, nausea which have all been progressing for the last few months.  She followed up with her primary care provider about 6 weeks ago, was told her kidney function had decreased.  She was actually referred to nephrology at that time.  She was also instructed to stop her medications which included CellCept, methotrexate, multivitamins, vitamin D, and folic acid.  She actually come to the emergency room with complaints of nausea and vomiting about 2 weeks ago and was given fluids and outpatient follow-up.  She also underwent a recent EGD with Dr. Rogers last week where she was told she may have inflammation/esophagitis.  She denies any fevers, chills, trauma.  Denies any prior history of cancer.  She is unaware of any prior history of kidney disease.  Denies use of NSAIDs.    In the ER, she was overall hemodynamically stable.  Her labs were notable for creatinine 3.72 and a calcium of 13.9.  Her potassium was 4.1.  She had TSH of 2.7 white count 10,000 hemoglobin 10.2 and platelets of 334.  Chest x-ray was unremarkable.  A CT scan of the chest without contrast was showing mild mediastinal and hilar adenopathy, consider chest CT with IV contrast.  CT abdomen pelvis without contrast with right-sided nephrolithiasis and no hydronephrosis.  There was similar HSM compared to prior  scans.  The patient was given a normal saline bolus.  Secondary to acute kidney injury and hypercalcemia we were asked to admit.    Review Of Systems:    All systems were reviewed and negative except as mentioned in history of presenting illness, assessment and plan.    Past Medical History: Patient  has a past medical history of Arthritis, Hernia, abdominal, and Sarcoidosis.    Past Surgical History: Patient  has a past surgical history that includes  section; Cholecystectomy; ventral/incisional hernia repair (N/A, 2017); Cataract extraction w/ intraocular lens implant (Right, 2023); and Cataract extraction w/ intraocular lens implant (Left, 2024).    Social History: Patient  reports that she has never smoked. She has never used smokeless tobacco. She reports that she does not drink alcohol and does not use drugs.    Family History:  Patient's family history has been reviewed and found to be noncontributory.     Allergies:      Citalopram hydrobromide    Home Medications:    Prior to Admission Medications       Prescriptions Last Dose Informant Patient Reported? Taking?    citalopram (CeleXA) 20 MG tablet   Yes No    Take 1 tablet by mouth Daily.    Patient not taking:  Reported on 3/27/2024    folic acid (FOLVITE) 1 MG tablet   Yes No    Take 1 tablet by mouth Daily.    Patient not taking:  Reported on 3/27/2024    methotrexate 2.5 MG tablet   Yes No    Take 1 tablet by mouth 1 (One) Time Per Week. 6 pills    Patient not taking:  Reported on 3/27/2024    mycophenolate (CELLCEPT) 500 MG tablet   Yes No    Take 2 tablets by mouth 2 (Two) Times a Day.    Patient not taking:  Reported on 3/27/2024    omeprazole (priLOSEC) 40 MG capsule   Yes No    Take 1 capsule by mouth Daily.    Patient not taking:  Reported on 3/27/2024    ondansetron ODT (ZOFRAN-ODT) 4 MG disintegrating tablet   No No    Place 1 tablet on the tongue Every 6 (Six) Hours As Needed for Nausea or Vomiting.    oxybutynin XL  "(DITROPAN-XL) 5 MG 24 hr tablet   Yes No    Take 1 tablet by mouth Daily.    Patient not taking:  Reported on 3/27/2024    vitamin B-12 (CYANOCOBALAMIN) 1000 MCG tablet   Yes No    Take 1 tablet by mouth Daily.    Patient not taking:  Reported on 3/27/2024    vitamin D3 125 MCG (5000 UT) capsule capsule   Yes No    Take 1 capsule by mouth Daily.    Patient not taking:  Reported on 3/27/2024          ED Medications:    Medications   sodium chloride 0.9 % flush 10 mL (has no administration in time range)   sodium chloride 0.9 % bolus 1,000 mL (0 mL Intravenous Stopped 4/7/24 1513)   aluminum-magnesium hydroxide-simethicone (MAALOX MAX) 400-400-40 MG/5ML 15 mL suspension ( Oral Given 4/7/24 1352)     Vital Signs:  Temp:  [98 °F (36.7 °C)] 98 °F (36.7 °C)  Heart Rate:  [66-74] 66  Resp:  [18] 18  BP: (147-163)/() 163/95        04/07/24  1315   Weight: 109 kg (241 lb)     Body mass index is 38.9 kg/m².    Physical Exam:     Most recent vital Signs: /95   Pulse 66   Temp 98 °F (36.7 °C)   Resp 18   Ht 167.6 cm (66\")   Wt 109 kg (241 lb)   LMP  (LMP Unknown) Comment: posyt menopausal  SpO2 99%   BMI 38.90 kg/m²     Physical Exam  Constitutional:       General: She is not in acute distress.     Appearance: She is obese. She is not toxic-appearing.   HENT:      Mouth/Throat:      Mouth: Mucous membranes are dry.      Pharynx: Oropharynx is clear.   Eyes:      Extraocular Movements: Extraocular movements intact.      Pupils: Pupils are equal, round, and reactive to light.   Cardiovascular:      Rate and Rhythm: Normal rate and regular rhythm.      Pulses: Normal pulses.      Heart sounds: No murmur heard.     No gallop.   Pulmonary:      Effort: Pulmonary effort is normal. No respiratory distress.      Breath sounds: No wheezing.   Abdominal:      General: Abdomen is flat. Bowel sounds are normal. There is no distension.      Tenderness: There is no abdominal tenderness. There is no guarding. " "  Musculoskeletal:         General: Normal range of motion.      Right lower leg: No edema.      Left lower leg: No edema.   Skin:     General: Skin is warm and dry.      Capillary Refill: Capillary refill takes less than 2 seconds.      Findings: No bruising or lesion.   Neurological:      General: No focal deficit present.      Mental Status: She is alert. Mental status is at baseline.      Motor: Weakness present.   Psychiatric:         Mood and Affect: Mood normal.         Thought Content: Thought content normal.         Laboratory data:    I have reviewed the labs done in the emergency room.    Results from last 7 days   Lab Units 04/07/24  1346   SODIUM mmol/L 138   POTASSIUM mmol/L 4.1   CHLORIDE mmol/L 99   CO2 mmol/L 26.6   BUN mg/dL 29*   CREATININE mg/dL 3.72*   CALCIUM mg/dL 13.9*   BILIRUBIN mg/dL 0.4   ALK PHOS U/L 88   ALT (SGPT) U/L 12   AST (SGOT) U/L 21   GLUCOSE mg/dL 91     Results from last 7 days   Lab Units 04/07/24  1346   WBC 10*3/mm3 10.57   HEMOGLOBIN g/dL 10.2*   HEMATOCRIT % 32.6*   PLATELETS 10*3/mm3 334         Results from last 7 days   Lab Units 04/07/24  1346   HSTROP T ng/L 18*             Results from last 7 days   Lab Units 04/07/24  1346   LIPASE U/L 28               Invalid input(s): \"USDES\", \"NITRITITE\", \"BACT\", \"EP\"    Pain Management Panel           No data to display                EKG:          Radiology:    CT Abdomen Pelvis Without Contrast    Result Date: 4/7/2024  PROCEDURE: CT ABDOMEN PELVIS WO CONTRAST-  HISTORY: Nausea, weakness s/p endoscopy  COMPARISON: March 22, 2024..  PROCEDURE: Axial images were obtained from the lung bases to the pubic symphysis by computed tomography. This study was performed with techniques to keep radiation doses as low as reasonably achievable, (ALARA). Individualized dose reduction techniques using automated exposure control or adjustment of mA and/or kV according to the patient size were employed.  FINDINGS:  ABDOMEN: The lung bases are " clear. The heart is proper size. The limited non-contrast images of the liver demonstrate no focal abnormality but the liver is enlarged at 18.5 cm, similar to prior. There are metallic surgical clips in the right upper quadrant consistent with previous cholecystectomy. The spleen mildly enlarged at 13 cm, stable from prior. No focal mass identified. Fullness of the left adrenal gland is stable. Right adrenal gland is normal. There are metallic surgical clips in the right upper quadrant consistent with previous cholecystectomy. The aorta is normal in caliber. There is no significant free fluid or adenopathy. There is no left nephrolithiasis. Small nonobstructing stones on the right are stable. There is no hydronephrosis. No bony destructive lesion seen. Small periaortic lymph nodes are stable from prior.  PELVIS: The GI tract demonstrate no obstruction. The appendix is identified and appears normal. The urinary bladder is almost completely collapsed. Uterus is midline. There is no fluid or adenopathy. No suspicious inguinal adenopathy seen. No free air identified.      Right nephrolithiasis without hydronephrosis.  Hepatosplenomegaly similar to prior study.   CTDI: 7.44 mGy DLP:1288.51 mGy.cm  This report was signed and finalized on 4/7/2024 3:42 PM by Nova Koch MD.      CT Chest Without Contrast Diagnostic    Result Date: 4/7/2024  PROCEDURE: CT CHEST WO CONTRAST DIAGNOSTIC-  HISTORY: Weakness, hx of sarcoidosis, s/p endoscopy  COMPARISON: No previous chest CT for comparison.  PROCEDURE: Axial images were obtained from the lung apex to the mid abdomen by computed tomography. This study was performed with techniques to keep radiation doses as low as reasonably achievable, (ALARA). Individualized dose reduction techniques using automated exposure control or adjustment of mA and/or kV according to the patient size were employed.  FINDINGS:  CHEST: There is no suspicious axillary adenopathy. Vascular calcifications  noted. There is mildly prominent right pretracheal lymph node in question fullness of the right hilum. Chest CT with contrast would be helpful for further evaluation in this patient with known sarcoidosis. The heart is proper size. There is no pericardial or pleural effusion.No suspicious infiltrate or nodule identified. Limited images of the upper abdomen demonstrate cholecystectomy clips. Spleen is incompletely visualized but appears at least mildly enlarged to 13 cm. No bony destructive lesion seen. No mediastinal air identified. No significant wall thickening of the esophagus identified. No pneumothorax seen.      No acute infiltrate identified.  Question mild mediastinal and hilar adenopathy, consider chest CT with intravenous contrast.    CTDI: 7.44 mGy DLP:1288.51 mGy.cm  This report was signed and finalized on 4/7/2024 3:36 PM by Nova Koch MD.      XR Chest 1 View    Result Date: 4/7/2024  PROCEDURE: XR CHEST 1 VW-  HISTORY: Gen weakness, worsening since yesterday with shortness of breath and dry heaves.  COMPARISON: None.  FINDINGS: The heart is normal in size. The lungs are clear. The mediastinum is unremarkable. There is no pneumothorax.  There are no acute osseous abnormalities. Apical lordotic positioning noted.      No acute cardiopulmonary process.     This report was signed and finalized on 4/7/2024 2:30 PM by Nova Koch MD.       Assessment:    Acute on chronic renal failure, POA unsure stage  Hypercalcemia, POA  History of sarcoidosis, POA  Obesity    Plan:    Admit for observation and workup    Kidney failure/hypercalcemia:  Definitely appears to have acute kidney injury when compared to labs obtained just 2 weeks ago in the emergency room.  Most recent chemistry panel I can find otherwise in epic currently was from 2020 and her creatinine was 0.9 at that time.  She does have symptomatology of hypercalcemia.  I will consult with nephrology for further workup and management.  I will place  patient on IV fluid resuscitation.  Will order labs to workup hypercalcemia further.  Monitor urine output.    I discussed with patient there is some concern of sarcoid affecting renal function.  She may end up needing renal biopsy at some point depending upon further workup and management.    Sarcoidosis:  Patient previously under the care of Dr. Da Silva, currently seeing Dr. Crespo.  Patient's primary care provider did stop her medications including CellCept and methotrexate about a month ago due to her kidney failure.    Patient currently meets observation level of care, anticipated stay less than 2 midnights.  Further recommendations are predicated upon improvement in clinical condition.  Plan of care discussed with the patient and a daughter at bedside.    Risk Assessment: High  DVT Prophylaxis: SCDs  Code Status: Full  Diet: Renal    Advance Care Planning  ACP discussion was held with the patient during this visit. Patient does not have an advance directive, declines further assistance.           Jackie Kay DO  24  17:34 EDT    Dictated utilizing Dragon dictation.    Electronically signed by Jackie Kay DO at 24 1743          Emergency Department Notes        Skyler Rogers DO at 24 1323        Procedure Orders    1. Critical Care [[id number]] ordered by Skyler Rogers DO                 EMERGENCY DEPARTMENT ENCOUNTER    Pt Name: Jamia Hinton  MRN: [id number]  Pt :   1965  Room Number:    Date of encounter:  2024  PCP: Jaycee Deal APRN  ED Provider: Skyler Rogers DO      HPI:  Chief Complaint: Generalized weakness    Context: Jamia Hinton is a 58 y.o. female with past medical history listed below who presents to the ED with generalized weakness.  Patient's daughter is at bedside contributes to HPI.  Gradual onset of symptoms over the past several days.  Generalized weakness described as extreme fatigue and tiredness.  Patient also reports  associated shortness of breath, nausea, and constipation.  Symptoms seem to be worse with exertion.  No fever, chills, chest pain, headache, neck pain, back pain, abdominal pain, bloody stools, problems urination, leg pain or swelling, numbness or weakness in extremities.  Patient had an EGD performed with general surgery on Friday, which showed esophagitis.  Past medical history of sarcoidosis and chronic kidney disease.    PAST MEDICAL HISTORY  Past Medical History:   Diagnosis Date    Arthritis     Hernia, abdominal     Sarcoidosis      Current Outpatient Medications   Medication Instructions    citalopram (CELEXA) 20 mg, Daily    folic acid (FOLVITE) 1 mg, Daily    methotrexate 2.5 mg, Weekly    mycophenolate (CELLCEPT) 1,000 mg, 2 Times Daily    omeprazole (PRILOSEC) 40 mg, Daily    ondansetron ODT (ZOFRAN-ODT) 4 mg, Translingual, Every 6 Hours PRN    oxybutynin XL (DITROPAN-XL) 5 mg, Daily    vitamin B-12 (CYANOCOBALAMIN) 1,000 mcg, Daily    vitamin D3 5,000 Units, Daily        PAST SURGICAL HISTORY  Past Surgical History:   Procedure Laterality Date    CATARACT EXTRACTION W/ INTRAOCULAR LENS IMPLANT Right 2023    Procedure: CATARACT PHACO EXTRACTION WITH INTRAOCULAR LENS IMPLANT RIGHT;  Surgeon: Christ Kerr MD;  Location: [facility] OR;  Service: Ophthalmology;  Laterality: Right;    CATARACT EXTRACTION W/ INTRAOCULAR LENS IMPLANT Left 2024    Procedure: CATARACT PHACO EXTRACTION WITH INTRAOCULAR LENS IMPLANT LEFT;  Surgeon: Christ Kerr MD;  Location: [facility] OR;  Service: Ophthalmology;  Laterality: Left;     SECTION      CHOLECYSTECTOMY      VENTRAL/INCISIONAL HERNIA REPAIR N/A 2017    Procedure:  INCISIONAL HERNIA REPAIR WITH MESH;  Surgeon: Bassam Rogers MD;  Location: [facility] OR;  Service:        FAMILY HISTORY  Family History   Problem Relation Age of Onset    No Known Problems Mother     No Known Problems Father        SOCIAL HISTORY  Social History      Socioeconomic History    Marital status:    Tobacco Use    Smoking status: Never    Smokeless tobacco: Never   Vaping Use    Vaping status: Never Used   Substance and Sexual Activity    Alcohol use: No    Drug use: No    Sexual activity: Defer       ALLERGIES  Citalopram hydrobromide    REVIEW OF SYSTEMS  All systems reviewed and negative except for those discussed in HPI.     PHYSICAL EXAM  ED Triage Vitals [04/07/24 1315]   Temp Heart Rate Resp BP SpO2   98 °F (36.7 °C) 70 18 157/90 99 %      Temp src Heart Rate Source Patient Position BP Location FiO2 (%)   -- -- -- -- --     I have reviewed the triage vital signs and nursing notes.    General: Obese female.  Alert.  Nontoxic appearance.  No acute distress.  Head: Normocephalic.  Atraumatic.  Eyes: No scleral icterus.  ENT: Moist mucous membranes.  Cardiovascular: Regular rate and rhythm.  No murmurs.  No rubs.  2+ distal pulses bilaterally.  Respiratory: Equal breath sounds bilaterally.  No rales.  No rhonchi.  No wheezing.  GI: Abdomen is soft.  Nondistended.  Nontender to palpation.  No rebound.  No guarding.  No CVA tenderness.  MSK: Moves all 4 extremities.  Neurologic: Oriented x 3.  No focal deficits.  Skin: No erythema.  No edema. No pallor. No cyanosis.  Psych: Normal mood and affect.    LAB RESULTS  Recent Results (from the past 24 hour(s))   Comprehensive Metabolic Panel    Collection Time: 04/07/24  1:46 PM    Specimen: Blood   Result Value Ref Range    Glucose 91 65 - 99 mg/dL    BUN 29 (H) 8 - 23 mg/dL    Creatinine 3.72 (H) 0.57 - 1.00 mg/dL    Sodium 138 136 - 145 mmol/L    Potassium 4.1 3.5 - 5.2 mmol/L    Chloride 99 98 - 107 mmol/L    CO2 26.6 22.0 - 29.0 mmol/L    Calcium 13.9 (C) 8.6 - 10.5 mg/dL    Total Protein 7.5 6.0 - 8.5 g/dL    Albumin 3.7 3.5 - 5.2 g/dL    ALT (SGPT) 12 1 - 33 U/L    AST (SGOT) 21 1 - 32 U/L    Alkaline Phosphatase 88 39 - 117 U/L    Total Bilirubin 0.4 0.0 - 1.2 mg/dL    Globulin 3.8 gm/dL    A/G Ratio 1.0  g/dL    BUN/Creatinine Ratio 7.8 7.0 - 25.0    Anion Gap 12.4 5.0 - 15.0 mmol/L    eGFR 13.4 (L) >60.0 mL/min/1.73   Lipase    Collection Time: 04/07/24  1:46 PM    Specimen: Blood   Result Value Ref Range    Lipase 28 13 - 60 U/L   Single High Sensitivity Troponin T    Collection Time: 04/07/24  1:46 PM    Specimen: Blood   Result Value Ref Range    HS Troponin T 18 (H) <14 ng/L   TSH    Collection Time: 04/07/24  1:46 PM    Specimen: Blood   Result Value Ref Range    TSH 2.770 0.270 - 4.200 uIU/mL   CBC Auto Differential    Collection Time: 04/07/24  1:46 PM    Specimen: Blood   Result Value Ref Range    WBC 10.57 3.40 - 10.80 10*3/mm3    RBC 3.71 (L) 3.77 - 5.28 10*6/mm3    Hemoglobin 10.2 (L) 12.0 - 15.9 g/dL    Hematocrit 32.6 (L) 34.0 - 46.6 %    MCV 87.9 79.0 - 97.0 fL    MCH 27.5 26.6 - 33.0 pg    MCHC 31.3 (L) 31.5 - 35.7 g/dL    RDW 15.1 12.3 - 15.4 %    RDW-SD 47.9 37.0 - 54.0 fl    MPV 11.0 6.0 - 12.0 fL    Platelets 334 140 - 450 10*3/mm3    Neutrophil % 67.7 42.7 - 76.0 %    Lymphocyte % 11.2 (L) 19.6 - 45.3 %    Monocyte % 14.7 (H) 5.0 - 12.0 %    Eosinophil % 4.1 0.3 - 6.2 %    Basophil % 1.5 0.0 - 1.5 %    Immature Grans % 0.8 (H) 0.0 - 0.5 %    Neutrophils, Absolute 7.17 (H) 1.70 - 7.00 10*3/mm3    Lymphocytes, Absolute 1.18 0.70 - 3.10 10*3/mm3    Monocytes, Absolute 1.55 (H) 0.10 - 0.90 10*3/mm3    Eosinophils, Absolute 0.43 (H) 0.00 - 0.40 10*3/mm3    Basophils, Absolute 0.16 0.00 - 0.20 10*3/mm3    Immature Grans, Absolute 0.08 (H) 0.00 - 0.05 10*3/mm3    nRBC 0.0 0.0 - 0.2 /100 WBC       RADIOLOGY  CT Abdomen Pelvis Without Contrast    Result Date: 4/7/2024  PROCEDURE: CT ABDOMEN PELVIS WO CONTRAST-  HISTORY: Nausea, weakness s/p endoscopy  COMPARISON: March 22, 2024..  PROCEDURE: Axial images were obtained from the lung bases to the pubic symphysis by computed tomography. This study was performed with techniques to keep radiation doses as low as reasonably achievable, (ALARA). Individualized  dose reduction techniques using automated exposure control or adjustment of mA and/or kV according to the patient size were employed.  FINDINGS:  ABDOMEN: The lung bases are clear. The heart is proper size. The limited non-contrast images of the liver demonstrate no focal abnormality but the liver is enlarged at 18.5 cm, similar to prior. There are metallic surgical clips in the right upper quadrant consistent with previous cholecystectomy. The spleen mildly enlarged at 13 cm, stable from prior. No focal mass identified. Fullness of the left adrenal gland is stable. Right adrenal gland is normal. There are metallic surgical clips in the right upper quadrant consistent with previous cholecystectomy. The aorta is normal in caliber. There is no significant free fluid or adenopathy. There is no left nephrolithiasis. Small nonobstructing stones on the right are stable. There is no hydronephrosis. No bony destructive lesion seen. Small periaortic lymph nodes are stable from prior.  PELVIS: The GI tract demonstrate no obstruction. The appendix is identified and appears normal. The urinary bladder is almost completely collapsed. Uterus is midline. There is no fluid or adenopathy. No suspicious inguinal adenopathy seen. No free air identified.      Right nephrolithiasis without hydronephrosis.  Hepatosplenomegaly similar to prior study.   CTDI: 7.44 mGy DLP:1288.51 mGy.cm  This report was signed and finalized on 4/7/2024 3:42 PM by Nova Koch MD.      CT Chest Without Contrast Diagnostic    Result Date: 4/7/2024  PROCEDURE: CT CHEST WO CONTRAST DIAGNOSTIC-  HISTORY: Weakness, hx of sarcoidosis, s/p endoscopy  COMPARISON: No previous chest CT for comparison.  PROCEDURE: Axial images were obtained from the lung apex to the mid abdomen by computed tomography. This study was performed with techniques to keep radiation doses as low as reasonably achievable, (ALARA). Individualized dose reduction techniques using automated  exposure control or adjustment of mA and/or kV according to the patient size were employed.  FINDINGS:  CHEST: There is no suspicious axillary adenopathy. Vascular calcifications noted. There is mildly prominent right pretracheal lymph node in question fullness of the right hilum. Chest CT with contrast would be helpful for further evaluation in this patient with known sarcoidosis. The heart is proper size. There is no pericardial or pleural effusion.No suspicious infiltrate or nodule identified. Limited images of the upper abdomen demonstrate cholecystectomy clips. Spleen is incompletely visualized but appears at least mildly enlarged to 13 cm. No bony destructive lesion seen. No mediastinal air identified. No significant wall thickening of the esophagus identified. No pneumothorax seen.      No acute infiltrate identified.  Question mild mediastinal and hilar adenopathy, consider chest CT with intravenous contrast.    CTDI: 7.44 mGy DLP:1288.51 mGy.cm  This report was signed and finalized on 4/7/2024 3:36 PM by Nova Koch MD.      XR Chest 1 View    Result Date: 4/7/2024  PROCEDURE: XR CHEST 1 VW-  HISTORY: Gen weakness, worsening since yesterday with shortness of breath and dry heaves.  COMPARISON: None.  FINDINGS: The heart is normal in size. The lungs are clear. The mediastinum is unremarkable. There is no pneumothorax.  There are no acute osseous abnormalities. Apical lordotic positioning noted.      No acute cardiopulmonary process.     This report was signed and finalized on 4/7/2024 2:30 PM by Nova Koch MD.       PROCEDURES  Critical Care    Performed by: Skyler Rogers DO  Authorized by: Skyler Rogers DO    Comments:      CRITICAL CARE PROCEDURE NOTE  Authorized and performed by: Dr. Rogers  Total critical care time: Approximately 35 minutes.  Patient was critically ill due to: Acute kidney injury  Intervention: IV fluids, close airway/mental status/hemodynamic monitoring    Due to a high  probability of clinically significant, life threatening deterioration, the patient required my highest level of preparedness to intervene emergently and I personally spent this critical care time directly and personally managing the patient.  This critical care time included obtaining a history; examining the patient; pulse oximetry; ordering and review of studies; arranging urgent treatment with development of a management plan; evaluation of patient's response to treatment; frequent reassessment; and, discussions with other providers.    This critical care time was performed to assess and manage the high probability of imminent, life-threatening deterioration that could result in multiorgan failure.  It was exclusive of separately billable procedures and treating other patients and teaching time.    Please see MDM section and the rest of the note for further information on patient assessment and interventions.        MEDICATIONS GIVEN IN ER  Medications   sodium chloride 0.9 % flush 10 mL (has no administration in time range)   sodium chloride 0.9 % bolus 1,000 mL (0 mL Intravenous Stopped 4/7/24 1513)   aluminum-magnesium hydroxide-simethicone (MAALOX MAX) 400-400-40 MG/5ML 15 mL suspension ( Oral Given 4/7/24 1352)       MEDICAL DECISION MAKING  58 y.o. female with past medical history listed above who presents with generalized weakness, shortness of breath, nausea. Vital signs remarkable for hypertension, otherwise within normal limits. Based on clinical presentation and physical exam, differential diagnosis includes, but is not limited to, exacerbation of esophagitis, dehydration, metabolic derangement, worsening sarcoidosis, pneumonia, pneumothorax, myocardial infarction, cardiac arrhythmia.     At least 3 different tests have been ordered on this patient.    Please see ED course below for my interpretation of the ED workup.  ED Course as of 04/07/24 1657   Sun Apr 07, 2024   1351 ECG 12 Lead Dyspnea  EKG  from interpretation sinus rhythm, rate 62, normal axis, no ST segment elevation or depression, normal QRS QTc intervals. [JS]   1633 I reviewed the labs listed above. Notable abnormalities are highlighted below.    Old laboratory data was reviewed from the medical records and compared to today's results.   [JS]   1633 Hemoglobin(!): 10.2 [JS]   1633 HS Troponin T(!): 18 [JS]   1633 Creatinine(!): 3.72  From 2.2 baseline. [JS]   1633 Calcium(!!): 13.9 [JS]   1634 I have independently reviewed and interpreted the imaging listed above.  My interpretations are listed below:    -Chest x-ray negative for pneumonia.    -CT chest negative for pneumonia.    -CT abdomen pelvis negative for small bowel obstruction.    Radiologist also notes questionable mild mediastinal and hilar adenopathy.  Hepatosplenomegaly similar to prior study. [JS]      ED Course User Index  [JS] David Hand DO      On re-evaluation, patient resting comfortably.  Vital signs remained stable on room air. Will proceed with medical admission for further workup and management.  I discussed the findings of the ED workup with the patient including my recommendation for admission.  Patient agreeable with plan and disposition.    16:55: Case discussed with Dr. Kay (hospitalist) who agrees to evaluate and admit the patient.  We discussed the HPI, pertinent PMHx, ED course and workup.    Chronic conditions affecting care: CKD    Social determinants of health impacting treatment or disposition: None    REPEAT VITAL SIGNS  AS OF 16:57 EDT VITALS:  BP - (!) 147/104  HR - 74  TEMP - 98 °F (36.7 °C)  O2 SATS - 98%    DIAGNOSIS  Final diagnoses:   Acute kidney injury   Dehydration       DISPOSITION  ED Disposition       ED Disposition   Decision to Admit    Condition   --    Comment   Level of Care: Telemetry [5]   Diagnosis: Acute-on-chronic kidney injury [871409]   Admitting Physician: MAIKEL KAY [[id number]]   Attending Physician: DAVID HAND  [[id number]]                       Please note that portions of this document were completed with voice recognition software.        Skyler Rogers DO  04/07/24 1657      Electronically signed by Skyler Rogers DO at 04/07/24 1657       Vital Signs (last day)       Date/Time Temp Temp src Pulse Resp BP Patient Position SpO2    04/10/24 1100 -- -- 59 -- -- -- --    04/10/24 0845 98.4 (36.9) Temporal 57 20 145/74 Sitting --    04/10/24 0700 -- -- 56 -- -- -- --    04/10/24 0415 97.7 (36.5) Oral 61 18 141/75 Lying 99    04/09/24 2343 98 (36.7) Oral 67 17 113/63 Lying 95    04/09/24 1934 97.7 (36.5) Oral 69 18 148/82 Lying 96    04/09/24 1537 97.9 (36.6) Oral 73 18 144/79 Sitting --    04/09/24 1107 97.9 (36.6) Oral 62 18 158/82 Sitting --    04/09/24 0728 97.8 (36.6) Oral 67 18 139/80 Sitting 100    04/09/24 0404 98 (36.7) Oral 77 17 129/83 Lying 95          Current Facility-Administered Medications   Medication Dose Route Frequency Provider Last Rate Last Admin    acetaminophen (TYLENOL) tablet 650 mg  650 mg Oral Q4H PRN Jackie Kay DO   650 mg at 04/10/24 0204    Or    acetaminophen (TYLENOL) 160 MG/5ML oral solution 650 mg  650 mg Oral Q4H PRN Jackie Kay DO        Or    acetaminophen (TYLENOL) suppository 650 mg  650 mg Rectal Q4H PRN Jackie Kay DO        ALPRAZolam (XANAX) tablet 0.5 mg  0.5 mg Oral Q8H PRN Jackie Kay DO        aluminum-magnesium hydroxide-simethicone (MAALOX MAX) 400-400-40 MG/5ML suspension 15 mL  15 mL Oral Q6H PRN Jackie Kay DO        sennosides-docusate (PERICOLACE) 8.6-50 MG per tablet 2 tablet  2 tablet Oral BID Jackie Kay DO        And    polyethylene glycol (MIRALAX) packet 17 g  17 g Oral Daily PRN Jackie Kay DO        And    bisacodyl (DULCOLAX) EC tablet 5 mg  5 mg Oral Daily PRN Jackie Kay DO        And    bisacodyl (DULCOLAX) suppository 10 mg  10 mg Rectal Daily PRN Jackie Kay  DO Serge        furosemide (LASIX) injection 80 mg  80 mg Intravenous Once Panfilo Jo MD, FASN        heparin (porcine) 5000 UNIT/ML injection 5,000 Units  5,000 Units Subcutaneous Q8H Daniela Ji APRN        iron polysaccharides (NIFEREX) capsule 150 mg  150 mg Oral Daily Panfilo Jo MD, RUDY   150 mg at 04/10/24 0813    melatonin tablet 5 mg  5 mg Oral Nightly PRN Jackie Kay DO        methylPREDNISolone sodium succinate (SOLU-Medrol) injection 80 mg  80 mg Intravenous Daily Panfilo Jo MD, RUDY   80 mg at 04/10/24 0813    nitroglycerin (NITROSTAT) SL tablet 0.4 mg  0.4 mg Sublingual Q5 Min PRN Jackie Kay DO        ondansetron ODT (ZOFRAN-ODT) disintegrating tablet 4 mg  4 mg Oral Q6H PRN Jackie Kay DO        Or    ondansetron (ZOFRAN) injection 4 mg  4 mg Intravenous Q6H PRN Jackie Kay,    4 mg at 04/08/24 1935    sodium chloride 0.9 % flush 10 mL  10 mL Intravenous PRN Jackie Kay,         sodium chloride 0.9 % flush 10 mL  10 mL Intravenous Q12H Jackie Kay, DO   10 mL at 04/10/24 0813    sodium chloride 0.9 % flush 10 mL  10 mL Intravenous PRN Jackie Kay,         sodium chloride 0.9 % infusion 40 mL  40 mL Intravenous PRN Jackie Kay,         sodium chloride 0.9 % infusion  100 mL/hr Intravenous Continuous Panfilo Jo MD, RUDY 200 mL/hr at 04/10/24 1226 200 mL/hr at 04/10/24 1226     Lab Results (last 24 hours)       Procedure Component Value Units Date/Time    Renal Function Panel [[id number]]  (Abnormal) Collected: 04/10/24 0548    Specimen: Blood Updated: 04/10/24 0648     Glucose 87 mg/dL      BUN 27 mg/dL      Creatinine 3.17 mg/dL      Sodium 139 mmol/L      Potassium 4.2 mmol/L      Chloride 111 mmol/L      CO2 18.4 mmol/L      Calcium 10.5 mg/dL      Albumin 3.1 g/dL      Phosphorus 3.6 mg/dL      Anion Gap 9.6 mmol/L      BUN/Creatinine Ratio 8.5     eGFR 16.2 mL/min/1.73      "Narrative:      GFR Normal >60  Chronic Kidney Disease <60  Kidney Failure <15            Imaging Results (Last 24 Hours)       ** No results found for the last 24 hours. **          Orders (last 24 hrs)        Start     Ordered    04/11/24 0600  Renal Function Panel  Morning Draw         04/10/24 1046    04/10/24 1600  furosemide (LASIX) injection 80 mg  Once         04/10/24 1319    04/10/24 1400  Renal Function Panel  Once         04/10/24 0728    04/10/24 1005  furosemide (LASIX) injection 40 mg  Once         04/10/24 0727    04/10/24 0900  methylPREDNISolone sodium succinate (SOLU-Medrol) injection 80 mg  Daily         04/10/24 0729    04/10/24 0600  Renal Function Panel  Morning Draw         04/09/24 1402    04/09/24 1415  heparin (porcine) 5000 UNIT/ML injection 5,000 Units  Every 8 Hours Scheduled         04/09/24 1329    04/09/24 1230  iron polysaccharides (NIFEREX) capsule 150 mg  Daily         04/09/24 1138    04/07/24 2100  sodium chloride 0.9 % flush 10 mL  Every 12 Hours Scheduled         04/07/24 1747    04/07/24 2100  sennosides-docusate (PERICOLACE) 8.6-50 MG per tablet 2 tablet  2 Times Daily        Placed in \"And\" Linked Group    04/07/24 1747    04/07/24 2000  Vital Signs  Every 4 Hours       04/07/24 1747 04/07/24 1845  sodium chloride 0.9 % infusion  Continuous         04/07/24 1747    04/07/24 1800  Oral Care  2 Times Daily       04/07/24 1747    04/07/24 1747  ondansetron ODT (ZOFRAN-ODT) disintegrating tablet 4 mg  Every 6 Hours PRN        Placed in \"Or\" Linked Group    04/07/24 1747    04/07/24 1747  ondansetron (ZOFRAN) injection 4 mg  Every 6 Hours PRN        Placed in \"Or\" Linked Group    04/07/24 1747    04/07/24 1747  aluminum-magnesium hydroxide-simethicone (MAALOX MAX) 400-400-40 MG/5ML suspension 15 mL  Every 6 Hours PRN         04/07/24 1747    04/07/24 1747  ALPRAZolam (XANAX) tablet 0.5 mg  Every 8 Hours PRN         04/07/24 1747    04/07/24 1747  melatonin tablet 5 mg  " "Nightly PRN         24 1747    24 1747  acetaminophen (TYLENOL) tablet 650 mg  Every 4 Hours PRN        Placed in \"Or\" Linked Group    24 1747    24 1747  acetaminophen (TYLENOL) 160 MG/5ML oral solution 650 mg  Every 4 Hours PRN        Placed in \"Or\" Linked Group    24 1747    24 1747  acetaminophen (TYLENOL) suppository 650 mg  Every 4 Hours PRN        Placed in \"Or\" Linked Group    24 1747    24 1747  polyethylene glycol (MIRALAX) packet 17 g  Daily PRN        Placed in \"And\" Linked Group    24 1747    24 1747  bisacodyl (DULCOLAX) EC tablet 5 mg  Daily PRN        Placed in \"And\" Linked Group    24 1747    24 1747  bisacodyl (DULCOLAX) suppository 10 mg  Daily PRN        Placed in \"And\" Linked Group    24 1747    24 1746  Intake & Output  Every Shift       24 1747    24 1745  nitroglycerin (NITROSTAT) SL tablet 0.4 mg  Every 5 Minutes PRN         24 1747    24 1745  sodium chloride 0.9 % flush 10 mL  As Needed         24 1747    24 1745  sodium chloride 0.9 % infusion 40 mL  As Needed         24 1747    24 1337  sodium chloride 0.9 % flush 10 mL  As Needed        Placed in \"And\" Linked Group    24 1337                     Physician Progress Notes (most recent note)        Daniela Ji APRN at 04/10/24 1044              [facility]    Name:  Jamia Hinton   Age:  58 y.o.  Sex:  female  :  1965  MRN:  [id number]   Visit Number:  [id number]  Admission Date:  2024  Date Of Service:  04/10/24  Primary Care Physician:  Jaycee Deal APRN     LOS: 0 days :    Chief Complaint:      Fatigue/poor appetite    Subjective:    Patient seen and examined with daughter at bedside.  She is very tearful and grateful for her care here.  She does want to initiate care with Dr. Gloria upon discharge.  Otherwise feeling improved with better " appetite.  Advised will need additional fluids/diuresis per Nephrology.     Hospital Course:    58-year-old female with history of sarcoidosis, rheumatoid arthritis, obesity, who presented to the emergency room with complaints of decreased oral intake, early satiety, weakness and nausea for multiple months now.  Recently told about decreased kidney function with primary care provider and all of her medications including CellCept and methotrexate were discontinued.  Recently underwent an outpatient EGD with Dr. Rogers, reportedly had esophagitis.     Workup in the emergency room was showing acute on chronic renal failure with creatinine 3.72 and also with a calcium of 13.9.  Potassium was 4.  A CT scan of the chest without contrast was showing mild mediastinal and hilar adenopathy.  CT scan abdomen pelvis showing right-sided nephrolithiasis without hydronephrosis.  Hospitalist consulted for further medical management.     Patient admitted to the hospitalist service and given IV fluid resuscitation.  Calcium slowly improved.  Dr. Jo followed.  IV hydration continued.  Diuresis and steroids initiated per nephrology.  Creatinine downtrending.    Review of Systems:     All systems were reviewed and negative except as mentioned in subjective, assessment and plan.    Vital Signs:    Temp:  [97.7 °F (36.5 °C)-98.4 °F (36.9 °C)] 98.4 °F (36.9 °C)  Heart Rate:  [56-73] 57  Resp:  [17-20] 20  BP: (113-158)/(63-82) 145/74    Intake and output:    I/O last 3 completed shifts:  In: 720 [P.O.:720]  Out: 2400 [Urine:2400]  I/O this shift:  In: 360 [P.O.:360]  Out: 200 [Urine:200]    Physical Examination:    General Appearance:  Alert and cooperative.  Middle-age female.  Pleasant.  No acute distress.  Tearful at times.   Head:  Atraumatic and normocephalic.   Eyes: Conjunctivae and sclerae normal, no icterus. No pallor.   Throat: No oral lesions, no thrush, oral mucosa moist.   Neck: Supple, trachea midline, no thyromegaly.  "  Lungs:   Breath sounds heard bilaterally equally.  No wheezing or crackles. No Pleural rub or bronchial breathing.  On room air unlabored.   Heart:  Normal S1 and S2, no murmur, no gallop, no rub. No JVD.   Abdomen:   Normal bowel sounds, no masses, no organomegaly. Soft, nontender, nondistended, no rebound tenderness.   Extremities: Supple, trace bilateral lower extremity edema, no cyanosis, no clubbing.   Skin: No bleeding or rash.   Neurologic: Alert and oriented x 3. No facial asymmetry. Moves all four limbs. No tremors.      Laboratory results:    Results from last 7 days   Lab Units 04/10/24  0548 04/09/24  0639 04/08/24  0529 04/07/24  1346   SODIUM mmol/L 139 141 138 138   POTASSIUM mmol/L 4.2 4.4 4.1 4.1   CHLORIDE mmol/L 111* 110* 103 99   CO2 mmol/L 18.4* 20.6* 25.6 26.6   BUN mg/dL 27* 28* 25* 29*   CREATININE mg/dL 3.17* 3.40* 3.75* 3.72*   CALCIUM mg/dL 10.5 10.3 12.0* 13.9*   BILIRUBIN mg/dL  --   --   --  0.4   ALK PHOS U/L  --   --   --  88   ALT (SGPT) U/L  --   --   --  12   AST (SGOT) U/L  --   --   --  21   GLUCOSE mg/dL 87 88 88 91     Results from last 7 days   Lab Units 04/07/24  1346   WBC 10*3/mm3 10.57   HEMOGLOBIN g/dL 10.2*   HEMATOCRIT % 32.6*   PLATELETS 10*3/mm3 334         Results from last 7 days   Lab Units 04/07/24  1346   HSTROP T ng/L 18*         No results for input(s): \"PHART\", \"IDX8UNF\", \"PO2ART\", \"XTK4FEB\", \"BASEEXCESS\" in the last 8760 hours.   I have reviewed the patient's laboratory results.    Radiology results:    No radiology results from the last 24 hrs  I have reviewed the patient's radiology reports.    Medication Review:     I have reviewed the patient's active and prn medications.     Problem List:      Acute-on-chronic kidney injury      Assessment:    Acute kidney injury, likely chronic kidney disease, baseline unknown, POA  Hypercalcemia, POA  History of sarcoidosis, POA  Rheumatoid arthritis  Obesity    Plan:    Kidney failure/hypercalcemia:  -Definitely " appears to have acute kidney injury when compared to labs obtained just 2 weeks ago in the emergency room with creatinine at that time 2.2  -Most recent chemistry panel I can find otherwise in epic currently was from 2020 and her creatinine was 0.9 at that time.    She does have symptomatology of hypercalcemia.  Calcium is improving.  Will continue with IV fluid resuscitation per nephrology recommendations.    - Definitely concerned that sarcoidosis is led to hypercalcemia.    -Diuresis and Solu-Medrol initiated per nephrology.  -Creatinine downtrending.     Sarcoidosis:  Patient previously under the care of Dr. Da Silva, currently seeing Dr. Crespo.    -Requesting pulmonology in [place].  Will order upon discharge with Dr. Elkins.   - Patient's primary care provider did stop her medications including CellCept and methotrexate about a month ago due to her kidney failure.  Uncontrolled, worsening sarcoidosis could be leading to the hypercalcemia.     DVT Prophylaxis: Heparin  Code Status: Full  Diet: Renal  Discharge Plan: Likely home in 1 to 2 days.    I have reviewed the copied text and it is accurate as of 4/10/2024       CLAUDIA Azul  04/10/24  10:44 EDT    Dictated utilizing Dragon dictation.      Electronically signed by Daniela Ji APRN at 04/10/24 1045          Consult Notes (most recent note)        Panfilo Jo MD, FASN at 04/08/24 0712        Consult Orders    1. Inpatient Nephrology Consult [[id number]] ordered by Jackie Kay DO at 04/07/24 1747                         [facility]      Nephrology Consultation      Referring Provider:   No ref. provider found    Reason for Consultation:  Acute kidney injury and associated problems  Hypercalcemia    Subjective:  Chief complaint   Chief Complaint   Patient presents with    Weakness - Generalized     Pt reports worsening weakness since yesterday with dry heaves. Pt reports feeling SOA. Denies CP      History of  present illness:    Ms. Hinton is a 58-year-old female with PMH significant for sarcoidosis, rheumatoid arthritis, morbid obesity and renal insufficiency who presented to the ED yesterday complaining of progressive weakness and nausea, been going on for the last couple months and getting worse. Also reports decreased PO intake and poor appetite. Patient's daughter reports her PCP informed the patient a couple weeks ago that her kidney function had worsened, at this time sCr was 2.29; reports no known prior history of CKD. Secondary to those findings, PCP discontinued CellCept and methotrexate, as well as her vitamin D and folate supplements.   Workup in the ED notable for sCr 3.72, Ca++ 13.9; other electrolytes normal.  Hgb 10.2. WBC normal. CXR negative for acute process. CT chest WO notable for mild mediastinal and hilar adenopathy. CT A/P WO shows right nephrolithiasis without hydronephrosis.  Renal US with small 3mm non-obstructing stone in right renal pole. She received 1L NS bolus in the ED secondary to hypovolemia however she was hemodynamically stable. She was admitted by the hospitalist secondary to acute kidney injury, hypercalcemia. Nephrology has been asked to see her for further management of this.    I have reviewed labs/imaging/records from this hospitalization, including ER staff and admitting/attending physicians H/P's and progress notes to establish a comprehensive understanding of this patient's clinical hospital course, as well as to establish plan of care appropriately.   Past Medical History:   Diagnosis Date    Arthritis     Hernia, abdominal     Sarcoidosis        Past Surgical History:   Procedure Laterality Date    CATARACT EXTRACTION W/ INTRAOCULAR LENS IMPLANT Right 12/20/2023    Procedure: CATARACT PHACO EXTRACTION WITH INTRAOCULAR LENS IMPLANT RIGHT;  Surgeon: Christ Kerr MD;  Location: [facility];  Service: Ophthalmology;  Laterality: Right;    CATARACT EXTRACTION W/  INTRAOCULAR LENS IMPLANT Left 2024    Procedure: CATARACT PHACO EXTRACTION WITH INTRAOCULAR LENS IMPLANT LEFT;  Surgeon: Christ Kerr MD;  Location: [facility];  Service: Ophthalmology;  Laterality: Left;     SECTION      CHOLECYSTECTOMY      VENTRAL/INCISIONAL HERNIA REPAIR N/A 2017    Procedure:  INCISIONAL HERNIA REPAIR WITH MESH;  Surgeon: Bassam Rogers MD;  Location: [facility];  Service:      Family History   Problem Relation Age of Onset    No Known Problems Mother     No Known Problems Father      negative h/o ESRD     Social History     Tobacco Use    Smoking status: Never    Smokeless tobacco: Never   Vaping Use    Vaping status: Never Used   Substance Use Topics    Alcohol use: No    Drug use: No     Home medications:   Prior to Admission Medications       Prescriptions Last Dose Informant Patient Reported? Taking?    citalopram (CeleXA) 20 MG tablet   Yes No    Take 1 tablet by mouth Daily.    Patient not taking:  Reported on 3/27/2024    folic acid (FOLVITE) 1 MG tablet   Yes No    Take 1 tablet by mouth Daily.    Patient not taking:  Reported on 3/27/2024    methotrexate 2.5 MG tablet   Yes No    Take 1 tablet by mouth 1 (One) Time Per Week. 6 pills    Patient not taking:  Reported on 3/27/2024    mycophenolate (CELLCEPT) 500 MG tablet   Yes No    Take 2 tablets by mouth 2 (Two) Times a Day.    Patient not taking:  Reported on 3/27/2024    omeprazole (priLOSEC) 40 MG capsule 2024  Yes Yes    Take 1 capsule by mouth Daily.    ondansetron ODT (ZOFRAN-ODT) 4 MG disintegrating tablet Past Month  No Yes    Place 1 tablet on the tongue Every 6 (Six) Hours As Needed for Nausea or Vomiting.    oxybutynin XL (DITROPAN-XL) 5 MG 24 hr tablet   Yes No    Take 1 tablet by mouth Daily.    Patient not taking:  Reported on 3/27/2024    vitamin B-12 (CYANOCOBALAMIN) 1000 MCG tablet   Yes No    Take 1 tablet by mouth Daily.    Patient not taking:  Reported on 3/27/2024    vitamin D3 125  MCG (5000 UT) capsule capsule   Yes No    Take 1 capsule by mouth Daily.    Patient not taking:  Reported on 3/27/2024          Emergency department medications:   Medications   sodium chloride 0.9 % flush 10 mL (has no administration in time range)   sodium chloride 0.9 % flush 10 mL (10 mL Intravenous Given 4/7/24 2045)   sodium chloride 0.9 % flush 10 mL (has no administration in time range)   sodium chloride 0.9 % infusion 40 mL (has no administration in time range)   nitroglycerin (NITROSTAT) SL tablet 0.4 mg (has no administration in time range)   sodium chloride 0.9 % infusion (125 mL/hr Intravenous Currently Infusing 4/8/24 0661)   sennosides-docusate (PERICOLACE) 8.6-50 MG per tablet 2 tablet (2 tablets Oral Not Given 4/7/24 2045)     And   polyethylene glycol (MIRALAX) packet 17 g (has no administration in time range)     And   bisacodyl (DULCOLAX) EC tablet 5 mg (has no administration in time range)     And   bisacodyl (DULCOLAX) suppository 10 mg (has no administration in time range)   acetaminophen (TYLENOL) tablet 650 mg (has no administration in time range)     Or   acetaminophen (TYLENOL) 160 MG/5ML oral solution 650 mg (has no administration in time range)     Or   acetaminophen (TYLENOL) suppository 650 mg (has no administration in time range)   melatonin tablet 5 mg (has no administration in time range)   ALPRAZolam (XANAX) tablet 0.5 mg (has no administration in time range)   aluminum-magnesium hydroxide-simethicone (MAALOX MAX) 400-400-40 MG/5ML suspension 15 mL (has no administration in time range)   ondansetron ODT (ZOFRAN-ODT) disintegrating tablet 4 mg (has no administration in time range)     Or   ondansetron (ZOFRAN) injection 4 mg (has no administration in time range)   sodium chloride 0.9 % bolus 1,000 mL (0 mL Intravenous Stopped 4/7/24 1513)   aluminum-magnesium hydroxide-simethicone (MAALOX MAX) 400-400-40 MG/5ML 15 mL suspension ( Oral Given 4/7/24 1352)       Allergies:  Citalopram  "hydrobromide    Review of Systems  14 point review of system were done and are negative except as mentioned in the history of present illness and assessment and plan.      Physical Exam:  Objective:  Vital Signs  /77 (BP Location: Right arm, Patient Position: Lying)   Pulse 62   Temp 97.6 °F (36.4 °C) (Oral)   Resp 18   Ht 167.6 cm (66\")   Wt 113 kg (249 lb 9 oz)   LMP  (LMP Unknown) Comment: posyt menopausal  SpO2 97%   BMI 40.28 kg/m²   Objective    I/O this shift:  In: -   Out: 200 [Urine:200]    Intake/Output Summary (Last 24 hours) at 4/8/2024 0712  Last data filed at 4/8/2024 0702  Gross per 24 hour   Intake 2000 ml   Output 2000 ml   Net 0 ml        Physical Exam     Constitutional: Awake, alert  Eyes: sclerae anicteric, no conjunctival injection  HEENT: mucous membranes dry  Neck: Supple, no thyromegaly, no lymphadenopathy, trachea midline, No JVD  Respiratory: Dminished to auscultation bilaterally, nonlabored respirations   Cardiovascular: RRR, no murmurs, rubs, or gallops.  Gastrointestinal: Positive bowel sounds, obese, soft, nontender, nondistended  Musculoskeletal: No edema, no clubbing or cyanosis  Psychiatric: Appropriate affect, cooperative  Neurologic: Oriented x 3, moving all extremities, Cranial Nerves grossly intact, speech clear, generalized weakness  Skin: warm and dry, no rashes            Results Review:   Results from last 7 days   Lab Units 04/08/24  0529 04/07/24  1346   SODIUM mmol/L 138 138   POTASSIUM mmol/L 4.1 4.1   CHLORIDE mmol/L 103 99   CO2 mmol/L 25.6 26.6   BUN mg/dL 25* 29*   CREATININE mg/dL 3.75* 3.72*   CALCIUM mg/dL 12.0* 13.9*   ALBUMIN g/dL  --  3.7   BILIRUBIN mg/dL  --  0.4   ALK PHOS U/L  --  88   ALT (SGPT) U/L  --  12   AST (SGOT) U/L  --  21   GLUCOSE mg/dL 88 91     Estimated Creatinine Clearance: 20.3 mL/min (A) (by C-G formula based on SCr of 3.75 mg/dL (H)).  Results from last 7 days   Lab Units 04/07/24  1346   MAGNESIUM mg/dL 2.4         Results " "from last 7 days   Lab Units 04/07/24  1346   WBC 10*3/mm3 10.57   HEMOGLOBIN g/dL 10.2*   PLATELETS 10*3/mm3 334         Brief Urine Lab Results  (Last result in the past 365 days)        Color   Clarity   Blood   Leuk Est   Nitrite   Protein   CREAT   Urine HCG        04/07/24 2357 Yellow   Clear   Trace   Negative   Negative   Trace                 No results found for: \"UTPCR\"  Imaging Results (Last 24 Hours)       Procedure Component Value Units Date/Time    US Renal Bilateral [[id number]] Collected: 04/07/24 1948     Updated: 04/07/24 1951    Narrative:      PROCEDURE: US RENAL BILATERAL-     HISTORY: buddy, hypercalcemia; N17.9-Acute kidney failure, unspecified;  E86.0-Dehydration     FINDINGS: Kidneys show a normal echo pattern. .  Right kidney measures  12.22 cm in length. Left kidney measures 13.2 cm in length.  Size is  normal.     No mass or cyst is seen. No obstructive changes are present. A  questionable small nonobstructing 3 mm stone is seen of the lower pole  right kidney.       Impression:      Unremarkable renal ultrasound.        This report was signed and finalized on 4/7/2024 7:49 PM by Frederick Jimenez MD.       CT Abdomen Pelvis Without Contrast [[id number]] Collected: 04/07/24 1537     Updated: 04/07/24 1545    Narrative:      PROCEDURE: CT ABDOMEN PELVIS WO CONTRAST-     HISTORY: Nausea, weakness s/p endoscopy     COMPARISON: March 22, 2024..     PROCEDURE: Axial images were obtained from the lung bases to the pubic  symphysis by computed tomography. This study was performed with  techniques to keep radiation doses as low as reasonably achievable,  (ALARA). Individualized dose reduction techniques using automated  exposure control or adjustment of mA and/or kV according to the patient  size were employed.     FINDINGS:     ABDOMEN: The lung bases are clear. The heart is proper size. The limited  non-contrast images of the liver demonstrate no focal abnormality but  the liver is enlarged at 18.5 " cm, similar to prior. There are metallic  surgical clips in the right upper quadrant consistent with previous  cholecystectomy. The spleen mildly enlarged at 13 cm, stable from prior.  No focal mass identified. Fullness of the left adrenal gland is stable.  Right adrenal gland is normal. There are metallic surgical clips in the  right upper quadrant consistent with previous cholecystectomy. The aorta  is normal in caliber. There is no significant free fluid or adenopathy.   There is no left nephrolithiasis. Small nonobstructing stones on the  right are stable. There is no hydronephrosis. No bony destructive lesion  seen. Small periaortic lymph nodes are stable from prior.     PELVIS: The GI tract demonstrate no obstruction. The appendix is  identified and appears normal. The urinary bladder is almost completely  collapsed. Uterus is midline. There is no fluid or adenopathy. No  suspicious inguinal adenopathy seen. No free air identified.       Impression:      Right nephrolithiasis without hydronephrosis.     Hepatosplenomegaly similar to prior study.        CTDI: 7.44 mGy  DLP:1288.51 mGy.cm     This report was signed and finalized on 4/7/2024 3:42 PM by Nova Koch MD.       CT Chest Without Contrast Diagnostic [[id number]] Collected: 04/07/24 1530     Updated: 04/07/24 1538    Narrative:      PROCEDURE: CT CHEST WO CONTRAST DIAGNOSTIC-     HISTORY: Weakness, hx of sarcoidosis, s/p endoscopy     COMPARISON: No previous chest CT for comparison.     PROCEDURE: Axial images were obtained from the lung apex to the mid  abdomen by computed tomography. This study was performed with techniques  to keep radiation doses as low as reasonably achievable, (ALARA).  Individualized dose reduction techniques using automated exposure  control or adjustment of mA and/or kV according to the patient size were  employed.     FINDINGS:     CHEST: There is no suspicious axillary adenopathy. Vascular  calcifications noted. There is  mildly prominent right pretracheal lymph  node in question fullness of the right hilum. Chest CT with contrast  would be helpful for further evaluation in this patient with known  sarcoidosis. The heart is proper size. There is no pericardial or  pleural effusion.No suspicious infiltrate or nodule identified. Limited  images of the upper abdomen demonstrate cholecystectomy clips. Spleen is  incompletely visualized but appears at least mildly enlarged to 13 cm.  No bony destructive lesion seen. No mediastinal air identified. No  significant wall thickening of the esophagus identified. No pneumothorax  seen.       Impression:      No acute infiltrate identified.     Question mild mediastinal and hilar adenopathy, consider chest CT with  intravenous contrast.           CTDI: 7.44 mGy  DLP:1288.51 mGy.cm     This report was signed and finalized on 4/7/2024 3:36 PM by Nova Koch MD.       XR Chest 1 View [[id number]] Collected: 04/07/24 1430     Updated: 04/07/24 1432    Narrative:      PROCEDURE: XR CHEST 1 VW-     HISTORY: Gen weakness, worsening since yesterday with shortness of  breath and dry heaves.     COMPARISON: None.     FINDINGS: The heart is normal in size. The lungs are clear. The  mediastinum is unremarkable. There is no pneumothorax.  There are no  acute osseous abnormalities. Apical lordotic positioning noted.        Impression:      No acute cardiopulmonary process.              This report was signed and finalized on 4/7/2024 2:30 PM by Nova Koch MD.             senna-docusate sodium, 2 tablet, Oral, BID  sodium chloride, 10 mL, Intravenous, Q12H      sodium chloride, 125 mL/hr, Last Rate: 125 mL/hr (04/08/24 0658)        Assessment/Plan:    Acute kidney injury: With possible h/o CKD, baseline unknown. Last month patient noted with worsened renal function, sCr 2.29 at that time. Previous to that, labs in chart from 2020 demonstrate normal renal function. sCr 3.7 upon presentation with  hypercalcemia; other e'lytes normal. Component of dehydration, progressively poor oral intake. No obstruction on imaging. UA bland. Further concern for nephrocalcinosis s/t sarcoidosis with hypercalcemia and nephrolithiasis.  Hypercalcemia: Routine workup has been ordered including PTH, PTH related peptide, vitamin D levels, 24-hour urine protein electrophoresis, as well as ionized calcium.  Recent hx of Vitamin D supplementation but recently discontinued.   Sarcoidosis: Known history, recently cellcept and methotrexate stopped s/t worsened renal function. No known previous renal manifestation.  It is not very clear if she has been following up with the pulmonary, will need pulmonary evaluation.  Consider steroids.  Rheumatoid arthritis: She was on methotrexate and CellCept, currently she has been off for about a month.  May need close follow-up with rheumatology.  Anemia: Check iron stores.  May be secondary to methotrexate.      Risk and complexity: High      Plan:  Continue IV fluid rehydration, increase NS to 200mL/hr. Hypercalcemia routine workup pending. Strict intake and output. Avoid nephrotoxic medications.  If renal function improves will consider Aredia, once her volume is replete will consider diuretics.  She will need routine cancer screening, will defer it to the primary care.  Continue with rest of the current treatment plan and surveillance labs.  Details were discussed with the patient as well as family in the room.  Daughter at the bedside.  Details were also discussed with the hospitalist service.   Further recommendations will depend on clinical course of the patient during the current hospitalization.    I also discussed the details with the nursing staff.  Rest as ordered.    In closing, I sincerely appreciate opportunity to participate in care of this patient. If I can be of any further assistance with the management of this patient, please don’t hesitate to contact me.    Panfilo Jo MD,  RUDY    04/08/24  07:12 EDT    Dictated using Dragon.              Electronically signed by Panfilo Jo MD, FASN at 04/08/24 0218

## 2024-04-10 NOTE — PROGRESS NOTES
[facility] Progress Note  [facility]. [place]        Patient Name: Jamia Hinton  : 1965  MRN: [id number]   LOS: 0 days    Patient Care Team:  Jaycee Deal APRN as PCP - General (Family Medicine)  Bassam Rogers MD as Consulting Physician (General Surgery)    Chief Complaint:    Chief Complaint   Patient presents with    Weakness - Generalized     Pt reports worsening weakness since yesterday with dry heaves. Pt reports feeling SOA. Denies CP      Primary Care Physician:  Jaycee Deal APRN  Date of admission: 2024    Subjective     Interval History:   Follow-up acute kidney injury and hypercalcemia  Events noted from last 24 hours.    I reviewed the chart and other providers notes, labs and procedures done since my last note.  She is doing well this morning. She is sitting up in the chair, daughter at bedside. She denies shortness of breath, chest pain, abdominal pain, dysuria or swelling. She complains she isn't sleeping well because she is urinating all night.  She is also complaining some shortness of breath when she is walking around.  She said she has been drinking a lot of water.    Review of Systems:   As noted above.    Objective     Vitals:   Temp:  [97.7 °F (36.5 °C)-98 °F (36.7 °C)] 97.7 °F (36.5 °C)  Heart Rate:  [61-73] 61  Resp:  [17-18] 18  BP: (113-158)/(63-82) 141/75    Intake/Output Summary (Last 24 hours) at 4/10/2024 0722  Last data filed at 2024 2343  Gross per 24 hour   Intake 720 ml   Output 1200 ml   Net -480 ml       Physical Exam:    General Appearance: alert, oriented x 3, no acute distress   Skin: warm and dry  HEENT: oral mucosa normal, nonicteric sclera  Neck: supple, no JVD  Lungs: Diminished bases bilaterally  Heart: RRR, normal S1 and S2  Abdomen: obese, soft, nontender, non distended and positive bowel sounds.  : no palpable bladder  Extremities: Trace edema, no cyanosis or clubbing  Neuro: normal speech and mental  status     Scheduled Meds:     Current Facility-Administered Medications   Medication Dose Route Frequency Provider Last Rate Last Admin    acetaminophen (TYLENOL) tablet 650 mg  650 mg Oral Q4H PRN Jackie Kay DO   650 mg at 04/10/24 0204    Or    acetaminophen (TYLENOL) 160 MG/5ML oral solution 650 mg  650 mg Oral Q4H PRN Jackie Kay DO        Or    acetaminophen (TYLENOL) suppository 650 mg  650 mg Rectal Q4H PRN Jackie Kay DO        ALPRAZolam (XANAX) tablet 0.5 mg  0.5 mg Oral Q8H PRN Jackie Kay DO        aluminum-magnesium hydroxide-simethicone (MAALOX MAX) 400-400-40 MG/5ML suspension 15 mL  15 mL Oral Q6H PRN Jackie Kay DO        sennosides-docusate (PERICOLACE) 8.6-50 MG per tablet 2 tablet  2 tablet Oral BID Jackie Kay DO        And    polyethylene glycol (MIRALAX) packet 17 g  17 g Oral Daily PRN Jackie Kay DO        And    bisacodyl (DULCOLAX) EC tablet 5 mg  5 mg Oral Daily PRN Jackie Kay DO        And    bisacodyl (DULCOLAX) suppository 10 mg  10 mg Rectal Daily PRN Jackie Kay DO        heparin (porcine) 5000 UNIT/ML injection 5,000 Units  5,000 Units Subcutaneous Q8H Daniela Ji APRN        iron polysaccharides (NIFEREX) capsule 150 mg  150 mg Oral Daily Panfilo Jo MD, FASN   150 mg at 04/09/24 1250    melatonin tablet 5 mg  5 mg Oral Nightly PRN Jacike Kay DO        nitroglycerin (NITROSTAT) SL tablet 0.4 mg  0.4 mg Sublingual Q5 Min PRN Jackie Kay DO        ondansetron ODT (ZOFRAN-ODT) disintegrating tablet 4 mg  4 mg Oral Q6H PRN Jackie Kay DO        Or    ondansetron (ZOFRAN) injection 4 mg  4 mg Intravenous Q6H PRN Jackie Kay DO   4 mg at 04/08/24 1935    sodium chloride 0.9 % flush 10 mL  10 mL Intravenous PRN Jackie Kay DO        sodium chloride 0.9 % flush 10 mL  10 mL Intravenous Q12H Jackie Kay  Serge, DO   10 mL at 04/09/24 1024    sodium chloride 0.9 % flush 10 mL  10 mL Intravenous PRN Jackie Kay, DO        sodium chloride 0.9 % infusion 40 mL  40 mL Intravenous PRN Jackie Kay, DO        sodium chloride 0.9 % infusion  200 mL/hr Intravenous Continuous SavannaEzio, APRN 200 mL/hr at 04/10/24 0159 200 mL/hr at 04/10/24 0159       heparin (porcine), 5,000 Units, Subcutaneous, Q8H  iron polysaccharides, 150 mg, Oral, Daily  senna-docusate sodium, 2 tablet, Oral, BID  sodium chloride, 10 mL, Intravenous, Q12H        IV Meds:   sodium chloride, 200 mL/hr, Last Rate: 200 mL/hr (04/10/24 0159)        Results Reviewed:   I have personally reviewed the results from the time of this admission to 4/10/2024 07:22 EDT     Results from last 7 days   Lab Units 04/10/24  0548 04/09/24  0639 04/08/24  0529 04/07/24  1346   SODIUM mmol/L 139 141 138 138   POTASSIUM mmol/L 4.2 4.4 4.1 4.1   CHLORIDE mmol/L 111* 110* 103 99   CO2 mmol/L 18.4* 20.6* 25.6 26.6   BUN mg/dL 27* 28* 25* 29*   CREATININE mg/dL 3.17* 3.40* 3.75* 3.72*   CALCIUM mg/dL 10.5 10.3 12.0* 13.9*   BILIRUBIN mg/dL  --   --   --  0.4   ALK PHOS U/L  --   --   --  88   ALT (SGPT) U/L  --   --   --  12   AST (SGOT) U/L  --   --   --  21   GLUCOSE mg/dL 87 88 88 91       Estimated Creatinine Clearance: 24.5 mL/min (A) (by C-G formula based on SCr of 3.17 mg/dL (H)).    Results from last 7 days   Lab Units 04/10/24  0548 04/07/24  1346   MAGNESIUM mg/dL  --  2.4   PHOSPHORUS mg/dL 3.6  --        Results from last 7 days   Lab Units 04/08/24  0529   URIC ACID mg/dL 10.8*       Results from last 7 days   Lab Units 04/07/24  1346   WBC 10*3/mm3 10.57   HEMOGLOBIN g/dL 10.2*   PLATELETS 10*3/mm3 334      Latest Reference Range & Units 04/08/24 05:29   Iron 37 - 145 mcg/dL 27 (L)   Iron Saturation (TSAT) 20 - 50 % 11 (L)   Transferrin 200 - 360 mg/dL 163 (L)   TIBC 298 - 536 mcg/dL 243 (L)   (L): Data is abnormally low        Brief  Urine Lab Results  (Last result in the past 365 days)        Color   Clarity   Blood   Leuk Est   Nitrite   Protein   CREAT   Urine HCG        04/08/24 0707             29.9                 Protein/Creatinine Ratio, Urine   Date Value Ref Range Status   04/07/2024 371.0 (H) 0.0 - 200.0 mg/G Crea Final       Imaging Results (Last 24 Hours)       ** No results found for the last 24 hours. **                Assessment / Plan     ASSESSMENT:  Acute kidney injury: With possible h/o CKD, baseline unknown. Last month patient noted with worsened renal function, sCr 2.29 at that time. Previous to that, labs in chart from 2020 demonstrate normal renal function. sCr 3.7 upon presentation with hypercalcemia; other e'lytes normal. Component of dehydration, progressively poor oral intake. No obstruction on imaging. UA bland. Further concern for nephrocalcinosis s/t sarcoidosis with hypercalcemia and nephrolithiasis. It is starting to show signs of improvement after aggressive fluid resuscitation  Hypercalcemia: Routine workup has been ordered including PTH, PTH related peptide, vitamin D levels, 24-hour urine protein electrophoresis, as well as ionized calcium. PTH adequately suppressed; Vitamin D was low normal. Calcium since stabilized secondary to calcitonin.   Sarcoidosis: Known history, recently cellcept and methotrexate stopped s/t worsened renal function. No known previous renal manifestation.  It is not very clear if she has been following up with the pulmonary, will need pulmonary evaluation.  Consider steroids.  Rheumatoid arthritis: She was on methotrexate and CellCept, currently she has been off for about a month.  May need close follow-up with rheumatology.  Anemia: Appears secondary to low iron stores. Continue oral iron supplementation        PLAN:  Decrease IV fluid she appears to be well-hydrated.  I will decrease NS at 100mL/hr. Hypercalcemia routine workup noted. Strict intake and output. Avoid nephrotoxic  medications.  If renal function improves will consider Aredia, once her volume is replete will consider diuretics.  Her renal function appears to be stabilizing, continued improvement noted today with sCr down to 3.17; she is still in a negative fluid balance we will give 2 doses of diuretics.  She will need routine cancer screening due to hypercalcemia, will defer it to the primary care.  Details were discussed with the patient as well as family in the room.  Daughter at the bedside.  Details were also discussed with the hospitalist service and or other providers as needed.   Continue with rest of the current treatment plan, and monitor with surveillance labs.  Further recommendations will depend on clinical course of the patient during the current hospitalization.   I have reviewed the copied text to this note, it was edited and the changes made as needed.  It is accurate to the point, when the note was signed today.     Thank you for involving us in the care of Jamia Hinton.  Please feel free to call with any questions.    Panfilo Jo MD, RUDY  04/10/24  07:22 EDT    [facility]  [phone]      Part of this note may be an electronic transcription/translation of spoken language to printed text using the Dragon Dictation System.

## 2024-04-10 NOTE — PROGRESS NOTES
[facility] HOSPITALIST    PROGRESS NOTE    Name:  Jamia Hinton   Age:  58 y.o.  Sex:  female  :  1965  MRN:  [id number]   Visit Number:  [id number]  Admission Date:  2024  Date Of Service:  04/10/24  Primary Care Physician:  Jaycee Deal APRN     LOS: 0 days :    Chief Complaint:      Fatigue/poor appetite    Subjective:    Patient seen and examined with daughter at bedside.  She is very tearful and grateful for her care here.  She does want to initiate care with Dr. Gloria upon discharge.  Otherwise feeling improved with better appetite.  Advised will need additional fluids/diuresis per Nephrology.     Hospital Course:    58-year-old female with history of sarcoidosis, rheumatoid arthritis, obesity, who presented to the emergency room with complaints of decreased oral intake, early satiety, weakness and nausea for multiple months now.  Recently told about decreased kidney function with primary care provider and all of her medications including CellCept and methotrexate were discontinued.  Recently underwent an outpatient EGD with Dr. Rogers, reportedly had esophagitis.     Workup in the emergency room was showing acute on chronic renal failure with creatinine 3.72 and also with a calcium of 13.9.  Potassium was 4.  A CT scan of the chest without contrast was showing mild mediastinal and hilar adenopathy.  CT scan abdomen pelvis showing right-sided nephrolithiasis without hydronephrosis.  Hospitalist consulted for further medical management.     Patient admitted to the hospitalist service and given IV fluid resuscitation.  Calcium slowly improved.  Dr. Jo followed.  IV hydration continued.  Diuresis and steroids initiated per nephrology.  Creatinine downtrending.    Review of Systems:     All systems were reviewed and negative except as mentioned in subjective, assessment and plan.    Vital Signs:    Temp:  [97.7 °F (36.5 °C)-98.4 °F (36.9 °C)] 98.4 °F (36.9 °C)  Heart Rate:   "[5673] 57  Resp:  [17-20] 20  BP: (113-158)/(63-82) 145/74    Intake and output:    I/O last 3 completed shifts:  In: 720 [P.O.:720]  Out: 2400 [Urine:2400]  I/O this shift:  In: 360 [P.O.:360]  Out: 200 [Urine:200]    Physical Examination:    General Appearance:  Alert and cooperative.  Middle-age female.  Pleasant.  No acute distress.  Tearful at times.   Head:  Atraumatic and normocephalic.   Eyes: Conjunctivae and sclerae normal, no icterus. No pallor.   Throat: No oral lesions, no thrush, oral mucosa moist.   Neck: Supple, trachea midline, no thyromegaly.   Lungs:   Breath sounds heard bilaterally equally.  No wheezing or crackles. No Pleural rub or bronchial breathing.  On room air unlabored.   Heart:  Normal S1 and S2, no murmur, no gallop, no rub. No JVD.   Abdomen:   Normal bowel sounds, no masses, no organomegaly. Soft, nontender, nondistended, no rebound tenderness.   Extremities: Supple, trace bilateral lower extremity edema, no cyanosis, no clubbing.   Skin: No bleeding or rash.   Neurologic: Alert and oriented x 3. No facial asymmetry. Moves all four limbs. No tremors.      Laboratory results:    Results from last 7 days   Lab Units 04/10/24  0548 04/09/24  0639 04/08/24  0529 04/07/24  1346   SODIUM mmol/L 139 141 138 138   POTASSIUM mmol/L 4.2 4.4 4.1 4.1   CHLORIDE mmol/L 111* 110* 103 99   CO2 mmol/L 18.4* 20.6* 25.6 26.6   BUN mg/dL 27* 28* 25* 29*   CREATININE mg/dL 3.17* 3.40* 3.75* 3.72*   CALCIUM mg/dL 10.5 10.3 12.0* 13.9*   BILIRUBIN mg/dL  --   --   --  0.4   ALK PHOS U/L  --   --   --  88   ALT (SGPT) U/L  --   --   --  12   AST (SGOT) U/L  --   --   --  21   GLUCOSE mg/dL 87 88 88 91     Results from last 7 days   Lab Units 04/07/24  1346   WBC 10*3/mm3 10.57   HEMOGLOBIN g/dL 10.2*   HEMATOCRIT % 32.6*   PLATELETS 10*3/mm3 334         Results from last 7 days   Lab Units 04/07/24  1346   HSTROP T ng/L 18*         No results for input(s): \"PHART\", \"DBM9TJZ\", \"PO2ART\", \"WII6BGT\", " "\"BASEEXCESS\" in the last 8760 hours.   I have reviewed the patient's laboratory results.    Radiology results:    No radiology results from the last 24 hrs  I have reviewed the patient's radiology reports.    Medication Review:     I have reviewed the patient's active and prn medications.     Problem List:      Acute-on-chronic kidney injury      Assessment:    Acute kidney injury, likely chronic kidney disease, baseline unknown, POA  Hypercalcemia, POA  History of sarcoidosis, POA  Rheumatoid arthritis  Obesity    Plan:    Kidney failure/hypercalcemia:  -Definitely appears to have acute kidney injury when compared to labs obtained just 2 weeks ago in the emergency room with creatinine at that time 2.2  -Most recent chemistry panel I can find otherwise in epic currently was from 2020 and her creatinine was 0.9 at that time.    She does have symptomatology of hypercalcemia.  Calcium is improving.  Will continue with IV fluid resuscitation per nephrology recommendations.    - Definitely concerned that sarcoidosis is led to hypercalcemia.    -Diuresis and Solu-Medrol initiated per nephrology.  -Creatinine downtrending.     Sarcoidosis:  Patient previously under the care of Dr. Da Silva, currently seeing Dr. Crespo.    -Requesting pulmonology in [place].  Will order upon discharge with Dr. Elkins.   - Patient's primary care provider did stop her medications including CellCept and methotrexate about a month ago due to her kidney failure.  Uncontrolled, worsening sarcoidosis could be leading to the hypercalcemia.     DVT Prophylaxis: Heparin  Code Status: Full  Diet: Renal  Discharge Plan: Likely home in 1 to 2 days.    I have reviewed the copied text and it is accurate as of 4/10/2024       Daniela Ji, APRN  04/10/24  10:44 EDT    Dictated utilizing Dragon dictation.    "

## 2024-04-10 NOTE — PLAN OF CARE
Goal Outcome Evaluation:  Plan of Care Reviewed With: patient        Progress: improving  Outcome Evaluation: No acute events overnight. Plan of care continued.

## 2024-04-11 LAB
1,25(OH)2D SERPL-MCNC: 205.5 PG/ML (ref 24.8–81.5)
ALBUMIN 24H MFR UR ELPH: 12.2 %
ALBUMIN SERPL-MCNC: 3.5 G/DL (ref 3.5–5.2)
ALPHA1 GLOB 24H MFR UR ELPH: 4.4 %
ALPHA2 GLOB 24H MFR UR ELPH: 18.8 %
ANION GAP SERPL CALCULATED.3IONS-SCNC: 11.8 MMOL/L (ref 5–15)
B-GLOBULIN 24H MFR UR ELPH: 41.4 %
BUN SERPL-MCNC: 35 MG/DL (ref 8–23)
BUN/CREAT SERPL: 11.7 (ref 7–25)
CALCIUM SPEC-SCNC: 10.8 MG/DL (ref 8.6–10.5)
CHLORIDE SERPL-SCNC: 108 MMOL/L (ref 98–107)
CO2 SERPL-SCNC: 20.2 MMOL/L (ref 22–29)
CREAT SERPL-MCNC: 2.98 MG/DL (ref 0.57–1)
EGFRCR SERPLBLD CKD-EPI 2021: 17.4 ML/MIN/1.73
GAMMA GLOB 24H MFR UR ELPH: 23.2 %
GLUCOSE SERPL-MCNC: 96 MG/DL (ref 65–99)
LABORATORY COMMENT REPORT: ABNORMAL
M PROTEIN 24H MFR UR ELPH: ABNORMAL %
PHOSPHATE SERPL-MCNC: 3.5 MG/DL (ref 2.5–4.5)
POTASSIUM SERPL-SCNC: 3.7 MMOL/L (ref 3.5–5.2)
PROT 24H UR-MRATE: 445 MG/24 HR (ref 30–150)
PROT UR-MCNC: 11.5 MG/DL
SODIUM SERPL-SCNC: 140 MMOL/L (ref 136–145)

## 2024-04-11 PROCEDURE — 80069 RENAL FUNCTION PANEL: CPT | Performed by: NURSE PRACTITIONER

## 2024-04-11 PROCEDURE — 99222 1ST HOSP IP/OBS MODERATE 55: CPT | Performed by: INTERNAL MEDICINE

## 2024-04-11 PROCEDURE — 99232 SBSQ HOSP IP/OBS MODERATE 35: CPT | Performed by: NURSE PRACTITIONER

## 2024-04-11 PROCEDURE — 25010000002 HEPARIN (PORCINE) PER 1000 UNITS: Performed by: NURSE PRACTITIONER

## 2024-04-11 PROCEDURE — 25010000002 METHYLPREDNISOLONE PER 125 MG: Performed by: INTERNAL MEDICINE

## 2024-04-11 PROCEDURE — 25810000003 SODIUM CHLORIDE 0.9 % SOLUTION: Performed by: INTERNAL MEDICINE

## 2024-04-11 PROCEDURE — 25010000002 FUROSEMIDE PER 20 MG: Performed by: INTERNAL MEDICINE

## 2024-04-11 RX ORDER — FUROSEMIDE 10 MG/ML
80 INJECTION INTRAMUSCULAR; INTRAVENOUS ONCE
Status: COMPLETED | OUTPATIENT
Start: 2024-04-11 | End: 2024-04-11

## 2024-04-11 RX ORDER — POTASSIUM CHLORIDE 750 MG/1
20 CAPSULE, EXTENDED RELEASE ORAL ONCE
Status: COMPLETED | OUTPATIENT
Start: 2024-04-11 | End: 2024-04-11

## 2024-04-11 RX ADMIN — Medication 10 ML: at 20:10

## 2024-04-11 RX ADMIN — POTASSIUM CHLORIDE 20 MEQ: 750 CAPSULE, EXTENDED RELEASE ORAL at 09:10

## 2024-04-11 RX ADMIN — HEPARIN SODIUM 5000 UNITS: 5000 INJECTION INTRAVENOUS; SUBCUTANEOUS at 21:27

## 2024-04-11 RX ADMIN — SODIUM CHLORIDE 100 ML/HR: 9 INJECTION, SOLUTION INTRAVENOUS at 00:14

## 2024-04-11 RX ADMIN — METHYLPREDNISOLONE SODIUM SUCCINATE 80 MG: 125 INJECTION, POWDER, FOR SOLUTION INTRAMUSCULAR; INTRAVENOUS at 09:10

## 2024-04-11 RX ADMIN — Medication 150 MG: at 09:10

## 2024-04-11 RX ADMIN — Medication 10 ML: at 09:11

## 2024-04-11 RX ADMIN — SODIUM CHLORIDE 100 ML/HR: 9 INJECTION, SOLUTION INTRAVENOUS at 20:10

## 2024-04-11 RX ADMIN — HEPARIN SODIUM 5000 UNITS: 5000 INJECTION INTRAVENOUS; SUBCUTANEOUS at 05:45

## 2024-04-11 RX ADMIN — FUROSEMIDE 80 MG: 10 INJECTION, SOLUTION INTRAMUSCULAR; INTRAVENOUS at 16:16

## 2024-04-11 RX ADMIN — SODIUM CHLORIDE 100 ML/HR: 9 INJECTION, SOLUTION INTRAVENOUS at 12:08

## 2024-04-11 RX ADMIN — HEPARIN SODIUM 5000 UNITS: 5000 INJECTION INTRAVENOUS; SUBCUTANEOUS at 16:16

## 2024-04-11 NOTE — PROGRESS NOTES
[facility] Progress Note  [facility]. [place]        Patient Name: Jamia Hinton  : 1965  MRN: [id number]   LOS: 1 day    Patient Care Team:  Jaycee Deal APRN as PCP - General (Family Medicine)  Bassam Rogers MD as Consulting Physician (General Surgery)    Chief Complaint:    Chief Complaint   Patient presents with    Weakness - Generalized     Pt reports worsening weakness since yesterday with dry heaves. Pt reports feeling SOA. Denies CP      Primary Care Physician:  Jaycee Deal APRN  Date of admission: 2024    Subjective     Interval History:   Follow-up acute kidney injury and hypercalcemia  Events noted from last 24 hours.    I reviewed the chart and other providers notes, labs and procedures done since my last note.  She is doing well. She is sitting up in the chair, her son is at bedside. She reports she is drinking a lot of water still, urinating a lot. Denies shortness of breath, chest pain, swelling or dysuria    Review of Systems:   As noted above.    Objective     Vitals:   Temp:  [97.9 °F (36.6 °C)-98.8 °F (37.1 °C)] 97.9 °F (36.6 °C)  Heart Rate:  [52-77] 75  Resp:  [16-18] 16  BP: (137-169)/(54-89) 169/89    Intake/Output Summary (Last 24 hours) at 2024  Last data filed at 2024  Gross per 24 hour   Intake 1840 ml   Output 3850 ml   Net -2010 ml       Physical Exam:    General Appearance: alert, oriented x 3, no acute distress   Skin: warm and dry  HEENT: oral mucosa normal, nonicteric sclera  Neck: supple, no JVD  Lungs: Diminished bases bilaterally  Heart: RRR, normal S1 and S2  Abdomen: obese, soft, nontender, non distended and positive bowel sounds.  : no palpable bladder  Extremities: Trace edema, no cyanosis or clubbing  Neuro: normal speech and mental status     Scheduled Meds:     Current Facility-Administered Medications   Medication Dose Route Frequency Provider Last Rate Last Admin    acetaminophen (TYLENOL)  tablet 650 mg  650 mg Oral Q4H PRN Jackie Kay DO   650 mg at 04/10/24 0204    Or    acetaminophen (TYLENOL) 160 MG/5ML oral solution 650 mg  650 mg Oral Q4H PRN Jackie Kay DO        Or    acetaminophen (TYLENOL) suppository 650 mg  650 mg Rectal Q4H PRN Jackie Kay DO        ALPRAZolam (XANAX) tablet 0.5 mg  0.5 mg Oral Q8H PRN Jackie Kay DO        aluminum-magnesium hydroxide-simethicone (MAALOX MAX) 400-400-40 MG/5ML suspension 15 mL  15 mL Oral Q6H PRN Jackie Kay DO        sennosides-docusate (PERICOLACE) 8.6-50 MG per tablet 2 tablet  2 tablet Oral BID Jackie Kay DO        And    polyethylene glycol (MIRALAX) packet 17 g  17 g Oral Daily PRN Jackie Kay DO        And    bisacodyl (DULCOLAX) EC tablet 5 mg  5 mg Oral Daily PRN Jackie Kay DO        And    bisacodyl (DULCOLAX) suppository 10 mg  10 mg Rectal Daily PRN Jackie Kay DO        heparin (porcine) 5000 UNIT/ML injection 5,000 Units  5,000 Units Subcutaneous Q8H Daniela Ji APRN   5,000 Units at 04/11/24 1616    iron polysaccharides (NIFEREX) capsule 150 mg  150 mg Oral Daily Panfilo Jo MD, FASN   150 mg at 04/11/24 0910    melatonin tablet 5 mg  5 mg Oral Nightly PRN Jackie Kay DO        nitroglycerin (NITROSTAT) SL tablet 0.4 mg  0.4 mg Sublingual Q5 Min PRN Jackie Kay DO        ondansetron ODT (ZOFRAN-ODT) disintegrating tablet 4 mg  4 mg Oral Q6H PRN Jackie Kay DO        Or    ondansetron (ZOFRAN) injection 4 mg  4 mg Intravenous Q6H PRN Jackie Kay DO   4 mg at 04/08/24 1935    sodium chloride 0.9 % flush 10 mL  10 mL Intravenous PRN Jackie Kay, DO        sodium chloride 0.9 % flush 10 mL  10 mL Intravenous Q12H Jackie Kay, DO   10 mL at 04/11/24 2010    sodium chloride 0.9 % flush 10 mL  10 mL Intravenous PRN Jackie Kay, DO        sodium  chloride 0.9 % infusion 40 mL  40 mL Intravenous MILLERN Jackie Kay DO        sodium chloride 0.9 % infusion  100 mL/hr Intravenous Continuous Panfilo Jo MD, FASHCAS 100 mL/hr at 04/11/24 2010 100 mL/hr at 04/11/24 2010       heparin (porcine), 5,000 Units, Subcutaneous, Q8H  iron polysaccharides, 150 mg, Oral, Daily  senna-docusate sodium, 2 tablet, Oral, BID  sodium chloride, 10 mL, Intravenous, Q12H        IV Meds:   sodium chloride, 100 mL/hr, Last Rate: 100 mL/hr (04/11/24 2010)        Results Reviewed:   I have personally reviewed the results from the time of this admission to 4/11/2024 20:45 EDT     Results from last 7 days   Lab Units 04/11/24  0729 04/10/24  1349 04/10/24  0548 04/08/24  0529 04/07/24  1346   SODIUM mmol/L 140 138 139   < > 138   POTASSIUM mmol/L 3.7 4.3 4.2   < > 4.1   CHLORIDE mmol/L 108* 107 111*   < > 99   CO2 mmol/L 20.2* 18.6* 18.4*   < > 26.6   BUN mg/dL 35* 29* 27*   < > 29*   CREATININE mg/dL 2.98* 3.04* 3.17*   < > 3.72*   CALCIUM mg/dL 10.8* 11.2* 10.5   < > 13.9*   BILIRUBIN mg/dL  --   --   --   --  0.4   ALK PHOS U/L  --   --   --   --  88   ALT (SGPT) U/L  --   --   --   --  12   AST (SGOT) U/L  --   --   --   --  21   GLUCOSE mg/dL 96 153* 87   < > 91    < > = values in this interval not displayed.       Estimated Creatinine Clearance: 26.1 mL/min (A) (by C-G formula based on SCr of 2.98 mg/dL (H)).    Results from last 7 days   Lab Units 04/11/24  0729 04/10/24  1349 04/10/24  0548 04/07/24  1346   MAGNESIUM mg/dL  --   --   --  2.4   PHOSPHORUS mg/dL 3.5 2.8 3.6  --        Results from last 7 days   Lab Units 04/08/24  0529   URIC ACID mg/dL 10.8*       Results from last 7 days   Lab Units 04/07/24  1346   WBC 10*3/mm3 10.57   HEMOGLOBIN g/dL 10.2*   PLATELETS 10*3/mm3 334      Latest Reference Range & Units 04/08/24 05:29   Iron 37 - 145 mcg/dL 27 (L)   Iron Saturation (TSAT) 20 - 50 % 11 (L)   Transferrin 200 - 360 mg/dL 163 (L)   TIBC 298 - 536 mcg/dL 243 (L)  "  (L): Data is abnormally low        Brief Urine Lab Results  (Last result in the past 365 days)        Color   Clarity   Blood   Leuk Est   Nitrite   Protein   CREAT   Urine HCG        04/08/24 0707             29.9                 No results found for: \"UTPCR\"      Imaging Results (Last 24 Hours)       ** No results found for the last 24 hours. **                Assessment / Plan     ASSESSMENT:  Acute kidney injury: With possible h/o CKD, baseline unknown. Last month patient noted with worsened renal function, sCr 2.29 at that time. Previous to that, labs in chart from 2020 demonstrate normal renal function. sCr 3.7 upon presentation with hypercalcemia; other e'lytes normal. Component of dehydration, progressively poor oral intake. No obstruction on imaging. UA bland. Further concern for nephrocalcinosis s/t sarcoidosis with hypercalcemia and nephrolithiasis. It is starting to show signs of improvement after aggressive fluid resuscitation  Hypercalcemia: Routine workup has been ordered including PTH, PTH related peptide, vitamin D levels, 24-hour urine protein electrophoresis, as well as ionized calcium. PTH adequately suppressed; Vitamin D was low normal. Calcium since stabilized secondary to calcitonin.   Sarcoidosis: Known history, recently cellcept and methotrexate stopped s/t worsened renal function. No known previous renal manifestation.  It is not very clear if she has been following up with the pulmonary, will need pulmonary evaluation.  Pulmonary consult is pending  Rheumatoid arthritis: She was on methotrexate and CellCept, currently she has been off for about a month.  May need close follow-up with rheumatology.  Anemia: Appears secondary to low iron stores. Continue oral iron supplementation        PLAN:  Continue IV rehydration with NS at 100mL/hr. Hypercalcemia routine workup noted. Strict intake and output. Avoid nephrotoxic medications.  If renal function improves will consider Aredia, once her " volume is replete will consider diuretics.  Her renal function appears to be stabilizing, continued improvement noted today with sCr down to 2.9; will give repeat dose of IV lasix 80mg as she responded well.  He is already received 2 doses of IV Solu-Medrol, awaiting pulmonary consultation.  May give another dose of IV or switch to oral steroids in the morning.  She will need routine cancer screening due to hypercalcemia, will defer it to the primary care.  Details were discussed with the patient as well as family in the room.    Details were also discussed with the hospitalist service and or other providers as needed.   Continue with rest of the current treatment plan, and monitor with surveillance labs.  Further recommendations will depend on clinical course of the patient during the current hospitalization.   I have reviewed the copied text to this note, it was edited and the changes made as needed.  It is accurate to the point, when the note was signed today.     Thank you for involving us in the care of Jamia Hinton.  Please feel free to call with any questions.    Panfilo Jo MD, FASN  04/11/24  20:45 EDT    [facility]  [phone]      Part of this note may be an electronic transcription/translation of spoken language to printed text using the Dragon Dictation System.

## 2024-04-11 NOTE — PLAN OF CARE
Problem: Adult Inpatient Plan of Care  Goal: Plan of Care Review  Outcome: Ongoing, Progressing  Goal: Patient-Specific Goal (Individualized)  Outcome: Ongoing, Progressing  Goal: Absence of Hospital-Acquired Illness or Injury  Outcome: Ongoing, Progressing  Intervention: Identify and Manage Fall Risk  Recent Flowsheet Documentation  Taken 4/11/2024 1600 by D'Amico, Kelly, RN  Safety Promotion/Fall Prevention:   nonskid shoes/slippers when out of bed   safety round/check completed  Taken 4/11/2024 1400 by D'Amico, Kelly, RN  Safety Promotion/Fall Prevention:   nonskid shoes/slippers when out of bed   safety round/check completed  Taken 4/11/2024 1200 by D'Amico, Kelly, RN  Safety Promotion/Fall Prevention:   nonskid shoes/slippers when out of bed   safety round/check completed  Taken 4/11/2024 1000 by D'Amico, Kelly, RN  Safety Promotion/Fall Prevention:   nonskid shoes/slippers when out of bed   safety round/check completed  Taken 4/11/2024 0800 by D'Amico, Kelly, RN  Safety Promotion/Fall Prevention:   assistive device/personal items within reach   clutter free environment maintained   fall prevention program maintained   lighting adjusted   mobility aid in reach   nonskid shoes/slippers when out of bed   room organization consistent   safety round/check completed  Taken 4/11/2024 0700 by D'Amico, Kelly, RN  Safety Promotion/Fall Prevention:   nonskid shoes/slippers when out of bed   safety round/check completed  Intervention: Prevent Skin Injury  Recent Flowsheet Documentation  Taken 4/11/2024 0800 by D'Amico, Kelly, RN  Body Position: position changed independently  Skin Protection:   adhesive use limited   drying agents applied   hydrocolloids used   incontinence pads utilized   skin-to-skin areas padded   skin-to-device areas padded  Intervention: Prevent and Manage VTE (Venous Thromboembolism) Risk  Recent Flowsheet Documentation  Taken 4/11/2024 0800 by D'Amico, Kelly, RN  Activity Management: up ad  magalie  Intervention: Prevent Infection  Recent Flowsheet Documentation  Taken 4/11/2024 0800 by D'Amico, Kelly, RN  Infection Prevention:   cohorting utilized   equipment surfaces disinfected   environmental surveillance performed   hand hygiene promoted   rest/sleep promoted   single patient room provided  Goal: Optimal Comfort and Wellbeing  Outcome: Ongoing, Progressing  Intervention: Provide Person-Centered Care  Recent Flowsheet Documentation  Taken 4/11/2024 0800 by D'Amico, Kelly, RN  Trust Relationship/Rapport:   care explained   choices provided   emotional support provided   empathic listening provided   questions answered   questions encouraged   reassurance provided   thoughts/feelings acknowledged  Goal: Readiness for Transition of Care  Outcome: Ongoing, Progressing     Problem: Fluid and Electrolyte Imbalance (Acute Kidney Injury/Impairment)  Goal: Fluid and Electrolyte Balance  Outcome: Ongoing, Progressing  Intervention: Monitor and Manage Fluid and Electrolyte Balance  Recent Flowsheet Documentation  Taken 4/11/2024 0800 by D'Amico, Kelly, RN  Fluid/Electrolyte Management: fluids provided     Problem: Oral Intake Inadequate (Acute Kidney Injury/Impairment)  Goal: Optimal Nutrition Intake  Outcome: Ongoing, Progressing  Intervention: Promote and Optimize Nutrition  Recent Flowsheet Documentation  Taken 4/11/2024 0800 by D'Amico, Kelly, RN  Oral Nutrition Promotion: rest periods promoted     Problem: Renal Function Impairment (Acute Kidney Injury/Impairment)  Goal: Effective Renal Function  Outcome: Ongoing, Progressing  Intervention: Monitor and Support Renal Function  Recent Flowsheet Documentation  Taken 4/11/2024 0800 by D'Amico, Kelly, RN  Medication Review/Management:   medications reviewed   high-risk medications identified     Problem: Electrolyte Imbalance  Goal: Electrolyte Balance  Outcome: Ongoing, Progressing  Intervention: Monitor and Manage Electrolyte Imbalance  Recent Flowsheet  Documentation  Taken 4/11/2024 0800 by D'Amico, Kelly, RN  Fluid/Electrolyte Management: fluids provided     Problem: Depression  Goal: Improved Mood  Outcome: Ongoing, Progressing  Intervention: Monitor and Manage Depressive Symptoms  Recent Flowsheet Documentation  Taken 4/11/2024 0800 by D'Amico, Kelly, RN  Supportive Measures:   active listening utilized   counseling provided   decision-making supported   journaling promoted   positive reinforcement provided   problem-solving facilitated   relaxation techniques promoted   self-responsibility promoted   verbalization of feelings encouraged   self-reflection promoted   self-care encouraged   guided imagery facilitated   goal-setting facilitated     Problem: Fall Injury Risk  Goal: Absence of Fall and Fall-Related Injury  Outcome: Ongoing, Progressing  Intervention: Identify and Manage Contributors  Recent Flowsheet Documentation  Taken 4/11/2024 0800 by D'Amico, Kelly, RN  Medication Review/Management:   medications reviewed   high-risk medications identified  Self-Care Promotion:   independence encouraged   BADL personal objects within reach   BADL personal routines maintained   meal set-up provided   safe use of adaptive equipment encouraged  Intervention: Promote Injury-Free Environment  Recent Flowsheet Documentation  Taken 4/11/2024 1600 by D'Amico, Kelly, RN  Safety Promotion/Fall Prevention:   nonskid shoes/slippers when out of bed   safety round/check completed  Taken 4/11/2024 1400 by D'Amico, Kelly, RN  Safety Promotion/Fall Prevention:   nonskid shoes/slippers when out of bed   safety round/check completed  Taken 4/11/2024 1200 by D'Amico, Kelly, RN  Safety Promotion/Fall Prevention:   nonskid shoes/slippers when out of bed   safety round/check completed  Taken 4/11/2024 1000 by D'Amico, Kelly, RN  Safety Promotion/Fall Prevention:   nonskid shoes/slippers when out of bed   safety round/check completed  Taken 4/11/2024 0800 by D'Amico, Kelly, RN  Safety  Promotion/Fall Prevention:   assistive device/personal items within reach   clutter free environment maintained   fall prevention program maintained   lighting adjusted   mobility aid in reach   nonskid shoes/slippers when out of bed   room organization consistent   safety round/check completed  Taken 4/11/2024 0700 by D'Amico, Kelly, RN  Safety Promotion/Fall Prevention:   nonskid shoes/slippers when out of bed   safety round/check completed   Goal Outcome Evaluation:

## 2024-04-11 NOTE — PLAN OF CARE
Problem: Adult Inpatient Plan of Care  Goal: Plan of Care Review  Outcome: Ongoing, Progressing  Goal: Patient-Specific Goal (Individualized)  Outcome: Ongoing, Progressing  Goal: Absence of Hospital-Acquired Illness or Injury  Outcome: Ongoing, Progressing  Intervention: Identify and Manage Fall Risk  Recent Flowsheet Documentation  Taken 4/11/2024 0419 by Genna Hall RN  Safety Promotion/Fall Prevention:   safety round/check completed   toileting scheduled   room organization consistent   activity supervised   assistive device/personal items within reach   clutter free environment maintained  Taken 4/11/2024 0200 by Genna Hall RN  Safety Promotion/Fall Prevention:   safety round/check completed   toileting scheduled   clutter free environment maintained   assistive device/personal items within reach   activity supervised  Taken 4/11/2024 0000 by Genna Hall RN  Safety Promotion/Fall Prevention:   assistive device/personal items within reach   activity supervised   clutter free environment maintained   safety round/check completed   room organization consistent  Taken 4/10/2024 2200 by Genna Hall RN  Safety Promotion/Fall Prevention:   activity supervised   assistive device/personal items within reach   clutter free environment maintained   safety round/check completed   toileting scheduled  Taken 4/10/2024 2000 by Genna Hall RN  Safety Promotion/Fall Prevention:   safety round/check completed   clutter free environment maintained   activity supervised   assistive device/personal items within reach  Intervention: Prevent Skin Injury  Recent Flowsheet Documentation  Taken 4/11/2024 0419 by Genan Hall RN  Body Position:   supine, legs elevated   30 degrees  Taken 4/11/2024 0200 by Genna Hall RN  Body Position:   side-lying   left  Taken 4/11/2024 0000 by Genna Hall RN  Body Position:   side-lying   right   legs elevated  Taken 4/10/2024 2200 by Genna Hall RN  Body  Position:   sitting up in bed   supine, legs elevated   position changed independently  Taken 4/10/2024 2000 by Genna Hall RN  Body Position: sitting up in bed  Intervention: Prevent and Manage VTE (Venous Thromboembolism) Risk  Recent Flowsheet Documentation  Taken 4/10/2024 2200 by Genna Hall, RN  Activity Management: activity encouraged  Taken 4/10/2024 2000 by Genna Hall RN  Activity Management: activity encouraged  VTE Prevention/Management: sequential compression devices off  Goal: Optimal Comfort and Wellbeing  Outcome: Ongoing, Progressing  Goal: Readiness for Transition of Care  Outcome: Ongoing, Progressing     Problem: Fluid and Electrolyte Imbalance (Acute Kidney Injury/Impairment)  Goal: Fluid and Electrolyte Balance  Outcome: Ongoing, Progressing     Problem: Oral Intake Inadequate (Acute Kidney Injury/Impairment)  Goal: Optimal Nutrition Intake  Outcome: Ongoing, Progressing     Problem: Renal Function Impairment (Acute Kidney Injury/Impairment)  Goal: Effective Renal Function  Outcome: Ongoing, Progressing  Intervention: Monitor and Support Renal Function  Recent Flowsheet Documentation  Taken 4/10/2024 2000 by Genna Hall RN  Medication Review/Management: medications reviewed     Problem: Electrolyte Imbalance  Goal: Electrolyte Balance  Outcome: Ongoing, Progressing     Problem: Depression  Goal: Improved Mood  Outcome: Ongoing, Progressing     Problem: Fall Injury Risk  Goal: Absence of Fall and Fall-Related Injury  Outcome: Ongoing, Progressing  Intervention: Identify and Manage Contributors  Recent Flowsheet Documentation  Taken 4/10/2024 2000 by Genna Hall RN  Medication Review/Management: medications reviewed  Intervention: Promote Injury-Free Environment  Recent Flowsheet Documentation  Taken 4/11/2024 0419 by Genna Hall, RN  Safety Promotion/Fall Prevention:   safety round/check completed   toileting scheduled   room organization consistent   activity  "supervised   assistive device/personal items within reach   clutter free environment maintained  Taken 4/11/2024 0200 by Genna Hall RN  Safety Promotion/Fall Prevention:   safety round/check completed   toileting scheduled   clutter free environment maintained   assistive device/personal items within reach   activity supervised  Taken 4/11/2024 0000 by Genna Hall RN  Safety Promotion/Fall Prevention:   assistive device/personal items within reach   activity supervised   clutter free environment maintained   safety round/check completed   room organization consistent  Taken 4/10/2024 2200 by Genna Hall RN  Safety Promotion/Fall Prevention:   activity supervised   assistive device/personal items within reach   clutter free environment maintained   safety round/check completed   toileting scheduled  Taken 4/10/2024 2000 by Genna Hall RN  Safety Promotion/Fall Prevention:   safety round/check completed   clutter free environment maintained   activity supervised   assistive device/personal items within reach   Goal Outcome Evaluation:   Care plan reviewed with patient. Patient's heart rate dropped to as low as 38 around 0408. Patient awake and asymptomatic. Dr. Corbin notified. Per Dr. Corbin, \"Will continue to monitor.\" Patient resting comfortably at this time.                                            "

## 2024-04-11 NOTE — PROGRESS NOTES
[facility] HOSPITALIST    PROGRESS NOTE    Name:  Jamia Hinton   Age:  58 y.o.  Sex:  female  :  1965  MRN:  [id number]   Visit Number:  [id number]  Admission Date:  2024  Date Of Service:  24  Primary Care Physician:  Jaycee Deal APRN     LOS: 1 day :    Chief Complaint:      Fatigue/poor appetite    Subjective:    Patient seen and examined with friend at bedside.  States feeling much improved.  Denies any concerns.  She is anxious about discharge.  Updated would have pulmonology seeing her today.    Hospital Course:    58-year-old female with history of sarcoidosis, rheumatoid arthritis, obesity, who presented to the emergency room with complaints of decreased oral intake, early satiety, weakness and nausea for multiple months now.  Recently told about decreased kidney function with primary care provider and all of her medications including CellCept and methotrexate were discontinued.  Recently underwent an outpatient EGD with Dr. Rogers, reportedly had esophagitis.     Workup in the emergency room was showing acute on chronic renal failure with creatinine 3.72 and also with a calcium of 13.9.  Potassium was 4.  A CT scan of the chest without contrast was showing mild mediastinal and hilar adenopathy.  CT scan abdomen pelvis showing right-sided nephrolithiasis without hydronephrosis.  Hospitalist consulted for further medical management.     Patient admitted to the hospitalist service and given IV fluid resuscitation.  Calcium slowly improved.  Dr. Jo followed.  IV hydration continued.  Diuresis and steroids initiated per nephrology.  Creatinine downtrending.  Otherwise reassuring labs and vitals noted.  Pulmonology also consulted for further recommendations.    Review of Systems:     All systems were reviewed and negative except as mentioned in subjective, assessment and plan.    Vital Signs:    Temp:  [97.6 °F (36.4 °C)-98.3 °F (36.8 °C)] 98 °F (36.7 °C)  Heart  Rate:  [52-77] 77  Resp:  [16-18] 17  BP: (137-167)/(54-86) 155/79    Intake and output:    I/O last 3 completed shifts:  In: 3880 [P.O.:1080; I.V.:2800]  Out: 7200 [Urine:7200]  I/O this shift:  In: 360 [P.O.:360]  Out: 700 [Urine:700]    Physical Examination:    General Appearance:  Alert and cooperative.  Middle-age female.  Pleasant.  No acute distress.  Tearful at times.  Anxious.   Head:  Atraumatic and normocephalic.   Eyes: Conjunctivae and sclerae normal, no icterus. No pallor.   Throat: No oral lesions, no thrush, oral mucosa moist.   Neck: Supple, trachea midline, no thyromegaly.   Lungs:   Breath sounds heard bilaterally equally.  No wheezing or crackles. No Pleural rub or bronchial breathing.  On room air unlabored.   Heart:  Normal S1 and S2, no murmur, no gallop, no rub. No JVD.   Abdomen:   Normal bowel sounds, no masses, no organomegaly. Soft, nontender, nondistended, no rebound tenderness.   Extremities: Supple, trace bilateral lower extremity edema, no cyanosis, no clubbing.   Skin: No bleeding or rash.   Neurologic: Alert and oriented x 3. No facial asymmetry. Moves all four limbs. No tremors.      Laboratory results:    Results from last 7 days   Lab Units 04/11/24  0729 04/10/24  1349 04/10/24  0548 04/08/24  0529 04/07/24  1346   SODIUM mmol/L 140 138 139   < > 138   POTASSIUM mmol/L 3.7 4.3 4.2   < > 4.1   CHLORIDE mmol/L 108* 107 111*   < > 99   CO2 mmol/L 20.2* 18.6* 18.4*   < > 26.6   BUN mg/dL 35* 29* 27*   < > 29*   CREATININE mg/dL 2.98* 3.04* 3.17*   < > 3.72*   CALCIUM mg/dL 10.8* 11.2* 10.5   < > 13.9*   BILIRUBIN mg/dL  --   --   --   --  0.4   ALK PHOS U/L  --   --   --   --  88   ALT (SGPT) U/L  --   --   --   --  12   AST (SGOT) U/L  --   --   --   --  21   GLUCOSE mg/dL 96 153* 87   < > 91    < > = values in this interval not displayed.     Results from last 7 days   Lab Units 04/07/24  1346   WBC 10*3/mm3 10.57   HEMOGLOBIN g/dL 10.2*   HEMATOCRIT % 32.6*   PLATELETS 10*3/mm3  "334         Results from last 7 days   Lab Units 04/07/24  1346   HSTROP T ng/L 18*         No results for input(s): \"PHART\", \"YII1LZR\", \"PO2ART\", \"IKQ4QBR\", \"BASEEXCESS\" in the last 8760 hours.   I have reviewed the patient's laboratory results.    Radiology results:    No radiology results from the last 24 hrs  I have reviewed the patient's radiology reports.    Medication Review:     I have reviewed the patient's active and prn medications.     Problem List:      Acute-on-chronic kidney injury      Assessment:    Acute kidney injury, likely chronic kidney disease, baseline unknown, POA  Hypercalcemia, POA  History of sarcoidosis, POA  Rheumatoid arthritis  Obesity    Plan:    Kidney failure/hypercalcemia:  -Definitely appears to have acute kidney injury when compared to labs obtained just 2 weeks ago in the emergency room with creatinine at that time 2.2  -Most recent chemistry panel I can find otherwise in epic currently was from 2020 and her creatinine was 0.9 at that time.    She does have symptomatology of hypercalcemia.  Calcium is improving.  Will continue with IV fluid resuscitation per nephrology recommendations.    - Definitely concerned that sarcoidosis has led to hypercalcemia.    -Diuresis and Solu-Medrol initiated per nephrology.  -Creatinine downtrending.     Sarcoidosis:  Patient previously under the care of Dr. Da Silva, currently seeing Dr. Crespo.    -Requesting pulmonology in [place].  Will order upon discharge.   - Patient's primary care provider did stop her medications including CellCept and methotrexate about a month ago due to her kidney failure.  Uncontrolled, worsening sarcoidosis could be leading to the hypercalcemia.  -Pulmonology to see today as well.  Appreciate recommendations.     DVT Prophylaxis: Heparin  Code Status: Full  Diet: Renal  Discharge Plan: Likely home in 1 to 2 days.    I have reviewed the copied text and it is accurate as of 4/11/2024       Daniela Ji, " APRN  04/11/24  11:47 EDT    Dictated utilizing Dragon dictation.

## 2024-04-11 NOTE — CASE MANAGEMENT/SOCIAL WORK
0930: CM spoke with pt at bedside. Alert and answers all questions coherently. DCP remain Home with family to transport.

## 2024-04-11 NOTE — CONSULTS
Date of consultation:   April 11, 2024    Requested by:    Hospitalist Service. CLAUDIA oDnald    PCP: Jaycee Deal APRN    Reason:  Sarcoidois    History of Present Illness:  58 y.o. female with history of sarcoidosis, rheumatoid arthritis and presented to the emergency room several days ago complaining of decreased p.o. intake, weakness and nausea and vomiting for a few months.  She states that she was first diagnosed with sarcoidosis about 3 years ago after an eye exam.  She was then referred to pulmonary and saw Dr. Da Silva at [facility] and states that she was initially started on methotrexate, folic acid and mycophenolate for this.  Denies ever being put on a trial of steroids.  However, at that time states she was relatively asymptomatic but only was referred there from her ophthalmologist.  However that physician left the practice about 5 months ago and then she started seeing Dr. Ceballos at [facility].  The plan was to continue treatment, but states no labs were drawn at that time.  Patient states that her primary care physician also did further autoimmune workup and had lab work that was abnormal and therefore was referred to rheumatology and was seeing Dr. Nicolas.  States she was diagnosed with rheumatoid arthritis, but no change in treatment was recommended as she was already on appropriate treatment with the methotrexate and CellCept.  She states she missed her last appointment with Dr. Nicolas over the winter as it was cold and difficult for her to get out.  Has not seen him recently    Patient started noticing about 2 months ago vomiting and significant fatigue.  She had a recent EGD done which showed esophagitis with Dr. Rogers.  She denies any shortness of breath or cough.  She then started noticing some lower extremity edema a few days prior to admission.  She was seen by her primary care physician and on April 8 her methotrexate and mycophenolate were stopped.  However, her symptoms  continued and therefore she came to the emergency room where calcium was initially 13.9 and creatinine was 3.72.  CT scan showed mild mediastinal and hilar lymphadenopathy consistent with her known sarcoid, but no parenchymal changes appreciated in her lungs.  She was admitted and has been IV fluid resuscitated and calcium has slowly improved.  Dr. Jo from nephrology has been following her and helping with hypercalcemia workup. He is considering pamidronate when her renal function improves.    Pulmonary Consultation was requested for further recommendations.       Review of System: All other review of systems negative except indicated in HPI.      Past Medical History:  Past Medical History:   Diagnosis Date    Arthritis     Hernia, abdominal     Sarcoidosis          Past Surgical History:  Past Surgical History:   Procedure Laterality Date    CATARACT EXTRACTION W/ INTRAOCULAR LENS IMPLANT Right 2023    Procedure: CATARACT PHACO EXTRACTION WITH INTRAOCULAR LENS IMPLANT RIGHT;  Surgeon: Christ Kerr MD;  Location: [facility];  Service: Ophthalmology;  Laterality: Right;    CATARACT EXTRACTION W/ INTRAOCULAR LENS IMPLANT Left 2024    Procedure: CATARACT PHACO EXTRACTION WITH INTRAOCULAR LENS IMPLANT LEFT;  Surgeon: Christ Kerr MD;  Location: [facility];  Service: Ophthalmology;  Laterality: Left;     SECTION      CHOLECYSTECTOMY      VENTRAL/INCISIONAL HERNIA REPAIR N/A 2017    Procedure:  INCISIONAL HERNIA REPAIR WITH MESH;  Surgeon: Bassam Rogers MD;  Location: [facility];  Service:          Family History:  Family History   Problem Relation Age of Onset    No Known Problems Mother     No Known Problems Father          Social History:  Social History     Socioeconomic History    Marital status:    Tobacco Use    Smoking status: Never    Smokeless tobacco: Never   Vaping Use    Vaping status: Never Used   Substance and Sexual Activity    Alcohol use: No    Drug  "use: No    Sexual activity: Defer       Intake/Output Summary (Last 24 hours) at 4/11/2024 1510  Last data filed at 4/11/2024 1215  Gross per 24 hour   Intake 3760 ml   Output 5300 ml   Net -1540 ml         Physical Exam:  /79 (BP Location: Right arm, Patient Position: Lying)   Pulse 77   Temp 98 °F (36.7 °C) (Temporal)   Resp 17   Ht 167.6 cm (66\")   Wt 117 kg (258 lb 9.6 oz)   LMP  (LMP Unknown) Comment: posyt menopausal  SpO2 98%   BMI 41.74 kg/m²   Constitutional:Vital signs reviewed           General:  No respiratory distress noted when speaking in complete sentences. Mildly anxious when discussing her health.   Eyes: Extraocular movement was intact, non-icteric sclera  ENT:No nasal discharge noted. Oropharynx is moist and non-erythematous  Neck: Supple.  No JVD noted.Trachea midline  Cardiovascular: S1 + S2.  Regular rate  Lungs/Respiratory: Good air movement. Clear to ascultation bilaterally.  Abdomen/GI: Soft.  Bowel sounds were positive.  No distension noted.  MSK/Extremities: Trace pedal edema noted. No cyanosis noted. No clubbing noted  Neurologic: Awake, alert and oriented x 3.Able to follow commands. Great historian.  Psychiatric: Normal mood and affect. Does appear mildly anxious on exam when discussing her health  Skin: No rash noted on visible skin. No excessive bruising noted.      Labs: Reviewed. Pertinent labs were noted.     Results from last 7 days   Lab Units 04/07/24  1346   WBC 10*3/mm3 10.57   HEMOGLOBIN g/dL 10.2*   HEMATOCRIT % 32.6*   PLATELETS 10*3/mm3 334   NEUTROPHIL % % 67.7   NEUTROS ABS 10*3/mm3 7.17*   EOSINOPHIL % % 4.1   EOS ABS 10*3/mm3 0.43*   LYMPHOCYTE % % 11.2*   LYMPHS ABS 10*3/mm3 1.18       Results from last 7 days   Lab Units 04/11/24  0729 04/10/24  1349 04/10/24  0548 04/08/24  0529 04/07/24  1346   SODIUM mmol/L 140 138 139   < > 138   POTASSIUM mmol/L 3.7 4.3 4.2   < > 4.1   CHLORIDE mmol/L 108* 107 111*   < > 99   CO2 mmol/L 20.2* 18.6* 18.4*   < > " "26.6   BUN mg/dL 35* 29* 27*   < > 29*   CREATININE mg/dL 2.98* 3.04* 3.17*   < > 3.72*   CALCIUM mg/dL 10.8* 11.2* 10.5   < > 13.9*   ANION GAP mmol/L 11.8 12.4 9.6   < > 12.4   BILIRUBIN mg/dL  --   --   --   --  0.4   ALK PHOS U/L  --   --   --   --  88   ALT (SGPT) U/L  --   --   --   --  12   AST (SGOT) U/L  --   --   --   --  21   GLUCOSE mg/dL 96 153* 87   < > 91   TOTAL PROTEIN g/dL  --   --   --   --  7.5   ALBUMIN g/dL 3.5 3.7 3.1*  --  3.7    < > = values in this interval not displayed.       Results from last 7 days   Lab Units 04/11/24  0729 04/10/24  1349 04/10/24  0548 04/07/24  1346   MAGNESIUM mg/dL  --   --   --  2.4   PHOSPHORUS mg/dL 3.5 2.8 3.6  --        No results found for: \"PROBNP\"    No results found for: \"INR\"    No results found for: \"PROCALCITO\"    No results found for: \"CRP\"    No results found for: \"SEDRATE\"    ABG: No results found for: \"PHART\", \"ILW9GKO\", \"PO2ART\", \"HGBBG\", \"P2ESNESQ\", \"CFIO2\", \"FCOHB\", \"CARBOXYHGB\", \"FMETHB\"      Micro: As of April 11, 2024   No results found for: \"RESPCX\"  No results found for: \"BLOODCX\"  Lab Results   Component Value Date    URINECX >100,000 CFU/mL Escherichia coli (A) 12/13/2017     No results found for: \"MRSACX\"  No results found for: \"MRSAPCR\"  No results found for: \"URCX\"  No components found for: \"LOWRESPCF\"  No results found for: \"THROATCX\"  No results found for: \"CULTURES\"  No components found for: \"STREPBCX\"  No results found for: \"STREPPNEUAG\"  No results found for: \"LEGIONELLA\"  No results found for: \"MYCOPLASCX\"  No results found for: \"GCCX\"  No results found for: \"WOUNDCX\"  No results found for: \"BODYFLDCX\"    No results found for: \"FLU\"    No results found for: \"ADENOVIRUS\"  No results found for: \"BY969O\"  No results found for: \"CVHKU1\"  No results found for: \"CVNL63\"  No results found for: \"CVOC43\"  No results found for: \"HUMETPNEVS\"  No results found for: \"HURVEV\"  No results found for: \"FLUBPCR\"  No results found for: " "\"PARAINFLUE\"  No results found for: \"PARAFLUV2\"  No results found for: \"PARAFLUV3\"  No results found for: \"PARAFLUV4\"  No results found for: \"BPERTPCR\"  No results found for: \"TFIGG19765\"  No results found for: \"CPNEUPCR\"  No results found for: \"MPNEUMO\"  No results found for: \"FLUAPCR\"  No results found for: \"FLUAH3\"  No results found for: \"FLUAH1\"  No results found for: \"RSV\"  No results found for: \"BPARAPCR\"    COVID 19:  No results found for: \"COVID19\"    No results found for: \"THCURSCR\"  No results found for: \"PCPUR\"  No results found for: \"COCAINEUR\"  No results found for: \"METAMPSCNUR\"  No results found for: \"LABOPIASCN\"  No results found for: \"AMPHETSCREEN\"  No results found for: \"LABBENZSCN\"  No results found for: \"TRICYCLICSCN\"  No results found for: \"LABMETHSCN\"  No results found for: \"BARBITSCNUR\"  No results found for: \"OXYCODONESCN\"  No results found for: \"PROPOXSCN\"  No results found for: \"BUPRENORSCNU\"  No results found for: \"ETHANOLMGDL\"  No results found for: \"ETOHPCT\"       sodium chloride, 100 mL/hr, Last Rate: 100 mL/hr (04/11/24 1208)        Imaging Study: Latest imaging studies was reviewed personally.   Imaging Results (Last 72 Hours)       ** No results found for the last 72 hours. **                   Assessment/Plan:    Sarcoidosis/Rheumatoid Arthritis   Will request records from  and Dr. Capone as it is unclear if she had pulmonary symptoms at the time of treatment initiation or if it was started due to eye disease.  It is unclear why she was not treated with steroids initially.  The patient and her family relate that at 1 point her pulmonologist stated he would stop treatment if not for her eye disease.  However, her ophthalmologist stated that eye disease could be controlled without systemic treatment.  Will try to get records for further verifications.    Currently off CellCept and Methotrexate given renal function and side effects.  However, given her calcium levels she will " most likely require further sarcoidosis treatment.   Will most likely need TNF alpha antagonists, but this will need to be determined after review of rheumatology notes.  It is unclear what prompted rheumatology referral.  If patient had significant rheumatoid arthritis then I would have expected rheumatology to be managing the medications and therefore will request clinic notes.     On chart review I see a rheumatoid factor positive in 2015 with level of 16 which is minimally elevated, but ROSA negative.  In 2021, CCP level was normal at 3.     2. Hypercalcemia  Appreciate nephrology input  PTH low and elevated 1,25 Vitamin D makes sarcoidosis the likely cause, but full workup still pending  Trending down with hydration and diuresis   As renal function improves, may consider pamidronate    3. Renal Failure  Creatinine trending down and currently 2.9 and likely related to methotrexate  Nephrology following        The plan was discussed with the  patient and family members at bedside.  I have also discussed the case with the nursing staff.     Recommendations were also discussed with the referring provider, CLAUDIA Donald.    I would like to thank you for the opportunity to participate in the care of this patient.  We will communicate changes and recommendations, if and when necessary.      This document was electronically signed by Cydney Walsh MD on 04/11/24 at 14:44 EDT      Dictated utilizing Dragon dictation.

## 2024-04-12 LAB
ALBUMIN SERPL-MCNC: 3.4 G/DL (ref 3.5–5.2)
ANION GAP SERPL CALCULATED.3IONS-SCNC: 11.4 MMOL/L (ref 5–15)
BUN SERPL-MCNC: 40 MG/DL (ref 8–23)
BUN/CREAT SERPL: 14.3 (ref 7–25)
CALCIUM SPEC-SCNC: 10.5 MG/DL (ref 8.6–10.5)
CHLORIDE SERPL-SCNC: 104 MMOL/L (ref 98–107)
CO2 SERPL-SCNC: 21.6 MMOL/L (ref 22–29)
CREAT SERPL-MCNC: 2.79 MG/DL (ref 0.57–1)
EGFRCR SERPLBLD CKD-EPI 2021: 18.9 ML/MIN/1.73
GLUCOSE SERPL-MCNC: 105 MG/DL (ref 65–99)
PHOSPHATE SERPL-MCNC: 4 MG/DL (ref 2.5–4.5)
POTASSIUM SERPL-SCNC: 3.8 MMOL/L (ref 3.5–5.2)
SODIUM SERPL-SCNC: 137 MMOL/L (ref 136–145)

## 2024-04-12 PROCEDURE — 99232 SBSQ HOSP IP/OBS MODERATE 35: CPT | Performed by: INTERNAL MEDICINE

## 2024-04-12 PROCEDURE — 25010000002 HEPARIN (PORCINE) PER 1000 UNITS: Performed by: NURSE PRACTITIONER

## 2024-04-12 PROCEDURE — 80069 RENAL FUNCTION PANEL: CPT | Performed by: NURSE PRACTITIONER

## 2024-04-12 PROCEDURE — 99232 SBSQ HOSP IP/OBS MODERATE 35: CPT | Performed by: NURSE PRACTITIONER

## 2024-04-12 PROCEDURE — 25810000003 SODIUM CHLORIDE 0.9 % SOLUTION: Performed by: INTERNAL MEDICINE

## 2024-04-12 PROCEDURE — 25010000002 FUROSEMIDE PER 20 MG: Performed by: INTERNAL MEDICINE

## 2024-04-12 RX ORDER — PREDNISONE 20 MG/1
20 TABLET ORAL
Status: DISCONTINUED | OUTPATIENT
Start: 2024-04-27 | End: 2024-04-14 | Stop reason: HOSPADM

## 2024-04-12 RX ORDER — PREDNISONE 20 MG/1
40 TABLET ORAL
Status: DISCONTINUED | OUTPATIENT
Start: 2024-04-13 | End: 2024-04-14 | Stop reason: HOSPADM

## 2024-04-12 RX ORDER — FUROSEMIDE 10 MG/ML
80 INJECTION INTRAMUSCULAR; INTRAVENOUS ONCE
Status: COMPLETED | OUTPATIENT
Start: 2024-04-12 | End: 2024-04-12

## 2024-04-12 RX ADMIN — Medication 10 ML: at 20:39

## 2024-04-12 RX ADMIN — SODIUM CHLORIDE 100 ML/HR: 9 INJECTION, SOLUTION INTRAVENOUS at 18:43

## 2024-04-12 RX ADMIN — Medication 150 MG: at 09:28

## 2024-04-12 RX ADMIN — HEPARIN SODIUM 5000 UNITS: 5000 INJECTION INTRAVENOUS; SUBCUTANEOUS at 20:39

## 2024-04-12 RX ADMIN — HEPARIN SODIUM 5000 UNITS: 5000 INJECTION INTRAVENOUS; SUBCUTANEOUS at 13:38

## 2024-04-12 RX ADMIN — SODIUM CHLORIDE 100 ML/HR: 9 INJECTION, SOLUTION INTRAVENOUS at 07:34

## 2024-04-12 RX ADMIN — ALPRAZOLAM 0.5 MG: 0.5 TABLET ORAL at 20:39

## 2024-04-12 RX ADMIN — HEPARIN SODIUM 5000 UNITS: 5000 INJECTION INTRAVENOUS; SUBCUTANEOUS at 05:41

## 2024-04-12 RX ADMIN — FUROSEMIDE 80 MG: 10 INJECTION, SOLUTION INTRAMUSCULAR; INTRAVENOUS at 13:40

## 2024-04-12 NOTE — CASE MANAGEMENT/SOCIAL WORK
1000: CM spoke with pt in room. Recently showered, denies any pain at current time. DCP remains Home. Denies any needs.

## 2024-04-12 NOTE — PROGRESS NOTES
[facility] HOSPITALIST    PROGRESS NOTE    Name:  Jamia Hinton   Age:  58 y.o.  Sex:  female  :  1965  MRN:  [id number]   Visit Number:  [id number]  Admission Date:  2024  Date Of Service:  24  Primary Care Physician:  Jaycee Deal APRN     LOS: 2 days :    Chief Complaint:      Fatigue/poor appetite    Subjective:    Patient seen and examined with no family at bedside.  She is grateful for her care here.  Updated pulmonology and nephrology to see today.  Kidney function is improving.  Awaiting records from prior pulmonology/rheumatology.  Has been continued on Lasix/fluids.    Hospital Course:    58-year-old female with history of sarcoidosis, rheumatoid arthritis, obesity, who presented to the emergency room with complaints of decreased oral intake, early satiety, weakness and nausea for multiple months now.  Recently told about decreased kidney function with primary care provider and all of her medications including CellCept and methotrexate were discontinued.  Recently underwent an outpatient EGD with Dr. Rogers, reportedly had esophagitis.     Workup in the emergency room was showing acute on chronic renal failure with creatinine 3.72 and also with a calcium of 13.9.  Potassium was 4.  A CT scan of the chest without contrast was showing mild mediastinal and hilar adenopathy.  CT scan abdomen pelvis showing right-sided nephrolithiasis without hydronephrosis.  Hospitalist consulted for further medical management.     Patient admitted to the hospitalist service and given IV fluid resuscitation.  Calcium slowly improved.  Dr. Jo followed.  IV hydration continued.  Diuresis and steroids initiated per nephrology.  Creatinine downtrending.  Records pending from prior pulmonology/rheumatology.  Per pulmonology will likely require TNF alpha antagonist.    Review of Systems:     All systems were reviewed and negative except as mentioned in subjective, assessment and  plan.    Vital Signs:    Temp:  [97 °F (36.1 °C)-98.8 °F (37.1 °C)] 97 °F (36.1 °C)  Heart Rate:  [52-75] 60  Resp:  [14-16] 14  BP: (127-169)/(75-89) 127/75    Intake and output:    I/O last 3 completed shifts:  In: 1840 [P.O.:840; I.V.:1000]  Out: 7700 [Urine:7700]  I/O this shift:  In: 677 [P.O.:677]  Out: 500 [Urine:500]    Physical Examination:    General Appearance:  Alert and cooperative.  Middle-age female.  Pleasant.  No acute distress.  Tearful at times.   Head:  Atraumatic and normocephalic.   Eyes: Conjunctivae and sclerae normal, no icterus. No pallor.   Throat: No oral lesions, no thrush, oral mucosa moist.   Neck: Supple, trachea midline, no thyromegaly.   Lungs:   Breath sounds heard bilaterally equally.  No wheezing or crackles. No Pleural rub or bronchial breathing.  On room air unlabored.   Heart:  Normal S1 and S2, no murmur, no gallop, no rub. No JVD.   Abdomen:   Normal bowel sounds, no masses, no organomegaly. Soft, nontender, nondistended, no rebound tenderness.   Extremities: Supple, trace bilateral lower extremity edema, no cyanosis, no clubbing.   Skin: No bleeding or rash.   Neurologic: Alert and oriented x 3. No facial asymmetry. Moves all four limbs. No tremors.      Laboratory results:    Results from last 7 days   Lab Units 04/12/24  0539 04/11/24  0729 04/10/24  1349 04/08/24  0529 04/07/24  1346   SODIUM mmol/L 137 140 138   < > 138   POTASSIUM mmol/L 3.8 3.7 4.3   < > 4.1   CHLORIDE mmol/L 104 108* 107   < > 99   CO2 mmol/L 21.6* 20.2* 18.6*   < > 26.6   BUN mg/dL 40* 35* 29*   < > 29*   CREATININE mg/dL 2.79* 2.98* 3.04*   < > 3.72*   CALCIUM mg/dL 10.5 10.8* 11.2*   < > 13.9*   BILIRUBIN mg/dL  --   --   --   --  0.4   ALK PHOS U/L  --   --   --   --  88   ALT (SGPT) U/L  --   --   --   --  12   AST (SGOT) U/L  --   --   --   --  21   GLUCOSE mg/dL 105* 96 153*   < > 91    < > = values in this interval not displayed.     Results from last 7 days   Lab Units 04/07/24  1346   WBC  "10*3/mm3 10.57   HEMOGLOBIN g/dL 10.2*   HEMATOCRIT % 32.6*   PLATELETS 10*3/mm3 334         Results from last 7 days   Lab Units 04/07/24  1346   HSTROP T ng/L 18*         No results for input(s): \"PHART\", \"BPD9RJR\", \"PO2ART\", \"MCX5OYN\", \"BASEEXCESS\" in the last 8760 hours.   I have reviewed the patient's laboratory results.    Radiology results:    No radiology results from the last 24 hrs  I have reviewed the patient's radiology reports.    Medication Review:     I have reviewed the patient's active and prn medications.     Problem List:      Acute-on-chronic kidney injury      Assessment:    Acute kidney injury, likely chronic kidney disease, baseline unknown, POA  Hypercalcemia, POA  History of sarcoidosis, POA  Rheumatoid arthritis  Obesity    Plan:    Kidney failure/hypercalcemia:  -Definitely appears to have acute kidney injury when compared to labs obtained just 2 weeks ago in the emergency room with creatinine at that time 2.2  -Most recent chemistry panel I can find otherwise in epic currently was from 2020 and her creatinine was 0.9 at that time.    -She does have symptomatology of hypercalcemia.  Calcium is improving.  Will continue with IV fluid resuscitation per nephrology recommendations.    - Definitely concerned that sarcoidosis has led to hypercalcemia.    -Diuresis per nephrology.  -Creatinine downtrending.     Sarcoidosis:  Patient previously under the care of Dr. Da Silva, currently seeing Dr. Crespo.    -Requesting pulmonology in [place].  Will need arranged upon discharge.  - Patient's primary care provider did stop her medications including CellCept and methotrexate about a month ago due to her kidney failure.  Uncontrolled, worsening sarcoidosis could be leading to the hypercalcemia.  -Per pulmonology will consider TNF alpha antagonist after reviewing rheumatology notes.     DVT Prophylaxis: Heparin  Code Status: Full  Diet: Renal  Discharge Plan: Likely home in 1 to 2 days.    I have " reviewed the copied text and it is accurate as of 4/12/2024       Daniela Ji, APRN  04/12/24  12:41 EDT    Dictated utilizing Dragon dictation.

## 2024-04-12 NOTE — PROGRESS NOTES
[facility] Progress Note  [facility]. [place]        Patient Name: Jamia Hinton  : 1965  MRN: [id number]   LOS: 2 days    Patient Care Team:  Jaycee Deal APRN as PCP - General (Family Medicine)  Bassam Rogers MD as Consulting Physician (General Surgery)    Chief Complaint:    Chief Complaint   Patient presents with    Weakness - Generalized     Pt reports worsening weakness since yesterday with dry heaves. Pt reports feeling SOA. Denies CP      Primary Care Physician:  Jaycee Deal APRN  Date of admission: 2024    Subjective     Interval History:   Follow-up acute kidney injury and hypercalcemia  Events noted from last 24 hours.    I reviewed the chart and other providers notes, labs and procedures done since my last note.  She is doing well. She is sitting up in the chair. She said she did sleep a little better last night. She is still drinking a lot of water and up to urinate a lot. Denies shortness of breath at rest, denies chest pain. She is asking about how long will she be here.    Review of Systems:   As noted above.    Objective     Vitals:   Temp:  [97.9 °F (36.6 °C)-98.8 °F (37.1 °C)] 98.6 °F (37 °C)  Heart Rate:  [55-77] 60  Resp:  [16-18] 16  BP: (143-169)/(72-89) 145/81    Intake/Output Summary (Last 24 hours) at 2024 0653  Last data filed at 2024 0000  Gross per 24 hour   Intake 840 ml   Output 3200 ml   Net -2360 ml       Physical Exam:    General Appearance: alert, oriented x 3, no acute distress   Skin: warm and dry  HEENT: oral mucosa normal, nonicteric sclera  Neck: supple, no JVD  Lungs: Diminished bases bilaterally  Heart: RRR, normal S1 and S2  Abdomen: obese, soft, nontender, non distended and positive bowel sounds.  : no palpable bladder  Extremities: Trace edema, no cyanosis or clubbing  Neuro: normal speech and mental status     Scheduled Meds:     Current Facility-Administered Medications   Medication Dose Route  Frequency Provider Last Rate Last Admin    acetaminophen (TYLENOL) tablet 650 mg  650 mg Oral Q4H PRN Jackie Kay DO   650 mg at 04/10/24 0204    Or    acetaminophen (TYLENOL) 160 MG/5ML oral solution 650 mg  650 mg Oral Q4H PRN Jackie Kay DO        Or    acetaminophen (TYLENOL) suppository 650 mg  650 mg Rectal Q4H PRN Jackie Kay DO        ALPRAZolam (XANAX) tablet 0.5 mg  0.5 mg Oral Q8H PRN Jackie Kay DO        aluminum-magnesium hydroxide-simethicone (MAALOX MAX) 400-400-40 MG/5ML suspension 15 mL  15 mL Oral Q6H PRN Jackie Kay DO        sennosides-docusate (PERICOLACE) 8.6-50 MG per tablet 2 tablet  2 tablet Oral BID Jackie Kay DO        And    polyethylene glycol (MIRALAX) packet 17 g  17 g Oral Daily PRN Jackie Kay DO        And    bisacodyl (DULCOLAX) EC tablet 5 mg  5 mg Oral Daily PRN Jackie Kay DO        And    bisacodyl (DULCOLAX) suppository 10 mg  10 mg Rectal Daily PRN Jackie Kay DO        heparin (porcine) 5000 UNIT/ML injection 5,000 Units  5,000 Units Subcutaneous Q8H Daniela Ji, APRN   5,000 Units at 04/12/24 0541    iron polysaccharides (NIFEREX) capsule 150 mg  150 mg Oral Daily Panfilo Jo MD, FASN   150 mg at 04/11/24 0910    melatonin tablet 5 mg  5 mg Oral Nightly PRN Jackie Kay DO        nitroglycerin (NITROSTAT) SL tablet 0.4 mg  0.4 mg Sublingual Q5 Min PRN Jackie Kay DO        ondansetron ODT (ZOFRAN-ODT) disintegrating tablet 4 mg  4 mg Oral Q6H PRN Jackie Kay DO        Or    ondansetron (ZOFRAN) injection 4 mg  4 mg Intravenous Q6H PRN Jackie Kay DO   4 mg at 04/08/24 1935    sodium chloride 0.9 % flush 10 mL  10 mL Intravenous PRN Jackie Kay,         sodium chloride 0.9 % flush 10 mL  10 mL Intravenous Q12H Jackie Kay DO   10 mL at 04/11/24 2010    sodium chloride 0.9 % flush 10 mL   10 mL Intravenous PRN Jackie Kay, DO        sodium chloride 0.9 % infusion 40 mL  40 mL Intravenous PRN Jackie Kay, DO        sodium chloride 0.9 % infusion  100 mL/hr Intravenous Continuous Panfilo Jo MD, FASN 100 mL/hr at 04/11/24 2010 100 mL/hr at 04/11/24 2010       heparin (porcine), 5,000 Units, Subcutaneous, Q8H  iron polysaccharides, 150 mg, Oral, Daily  senna-docusate sodium, 2 tablet, Oral, BID  sodium chloride, 10 mL, Intravenous, Q12H        IV Meds:   sodium chloride, 100 mL/hr, Last Rate: 100 mL/hr (04/11/24 2010)        Results Reviewed:   I have personally reviewed the results from the time of this admission to 4/12/2024 06:53 EDT     Results from last 7 days   Lab Units 04/12/24  0539 04/11/24  0729 04/10/24  1349 04/08/24  0529 04/07/24  1346   SODIUM mmol/L 137 140 138   < > 138   POTASSIUM mmol/L 3.8 3.7 4.3   < > 4.1   CHLORIDE mmol/L 104 108* 107   < > 99   CO2 mmol/L 21.6* 20.2* 18.6*   < > 26.6   BUN mg/dL 40* 35* 29*   < > 29*   CREATININE mg/dL 2.79* 2.98* 3.04*   < > 3.72*   CALCIUM mg/dL 10.5 10.8* 11.2*   < > 13.9*   BILIRUBIN mg/dL  --   --   --   --  0.4   ALK PHOS U/L  --   --   --   --  88   ALT (SGPT) U/L  --   --   --   --  12   AST (SGOT) U/L  --   --   --   --  21   GLUCOSE mg/dL 105* 96 153*   < > 91    < > = values in this interval not displayed.       Estimated Creatinine Clearance: 27.9 mL/min (A) (by C-G formula based on SCr of 2.79 mg/dL (H)).    Results from last 7 days   Lab Units 04/12/24 0539 04/11/24  0729 04/10/24  1349 04/10/24  0548 04/07/24  1346   MAGNESIUM mg/dL  --   --   --   --  2.4   PHOSPHORUS mg/dL 4.0 3.5 2.8   < >  --     < > = values in this interval not displayed.       Results from last 7 days   Lab Units 04/08/24  0529   URIC ACID mg/dL 10.8*       Results from last 7 days   Lab Units 04/07/24  1346   WBC 10*3/mm3 10.57   HEMOGLOBIN g/dL 10.2*   PLATELETS 10*3/mm3 334      Latest Reference Range & Units 04/08/24 05:29  "  Iron 37 - 145 mcg/dL 27 (L)   Iron Saturation (TSAT) 20 - 50 % 11 (L)   Transferrin 200 - 360 mg/dL 163 (L)   TIBC 298 - 536 mcg/dL 243 (L)   (L): Data is abnormally low        Brief Urine Lab Results  (Last result in the past 365 days)        Color   Clarity   Blood   Leuk Est   Nitrite   Protein   CREAT   Urine HCG        04/08/24 0707             29.9                 No results found for: \"UTPCR\"      Imaging Results (Last 24 Hours)       ** No results found for the last 24 hours. **                Assessment / Plan     ASSESSMENT:  Acute kidney injury: With possible h/o CKD, baseline unknown. Last month patient noted with worsened renal function, sCr 2.29 at that time. Previous to that, labs in chart from 2020 demonstrate normal renal function. sCr 3.7 upon presentation with hypercalcemia; other e'lytes normal. Component of dehydration, progressively poor oral intake. No obstruction on imaging. UA bland. Further concern for nephrocalcinosis s/t sarcoidosis with hypercalcemia and nephrolithiasis. It is starting to show signs of improvement after aggressive fluid resuscitation  Hypercalcemia: Routine workup has been ordered including PTH, PTH related peptide, vitamin D levels, 24-hour urine protein electrophoresis, as well as ionized calcium. PTH adequately suppressed; Vitamin D was low normal. Calcium since stabilized secondary to calcitonin.   Sarcoidosis: Known history, recently cellcept and methotrexate stopped s/t worsened renal function. No known previous renal manifestation.  It is not very clear if she has been following up with the pulmonary, will need pulmonary evaluation.  Pulmonary consult is pending  Rheumatoid arthritis: She was on methotrexate and CellCept, currently she has been off for about a month.  May need close follow-up with rheumatology.  Anemia: Appears secondary to low iron stores. Continue oral iron supplementation        PLAN:  Continue IV rehydration with NS at 100mL/hr. " Hypercalcemia routine workup noted, it was negative. Strict intake and output. Avoid nephrotoxic medications.  If renal function improves will consider Aredia, once her volume is replete will consider diuretics.  Her renal function appears to be stabilizing, continued improvement noted today with sCr down to 2.79; s/p 80mg IV lasix x1 yesterday with good response, continue to optimize volume status, will give 1 more dose today.  Need to be assessed on daily basis.  He is already received 2 doses of IV Solu-Medrol, further management of sarcoidosis per pulmonology, steroids were continued orally.  She will need routine cancer screening due to hypercalcemia, will defer it to the primary care.  Details were discussed with the patient no family in the room.    Details were also discussed with the hospitalist service and or other providers as needed.   Continue with rest of the current treatment plan, and monitor with surveillance labs.  Further recommendations will depend on clinical course of the patient during the current hospitalization.   I have reviewed the copied text to this note, it was edited and the changes made as needed.  It is accurate to the point, when the note was signed today.     Thank you for involving us in the care of Jamia ARSHAD Mary Jane.  Please feel free to call with any questions.    Panfilo Jo MD, FASN  04/12/24  06:53 EDT    [facility]  [phone]      Part of this note may be an electronic transcription/translation of spoken language to printed text using the Dragon Dictation System.

## 2024-04-12 NOTE — PROGRESS NOTES
"  CC: History of sarcoidosis.    S: Currently out of bed in a chair.  Feels somewhat better overall although continues to have fatigue and tiredness on occasion.  Is concerned about her renal function not improving as \"fast as I want\"    ROS: Positive for mild anxiety. Denies chest pain, diarrhea or fever.    O: /80 (BP Location: Right arm, Patient Position: Sitting)   Pulse 52   Temp 97.7 °F (36.5 °C) (Oral)   Resp 16   Ht 167.6 cm (66\")   Wt 117 kg (258 lb 9.6 oz)   LMP  (LMP Unknown) Comment: posyt menopausal  SpO2 97%   BMI 41.74 kg/m²     Vital signs reviewed.  General/Constitutional: No respiratory distress noted.  Neck: No JVD   Cardiovascular: S1 + S2.  Mildly bradycardic  Lungs/Respiratory: No wheezing heard.  No obvious crackles noted  Musculoskeletal/Extremities: No edema noted.  Neurologic: AAOx3. Was able to follow commands       Labs: Reviewed.   Results from last 7 days   Lab Units 04/07/24  1346   WBC 10*3/mm3 10.57   HEMOGLOBIN g/dL 10.2*   HEMATOCRIT % 32.6*   PLATELETS 10*3/mm3 334       No results found for: \"PROCALCITO\"    No results found for: \"CRP\"    No results found for: \"SEDRATE\"    No results found for: \"PROBNP\"    Results from last 7 days   Lab Units 04/12/24  0539 04/11/24  0729 04/10/24  1349 04/08/24  0529 04/07/24  1346   SODIUM mmol/L 137 140 138   < > 138   POTASSIUM mmol/L 3.8 3.7 4.3   < > 4.1   CHLORIDE mmol/L 104 108* 107   < > 99   CO2 mmol/L 21.6* 20.2* 18.6*   < > 26.6   BUN mg/dL 40* 35* 29*   < > 29*   CREATININE mg/dL 2.79* 2.98* 3.04*   < > 3.72*   CALCIUM mg/dL 10.5 10.8* 11.2*   < > 13.9*   ANION GAP mmol/L 11.4 11.8 12.4   < > 12.4   BILIRUBIN mg/dL  --   --   --   --  0.4   ALK PHOS U/L  --   --   --   --  88   ALT (SGPT) U/L  --   --   --   --  12   AST (SGOT) U/L  --   --   --   --  21   GLUCOSE mg/dL 105* 96 153*   < > 91   TOTAL PROTEIN g/dL  --   --   --   --  7.5   ALBUMIN g/dL 3.4* 3.5 3.7   < > 3.7    < > = values in this interval not displayed. " "      Results from last 7 days   Lab Units 04/12/24  0539 04/11/24  0729 04/10/24  1349 04/10/24  0548 04/07/24  1346   MAGNESIUM mg/dL  --   --   --   --  2.4   PHOSPHORUS mg/dL 4.0 3.5 2.8   < >  --     < > = values in this interval not displayed.             No results found for: \"DDIMER\"    Brief Urine Lab Results  (Last result in the past 365 days)        Color   Clarity   Blood   Leuk Est   Nitrite   Protein   CREAT   Urine HCG        04/08/24 0707             29.9                   Micro: As of April 12, 2024   No results found for: \"RESPCX\"  No results found for: \"BCIDPCR\"  No results found for: \"BLOODCX\"  Lab Results   Component Value Date    URINECX >100,000 CFU/mL Escherichia coli (A) 12/13/2017     No results found for: \"MRSACX\"  No results found for: \"MRSAPCR\"  No results found for: \"URCX\"  No components found for: \"LOWRESPCF\"  No results found for: \"THROATCX\"  No results found for: \"CULTURES\"  No components found for: \"STREPBCX\"  No results found for: \"STREPPNEUAG\"  No results found for: \"LEGIONELLA\"  No results found for: \"MYCOPLASCX\"  No results found for: \"GCCX\"  No results found for: \"WOUNDCX\"  No results found for: \"BODYFLDCX\"    No results found for: \"FLU\"    No results found for: \"ADENOVIRUS\"  No results found for: \"PH036Z\"  No results found for: \"CVHKU1\"  No results found for: \"CVNL63\"  No results found for: \"CVOC43\"  No results found for: \"HUMETPNEVS\"  No results found for: \"HURVEV\"  No results found for: \"FLUBPCR\"  No results found for: \"PARAINFLUE\"  No results found for: \"PARAFLUV2\"  No results found for: \"PARAFLUV3\"  No results found for: \"PARAFLUV4\"  No results found for: \"BPERTPCR\"  No results found for: \"JUBCB28807\"  No results found for: \"CPNEUPCR\"  No results found for: \"MPNEUMO\"  No results found for: \"FLUAPCR\"  No results found for: \"FLUAH3\"  No results found for: \"FLUAH1\"  No results found for: \"RSV\"  No results found for: \"BPARAPCR\"    COVID 19:  No results found for: " "\"COVID19\"          sodium chloride, 100 mL/hr, Last Rate: 100 mL/hr (04/12/24 0734)          CXRay: Latest imaging study was reviewed personally.   Imaging Results (Last 7 Days)       Procedure Component Value Units Date/Time    US Renal Bilateral [[id number]] Collected: 04/07/24 1948     Updated: 04/07/24 1951    Narrative:      PROCEDURE: US RENAL BILATERAL-     HISTORY: buddy, hypercalcemia; N17.9-Acute kidney failure, unspecified;  E86.0-Dehydration     FINDINGS: Kidneys show a normal echo pattern. .  Right kidney measures  12.22 cm in length. Left kidney measures 13.2 cm in length.  Size is  normal.     No mass or cyst is seen. No obstructive changes are present. A  questionable small nonobstructing 3 mm stone is seen of the lower pole  right kidney.       Impression:      Unremarkable renal ultrasound.        This report was signed and finalized on 4/7/2024 7:49 PM by Frederick Jimenez MD.       CT Abdomen Pelvis Without Contrast [[id number]] Collected: 04/07/24 1537     Updated: 04/07/24 1545    Narrative:      PROCEDURE: CT ABDOMEN PELVIS WO CONTRAST-     HISTORY: Nausea, weakness s/p endoscopy     COMPARISON: March 22, 2024..     PROCEDURE: Axial images were obtained from the lung bases to the pubic  symphysis by computed tomography. This study was performed with  techniques to keep radiation doses as low as reasonably achievable,  (ALARA). Individualized dose reduction techniques using automated  exposure control or adjustment of mA and/or kV according to the patient  size were employed.     FINDINGS:     ABDOMEN: The lung bases are clear. The heart is proper size. The limited  non-contrast images of the liver demonstrate no focal abnormality but  the liver is enlarged at 18.5 cm, similar to prior. There are metallic  surgical clips in the right upper quadrant consistent with previous  cholecystectomy. The spleen mildly enlarged at 13 cm, stable from prior.  No focal mass identified. Fullness of the left adrenal " gland is stable.  Right adrenal gland is normal. There are metallic surgical clips in the  right upper quadrant consistent with previous cholecystectomy. The aorta  is normal in caliber. There is no significant free fluid or adenopathy.   There is no left nephrolithiasis. Small nonobstructing stones on the  right are stable. There is no hydronephrosis. No bony destructive lesion  seen. Small periaortic lymph nodes are stable from prior.     PELVIS: The GI tract demonstrate no obstruction. The appendix is  identified and appears normal. The urinary bladder is almost completely  collapsed. Uterus is midline. There is no fluid or adenopathy. No  suspicious inguinal adenopathy seen. No free air identified.       Impression:      Right nephrolithiasis without hydronephrosis.     Hepatosplenomegaly similar to prior study.        CTDI: 7.44 mGy  DLP:1288.51 mGy.cm     This report was signed and finalized on 4/7/2024 3:42 PM by Nova Koch MD.       CT Chest Without Contrast Diagnostic [[id number]] Collected: 04/07/24 1530     Updated: 04/07/24 1538    Narrative:      PROCEDURE: CT CHEST WO CONTRAST DIAGNOSTIC-     HISTORY: Weakness, hx of sarcoidosis, s/p endoscopy     COMPARISON: No previous chest CT for comparison.     PROCEDURE: Axial images were obtained from the lung apex to the mid  abdomen by computed tomography. This study was performed with techniques  to keep radiation doses as low as reasonably achievable, (ALARA).  Individualized dose reduction techniques using automated exposure  control or adjustment of mA and/or kV according to the patient size were  employed.     FINDINGS:     CHEST: There is no suspicious axillary adenopathy. Vascular  calcifications noted. There is mildly prominent right pretracheal lymph  node in question fullness of the right hilum. Chest CT with contrast  would be helpful for further evaluation in this patient with known  sarcoidosis. The heart is proper size. There is no pericardial  or  pleural effusion.No suspicious infiltrate or nodule identified. Limited  images of the upper abdomen demonstrate cholecystectomy clips. Spleen is  incompletely visualized but appears at least mildly enlarged to 13 cm.  No bony destructive lesion seen. No mediastinal air identified. No  significant wall thickening of the esophagus identified. No pneumothorax  seen.       Impression:      No acute infiltrate identified.     Question mild mediastinal and hilar adenopathy, consider chest CT with  intravenous contrast.           CTDI: 7.44 mGy  DLP:1288.51 mGy.cm     This report was signed and finalized on 4/7/2024 3:36 PM by Nova Koch MD.       XR Chest 1 View [[id number]] Collected: 04/07/24 1430     Updated: 04/07/24 1432    Narrative:      PROCEDURE: XR CHEST 1 VW-     HISTORY: Gen weakness, worsening since yesterday with shortness of  breath and dry heaves.     COMPARISON: None.     FINDINGS: The heart is normal in size. The lungs are clear. The  mediastinum is unremarkable. There is no pneumothorax.  There are no  acute osseous abnormalities. Apical lordotic positioning noted.        Impression:      No acute cardiopulmonary process.              This report was signed and finalized on 4/7/2024 2:30 PM by Nova Koch MD.                     Assessment & Recommendations/Plan:   1.  History of sarcoidosis  Apparently was on treatment with methotrexate and CellCept  Developed hypercalcemia despite being on above agents  Unless there is a secondary cause for hypercalcemia, she will likely need to be kept on prednisone for 2-6 months  I would suggest starting the patient on prednisone 40 mg for 2 weeks before decreasing it to 20 mg for another 4 weeks before following her clinically and with repeat calcium levels, before any further de-escalation can be considered.  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  Given her most recent renal failure, I  would recommend holding off on restarting methotrexate and/or CellCept    2.  Hypercalcemia/renal failure  Being followed by nephrology    3.  Discharge disposition/recommendations  Follow-up with Dr. Walsh in 6-8 weeks after discharge.  Already ordered  Prednisone tapering dose.  Will need to be ordered by discharging provider.      I have discussed patient's overall clinical status with Dr. Jo especially with regards to sarcoidosis and findings of hypercalcemia     This document was electronically signed by Tamera Gloria MD on 04/12/24 at 10:44 EDT      Dictated utilizing Dragon dictation.

## 2024-04-12 NOTE — PLAN OF CARE
Goal Outcome Evaluation:   Care plan reviewed with patient. Patient resting in bed. Vital signs stable. No acute changes.

## 2024-04-12 NOTE — PROGRESS NOTES
Nutrition Services    Patient Name:  Jamia Hinton  YOB: 1965  MRN: [id number]  Admit Date:  4/7/2024    Pt had questions on diet. She is concerned about what to eat when she goes home. Reviewed K+ and phosphorus WNL. Discussed K+ and phosphorus diet restriction is not necessary to follow at home unless labs are elevated. Discussed limiting sodium at home and not consuming excessive amounts of protein. She is worried about iCa. She reviewed her diet. She is not consuming any dairy foods or large amounts of calcium containing foods. She is not taking any antiacids. Reviewed recommended amounts of calcium foods. Pt has contact information and referral form in case she would like to setup outpatient nutrition appointment.     Electronically signed by:  Namrata Rodriguez RD  04/12/24 13:58 EDT

## 2024-04-13 LAB
ALBUMIN SERPL-MCNC: 3.6 G/DL (ref 3.5–5.2)
ANION GAP SERPL CALCULATED.3IONS-SCNC: 14.3 MMOL/L (ref 5–15)
BUN SERPL-MCNC: 42 MG/DL (ref 8–23)
BUN/CREAT SERPL: 17.1 (ref 7–25)
CALCIUM SPEC-SCNC: 11 MG/DL (ref 8.6–10.5)
CHLORIDE SERPL-SCNC: 101 MMOL/L (ref 98–107)
CO2 SERPL-SCNC: 22.7 MMOL/L (ref 22–29)
CREAT SERPL-MCNC: 2.45 MG/DL (ref 0.57–1)
EGFRCR SERPLBLD CKD-EPI 2021: 22.1 ML/MIN/1.73
GLUCOSE SERPL-MCNC: 77 MG/DL (ref 65–99)
PHOSPHATE SERPL-MCNC: 3.7 MG/DL (ref 2.5–4.5)
POTASSIUM SERPL-SCNC: 3.8 MMOL/L (ref 3.5–5.2)
PTH RELATED PROT SERPL-SCNC: <2 PMOL/L
SODIUM SERPL-SCNC: 138 MMOL/L (ref 136–145)

## 2024-04-13 PROCEDURE — 80069 RENAL FUNCTION PANEL: CPT | Performed by: NURSE PRACTITIONER

## 2024-04-13 PROCEDURE — 63710000001 PREDNISONE PER 1 MG: Performed by: INTERNAL MEDICINE

## 2024-04-13 PROCEDURE — 25010000002 HEPARIN (PORCINE) PER 1000 UNITS: Performed by: NURSE PRACTITIONER

## 2024-04-13 PROCEDURE — 99232 SBSQ HOSP IP/OBS MODERATE 35: CPT | Performed by: FAMILY MEDICINE

## 2024-04-13 PROCEDURE — 25810000003 SODIUM CHLORIDE 0.9 % SOLUTION: Performed by: INTERNAL MEDICINE

## 2024-04-13 RX ADMIN — ALPRAZOLAM 0.5 MG: 0.5 TABLET ORAL at 20:26

## 2024-04-13 RX ADMIN — PREDNISONE 40 MG: 20 TABLET ORAL at 09:02

## 2024-04-13 RX ADMIN — SODIUM CHLORIDE 100 ML/HR: 9 INJECTION, SOLUTION INTRAVENOUS at 13:24

## 2024-04-13 RX ADMIN — HEPARIN SODIUM 5000 UNITS: 5000 INJECTION INTRAVENOUS; SUBCUTANEOUS at 13:24

## 2024-04-13 RX ADMIN — Medication 150 MG: at 09:02

## 2024-04-13 RX ADMIN — HEPARIN SODIUM 5000 UNITS: 5000 INJECTION INTRAVENOUS; SUBCUTANEOUS at 05:00

## 2024-04-13 RX ADMIN — Medication 10 ML: at 20:26

## 2024-04-13 RX ADMIN — HEPARIN SODIUM 5000 UNITS: 5000 INJECTION INTRAVENOUS; SUBCUTANEOUS at 20:26

## 2024-04-13 NOTE — PROGRESS NOTES
[facility] HOSPITALIST    PROGRESS NOTE    Name:  Jamia Hinton   Age:  58 y.o.  Sex:  female  :  1965  MRN:  [id number]   Visit Number:  [id number]  Admission Date:  2024  Date Of Service:  24  Primary Care Physician:  Jaycee Deal APRN     LOS: 3 days :    Chief Complaint:      Fatigue, poor appetite    Subjective:    Patient seen and examined,  at bedside.  Patient reports feeling significantly better.  Denies any acute complaints.    Hospital Course:    58-year-old female with history of sarcoidosis, rheumatoid arthritis, obesity, who presented to the emergency room with complaints of decreased oral intake, early satiety, weakness and nausea for multiple months now.  Recently told about decreased kidney function with primary care provider and all of her medications including CellCept and methotrexate were discontinued.  Recently underwent an outpatient EGD with Dr. Rogers, reportedly had esophagitis.     Workup in the emergency room was showing acute on chronic renal failure with creatinine 3.72 and also with a calcium of 13.9.  Potassium was 4.  A CT scan of the chest without contrast was showing mild mediastinal and hilar adenopathy.  CT scan abdomen pelvis showing right-sided nephrolithiasis without hydronephrosis.  Hospitalist consulted for further medical management.     Patient admitted to the hospitalist service and given IV fluid resuscitation.  Calcium slowly improved.  Dr. Jo followed.  IV hydration continued.  Diuresis and steroids initiated per nephrology.  Creatinine downtrending.  Records pending from prior pulmonology/rheumatology.  Per pulmonology will likely require TNF alpha antagonist.    Review of Systems:     All systems were reviewed and negative except as mentioned in subjective, assessment and plan.    Vital Signs:    Temp:  [97.3 °F (36.3 °C)-97.9 °F (36.6 °C)] 97.3 °F (36.3 °C)  Heart Rate:  [58-84] 68  Resp:  [14-18] 16  BP:  (127-154)/(66-93) 127/76    Intake and output:    I/O last 3 completed shifts:  In: 1037 [P.O.:1037]  Out: 8500 [Urine:8500]  I/O this shift:  In: 600 [P.O.:600]  Out: 600 [Urine:600]    Physical Examination:    General Appearance:  Alert and cooperative.  Chronically ill-appearing, sitting upright in chair at bedside   Head:  Atraumatic and normocephalic.   Eyes: Conjunctivae and sclerae normal, no icterus. No pallor.   Throat: No oral lesions, no thrush, oral mucosa moist.   Neck: Supple, trachea midline, no thyromegaly.   Lungs:   Breath sounds heard bilaterally equally.  No wheezing or crackles. No Pleural rub or bronchial breathing.   Heart:  Normal S1 and S2, no murmur, no gallop, no rub. No JVD.   Abdomen:   Normal bowel sounds, no masses, no organomegaly. Soft, nontender, nondistended, no rebound tenderness.   Extremities: Supple, no edema, no cyanosis, no clubbing.   Skin: No bleeding or rash.   Neurologic: Alert and oriented x 3. No facial asymmetry. Moves all four limbs. No tremors.      Laboratory results:    Results from last 7 days   Lab Units 04/13/24  0701 04/12/24  0539 04/11/24  0729 04/08/24  0529 04/07/24  1346   SODIUM mmol/L 138 137 140   < > 138   POTASSIUM mmol/L 3.8 3.8 3.7   < > 4.1   CHLORIDE mmol/L 101 104 108*   < > 99   CO2 mmol/L 22.7 21.6* 20.2*   < > 26.6   BUN mg/dL 42* 40* 35*   < > 29*   CREATININE mg/dL 2.45* 2.79* 2.98*   < > 3.72*   CALCIUM mg/dL 11.0* 10.5 10.8*   < > 13.9*   BILIRUBIN mg/dL  --   --   --   --  0.4   ALK PHOS U/L  --   --   --   --  88   ALT (SGPT) U/L  --   --   --   --  12   AST (SGOT) U/L  --   --   --   --  21   GLUCOSE mg/dL 77 105* 96   < > 91    < > = values in this interval not displayed.     Results from last 7 days   Lab Units 04/07/24  1346   WBC 10*3/mm3 10.57   HEMOGLOBIN g/dL 10.2*   HEMATOCRIT % 32.6*   PLATELETS 10*3/mm3 334         Results from last 7 days   Lab Units 04/07/24  1346   HSTROP T ng/L 18*     I have reviewed the patient's  laboratory results.    Radiology results:    No radiology results from the last 24 hrs  I have reviewed the patient's radiology reports.    Medication Review:     I have reviewed the patient's active and prn medications.     Problem List:      Acute-on-chronic kidney injury      Assessment:    Acute kidney injury, likely chronic kidney disease, baseline unknown, POA  Hypercalcemia, POA  History of sarcoidosis, POA  Rheumatoid arthritis  Obesity    Plan:    Kidney failure  Hypercalcemia:  -Baseline creatinine 2.2, progressive improvement, currently 2.4.  -Nephrology following, made the following recommendations:   -Daily assessment of fluid status, diuresis reevaluation on daily basis.   -Normal saline 100 mL/h.   -Avoid nephrotoxic medications, strict I's and O's.  -Calcium improving  -concerned that sarcoidosis has led to hypercalcemia.    -Creatinine downtrending.     Sarcoidosis:  -Patient previously under the care of Dr. Da Silva, currently seeing Dr. Crespo.    -Requesting pulmonology in [place].  Will need arranged upon discharge.  -Inpatient pulmonology has seen evaluated, with the following recommendations:   -Prednisone for 2 to 6 months,   Prednisone 40 mg p.o. daily for 2 weeks,   Prednisone 20 mg p.o. daily for another 4 weeks,   follow-up in clinic.  -Per pulmonology will consider TNF alpha antagonist after reviewing rheumatology notes.    DVT Prophylaxis: Heparin  Code Status: Full  Diet: Renal  Discharge Plan: Likely home tomorrow, assuming Cr returns to normal.    Keo Sheffield DO  04/13/24  14:09 EDT    Dictated utilizing Dragon dictation.

## 2024-04-13 NOTE — PLAN OF CARE
Problem: Adult Inpatient Plan of Care  Goal: Plan of Care Review  Outcome: Ongoing, Progressing  Flowsheets  Taken 4/12/2024 2219 by Ayla Lorenzo RN  Progress: improving  Taken 4/10/2024 1650 by Katherine Deal RN  Plan of Care Reviewed With: patient  Goal: Patient-Specific Goal (Individualized)  Outcome: Ongoing, Progressing  Goal: Absence of Hospital-Acquired Illness or Injury  Outcome: Ongoing, Progressing  Intervention: Identify and Manage Fall Risk  Recent Flowsheet Documentation  Taken 4/12/2024 2200 by Ayla Lorenzo RN  Safety Promotion/Fall Prevention: safety round/check completed  Taken 4/12/2024 2000 by Ayla Lorenzo RN  Safety Promotion/Fall Prevention: safety round/check completed  Intervention: Prevent Skin Injury  Recent Flowsheet Documentation  Taken 4/12/2024 2200 by Ayla Lorenzo RN  Body Position: position changed independently  Taken 4/12/2024 2000 by Ayla Lorenzo RN  Body Position: position changed independently  Intervention: Prevent and Manage VTE (Venous Thromboembolism) Risk  Recent Flowsheet Documentation  Taken 4/12/2024 2200 by Ayla Lorenzo RN  Activity Management: up ad magalie  Taken 4/12/2024 2000 by Ayla Lorenzo RN  Activity Management: up ad magalie  Goal: Optimal Comfort and Wellbeing  Outcome: Ongoing, Progressing  Goal: Readiness for Transition of Care  Outcome: Ongoing, Progressing     Problem: Fluid and Electrolyte Imbalance (Acute Kidney Injury/Impairment)  Goal: Fluid and Electrolyte Balance  Outcome: Ongoing, Progressing     Problem: Oral Intake Inadequate (Acute Kidney Injury/Impairment)  Goal: Optimal Nutrition Intake  Outcome: Ongoing, Progressing     Problem: Renal Function Impairment (Acute Kidney Injury/Impairment)  Goal: Effective Renal Function  Outcome: Ongoing, Progressing     Problem: Electrolyte Imbalance  Goal: Electrolyte Balance  Outcome: Ongoing, Progressing     Problem: Depression  Goal: Improved Mood  Outcome: Ongoing, Progressing     Problem: Fall Injury  Risk  Goal: Absence of Fall and Fall-Related Injury  Outcome: Ongoing, Progressing  Intervention: Promote Injury-Free Environment  Recent Flowsheet Documentation  Taken 4/12/2024 2200 by Ayla Lorenzo, RN  Safety Promotion/Fall Prevention: safety round/check completed  Taken 4/12/2024 2000 by Ayla Lorenzo, RN  Safety Promotion/Fall Prevention: safety round/check completed   Goal Outcome Evaluation:           Progress: improving

## 2024-04-13 NOTE — PLAN OF CARE
Problem: Adult Inpatient Plan of Care  Goal: Plan of Care Review  Outcome: Ongoing, Progressing  Goal: Patient-Specific Goal (Individualized)  Outcome: Ongoing, Progressing  Goal: Absence of Hospital-Acquired Illness or Injury  Outcome: Ongoing, Progressing  Intervention: Identify and Manage Fall Risk  Recent Flowsheet Documentation  Taken 4/13/2024 1600 by D'Amico, Kelly, RN  Safety Promotion/Fall Prevention:   nonskid shoes/slippers when out of bed   safety round/check completed  Taken 4/13/2024 1400 by D'Amico, Kelly, RN  Safety Promotion/Fall Prevention:   nonskid shoes/slippers when out of bed   safety round/check completed  Taken 4/13/2024 1200 by D'Amico, Kelly, RN  Safety Promotion/Fall Prevention:   nonskid shoes/slippers when out of bed   safety round/check completed  Taken 4/13/2024 1000 by D'Amico, Kelly, RN  Safety Promotion/Fall Prevention:   nonskid shoes/slippers when out of bed   safety round/check completed  Taken 4/13/2024 0800 by D'Amico, Kelly, RN  Safety Promotion/Fall Prevention:   assistive device/personal items within reach   clutter free environment maintained   fall prevention program maintained   lighting adjusted   mobility aid in reach   nonskid shoes/slippers when out of bed   room organization consistent   safety round/check completed  Intervention: Prevent Skin Injury  Recent Flowsheet Documentation  Taken 4/13/2024 0800 by D'Amico, Kelly, RN  Body Position: position changed independently  Skin Protection:   adhesive use limited   drying agents applied   hydrocolloids used   incontinence pads utilized   skin-to-skin areas padded   skin-to-device areas padded  Intervention: Prevent and Manage VTE (Venous Thromboembolism) Risk  Recent Flowsheet Documentation  Taken 4/13/2024 0800 by D'Amico, Kelly, RN  Activity Management: up ad magalie  Intervention: Prevent Infection  Recent Flowsheet Documentation  Taken 4/13/2024 0800 by D'Amico, Kelly, RN  Infection Prevention:   cohorting  utilized   equipment surfaces disinfected   environmental surveillance performed   hand hygiene promoted   rest/sleep promoted   single patient room provided  Goal: Optimal Comfort and Wellbeing  Outcome: Ongoing, Progressing  Intervention: Provide Person-Centered Care  Recent Flowsheet Documentation  Taken 4/13/2024 0800 by D'Amico, Kelly, RN  Trust Relationship/Rapport:   care explained   choices provided   emotional support provided   empathic listening provided   questions answered   questions encouraged   reassurance provided   thoughts/feelings acknowledged  Goal: Readiness for Transition of Care  Outcome: Ongoing, Progressing     Problem: Fluid and Electrolyte Imbalance (Acute Kidney Injury/Impairment)  Goal: Fluid and Electrolyte Balance  Outcome: Ongoing, Progressing  Intervention: Monitor and Manage Fluid and Electrolyte Balance  Recent Flowsheet Documentation  Taken 4/13/2024 0800 by D'Amico, Kelly, RN  Fluid/Electrolyte Management: fluids provided     Problem: Oral Intake Inadequate (Acute Kidney Injury/Impairment)  Goal: Optimal Nutrition Intake  Outcome: Ongoing, Progressing  Intervention: Promote and Optimize Nutrition  Recent Flowsheet Documentation  Taken 4/13/2024 0800 by D'Amico, Kelly, RN  Oral Nutrition Promotion: rest periods promoted     Problem: Renal Function Impairment (Acute Kidney Injury/Impairment)  Goal: Effective Renal Function  Outcome: Ongoing, Progressing  Intervention: Monitor and Support Renal Function  Recent Flowsheet Documentation  Taken 4/13/2024 0800 by D'Amico, Kelly, RN  Medication Review/Management:   medications reviewed   high-risk medications identified     Problem: Electrolyte Imbalance  Goal: Electrolyte Balance  Outcome: Ongoing, Progressing  Intervention: Monitor and Manage Electrolyte Imbalance  Recent Flowsheet Documentation  Taken 4/13/2024 0800 by D'Amico, Kelly, RN  Fluid/Electrolyte Management: fluids provided     Problem: Depression  Goal: Improved  Mood  Outcome: Ongoing, Progressing  Intervention: Monitor and Manage Depressive Symptoms  Recent Flowsheet Documentation  Taken 4/13/2024 0800 by D'Amico, Kelly, RN  Supportive Measures:   active listening utilized   counseling provided   decision-making supported   journaling promoted   positive reinforcement provided   problem-solving facilitated   relaxation techniques promoted   self-responsibility promoted   verbalization of feelings encouraged   self-reflection promoted   self-care encouraged   guided imagery facilitated   goal-setting facilitated     Problem: Fall Injury Risk  Goal: Absence of Fall and Fall-Related Injury  Outcome: Ongoing, Progressing  Intervention: Identify and Manage Contributors  Recent Flowsheet Documentation  Taken 4/13/2024 0800 by D'Amico, Kelly, RN  Medication Review/Management:   medications reviewed   high-risk medications identified  Self-Care Promotion:   independence encouraged   BADL personal objects within reach   BADL personal routines maintained   meal set-up provided   safe use of adaptive equipment encouraged  Intervention: Promote Injury-Free Environment  Recent Flowsheet Documentation  Taken 4/13/2024 1600 by D'Amico, Kelly, RN  Safety Promotion/Fall Prevention:   nonskid shoes/slippers when out of bed   safety round/check completed  Taken 4/13/2024 1400 by D'Amico, Kelly, RN  Safety Promotion/Fall Prevention:   nonskid shoes/slippers when out of bed   safety round/check completed  Taken 4/13/2024 1200 by D'Amico, Kelly, RN  Safety Promotion/Fall Prevention:   nonskid shoes/slippers when out of bed   safety round/check completed  Taken 4/13/2024 1000 by D'Amico, Kelly, RN  Safety Promotion/Fall Prevention:   nonskid shoes/slippers when out of bed   safety round/check completed  Taken 4/13/2024 0800 by D'Amico, Kelly, RN  Safety Promotion/Fall Prevention:   assistive device/personal items within reach   clutter free environment maintained   fall prevention program  maintained   lighting adjusted   mobility aid in reach   nonskid shoes/slippers when out of bed   room organization consistent   safety round/check completed   Goal Outcome Evaluation:

## 2024-04-13 NOTE — PROGRESS NOTES
[facility] Progress Note  [facility]. [place]        Patient Name: Jamia Hinton  : 1965  MRN: [id number]   LOS: 3 days    Patient Care Team:  Jaycee Deal APRN as PCP - General (Family Medicine)  Bassam Rogers MD as Consulting Physician (General Surgery)    Chief Complaint:    Chief Complaint   Patient presents with    Weakness - Generalized     Pt reports worsening weakness since yesterday with dry heaves. Pt reports feeling SOA. Denies CP      Primary Care Physician:  Jaycee Deal APRN  Date of admission: 2024    Subjective     Interval History:   Follow-up acute kidney injury and hypercalcemia  Events noted from last 24 hours.    I reviewed the chart and other providers notes, labs and procedures done since my last note.  She is doing well. She is sitting up in the chair. She said she did sleep a little better last night. She is still drinking a lot of water and up to urinate a lot. Denies shortness of breath at rest, denies chest pain. She is asking about how long will she be here.    Review of Systems:   As noted above.    Objective     Vitals:   Temp:  [97 °F (36.1 °C)-97.9 °F (36.6 °C)] 97.7 °F (36.5 °C)  Heart Rate:  [52-78] 58  Resp:  [14-18] 14  BP: (127-158)/(66-93) 138/72    Intake/Output Summary (Last 24 hours) at 2024 0616  Last data filed at 2024 0418  Gross per 24 hour   Intake 1037 ml   Output 5800 ml   Net -4763 ml       Physical Exam:    General Appearance: alert, oriented x 3, no acute distress   Skin: warm and dry  HEENT: oral mucosa normal, nonicteric sclera  Neck: supple, no JVD  Lungs: Diminished bases bilaterally  Heart: RRR, normal S1 and S2  Abdomen: obese, soft, nontender, non distended and positive bowel sounds.  : no palpable bladder  Extremities: Trace edema, no cyanosis or clubbing  Neuro: normal speech and mental status     Scheduled Meds:     Current Facility-Administered Medications   Medication Dose Route  Frequency Provider Last Rate Last Admin    acetaminophen (TYLENOL) tablet 650 mg  650 mg Oral Q4H PRN Jackie Kay DO   650 mg at 04/10/24 0204    Or    acetaminophen (TYLENOL) 160 MG/5ML oral solution 650 mg  650 mg Oral Q4H PRN Jackie Kay DO        Or    acetaminophen (TYLENOL) suppository 650 mg  650 mg Rectal Q4H PRN Jackie Kay DO        ALPRAZolam (XANAX) tablet 0.5 mg  0.5 mg Oral Q8H PRN Margy, Daniela HAYNES, APRN   0.5 mg at 04/12/24 2039    aluminum-magnesium hydroxide-simethicone (MAALOX MAX) 400-400-40 MG/5ML suspension 15 mL  15 mL Oral Q6H PRN Jackie Kay DO        sennosides-docusate (PERICOLACE) 8.6-50 MG per tablet 2 tablet  2 tablet Oral BID Jackie Kay DO        And    polyethylene glycol (MIRALAX) packet 17 g  17 g Oral Daily PRN Jackie Kay DO        And    bisacodyl (DULCOLAX) EC tablet 5 mg  5 mg Oral Daily PRN Jackie Kay DO        And    bisacodyl (DULCOLAX) suppository 10 mg  10 mg Rectal Daily PRN Jackie Kay DO        heparin (porcine) 5000 UNIT/ML injection 5,000 Units  5,000 Units Subcutaneous Q8H Margy, Daniela HAYNES, APRN   5,000 Units at 04/13/24 0500    iron polysaccharides (NIFEREX) capsule 150 mg  150 mg Oral Daily Panfilo Jo MD, FASN   150 mg at 04/12/24 0928    melatonin tablet 5 mg  5 mg Oral Nightly PRN Jackie Kay DO        nitroglycerin (NITROSTAT) SL tablet 0.4 mg  0.4 mg Sublingual Q5 Min PRN Jackie Kay DO        ondansetron ODT (ZOFRAN-ODT) disintegrating tablet 4 mg  4 mg Oral Q6H PRN Jackie Kay DO        Or    ondansetron (ZOFRAN) injection 4 mg  4 mg Intravenous Q6H PRN Jackie Kay DO   4 mg at 04/08/24 1935    predniSONE (DELTASONE) tablet 40 mg  40 mg Oral Daily With Breakfast Tamera Gloria MD        Followed by    [START ON 4/27/2024] predniSONE (DELTASONE) tablet 20 mg  20 mg Oral Daily With Breakfast Tamera Golria  MD        sodium chloride 0.9 % flush 10 mL  10 mL Intravenous PRN Jackie Kayus, DO        sodium chloride 0.9 % flush 10 mL  10 mL Intravenous Q12H Jackie Kay, DO   10 mL at 04/12/24 2039    sodium chloride 0.9 % flush 10 mL  10 mL Intravenous PRN Jackie Kayus, DO        sodium chloride 0.9 % infusion 40 mL  40 mL Intravenous PRN Jackie Kay, DO        sodium chloride 0.9 % infusion  100 mL/hr Intravenous Continuous Panfilo Jo MD, FASN 100 mL/hr at 04/12/24 1843 100 mL/hr at 04/12/24 1843       heparin (porcine), 5,000 Units, Subcutaneous, Q8H  iron polysaccharides, 150 mg, Oral, Daily  predniSONE, 40 mg, Oral, Daily With Breakfast   Followed by  [START ON 4/27/2024] predniSONE, 20 mg, Oral, Daily With Breakfast  senna-docusate sodium, 2 tablet, Oral, BID  sodium chloride, 10 mL, Intravenous, Q12H        IV Meds:   sodium chloride, 100 mL/hr, Last Rate: 100 mL/hr (04/12/24 1843)        Results Reviewed:   I have personally reviewed the results from the time of this admission to 4/13/2024 06:16 EDT     Results from last 7 days   Lab Units 04/12/24  0539 04/11/24  0729 04/10/24  1349 04/08/24  0529 04/07/24  1346   SODIUM mmol/L 137 140 138   < > 138   POTASSIUM mmol/L 3.8 3.7 4.3   < > 4.1   CHLORIDE mmol/L 104 108* 107   < > 99   CO2 mmol/L 21.6* 20.2* 18.6*   < > 26.6   BUN mg/dL 40* 35* 29*   < > 29*   CREATININE mg/dL 2.79* 2.98* 3.04*   < > 3.72*   CALCIUM mg/dL 10.5 10.8* 11.2*   < > 13.9*   BILIRUBIN mg/dL  --   --   --   --  0.4   ALK PHOS U/L  --   --   --   --  88   ALT (SGPT) U/L  --   --   --   --  12   AST (SGOT) U/L  --   --   --   --  21   GLUCOSE mg/dL 105* 96 153*   < > 91    < > = values in this interval not displayed.       Estimated Creatinine Clearance: 27.4 mL/min (A) (by C-G formula based on SCr of 2.79 mg/dL (H)).    Results from last 7 days   Lab Units 04/12/24  0539 04/11/24  0729 04/10/24  1349 04/10/24  0548 04/07/24  1346   MAGNESIUM mg/dL   "--   --   --   --  2.4   PHOSPHORUS mg/dL 4.0 3.5 2.8   < >  --     < > = values in this interval not displayed.       Results from last 7 days   Lab Units 04/08/24  0529   URIC ACID mg/dL 10.8*       Results from last 7 days   Lab Units 04/07/24  1346   WBC 10*3/mm3 10.57   HEMOGLOBIN g/dL 10.2*   PLATELETS 10*3/mm3 334      Latest Reference Range & Units 04/08/24 05:29   Iron 37 - 145 mcg/dL 27 (L)   Iron Saturation (TSAT) 20 - 50 % 11 (L)   Transferrin 200 - 360 mg/dL 163 (L)   TIBC 298 - 536 mcg/dL 243 (L)   (L): Data is abnormally low        Brief Urine Lab Results  (Last result in the past 365 days)        Color   Clarity   Blood   Leuk Est   Nitrite   Protein   CREAT   Urine HCG        04/08/24 0707             29.9                 No results found for: \"UTPCR\"      Imaging Results (Last 24 Hours)       ** No results found for the last 24 hours. **                Assessment / Plan     ASSESSMENT:  Acute kidney injury: With possible h/o CKD, baseline unknown. Last month patient noted with worsened renal function, sCr 2.29 at that time. Previous to that, labs in chart from 2020 demonstrate normal renal function. sCr 3.7 upon presentation with hypercalcemia; other e'lytes normal. Component of dehydration, progressively poor oral intake. No obstruction on imaging. UA bland. Further concern for nephrocalcinosis s/t sarcoidosis with hypercalcemia and nephrolithiasis. It is starting to show signs of improvement after aggressive fluid resuscitation  Hypercalcemia: Routine workup has been ordered including PTH, PTH related peptide, vitamin D levels, 24-hour urine protein electrophoresis, as well as ionized calcium. PTH adequately suppressed; Vitamin D was low normal. Calcium since stabilized secondary to calcitonin.   Sarcoidosis: Known history, recently cellcept and methotrexate stopped s/t worsened renal function. No known previous renal manifestation.  It is not very clear if she has been following up with the " pulmonary, will need pulmonary evaluation.  Pulmonary consult is pending  Rheumatoid arthritis: She was on methotrexate and CellCept, currently she has been off for about a month.  May need close follow-up with rheumatology.  Anemia: Appears secondary to low iron stores. Continue oral iron supplementation        PLAN:   Acute kidney injury, on CKD slightly to improve  Cr 3.7-> 2.98-> 2.79->  2.45   Calcium-> 12-> 11.2-> 10.5->  11.0  - 1, 25   vitamin-D is very high   Great urine, 5.8 L  IVF->     Thank you for involving us in the care of Jamia ARSHAD Mary Jane.  Please feel free to call with any questions.    Memo Joy MD  04/13/24  06:16 EDT    [facility]  [phone]      Part of this note may be an electronic transcription/translation of spoken language to printed text using the Dragon Dictation System.

## 2024-04-14 ENCOUNTER — READMISSION MANAGEMENT (OUTPATIENT)
Dept: CALL CENTER | Facility: HOSPITAL | Age: 59
End: 2024-04-14
Payer: MEDICAID

## 2024-04-14 VITALS
BODY MASS INDEX: 39.93 KG/M2 | DIASTOLIC BLOOD PRESSURE: 84 MMHG | RESPIRATION RATE: 18 BRPM | WEIGHT: 248.46 LBS | OXYGEN SATURATION: 98 % | HEIGHT: 66 IN | TEMPERATURE: 97.6 F | SYSTOLIC BLOOD PRESSURE: 178 MMHG | HEART RATE: 60 BPM

## 2024-04-14 LAB
ANION GAP SERPL CALCULATED.3IONS-SCNC: 10.8 MMOL/L (ref 5–15)
BUN SERPL-MCNC: 42 MG/DL (ref 8–23)
BUN/CREAT SERPL: 18.3 (ref 7–25)
CALCIUM SPEC-SCNC: 11.2 MG/DL (ref 8.6–10.5)
CHLORIDE SERPL-SCNC: 104 MMOL/L (ref 98–107)
CO2 SERPL-SCNC: 25.2 MMOL/L (ref 22–29)
CREAT SERPL-MCNC: 2.29 MG/DL (ref 0.57–1)
EGFRCR SERPLBLD CKD-EPI 2021: 23.9 ML/MIN/1.73
GLUCOSE SERPL-MCNC: 90 MG/DL (ref 65–99)
POTASSIUM SERPL-SCNC: 3.6 MMOL/L (ref 3.5–5.2)
SODIUM SERPL-SCNC: 140 MMOL/L (ref 136–145)

## 2024-04-14 PROCEDURE — 99239 HOSP IP/OBS DSCHRG MGMT >30: CPT | Performed by: FAMILY MEDICINE

## 2024-04-14 PROCEDURE — 25810000003 SODIUM CHLORIDE 0.9 % SOLUTION: Performed by: INTERNAL MEDICINE

## 2024-04-14 PROCEDURE — 80048 BASIC METABOLIC PNL TOTAL CA: CPT | Performed by: FAMILY MEDICINE

## 2024-04-14 PROCEDURE — 25010000002 HEPARIN (PORCINE) PER 1000 UNITS: Performed by: NURSE PRACTITIONER

## 2024-04-14 PROCEDURE — 63710000001 PREDNISONE PER 1 MG: Performed by: INTERNAL MEDICINE

## 2024-04-14 RX ORDER — PREDNISONE 20 MG/1
TABLET ORAL
Qty: 68 TABLET | Refills: 0 | Status: SHIPPED | OUTPATIENT
Start: 2024-04-14 | End: 2024-06-07

## 2024-04-14 RX ORDER — IRON POLYSACCHARIDE COMPLEX 150 MG
150 CAPSULE ORAL DAILY
Qty: 30 CAPSULE | Refills: 0 | Status: SHIPPED | OUTPATIENT
Start: 2024-04-14

## 2024-04-14 RX ADMIN — HEPARIN SODIUM 5000 UNITS: 5000 INJECTION INTRAVENOUS; SUBCUTANEOUS at 05:12

## 2024-04-14 RX ADMIN — PREDNISONE 40 MG: 20 TABLET ORAL at 09:15

## 2024-04-14 RX ADMIN — SODIUM CHLORIDE 100 ML/HR: 9 INJECTION, SOLUTION INTRAVENOUS at 07:36

## 2024-04-14 RX ADMIN — Medication 150 MG: at 09:15

## 2024-04-14 NOTE — PLAN OF CARE
Problem: Adult Inpatient Plan of Care  Goal: Plan of Care Review  Outcome: Ongoing, Progressing  Flowsheets  Taken 4/13/2024 2039 by Ayla Lorenzo RN  Progress: improving  Taken 4/10/2024 1650 by Katherine Deal RN  Plan of Care Reviewed With: patient  Goal: Patient-Specific Goal (Individualized)  Outcome: Ongoing, Progressing  Goal: Absence of Hospital-Acquired Illness or Injury  Outcome: Ongoing, Progressing  Intervention: Identify and Manage Fall Risk  Recent Flowsheet Documentation  Taken 4/13/2024 2000 by Ayla Lorenzo RN  Safety Promotion/Fall Prevention: safety round/check completed  Intervention: Prevent Skin Injury  Recent Flowsheet Documentation  Taken 4/13/2024 2000 by Ayla Lorenzo RN  Body Position: position changed independently  Intervention: Prevent and Manage VTE (Venous Thromboembolism) Risk  Recent Flowsheet Documentation  Taken 4/13/2024 2000 by Ayla Lorenzo RN  Activity Management: up ad magalie  Goal: Optimal Comfort and Wellbeing  Outcome: Ongoing, Progressing  Goal: Readiness for Transition of Care  Outcome: Ongoing, Progressing     Problem: Fluid and Electrolyte Imbalance (Acute Kidney Injury/Impairment)  Goal: Fluid and Electrolyte Balance  Outcome: Ongoing, Progressing     Problem: Oral Intake Inadequate (Acute Kidney Injury/Impairment)  Goal: Optimal Nutrition Intake  Outcome: Ongoing, Progressing     Problem: Renal Function Impairment (Acute Kidney Injury/Impairment)  Goal: Effective Renal Function  Outcome: Ongoing, Progressing  Intervention: Monitor and Support Renal Function  Recent Flowsheet Documentation  Taken 4/13/2024 2000 by Ayla Lorenzo RN  Medication Review/Management: medications reviewed     Problem: Electrolyte Imbalance  Goal: Electrolyte Balance  Outcome: Ongoing, Progressing     Problem: Depression  Goal: Improved Mood  Outcome: Ongoing, Progressing     Problem: Fall Injury Risk  Goal: Absence of Fall and Fall-Related Injury  Outcome: Ongoing,  Progressing  Intervention: Identify and Manage Contributors  Recent Flowsheet Documentation  Taken 4/13/2024 2000 by Ayla Lorenzo, RN  Medication Review/Management: medications reviewed  Intervention: Promote Injury-Free Environment  Recent Flowsheet Documentation  Taken 4/13/2024 2000 by Ayla Lorenzo, RN  Safety Promotion/Fall Prevention: safety round/check completed   Goal Outcome Evaluation:           Progress: improving

## 2024-04-14 NOTE — CASE MANAGEMENT/SOCIAL WORK
Case Management Discharge Note           Provided Post Acute Provider List?: N/A  Provided Post Acute Provider Quality & Resource List?: N/A    Selected Continued Care - Admitted Since 4/7/2024       Destination    No services have been selected for the patient.                Durable Medical Equipment    No services have been selected for the patient.                Dialysis/Infusion    No services have been selected for the patient.                Home Medical Care    No services have been selected for the patient.                Therapy    No services have been selected for the patient.                Community Resources    No services have been selected for the patient.                Community & DME    No services have been selected for the patient.                    Transportation Services  Private: Car    Final Discharge Disposition Code: 01 - home or self-care

## 2024-04-14 NOTE — OUTREACH NOTE
Prep Survey      Flowsheet Row Responses   [facility] facility patient discharged from? [facility]   Is LACE score < 7 ? No   Eligibility Readm Mgmt   Discharge diagnosis Acute-on-chronic kidney injury   Does the patient have one of the following disease processes/diagnoses(primary or secondary)? Other   Does the patient have Home health ordered? No   Is there a DME ordered? No   Prep survey completed? Yes            Catherine ARSHAD - Registered Nurse

## 2024-04-14 NOTE — DISCHARGE SUMMARY
[facility]   DISCHARGE SUMMARY      Name:  Jamia Hinton   Age:  58 y.o.  Sex:  female  :  1965  MRN:  [id number]   Visit Number:  [id number]    Admission Date:  2024  Date of Discharge:  2024  Primary Care Physician:  Jaycee Deal APRN    Important issues to note:    Acute kidney injury, likely chronic kidney disease, baseline unknown, POA  Hypercalcemia, POA  History of sarcoidosis, POA  Rheumatoid arthritis  Obesity    Discharge Diagnoses:     Acute kidney injury, likely chronic kidney disease, baseline unknown, POA  Hypercalcemia, POA  History of sarcoidosis, POA  Rheumatoid arthritis  Obesity    Problem List:     Active Hospital Problems    Diagnosis  POA    **Acute-on-chronic kidney injury [N17.9, N18.9]  Yes      Resolved Hospital Problems   No resolved problems to display.     Presenting Problem:    Chief Complaint   Patient presents with    Weakness - Generalized     Pt reports worsening weakness since yesterday with dry heaves. Pt reports feeling SOA. Denies CP       Consults:     Consulting Physician(s)         Provider   Role Specialty     Panfilo Jo MD, RUDY      Consulting Physician Nephrology     Cydney Walsh MD      Consulting Physician Pulmonary Disease     Tamera Gloria MD      Consulting Physician Pulmonary Disease          Procedures Performed: None        History of presenting illness/Hospital Course:    58-year-old female with history of sarcoidosis, rheumatoid arthritis, obesity, who presented to the emergency room with complaints of decreased oral intake, early satiety, weakness and nausea for multiple months now.  Recently told about decreased kidney function with primary care provider and all of her medications including CellCept and methotrexate were discontinued.  Recently underwent an outpatient EGD with Dr. Rogers, reportedly had esophagitis.     Workup in the emergency room was showing acute on chronic renal  failure with creatinine 3.72 and also with a calcium of 13.9.  Potassium was 4.  A CT scan of the chest without contrast was showing mild mediastinal and hilar adenopathy.  CT scan abdomen pelvis showing right-sided nephrolithiasis without hydronephrosis.  Hospitalist consulted for further medical management.     Patient admitted to the hospitalist service and given IV fluid resuscitation.  Calcium slowly improved.  Dr. Jo followed.  IV hydration continued.  Diuresis and steroids initiated per nephrology.  Creatinine downtrending.  Records pending from prior pulmonology/rheumatology.  Per pulmonology will likely require TNF alpha antagonist.  Pulmonology had made recommendations for long-term steroid dosing, prednisone 40 mg p.o. x 2 weeks, titrated down to 20 mg p.o. x 4 weeks.     Patient serum creatinine returned back to baseline, has been eating and drinking without difficulty, urinating and defecating without issue.  Patient has reached maximum medical improvement from her inpatient hospital stay.  Patient be discharged home in stable condition.  Educated about the need for taking medications as prescribed, keeping appropriate follow-up with consultants and PCP.  Patient verbalized understanding.    Vital Signs:    Temp:  [97.3 °F (36.3 °C)-97.9 °F (36.6 °C)] 97.6 °F (36.4 °C)  Heart Rate:  [57-73] 60  Resp:  [16-18] 18  BP: (127-178)/(74-86) 178/84    Physical Exam:    General Appearance:  Alert and cooperative.  Chronically ill-appearing, daughter at bedside, sitting upright at the side of the bed   Head:  Atraumatic and normocephalic.   Eyes: Conjunctivae and sclerae normal, no icterus. No pallor.   Ears:  Ears with no abnormalities noted.   Throat: No oral lesions, no thrush, oral mucosa moist.   Neck: Supple, trachea midline, no thyromegaly.   Back:   No kyphoscoliosis present. No tenderness to palpation.   Lungs:   Breath sounds heard bilaterally equally.  No crackles or wheezing. No Pleural rub or  bronchial breathing.   Heart:  Normal S1 and S2, no murmur, no gallop, no rub. No JVD.   Abdomen:   Normal bowel sounds, no masses, no organomegaly. Soft, nontender, nondistended, no rebound tenderness.   Extremities: Supple, no edema, no cyanosis, no clubbing.   Pulses: Pulses palpable bilaterally.   Skin: No bleeding or rash.   Neurologic: Alert and oriented x 3. No facial asymmetry. Moves all four limbs. No tremors.     Pertinent Lab Results:     Results from last 7 days   Lab Units 04/14/24  0801 04/13/24  0701 04/12/24  0539 04/08/24  0529 04/07/24  1346   SODIUM mmol/L 140 138 137   < > 138   POTASSIUM mmol/L 3.6 3.8 3.8   < > 4.1   CHLORIDE mmol/L 104 101 104   < > 99   CO2 mmol/L 25.2 22.7 21.6*   < > 26.6   BUN mg/dL 42* 42* 40*   < > 29*   CREATININE mg/dL 2.29* 2.45* 2.79*   < > 3.72*   CALCIUM mg/dL 11.2* 11.0* 10.5   < > 13.9*   BILIRUBIN mg/dL  --   --   --   --  0.4   ALK PHOS U/L  --   --   --   --  88   ALT (SGPT) U/L  --   --   --   --  12   AST (SGOT) U/L  --   --   --   --  21   GLUCOSE mg/dL 90 77 105*   < > 91    < > = values in this interval not displayed.     Results from last 7 days   Lab Units 04/07/24  1346   WBC 10*3/mm3 10.57   HEMOGLOBIN g/dL 10.2*   HEMATOCRIT % 32.6*   PLATELETS 10*3/mm3 334         Results from last 7 days   Lab Units 04/07/24  1346   HSTROP T ng/L 18*             Results from last 7 days   Lab Units 04/07/24  1346   LIPASE U/L 28               Pertinent Radiology Results:    Imaging Results (All)       Procedure Component Value Units Date/Time    US Renal Bilateral [[id number]] Collected: 04/07/24 1948     Updated: 04/07/24 1951    Narrative:      PROCEDURE: US RENAL BILATERAL-     HISTORY: buddy, hypercalcemia; N17.9-Acute kidney failure, unspecified;  E86.0-Dehydration     FINDINGS: Kidneys show a normal echo pattern. .  Right kidney measures  12.22 cm in length. Left kidney measures 13.2 cm in length.  Size is  normal.     No mass or cyst is seen. No obstructive  changes are present. A  questionable small nonobstructing 3 mm stone is seen of the lower pole  right kidney.       Impression:      Unremarkable renal ultrasound.        This report was signed and finalized on 4/7/2024 7:49 PM by Frederick Jimenez MD.       CT Abdomen Pelvis Without Contrast [[id number]] Collected: 04/07/24 1537     Updated: 04/07/24 1545    Narrative:      PROCEDURE: CT ABDOMEN PELVIS WO CONTRAST-     HISTORY: Nausea, weakness s/p endoscopy     COMPARISON: March 22, 2024..     PROCEDURE: Axial images were obtained from the lung bases to the pubic  symphysis by computed tomography. This study was performed with  techniques to keep radiation doses as low as reasonably achievable,  (ALARA). Individualized dose reduction techniques using automated  exposure control or adjustment of mA and/or kV according to the patient  size were employed.     FINDINGS:     ABDOMEN: The lung bases are clear. The heart is proper size. The limited  non-contrast images of the liver demonstrate no focal abnormality but  the liver is enlarged at 18.5 cm, similar to prior. There are metallic  surgical clips in the right upper quadrant consistent with previous  cholecystectomy. The spleen mildly enlarged at 13 cm, stable from prior.  No focal mass identified. Fullness of the left adrenal gland is stable.  Right adrenal gland is normal. There are metallic surgical clips in the  right upper quadrant consistent with previous cholecystectomy. The aorta  is normal in caliber. There is no significant free fluid or adenopathy.   There is no left nephrolithiasis. Small nonobstructing stones on the  right are stable. There is no hydronephrosis. No bony destructive lesion  seen. Small periaortic lymph nodes are stable from prior.     PELVIS: The GI tract demonstrate no obstruction. The appendix is  identified and appears normal. The urinary bladder is almost completely  collapsed. Uterus is midline. There is no fluid or adenopathy.  No  suspicious inguinal adenopathy seen. No free air identified.       Impression:      Right nephrolithiasis without hydronephrosis.     Hepatosplenomegaly similar to prior study.        CTDI: 7.44 mGy  DLP:1288.51 mGy.cm     This report was signed and finalized on 4/7/2024 3:42 PM by Nova Koch MD.       CT Chest Without Contrast Diagnostic [[id number]] Collected: 04/07/24 1530     Updated: 04/07/24 1538    Narrative:      PROCEDURE: CT CHEST WO CONTRAST DIAGNOSTIC-     HISTORY: Weakness, hx of sarcoidosis, s/p endoscopy     COMPARISON: No previous chest CT for comparison.     PROCEDURE: Axial images were obtained from the lung apex to the mid  abdomen by computed tomography. This study was performed with techniques  to keep radiation doses as low as reasonably achievable, (ALARA).  Individualized dose reduction techniques using automated exposure  control or adjustment of mA and/or kV according to the patient size were  employed.     FINDINGS:     CHEST: There is no suspicious axillary adenopathy. Vascular  calcifications noted. There is mildly prominent right pretracheal lymph  node in question fullness of the right hilum. Chest CT with contrast  would be helpful for further evaluation in this patient with known  sarcoidosis. The heart is proper size. There is no pericardial or  pleural effusion.No suspicious infiltrate or nodule identified. Limited  images of the upper abdomen demonstrate cholecystectomy clips. Spleen is  incompletely visualized but appears at least mildly enlarged to 13 cm.  No bony destructive lesion seen. No mediastinal air identified. No  significant wall thickening of the esophagus identified. No pneumothorax  seen.       Impression:      No acute infiltrate identified.     Question mild mediastinal and hilar adenopathy, consider chest CT with  intravenous contrast.           CTDI: 7.44 mGy  DLP:1288.51 mGy.cm     This report was signed and finalized on 4/7/2024 3:36 PM by Nova  MD August.       XR Chest 1 View [[id number]] Collected: 04/07/24 1430     Updated: 04/07/24 1432    Narrative:      PROCEDURE: XR CHEST 1 VW-     HISTORY: Gen weakness, worsening since yesterday with shortness of  breath and dry heaves.     COMPARISON: None.     FINDINGS: The heart is normal in size. The lungs are clear. The  mediastinum is unremarkable. There is no pneumothorax.  There are no  acute osseous abnormalities. Apical lordotic positioning noted.        Impression:      No acute cardiopulmonary process.              This report was signed and finalized on 4/7/2024 2:30 PM by Nova Koch MD.               Condition on Discharge:  Stable.    Code status during the hospital stay:    Code Status and Medical Interventions:   Ordered at: 04/07/24 3585     Code Status (Patient has no pulse and is not breathing):    CPR (Attempt to Resuscitate)     Medical Interventions (Patient has pulse or is breathing):    Full Support     Discharge Disposition: To home        Discharge Medications:       Discharge Medications        ASK your doctor about these medications        Instructions Start Date   citalopram 20 MG tablet  Commonly known as: CeleXA   20 mg, Daily      folic acid 1 MG tablet  Commonly known as: FOLVITE   1 mg, Daily      methotrexate 2.5 MG tablet   2.5 mg, Weekly      mycophenolate 500 MG tablet  Commonly known as: CELLCEPT   1,000 mg, 2 Times Daily      omeprazole 40 MG capsule  Commonly known as: priLOSEC   40 mg, Oral, Daily      ondansetron ODT 4 MG disintegrating tablet  Commonly known as: ZOFRAN-ODT   4 mg, Translingual, Every 6 Hours PRN      oxybutynin XL 5 MG 24 hr tablet  Commonly known as: DITROPAN-XL   5 mg, Daily      vitamin B-12 1000 MCG tablet  Commonly known as: CYANOCOBALAMIN   1,000 mcg, Daily      vitamin D3 125 MCG (5000 UT) capsule capsule   5,000 Units, Daily             Discharge Diet: Renal      Activity at Discharge: As tolerated      Follow-up Appointments:    Additional  Instructions for the Follow-ups that You Need to Schedule       Discharge Follow-up with Specified Provider: Dr. Walsh; 6 Weeks   As directed      To: Dr. Walsh   Follow Up: 6 Weeks   Follow Up Details: If being discharged on a weekend, please call our office on the next working day to schedule this appointment.               Follow-up Information       Jaycee Deal APRN .    Specialty: Family Medicine  Contact information:  [address] [facility] [address]  [phone]               Cydney Walsh MD Follow up in 6 week(s).    Specialty: Pulmonary Disease  Why: 6-8 weeks after discharge.  Contact information:  [address]  [address]  [facility] [address]  [phone]                             Test Results Pending at Discharge:       Keo Sheffield DO  04/14/24  08:50 EDT    Time: I spent 35 minutes on this discharge activity which included: face-to-face encounter with the patient, reviewing the data in the system, coordination of the care with the nursing staff as well as consultants, documentation, and entering orders.     Dictated utilizing Dragon dictation.

## 2024-04-14 NOTE — PAYOR COMM NOTE
"  D/c summary  Ur manager; alexandr rapp, rn [phone] and fax [phone]      aJmia Murrieta (58 y.o. Female)       Date of Birth   1965    Social Security Number       Address   [address] [place] [id number]    Home Phone   [phone]    MRN   [id number]       Druze   [employer] orth[employer]    Marital Status                               Admission Date   24    Admission Type   Emergency    Admitting Provider   Jackie Kay DO    Attending Provider       Department, Room/Bed   [facility]        Discharge Date   2024    Discharge Disposition   Home or Self Care    Discharge Destination   Home                              Attending Provider: (none)   Allergies: Citalopram Hydrobromide    Isolation: None   Infection: None   Code Status: CPR    Ht: 167.6 cm (66\")   Wt: 113 kg (248 lb 7.3 oz)    Admission Cmt: None   Principal Problem: Acute-on-chronic kidney injury [N17.9,N18.9]                   Active Insurance as of 2024       Primary Coverage       Payor Plan Insurance Group Employer/Plan Group    [place] [id number]       Payor Plan Address Payor Plan Phone Number Payor Plan Fax Number Effective Dates    [employer] [address] [phone]  2020 - None Entered    [facility] [id number]         Subscriber Name Subscriber Birth Date Member ID       JAMIA MURRIETA 1965 [id number]                     Emergency Contacts        (Rel.) Home Phone Work Phone Mobile Phone    Veronica Murrieta (Daughter) [phone] -- [phone]                 Physician Progress Notes (last 24 hours)        Keo Sheffield DO at 24 1408              [facility]    PROGRESS NOTE    Name:  Jamia Murrieta   Age:  58 y.o.  Sex:  female  :  1965  MRN:  [id number]   Visit Number:  [id number]  Admission Date:  2024  Date Of Service:  24  Primary Care Physician:  " Jaycee Deal, CLAUDIA     LOS: 3 days :    Chief Complaint:      Fatigue, poor appetite    Subjective:    Patient seen and examined,  at bedside.  Patient reports feeling significantly better.  Denies any acute complaints.    Hospital Course:    58-year-old female with history of sarcoidosis, rheumatoid arthritis, obesity, who presented to the emergency room with complaints of decreased oral intake, early satiety, weakness and nausea for multiple months now.  Recently told about decreased kidney function with primary care provider and all of her medications including CellCept and methotrexate were discontinued.  Recently underwent an outpatient EGD with Dr. Rogers, reportedly had esophagitis.     Workup in the emergency room was showing acute on chronic renal failure with creatinine 3.72 and also with a calcium of 13.9.  Potassium was 4.  A CT scan of the chest without contrast was showing mild mediastinal and hilar adenopathy.  CT scan abdomen pelvis showing right-sided nephrolithiasis without hydronephrosis.  Hospitalist consulted for further medical management.     Patient admitted to the hospitalist service and given IV fluid resuscitation.  Calcium slowly improved.  Dr. Jo followed.  IV hydration continued.  Diuresis and steroids initiated per nephrology.  Creatinine downtrending.  Records pending from prior pulmonology/rheumatology.  Per pulmonology will likely require TNF alpha antagonist.    Review of Systems:     All systems were reviewed and negative except as mentioned in subjective, assessment and plan.    Vital Signs:    Temp:  [97.3 °F (36.3 °C)-97.9 °F (36.6 °C)] 97.3 °F (36.3 °C)  Heart Rate:  [58-84] 68  Resp:  [14-18] 16  BP: (127-154)/(66-93) 127/76    Intake and output:    I/O last 3 completed shifts:  In: 1037 [P.O.:1037]  Out: 8500 [Urine:8500]  I/O this shift:  In: 600 [P.O.:600]  Out: 600 [Urine:600]    Physical Examination:    General Appearance:  Alert and cooperative.   Chronically ill-appearing, sitting upright in chair at bedside   Head:  Atraumatic and normocephalic.   Eyes: Conjunctivae and sclerae normal, no icterus. No pallor.   Throat: No oral lesions, no thrush, oral mucosa moist.   Neck: Supple, trachea midline, no thyromegaly.   Lungs:   Breath sounds heard bilaterally equally.  No wheezing or crackles. No Pleural rub or bronchial breathing.   Heart:  Normal S1 and S2, no murmur, no gallop, no rub. No JVD.   Abdomen:   Normal bowel sounds, no masses, no organomegaly. Soft, nontender, nondistended, no rebound tenderness.   Extremities: Supple, no edema, no cyanosis, no clubbing.   Skin: No bleeding or rash.   Neurologic: Alert and oriented x 3. No facial asymmetry. Moves all four limbs. No tremors.      Laboratory results:    Results from last 7 days   Lab Units 04/13/24  0701 04/12/24  0539 04/11/24  0729 04/08/24  0529 04/07/24  1346   SODIUM mmol/L 138 137 140   < > 138   POTASSIUM mmol/L 3.8 3.8 3.7   < > 4.1   CHLORIDE mmol/L 101 104 108*   < > 99   CO2 mmol/L 22.7 21.6* 20.2*   < > 26.6   BUN mg/dL 42* 40* 35*   < > 29*   CREATININE mg/dL 2.45* 2.79* 2.98*   < > 3.72*   CALCIUM mg/dL 11.0* 10.5 10.8*   < > 13.9*   BILIRUBIN mg/dL  --   --   --   --  0.4   ALK PHOS U/L  --   --   --   --  88   ALT (SGPT) U/L  --   --   --   --  12   AST (SGOT) U/L  --   --   --   --  21   GLUCOSE mg/dL 77 105* 96   < > 91    < > = values in this interval not displayed.     Results from last 7 days   Lab Units 04/07/24  1346   WBC 10*3/mm3 10.57   HEMOGLOBIN g/dL 10.2*   HEMATOCRIT % 32.6*   PLATELETS 10*3/mm3 334         Results from last 7 days   Lab Units 04/07/24  1346   HSTROP T ng/L 18*     I have reviewed the patient's laboratory results.    Radiology results:    No radiology results from the last 24 hrs  I have reviewed the patient's radiology reports.    Medication Review:     I have reviewed the patient's active and prn medications.     Problem List:      Acute-on-chronic  kidney injury      Assessment:    Acute kidney injury, likely chronic kidney disease, baseline unknown, POA  Hypercalcemia, POA  History of sarcoidosis, POA  Rheumatoid arthritis  Obesity    Plan:    Kidney failure  Hypercalcemia:  -Baseline creatinine 2.2, progressive improvement, currently 2.4.  -Nephrology following, made the following recommendations:   -Daily assessment of fluid status, diuresis reevaluation on daily basis.   -Normal saline 100 mL/h.   -Avoid nephrotoxic medications, strict I's and O's.  -Calcium improving  -concerned that sarcoidosis has led to hypercalcemia.    -Creatinine downtrending.     Sarcoidosis:  -Patient previously under the care of Dr. Da Silva, currently seeing Dr. Crespo.    -Requesting pulmonology in [place].  Will need arranged upon discharge.  -Inpatient pulmonology has seen evaluated, with the following recommendations:   -Prednisone for 2 to 6 months,   Prednisone 40 mg p.o. daily for 2 weeks,   Prednisone 20 mg p.o. daily for another 4 weeks,   follow-up in clinic.  -Per pulmonology will consider TNF alpha antagonist after reviewing rheumatology notes.    DVT Prophylaxis: Heparin  Code Status: Full  Diet: Renal  Discharge Plan: Likely home tomorrow, assuming Cr returns to normal.    Keo Sheffield DO  24  14:09 EDT    Dictated utilizing Dragon dictation.       Electronically signed by Keo Sheffield DO at 24 1414          Discharge Summary        Keo Sheffield DO at 24 0850              [facility]      Name:  Jamia Hinton   Age:  58 y.o.  Sex:  female  :  1965  MRN:  [id number]   Visit Number:  [id number]    Admission Date:  2024  Date of Discharge:  2024  Primary Care Physician:  Jaycee Deal APRN    Important issues to note:    Acute kidney injury, likely chronic kidney disease, baseline unknown, POA  Hypercalcemia, POA  History of sarcoidosis, POA  Rheumatoid  arthritis  Obesity    Discharge Diagnoses:     Acute kidney injury, likely chronic kidney disease, baseline unknown, POA  Hypercalcemia, POA  History of sarcoidosis, POA  Rheumatoid arthritis  Obesity    Problem List:     Active Hospital Problems    Diagnosis  POA    **Acute-on-chronic kidney injury [N17.9, N18.9]  Yes      Resolved Hospital Problems   No resolved problems to display.     Presenting Problem:    Chief Complaint   Patient presents with    Weakness - Generalized     Pt reports worsening weakness since yesterday with dry heaves. Pt reports feeling SOA. Denies CP       Consults:     Consulting Physician(s)         Provider   Role Specialty     Panfilo Jo MD, RUDY      Consulting Physician Nephrology     Cydney Walsh MD      Consulting Physician Pulmonary Disease     Tamera Gloria MD      Consulting Physician Pulmonary Disease          Procedures Performed: None        History of presenting illness/Hospital Course:    58-year-old female with history of sarcoidosis, rheumatoid arthritis, obesity, who presented to the emergency room with complaints of decreased oral intake, early satiety, weakness and nausea for multiple months now.  Recently told about decreased kidney function with primary care provider and all of her medications including CellCept and methotrexate were discontinued.  Recently underwent an outpatient EGD with Dr. Rogers, reportedly had esophagitis.     Workup in the emergency room was showing acute on chronic renal failure with creatinine 3.72 and also with a calcium of 13.9.  Potassium was 4.  A CT scan of the chest without contrast was showing mild mediastinal and hilar adenopathy.  CT scan abdomen pelvis showing right-sided nephrolithiasis without hydronephrosis.  Hospitalist consulted for further medical management.     Patient admitted to the hospitalist service and given IV fluid resuscitation.  Calcium slowly improved.  Dr. Jo followed.  IV hydration  continued.  Diuresis and steroids initiated per nephrology.  Creatinine downtrending.  Records pending from prior pulmonology/rheumatology.  Per pulmonology will likely require TNF alpha antagonist.  Pulmonology had made recommendations for long-term steroid dosing, prednisone 40 mg p.o. x 2 weeks, titrated down to 20 mg p.o. x 4 weeks.     Patient serum creatinine returned back to baseline, has been eating and drinking without difficulty, urinating and defecating without issue.  Patient has reached maximum medical improvement from her inpatient hospital stay.  Patient be discharged home in stable condition.  Educated about the need for taking medications as prescribed, keeping appropriate follow-up with consultants and PCP.  Patient verbalized understanding.    Vital Signs:    Temp:  [97.3 °F (36.3 °C)-97.9 °F (36.6 °C)] 97.6 °F (36.4 °C)  Heart Rate:  [57-73] 60  Resp:  [16-18] 18  BP: (127-178)/(74-86) 178/84    Physical Exam:    General Appearance:  Alert and cooperative.  Chronically ill-appearing, daughter at bedside, sitting upright at the side of the bed   Head:  Atraumatic and normocephalic.   Eyes: Conjunctivae and sclerae normal, no icterus. No pallor.   Ears:  Ears with no abnormalities noted.   Throat: No oral lesions, no thrush, oral mucosa moist.   Neck: Supple, trachea midline, no thyromegaly.   Back:   No kyphoscoliosis present. No tenderness to palpation.   Lungs:   Breath sounds heard bilaterally equally.  No crackles or wheezing. No Pleural rub or bronchial breathing.   Heart:  Normal S1 and S2, no murmur, no gallop, no rub. No JVD.   Abdomen:   Normal bowel sounds, no masses, no organomegaly. Soft, nontender, nondistended, no rebound tenderness.   Extremities: Supple, no edema, no cyanosis, no clubbing.   Pulses: Pulses palpable bilaterally.   Skin: No bleeding or rash.   Neurologic: Alert and oriented x 3. No facial asymmetry. Moves all four limbs. No tremors.     Pertinent Lab Results:      Results from last 7 days   Lab Units 04/14/24  0801 04/13/24  0701 04/12/24  0539 04/08/24  0529 04/07/24  1346   SODIUM mmol/L 140 138 137   < > 138   POTASSIUM mmol/L 3.6 3.8 3.8   < > 4.1   CHLORIDE mmol/L 104 101 104   < > 99   CO2 mmol/L 25.2 22.7 21.6*   < > 26.6   BUN mg/dL 42* 42* 40*   < > 29*   CREATININE mg/dL 2.29* 2.45* 2.79*   < > 3.72*   CALCIUM mg/dL 11.2* 11.0* 10.5   < > 13.9*   BILIRUBIN mg/dL  --   --   --   --  0.4   ALK PHOS U/L  --   --   --   --  88   ALT (SGPT) U/L  --   --   --   --  12   AST (SGOT) U/L  --   --   --   --  21   GLUCOSE mg/dL 90 77 105*   < > 91    < > = values in this interval not displayed.     Results from last 7 days   Lab Units 04/07/24  1346   WBC 10*3/mm3 10.57   HEMOGLOBIN g/dL 10.2*   HEMATOCRIT % 32.6*   PLATELETS 10*3/mm3 334         Results from last 7 days   Lab Units 04/07/24  1346   HSTROP T ng/L 18*             Results from last 7 days   Lab Units 04/07/24  1346   LIPASE U/L 28               Pertinent Radiology Results:    Imaging Results (All)       Procedure Component Value Units Date/Time    US Renal Bilateral [[id number]] Collected: 04/07/24 1948     Updated: 04/07/24 1951    Narrative:      PROCEDURE: US RENAL BILATERAL-     HISTORY: buddy, hypercalcemia; N17.9-Acute kidney failure, unspecified;  E86.0-Dehydration     FINDINGS: Kidneys show a normal echo pattern. .  Right kidney measures  12.22 cm in length. Left kidney measures 13.2 cm in length.  Size is  normal.     No mass or cyst is seen. No obstructive changes are present. A  questionable small nonobstructing 3 mm stone is seen of the lower pole  right kidney.       Impression:      Unremarkable renal ultrasound.        This report was signed and finalized on 4/7/2024 7:49 PM by Frederick Jimenez MD.       CT Abdomen Pelvis Without Contrast [[id number]] Collected: 04/07/24 1537     Updated: 04/07/24 1545    Narrative:      PROCEDURE: CT ABDOMEN PELVIS WO CONTRAST-     HISTORY: Nausea, weakness s/p  endoscopy     COMPARISON: March 22, 2024..     PROCEDURE: Axial images were obtained from the lung bases to the pubic  symphysis by computed tomography. This study was performed with  techniques to keep radiation doses as low as reasonably achievable,  (ALARA). Individualized dose reduction techniques using automated  exposure control or adjustment of mA and/or kV according to the patient  size were employed.     FINDINGS:     ABDOMEN: The lung bases are clear. The heart is proper size. The limited  non-contrast images of the liver demonstrate no focal abnormality but  the liver is enlarged at 18.5 cm, similar to prior. There are metallic  surgical clips in the right upper quadrant consistent with previous  cholecystectomy. The spleen mildly enlarged at 13 cm, stable from prior.  No focal mass identified. Fullness of the left adrenal gland is stable.  Right adrenal gland is normal. There are metallic surgical clips in the  right upper quadrant consistent with previous cholecystectomy. The aorta  is normal in caliber. There is no significant free fluid or adenopathy.   There is no left nephrolithiasis. Small nonobstructing stones on the  right are stable. There is no hydronephrosis. No bony destructive lesion  seen. Small periaortic lymph nodes are stable from prior.     PELVIS: The GI tract demonstrate no obstruction. The appendix is  identified and appears normal. The urinary bladder is almost completely  collapsed. Uterus is midline. There is no fluid or adenopathy. No  suspicious inguinal adenopathy seen. No free air identified.       Impression:      Right nephrolithiasis without hydronephrosis.     Hepatosplenomegaly similar to prior study.        CTDI: 7.44 mGy  DLP:1288.51 mGy.cm     This report was signed and finalized on 4/7/2024 3:42 PM by Nova Koch MD.       CT Chest Without Contrast Diagnostic [[id number]] Collected: 04/07/24 1530     Updated: 04/07/24 1538    Narrative:      PROCEDURE: CT CHEST WO  CONTRAST DIAGNOSTIC-     HISTORY: Weakness, hx of sarcoidosis, s/p endoscopy     COMPARISON: No previous chest CT for comparison.     PROCEDURE: Axial images were obtained from the lung apex to the mid  abdomen by computed tomography. This study was performed with techniques  to keep radiation doses as low as reasonably achievable, (ALARA).  Individualized dose reduction techniques using automated exposure  control or adjustment of mA and/or kV according to the patient size were  employed.     FINDINGS:     CHEST: There is no suspicious axillary adenopathy. Vascular  calcifications noted. There is mildly prominent right pretracheal lymph  node in question fullness of the right hilum. Chest CT with contrast  would be helpful for further evaluation in this patient with known  sarcoidosis. The heart is proper size. There is no pericardial or  pleural effusion.No suspicious infiltrate or nodule identified. Limited  images of the upper abdomen demonstrate cholecystectomy clips. Spleen is  incompletely visualized but appears at least mildly enlarged to 13 cm.  No bony destructive lesion seen. No mediastinal air identified. No  significant wall thickening of the esophagus identified. No pneumothorax  seen.       Impression:      No acute infiltrate identified.     Question mild mediastinal and hilar adenopathy, consider chest CT with  intravenous contrast.           CTDI: 7.44 mGy  DLP:1288.51 mGy.cm     This report was signed and finalized on 4/7/2024 3:36 PM by Nova Koch MD.       XR Chest 1 View [[id number]] Collected: 04/07/24 1430     Updated: 04/07/24 1432    Narrative:      PROCEDURE: XR CHEST 1 VW-     HISTORY: Gen weakness, worsening since yesterday with shortness of  breath and dry heaves.     COMPARISON: None.     FINDINGS: The heart is normal in size. The lungs are clear. The  mediastinum is unremarkable. There is no pneumothorax.  There are no  acute osseous abnormalities. Apical lordotic positioning noted.         Impression:      No acute cardiopulmonary process.              This report was signed and finalized on 4/7/2024 2:30 PM by Nova Koch MD.               Condition on Discharge:  Stable.    Code status during the hospital stay:    Code Status and Medical Interventions:   Ordered at: 04/07/24 1747     Code Status (Patient has no pulse and is not breathing):    CPR (Attempt to Resuscitate)     Medical Interventions (Patient has pulse or is breathing):    Full Support     Discharge Disposition: To home        Discharge Medications:       Discharge Medications        ASK your doctor about these medications        Instructions Start Date   citalopram 20 MG tablet  Commonly known as: CeleXA   20 mg, Daily      folic acid 1 MG tablet  Commonly known as: FOLVITE   1 mg, Daily      methotrexate 2.5 MG tablet   2.5 mg, Weekly      mycophenolate 500 MG tablet  Commonly known as: CELLCEPT   1,000 mg, 2 Times Daily      omeprazole 40 MG capsule  Commonly known as: priLOSEC   40 mg, Oral, Daily      ondansetron ODT 4 MG disintegrating tablet  Commonly known as: ZOFRAN-ODT   4 mg, Translingual, Every 6 Hours PRN      oxybutynin XL 5 MG 24 hr tablet  Commonly known as: DITROPAN-XL   5 mg, Daily      vitamin B-12 1000 MCG tablet  Commonly known as: CYANOCOBALAMIN   1,000 mcg, Daily      vitamin D3 125 MCG (5000 UT) capsule capsule   5,000 Units, Daily             Discharge Diet: Renal      Activity at Discharge: As tolerated      Follow-up Appointments:    Additional Instructions for the Follow-ups that You Need to Schedule       Discharge Follow-up with Specified Provider: Dr. Walsh; 6 Weeks   As directed      To: Dr. Walsh   Follow Up: 6 Weeks   Follow Up Details: If being discharged on a weekend, please call our office on the next working day to schedule this appointment.               Follow-up Information       Jaycee Deal APRN .    Specialty: Family Medicine  Contact information:  [address]  [place]  [address]  [phone]               Cydney Walsh MD Follow up in 6 week(s).    Specialty: Pulmonary Disease  Why: 6-8 weeks after discharge.  Contact information:  [address]  [address] [address] [phone]                             Test Results Pending at Discharge:       Keo Sheffield DO  04/14/24  08:50 EDT    Time: I spent 35 minutes on this discharge activity which included: face-to-face encounter with the patient, reviewing the data in the system, coordination of the care with the nursing staff as well as consultants, documentation, and entering orders.     Dictated utilizing Dragon dictation.       Electronically signed by Keo Sheffield DO at 04/14/24 0900

## 2024-04-14 NOTE — DISCHARGE INSTR - LAB
Follow up with Nephrology Dr. Jo within 1 week. Office to call with appointment time. [phone]   Need 1 week Basic Metabolic Panel, BMP

## 2024-04-14 NOTE — PLAN OF CARE
Problem: Adult Inpatient Plan of Care  Goal: Plan of Care Review  Outcome: Met  Goal: Patient-Specific Goal (Individualized)  Outcome: Met  Goal: Absence of Hospital-Acquired Illness or Injury  Outcome: Met  Intervention: Identify and Manage Fall Risk  Recent Flowsheet Documentation  Taken 4/14/2024 1000 by D'Amico, Kelly, RN  Safety Promotion/Fall Prevention:   nonskid shoes/slippers when out of bed   safety round/check completed  Taken 4/14/2024 0800 by D'Amico, Kelly, RN  Safety Promotion/Fall Prevention:   assistive device/personal items within reach   clutter free environment maintained   fall prevention program maintained   lighting adjusted   mobility aid in reach   nonskid shoes/slippers when out of bed   room organization consistent   safety round/check completed  Intervention: Prevent Skin Injury  Recent Flowsheet Documentation  Taken 4/14/2024 0800 by D'Amico, Kelly, RN  Body Position: position changed independently  Skin Protection:   adhesive use limited   drying agents applied   hydrocolloids used   incontinence pads utilized   skin-to-skin areas padded   skin-to-device areas padded  Intervention: Prevent and Manage VTE (Venous Thromboembolism) Risk  Recent Flowsheet Documentation  Taken 4/14/2024 0800 by D'Amico, Kelly, RN  Activity Management: up ad magalie  Intervention: Prevent Infection  Recent Flowsheet Documentation  Taken 4/14/2024 0800 by D'Amico, Kelly, RN  Infection Prevention:   cohorting utilized   equipment surfaces disinfected   environmental surveillance performed   hand hygiene promoted   rest/sleep promoted   single patient room provided  Goal: Optimal Comfort and Wellbeing  Outcome: Met  Intervention: Provide Person-Centered Care  Recent Flowsheet Documentation  Taken 4/14/2024 0800 by D'Amico, Kelly, RN  Trust Relationship/Rapport:   care explained   choices provided   emotional support provided   empathic listening provided   questions answered   questions encouraged   reassurance  provided   thoughts/feelings acknowledged  Goal: Readiness for Transition of Care  Outcome: Met     Problem: Fluid and Electrolyte Imbalance (Acute Kidney Injury/Impairment)  Goal: Fluid and Electrolyte Balance  Outcome: Met  Intervention: Monitor and Manage Fluid and Electrolyte Balance  Recent Flowsheet Documentation  Taken 4/14/2024 0800 by D'Amico, Kelly, RN  Fluid/Electrolyte Management: fluids provided     Problem: Oral Intake Inadequate (Acute Kidney Injury/Impairment)  Goal: Optimal Nutrition Intake  Outcome: Met  Intervention: Promote and Optimize Nutrition  Recent Flowsheet Documentation  Taken 4/14/2024 0800 by D'Amico, Kelly, RN  Oral Nutrition Promotion: rest periods promoted     Problem: Renal Function Impairment (Acute Kidney Injury/Impairment)  Goal: Effective Renal Function  Outcome: Met  Intervention: Monitor and Support Renal Function  Recent Flowsheet Documentation  Taken 4/14/2024 0800 by D'Amico, Kelly, RN  Medication Review/Management:   medications reviewed   high-risk medications identified     Problem: Electrolyte Imbalance  Goal: Electrolyte Balance  Outcome: Met  Intervention: Monitor and Manage Electrolyte Imbalance  Recent Flowsheet Documentation  Taken 4/14/2024 0800 by D'Amico, Kelly, RN  Fluid/Electrolyte Management: fluids provided     Problem: Depression  Goal: Improved Mood  Outcome: Met  Intervention: Monitor and Manage Depressive Symptoms  Recent Flowsheet Documentation  Taken 4/14/2024 0800 by D'Amico, Kelly, RN  Supportive Measures:   active listening utilized   counseling provided   decision-making supported   journaling promoted   positive reinforcement provided   problem-solving facilitated   relaxation techniques promoted   self-responsibility promoted   verbalization of feelings encouraged   self-reflection promoted   self-care encouraged   guided imagery facilitated   goal-setting facilitated     Problem: Fall Injury Risk  Goal: Absence of Fall and Fall-Related  Injury  Outcome: Met  Intervention: Identify and Manage Contributors  Recent Flowsheet Documentation  Taken 4/14/2024 0800 by D'Amico, Kelly, RN  Medication Review/Management:   medications reviewed   high-risk medications identified  Self-Care Promotion:   independence encouraged   BADL personal objects within reach   BADL personal routines maintained   meal set-up provided   safe use of adaptive equipment encouraged  Intervention: Promote Injury-Free Environment  Recent Flowsheet Documentation  Taken 4/14/2024 1000 by D'Amico, Kelly, RN  Safety Promotion/Fall Prevention:   nonskid shoes/slippers when out of bed   safety round/check completed  Taken 4/14/2024 0800 by D'Amico, Kelly, RN  Safety Promotion/Fall Prevention:   assistive device/personal items within reach   clutter free environment maintained   fall prevention program maintained   lighting adjusted   mobility aid in reach   nonskid shoes/slippers when out of bed   room organization consistent   safety round/check completed   Goal Outcome Evaluation:

## 2024-04-14 NOTE — PROGRESS NOTES
[facility] Progress Note  [facility]. [place]        Patient Name: Jamia Hinton  : 1965  MRN: [id number]   LOS: 4 days    Patient Care Team:  Jaycee Deal APRN as PCP - General (Family Medicine)  Bassam Rogers MD as Consulting Physician (General Surgery)    Chief Complaint:    Chief Complaint   Patient presents with    Weakness - Generalized     Pt reports worsening weakness since yesterday with dry heaves. Pt reports feeling SOA. Denies CP      Primary Care Physician:  Jaycee Deal APRN  Date of admission: 2024    Subjective     Interval History:   Follow-up acute kidney injury and hypercalcemia  Events noted from last 24 hours.    I reviewed the chart and other providers notes, labs and procedures done since my last note.  She is doing well. She is sitting up in the chair. She said she did sleep a little better last night. She is still drinking a lot of water and up to urinate a lot. Denies shortness of breath at rest, denies chest pain. She is asking about how long will she be here.    Review of Systems:   As noted above.    Objective     Vitals:   Temp:  [97.3 °F (36.3 °C)-97.9 °F (36.6 °C)] 97.9 °F (36.6 °C)  Heart Rate:  [57-84] 58  Resp:  [16-18] 18  BP: (127-157)/(74-86) 157/74    Intake/Output Summary (Last 24 hours) at 2024 0548  Last data filed at 2024 0500  Gross per 24 hour   Intake 960 ml   Output 5900 ml   Net -4940 ml       Physical Exam:    General Appearance: alert, oriented x 3, no acute distress   Skin: warm and dry  HEENT: oral mucosa normal, nonicteric sclera  Neck: supple, no JVD  Lungs: Diminished bases bilaterally  Heart: RRR, normal S1 and S2  Abdomen: obese, soft, nontender, non distended and positive bowel sounds.  : no palpable bladder  Extremities: Trace edema, no cyanosis or clubbing  Neuro: normal speech and mental status     Scheduled Meds:     Current Facility-Administered Medications   Medication Dose Route  Frequency Provider Last Rate Last Admin    acetaminophen (TYLENOL) tablet 650 mg  650 mg Oral Q4H PRN Jackie Kay DO   650 mg at 04/10/24 0204    Or    acetaminophen (TYLENOL) 160 MG/5ML oral solution 650 mg  650 mg Oral Q4H PRN Jcakie Kay DO        Or    acetaminophen (TYLENOL) suppository 650 mg  650 mg Rectal Q4H PRN Jackie Kay DO        ALPRAZolam (XANAX) tablet 0.5 mg  0.5 mg Oral Q8H PRN Margy, Daniela HAYNES, APRN   0.5 mg at 04/13/24 2026    aluminum-magnesium hydroxide-simethicone (MAALOX MAX) 400-400-40 MG/5ML suspension 15 mL  15 mL Oral Q6H PRN Jackie Kay DO        sennosides-docusate (PERICOLACE) 8.6-50 MG per tablet 2 tablet  2 tablet Oral BID Jackie Kay DO        And    polyethylene glycol (MIRALAX) packet 17 g  17 g Oral Daily PRN Jackie Kay DO        And    bisacodyl (DULCOLAX) EC tablet 5 mg  5 mg Oral Daily PRN Jackie Kay DO        And    bisacodyl (DULCOLAX) suppository 10 mg  10 mg Rectal Daily PRN Jackie Kay DO        heparin (porcine) 5000 UNIT/ML injection 5,000 Units  5,000 Units Subcutaneous Q8H Margy, Daniela HAYNES, APRN   5,000 Units at 04/14/24 0512    iron polysaccharides (NIFEREX) capsule 150 mg  150 mg Oral Daily Panfilo Jo MD, FASN   150 mg at 04/13/24 0902    melatonin tablet 5 mg  5 mg Oral Nightly PRN Jackie Kay DO        nitroglycerin (NITROSTAT) SL tablet 0.4 mg  0.4 mg Sublingual Q5 Min PRN Jackie Kay DO        ondansetron ODT (ZOFRAN-ODT) disintegrating tablet 4 mg  4 mg Oral Q6H PRN Jackie Kay DO        Or    ondansetron (ZOFRAN) injection 4 mg  4 mg Intravenous Q6H PRN Jackie Kay DO   4 mg at 04/08/24 1935    predniSONE (DELTASONE) tablet 40 mg  40 mg Oral Daily With Breakfast Tamera Gloria MD   40 mg at 04/13/24 0902    Followed by    [START ON 4/27/2024] predniSONE (DELTASONE) tablet 20 mg  20 mg Oral Daily With  Breakfast Tamera Gloria MD        sodium chloride 0.9 % flush 10 mL  10 mL Intravenous PRN Jackie Kayus, DO        sodium chloride 0.9 % flush 10 mL  10 mL Intravenous Q12H Jackie Kay, DO   10 mL at 04/13/24 2026    sodium chloride 0.9 % flush 10 mL  10 mL Intravenous PRN Eliza, Jackie Sebastianus, DO        sodium chloride 0.9 % infusion 40 mL  40 mL Intravenous PRN Jackie Kayus, DO        sodium chloride 0.9 % infusion  100 mL/hr Intravenous Continuous Panfilo Jo MD, FASN 100 mL/hr at 04/13/24 1324 100 mL/hr at 04/13/24 1324       heparin (porcine), 5,000 Units, Subcutaneous, Q8H  iron polysaccharides, 150 mg, Oral, Daily  predniSONE, 40 mg, Oral, Daily With Breakfast   Followed by  [START ON 4/27/2024] predniSONE, 20 mg, Oral, Daily With Breakfast  senna-docusate sodium, 2 tablet, Oral, BID  sodium chloride, 10 mL, Intravenous, Q12H        IV Meds:   sodium chloride, 100 mL/hr, Last Rate: 100 mL/hr (04/13/24 1324)        Results Reviewed:   I have personally reviewed the results from the time of this admission to 4/14/2024 05:48 EDT     Results from last 7 days   Lab Units 04/13/24  0701 04/12/24  0539 04/11/24  0729 04/08/24  0529 04/07/24  1346   SODIUM mmol/L 138 137 140   < > 138   POTASSIUM mmol/L 3.8 3.8 3.7   < > 4.1   CHLORIDE mmol/L 101 104 108*   < > 99   CO2 mmol/L 22.7 21.6* 20.2*   < > 26.6   BUN mg/dL 42* 40* 35*   < > 29*   CREATININE mg/dL 2.45* 2.79* 2.98*   < > 3.72*   CALCIUM mg/dL 11.0* 10.5 10.8*   < > 13.9*   BILIRUBIN mg/dL  --   --   --   --  0.4   ALK PHOS U/L  --   --   --   --  88   ALT (SGPT) U/L  --   --   --   --  12   AST (SGOT) U/L  --   --   --   --  21   GLUCOSE mg/dL 77 105* 96   < > 91    < > = values in this interval not displayed.       Estimated Creatinine Clearance: 31.1 mL/min (A) (by C-G formula based on SCr of 2.45 mg/dL (H)).    Results from last 7 days   Lab Units 04/13/24  0701 04/12/24  0539 04/11/24  0729 04/10/24  0548  "04/07/24  1346   MAGNESIUM mg/dL  --   --   --   --  2.4   PHOSPHORUS mg/dL 3.7 4.0 3.5   < >  --     < > = values in this interval not displayed.       Results from last 7 days   Lab Units 04/08/24  0529   URIC ACID mg/dL 10.8*       Results from last 7 days   Lab Units 04/07/24  1346   WBC 10*3/mm3 10.57   HEMOGLOBIN g/dL 10.2*   PLATELETS 10*3/mm3 334      Latest Reference Range & Units 04/08/24 05:29   Iron 37 - 145 mcg/dL 27 (L)   Iron Saturation (TSAT) 20 - 50 % 11 (L)   Transferrin 200 - 360 mg/dL 163 (L)   TIBC 298 - 536 mcg/dL 243 (L)   (L): Data is abnormally low        Brief Urine Lab Results  (Last result in the past 365 days)        Color   Clarity   Blood   Leuk Est   Nitrite   Protein   CREAT   Urine HCG        04/08/24 0707             29.9                 No results found for: \"UTPCR\"      Imaging Results (Last 24 Hours)       ** No results found for the last 24 hours. **                Assessment / Plan     ASSESSMENT:  Acute kidney injury: With possible h/o CKD, baseline unknown. Last month patient noted with worsened renal function, sCr 2.29 at that time. Previous to that, labs in chart from 2020 demonstrate normal renal function. sCr 3.7 upon presentation with hypercalcemia; other e'lytes normal. Component of dehydration, progressively poor oral intake. No obstruction on imaging. UA bland. Further concern for nephrocalcinosis s/t sarcoidosis with hypercalcemia and nephrolithiasis. It is starting to show signs of improvement after aggressive fluid resuscitation  Hypercalcemia: Routine workup has been ordered including PTH, PTH related peptide, vitamin D levels, 24-hour urine protein electrophoresis, as well as ionized calcium. PTH adequately suppressed; Vitamin D was low normal. Calcium since stabilized secondary to calcitonin.   Sarcoidosis: Known history, recently cellcept and methotrexate stopped s/t worsened renal function. No known previous renal manifestation.  It is not very clear if she " has been following up with the pulmonary, will need pulmonary evaluation.  Pulmonary consult is pending  Rheumatoid arthritis: She was on methotrexate and CellCept, currently she has been off for about a month.  May need close follow-up with rheumatology.  Anemia: Appears secondary to low iron stores. Continue oral iron supplementation        PLAN:   Acute kidney injury, on CKD slightly to improve  Cr 3.7-> 2.98-> 2.79->  2.45-> pending   Calcium-> 12-> 11.2-> 10.5->  11.0-> pending  - 1, 25   vitamin-D is very high   Great urine, 5.8 L  IVF->     Thank you for involving us in the care of Jamia ARSHAD Mary Jane.  Please feel free to call with any questions.    Memo Joy MD  04/14/24  05:48 EDT    [facility]  [phone]

## 2024-04-18 ENCOUNTER — READMISSION MANAGEMENT (OUTPATIENT)
Dept: CALL CENTER | Facility: HOSPITAL | Age: 59
End: 2024-04-18
Payer: MEDICAID

## 2024-04-18 NOTE — OUTREACH NOTE
Medical Week 1 Survey      Flowsheet Row Responses   [facility] patient discharged from? [place]   Does the patient have one of the following disease processes/diagnoses(primary or secondary)? Other   Week 1 attempt successful? Yes   Call start time 1556   Call end time 1557   Discharge diagnosis Acute-on-chronic kidney injury   Meds reviewed with patient/caregiver? Yes   Is the patient having any side effects they believe may be caused by any medication additions or changes? No   Does the patient have all medications ordered at discharge? Yes   Is the patient taking all medications as directed (includes completed medication regime)? Yes   Comments regarding appointments Dr. Rogers apt on 4/22/24   Does the patient have a primary care provider?  Yes   Has the patient kept scheduled appointments due by today? N/A   Has home health visited the patient within 72 hours of discharge? N/A   Psychosocial issues? No   Did the patient receive a copy of their discharge instructions? Yes   Nursing interventions Reviewed instructions with patient   What is the patient's perception of their health status since discharge? Improving   Is the patient/caregiver able to teach back signs and symptoms related to disease process for when to call PCP? Yes   Is the patient/caregiver able to teach back signs and symptoms related to disease process for when to call 911? Yes   Is the patient/caregiver able to teach back the hierarchy of who to call/visit for symptoms/problems? PCP, Specialist, Home health nurse, Urgent Care, ED, 911 Yes   If the patient is a current smoker, are they able to teach back resources for cessation? Not a smoker   Week 1 call completed? Yes   Graduated Yes   Did the patient feel the follow up calls were helpful during their recovery period? Yes   Graduated/Revoked comments Pt doing well   Call end time 1557            Melva CONNELL - Registered Nurse

## 2024-04-19 NOTE — PROGRESS NOTES
Patient: Jamia Hinton    YOB: 1965    Date: 04/22/2024    Primary Care Provider: Jaycee Deal APRN    Reason for Consultation: Follow-up EGD    Chief Complaint   Patient presents with    Follow-up     EGD       History of present illness:  I saw the patient in the office today as a followup epigastric pain and unexplained weight loss. Patient had EGD with biopsy performed 04/05/2024, the pathology report did show antral mucosa with mild chronic inactive gastritis and squamocolumnar junctional mucosa with no significant histopathology.  They state that they have done well, she did have a recent 6-day hospitalization for treatment of hypercalcemia.  She does have a history significant for sarcoidosis and has a follow-up appointment with the nephrologist because of acute renal insufficiency.  She states that her symptomatology seems to have resolved.    The following portions of the patient's history were reviewed and updated as appropriate: allergies, current medications, past family history, past medical history, past social history, past surgical history and problem list.    Review of Systems   Constitutional:  Negative for chills, fever and unexpected weight change.   HENT:  Negative for hearing loss, trouble swallowing and voice change.    Eyes:  Negative for visual disturbance.   Respiratory:  Negative for apnea, cough, chest tightness, shortness of breath and wheezing.    Cardiovascular:  Negative for chest pain, palpitations and leg swelling.   Gastrointestinal:  Negative for abdominal distention, abdominal pain, anal bleeding, blood in stool, constipation, diarrhea, nausea, rectal pain and vomiting.   Endocrine: Negative for cold intolerance and heat intolerance.   Genitourinary:  Negative for difficulty urinating, dysuria and flank pain.   Musculoskeletal:  Negative for back pain and gait problem.   Skin:  Negative for color change, rash and wound.   Neurological:  Negative for dizziness,  "syncope, speech difficulty, weakness, light-headedness, numbness and headaches.   Hematological:  Negative for adenopathy. Does not bruise/bleed easily.   Psychiatric/Behavioral:  Negative for confusion. The patient is not nervous/anxious.        Vital Signs:   Vitals:    04/22/24 0849   BP: 168/88   Pulse: 75   Temp: 97.5 °F (36.4 °C)   SpO2: 97%   Weight: 112 kg (248 lb)   Height: 167.6 cm (65.98\")       Allergies:  Allergies   Allergen Reactions    Citalopram Hydrobromide Unknown - High Severity     Couldn't function       Medications:    Current Outpatient Medications:     iron polysaccharides (NIFEREX) 150 MG capsule, Take 1 capsule by mouth Daily., Disp: 30 capsule, Rfl: 0    predniSONE (DELTASONE) 20 MG tablet, Take 2 tablets by mouth Daily With Breakfast for 13 days, THEN 1 tablet Daily With Breakfast for 42 days., Disp: 68 tablet, Rfl: 0    citalopram (CeleXA) 20 MG tablet, Take 1 tablet by mouth Daily. (Patient not taking: Reported on 4/22/2024), Disp: , Rfl:     folic acid (FOLVITE) 1 MG tablet, Take 1 tablet by mouth Daily. (Patient not taking: Reported on 4/22/2024), Disp: , Rfl:     omeprazole (priLOSEC) 40 MG capsule, Take 1 capsule by mouth Daily. (Patient not taking: Reported on 4/22/2024), Disp: , Rfl:     ondansetron ODT (ZOFRAN-ODT) 4 MG disintegrating tablet, Place 1 tablet on the tongue Every 6 (Six) Hours As Needed for Nausea or Vomiting. (Patient not taking: Reported on 4/22/2024), Disp: 9 tablet, Rfl: 0    oxybutynin XL (DITROPAN-XL) 5 MG 24 hr tablet, Take 1 tablet by mouth Daily. (Patient not taking: Reported on 4/22/2024), Disp: , Rfl:     vitamin B-12 (CYANOCOBALAMIN) 1000 MCG tablet, Take 1 tablet by mouth Daily. (Patient not taking: Reported on 4/22/2024), Disp: , Rfl:     vitamin D3 125 MCG (5000 UT) capsule capsule, Take 1 capsule by mouth Daily. (Patient not taking: Reported on 4/22/2024), Disp: , Rfl:     Physical Exam:   General Appearance:    Alert, cooperative, in no acute " distress   Abdomen:     no masses, no organomegaly, soft non-tender, non-distended, no guarding, wounds are well healed, no evidence of recurrent hernia, no peritoneal signs   Chest:      Clear toausculation            Cor:  Regular rate and rhythm      Results Review:   I reviewed the patient's new clinical results.  I reviewed the patient's new imaging results and agree with the interpretation.  I reviewed the patient's other test results and agree with the interpretation    Review of Systems was reviewed and confirmed as accurate as documented by the MA.    Assessment / Plan:    1. Epigastric pain        I did discuss the situation with the patient today in the office and they have done well from their recent EGD with biopsy. I have told the patient she needs no further intervention at this time but I have encouraged her to keep her next scheduled appointment this coming Wednesday with the nephrologist.    Electronically signed by Bassam Rogers MD  04/22/24

## 2024-04-22 ENCOUNTER — OFFICE VISIT (OUTPATIENT)
Dept: SURGERY | Facility: CLINIC | Age: 59
End: 2024-04-22
Payer: MEDICAID

## 2024-04-22 VITALS
TEMPERATURE: 97.5 F | HEIGHT: 66 IN | WEIGHT: 248 LBS | SYSTOLIC BLOOD PRESSURE: 168 MMHG | DIASTOLIC BLOOD PRESSURE: 88 MMHG | HEART RATE: 75 BPM | BODY MASS INDEX: 39.86 KG/M2 | OXYGEN SATURATION: 97 %

## 2024-04-22 DIAGNOSIS — R10.13 EPIGASTRIC PAIN: Primary | ICD-10-CM

## 2024-04-22 PROCEDURE — 1160F RVW MEDS BY RX/DR IN RCRD: CPT | Performed by: SURGERY

## 2024-04-22 PROCEDURE — 1159F MED LIST DOCD IN RCRD: CPT | Performed by: SURGERY

## 2024-04-22 PROCEDURE — 99213 OFFICE O/P EST LOW 20 MIN: CPT | Performed by: SURGERY

## 2024-04-24 ENCOUNTER — HOSPITAL ENCOUNTER (INPATIENT)
Facility: HOSPITAL | Age: 59
LOS: 4 days | Discharge: HOME | DRG: 683 | End: 2024-04-28
Attending: STUDENT IN AN ORGANIZED HEALTH CARE EDUCATION/TRAINING PROGRAM | Admitting: INTERNAL MEDICINE
Payer: MEDICAID

## 2024-04-24 DIAGNOSIS — E83.52 HYPERCALCEMIA: Primary | ICD-10-CM

## 2024-04-24 DIAGNOSIS — N17.9 AKI (ACUTE KIDNEY INJURY): ICD-10-CM

## 2024-04-24 LAB
ALBUMIN SERPL-MCNC: 4.1 G/DL (ref 3.5–5.2)
ALBUMIN/GLOB SERPL: 1.3 G/DL
ALP SERPL-CCNC: 64 U/L (ref 39–117)
ALT SERPL W P-5'-P-CCNC: 15 U/L (ref 1–33)
ANION GAP SERPL CALCULATED.3IONS-SCNC: 12.6 MMOL/L (ref 5–15)
AST SERPL-CCNC: 13 U/L (ref 1–32)
BACTERIA UR QL AUTO: ABNORMAL /HPF
BASOPHILS # BLD AUTO: 0.06 10*3/MM3 (ref 0–0.2)
BASOPHILS NFR BLD AUTO: 0.3 % (ref 0–1.5)
BILIRUB SERPL-MCNC: 0.4 MG/DL (ref 0–1.2)
BILIRUB UR QL STRIP: NEGATIVE
BUN SERPL-MCNC: 75 MG/DL (ref 8–23)
BUN/CREAT SERPL: 19.6 (ref 7–25)
CALCIUM SPEC-SCNC: 14.1 MG/DL (ref 8.6–10.5)
CHLORIDE SERPL-SCNC: 98 MMOL/L (ref 98–107)
CLARITY UR: CLEAR
CO2 SERPL-SCNC: 25.4 MMOL/L (ref 22–29)
COD CRY URNS QL: ABNORMAL /HPF
COLOR UR: YELLOW
CREAT SERPL-MCNC: 3.82 MG/DL (ref 0.57–1)
DEPRECATED RDW RBC AUTO: 49.4 FL (ref 37–54)
EGFRCR SERPLBLD CKD-EPI 2021: 12.9 ML/MIN/1.73
EOSINOPHIL # BLD AUTO: 0.23 10*3/MM3 (ref 0–0.4)
EOSINOPHIL NFR BLD AUTO: 1.2 % (ref 0.3–6.2)
ERYTHROCYTE [DISTWIDTH] IN BLOOD BY AUTOMATED COUNT: 15.4 % (ref 12.3–15.4)
GLOBULIN UR ELPH-MCNC: 3.2 GM/DL
GLUCOSE SERPL-MCNC: 101 MG/DL (ref 65–99)
GLUCOSE UR STRIP-MCNC: NEGATIVE MG/DL
HCT VFR BLD AUTO: 35.9 % (ref 34–46.6)
HGB BLD-MCNC: 11.6 G/DL (ref 12–15.9)
HGB UR QL STRIP.AUTO: ABNORMAL
HOLD SPECIMEN: NORMAL
HOLD SPECIMEN: NORMAL
HYALINE CASTS UR QL AUTO: ABNORMAL /LPF
IMM GRANULOCYTES # BLD AUTO: 0.16 10*3/MM3 (ref 0–0.05)
IMM GRANULOCYTES NFR BLD AUTO: 0.9 % (ref 0–0.5)
KETONES UR QL STRIP: NEGATIVE
LEUKOCYTE ESTERASE UR QL STRIP.AUTO: ABNORMAL
LYMPHOCYTES # BLD AUTO: 0.53 10*3/MM3 (ref 0.7–3.1)
LYMPHOCYTES NFR BLD AUTO: 2.8 % (ref 19.6–45.3)
MAGNESIUM SERPL-MCNC: 2.4 MG/DL (ref 1.6–2.4)
MCH RBC QN AUTO: 28.4 PG (ref 26.6–33)
MCHC RBC AUTO-ENTMCNC: 32.3 G/DL (ref 31.5–35.7)
MCV RBC AUTO: 88 FL (ref 79–97)
MONOCYTES # BLD AUTO: 0.67 10*3/MM3 (ref 0.1–0.9)
MONOCYTES NFR BLD AUTO: 3.6 % (ref 5–12)
NEUTROPHILS NFR BLD AUTO: 17.14 10*3/MM3 (ref 1.7–7)
NEUTROPHILS NFR BLD AUTO: 91.2 % (ref 42.7–76)
NITRITE UR QL STRIP: NEGATIVE
NRBC BLD AUTO-RTO: 0 /100 WBC (ref 0–0.2)
PH UR STRIP.AUTO: 6.5 [PH] (ref 5–8)
PLATELET # BLD AUTO: 266 10*3/MM3 (ref 140–450)
PMV BLD AUTO: 11.3 FL (ref 6–12)
POTASSIUM SERPL-SCNC: 4.1 MMOL/L (ref 3.5–5.2)
PROT SERPL-MCNC: 7.3 G/DL (ref 6–8.5)
PROT UR QL STRIP: ABNORMAL
RBC # BLD AUTO: 4.08 10*6/MM3 (ref 3.77–5.28)
RBC # UR STRIP: ABNORMAL /HPF
REF LAB TEST METHOD: ABNORMAL
SODIUM SERPL-SCNC: 136 MMOL/L (ref 136–145)
SP GR UR STRIP: 1.01 (ref 1–1.03)
SQUAMOUS #/AREA URNS HPF: ABNORMAL /HPF
UROBILINOGEN UR QL STRIP: ABNORMAL
WBC # UR STRIP: ABNORMAL /HPF
WBC NRBC COR # BLD AUTO: 18.79 10*3/MM3 (ref 3.4–10.8)
WHOLE BLOOD HOLD COAG: NORMAL
WHOLE BLOOD HOLD SPECIMEN: NORMAL

## 2024-04-24 PROCEDURE — 25810000003 SODIUM CHLORIDE 0.9 % SOLUTION: Performed by: STUDENT IN AN ORGANIZED HEALTH CARE EDUCATION/TRAINING PROGRAM

## 2024-04-24 PROCEDURE — 80053 COMPREHEN METABOLIC PANEL: CPT | Performed by: STUDENT IN AN ORGANIZED HEALTH CARE EDUCATION/TRAINING PROGRAM

## 2024-04-24 PROCEDURE — 25810000003 SODIUM CHLORIDE 0.9 % SOLUTION: Performed by: NURSE PRACTITIONER

## 2024-04-24 PROCEDURE — 99291 CRITICAL CARE FIRST HOUR: CPT

## 2024-04-24 PROCEDURE — 36415 COLL VENOUS BLD VENIPUNCTURE: CPT

## 2024-04-24 PROCEDURE — 99222 1ST HOSP IP/OBS MODERATE 55: CPT | Performed by: NURSE PRACTITIONER

## 2024-04-24 PROCEDURE — 83735 ASSAY OF MAGNESIUM: CPT | Performed by: STUDENT IN AN ORGANIZED HEALTH CARE EDUCATION/TRAINING PROGRAM

## 2024-04-24 PROCEDURE — 25010000002 HEPARIN (PORCINE) PER 1000 UNITS: Performed by: NURSE PRACTITIONER

## 2024-04-24 PROCEDURE — 63710000001 CALCITONIN PER 400 UNITS: Performed by: INTERNAL MEDICINE

## 2024-04-24 PROCEDURE — 93005 ELECTROCARDIOGRAM TRACING: CPT | Performed by: STUDENT IN AN ORGANIZED HEALTH CARE EDUCATION/TRAINING PROGRAM

## 2024-04-24 PROCEDURE — 85025 COMPLETE CBC W/AUTO DIFF WBC: CPT | Performed by: STUDENT IN AN ORGANIZED HEALTH CARE EDUCATION/TRAINING PROGRAM

## 2024-04-24 PROCEDURE — 81001 URINALYSIS AUTO W/SCOPE: CPT | Performed by: STUDENT IN AN ORGANIZED HEALTH CARE EDUCATION/TRAINING PROGRAM

## 2024-04-24 RX ORDER — PREDNISONE 20 MG/1
20 TABLET ORAL
Status: DISCONTINUED | OUTPATIENT
Start: 2024-04-27 | End: 2024-04-25

## 2024-04-24 RX ORDER — SODIUM CHLORIDE 9 MG/ML
125 INJECTION, SOLUTION INTRAVENOUS CONTINUOUS
Status: DISCONTINUED | OUTPATIENT
Start: 2024-04-24 | End: 2024-04-26

## 2024-04-24 RX ORDER — POLYETHYLENE GLYCOL 3350 17 G/17G
17 POWDER, FOR SOLUTION ORAL DAILY PRN
Status: DISCONTINUED | OUTPATIENT
Start: 2024-04-24 | End: 2024-04-28 | Stop reason: HOSPADM

## 2024-04-24 RX ORDER — ACETAMINOPHEN 325 MG/1
650 TABLET ORAL EVERY 4 HOURS PRN
Status: DISCONTINUED | OUTPATIENT
Start: 2024-04-24 | End: 2024-04-28 | Stop reason: HOSPADM

## 2024-04-24 RX ORDER — HEPARIN SODIUM 5000 [USP'U]/ML
5000 INJECTION, SOLUTION INTRAVENOUS; SUBCUTANEOUS EVERY 12 HOURS SCHEDULED
Status: DISCONTINUED | OUTPATIENT
Start: 2024-04-24 | End: 2024-04-28 | Stop reason: HOSPADM

## 2024-04-24 RX ORDER — BISACODYL 10 MG
10 SUPPOSITORY, RECTAL RECTAL DAILY PRN
Status: DISCONTINUED | OUTPATIENT
Start: 2024-04-24 | End: 2024-04-28 | Stop reason: HOSPADM

## 2024-04-24 RX ORDER — SODIUM CHLORIDE 9 MG/ML
40 INJECTION, SOLUTION INTRAVENOUS AS NEEDED
Status: DISCONTINUED | OUTPATIENT
Start: 2024-04-24 | End: 2024-04-28 | Stop reason: HOSPADM

## 2024-04-24 RX ORDER — NITROGLYCERIN 0.4 MG/1
0.4 TABLET SUBLINGUAL
Status: DISCONTINUED | OUTPATIENT
Start: 2024-04-24 | End: 2024-04-28 | Stop reason: HOSPADM

## 2024-04-24 RX ORDER — CALCITONIN SALMON 200 [USP'U]/ML
4 INJECTION, SOLUTION INTRAMUSCULAR; SUBCUTANEOUS EVERY 12 HOURS
Status: DISPENSED | OUTPATIENT
Start: 2024-04-24 | End: 2024-04-26

## 2024-04-24 RX ORDER — IRON POLYSACCHARIDE COMPLEX 150 MG
150 CAPSULE ORAL DAILY
Status: DISCONTINUED | OUTPATIENT
Start: 2024-04-25 | End: 2024-04-28 | Stop reason: HOSPADM

## 2024-04-24 RX ORDER — PREDNISONE 20 MG/1
40 TABLET ORAL
Status: DISCONTINUED | OUTPATIENT
Start: 2024-04-25 | End: 2024-04-25

## 2024-04-24 RX ORDER — LABETALOL HYDROCHLORIDE 5 MG/ML
10 INJECTION, SOLUTION INTRAVENOUS EVERY 6 HOURS PRN
Status: DISCONTINUED | OUTPATIENT
Start: 2024-04-24 | End: 2024-04-28 | Stop reason: HOSPADM

## 2024-04-24 RX ORDER — AMOXICILLIN 250 MG
2 CAPSULE ORAL 2 TIMES DAILY PRN
Status: DISCONTINUED | OUTPATIENT
Start: 2024-04-24 | End: 2024-04-28 | Stop reason: HOSPADM

## 2024-04-24 RX ORDER — BISACODYL 5 MG/1
5 TABLET, DELAYED RELEASE ORAL DAILY PRN
Status: DISCONTINUED | OUTPATIENT
Start: 2024-04-24 | End: 2024-04-28 | Stop reason: HOSPADM

## 2024-04-24 RX ORDER — ONDANSETRON 2 MG/ML
4 INJECTION INTRAMUSCULAR; INTRAVENOUS EVERY 6 HOURS PRN
Status: DISCONTINUED | OUTPATIENT
Start: 2024-04-24 | End: 2024-04-28 | Stop reason: HOSPADM

## 2024-04-24 RX ORDER — SODIUM CHLORIDE 0.9 % (FLUSH) 0.9 %
10 SYRINGE (ML) INJECTION EVERY 12 HOURS SCHEDULED
Status: DISCONTINUED | OUTPATIENT
Start: 2024-04-24 | End: 2024-04-28 | Stop reason: HOSPADM

## 2024-04-24 RX ORDER — CHOLECALCIFEROL (VITAMIN D3) 125 MCG
5 CAPSULE ORAL NIGHTLY PRN
Status: DISCONTINUED | OUTPATIENT
Start: 2024-04-24 | End: 2024-04-28 | Stop reason: HOSPADM

## 2024-04-24 RX ORDER — ONDANSETRON 4 MG/1
4 TABLET, ORALLY DISINTEGRATING ORAL EVERY 6 HOURS PRN
Status: DISCONTINUED | OUTPATIENT
Start: 2024-04-24 | End: 2024-04-28 | Stop reason: HOSPADM

## 2024-04-24 RX ORDER — SODIUM CHLORIDE 0.9 % (FLUSH) 0.9 %
10 SYRINGE (ML) INJECTION AS NEEDED
Status: DISCONTINUED | OUTPATIENT
Start: 2024-04-24 | End: 2024-04-28 | Stop reason: HOSPADM

## 2024-04-24 RX ADMIN — Medication 10 ML: at 20:15

## 2024-04-24 RX ADMIN — HEPARIN SODIUM 5000 UNITS: 5000 INJECTION INTRAVENOUS; SUBCUTANEOUS at 20:09

## 2024-04-24 RX ADMIN — SODIUM CHLORIDE 150 ML/HR: 9 INJECTION, SOLUTION INTRAVENOUS at 17:06

## 2024-04-24 RX ADMIN — CALCITONIN SALMON 452 UNITS: 200 INJECTION, SOLUTION INTRAMUSCULAR; SUBCUTANEOUS at 20:11

## 2024-04-24 RX ADMIN — SODIUM CHLORIDE 1000 ML: 9 INJECTION, SOLUTION INTRAVENOUS at 13:50

## 2024-04-24 NOTE — CONSULTS
[facility] Consult Note      Patient Name: Jamia Hinton  : 1965  MRN: [id number]  Primary Care Physician:  Jaycee Deal APRN  Referring Physician: No ref. provider found  Date of admission: 2024    Subjective     Reason for Consult:  ANA CKD     HPI:   Jamia Hinton is a 58 y.o. female with presumed CKD stage 4, sarcoidosis, RA directed to ER by Jenny MCNEIL from our office for worsening renal fcn and hypercalcemia.  BUN/Cr 75/3.8 with Ca 14.1.  Cr was 2.3 a month ago on 3/22/24 (Ca = 11) but up to 3.7 upon admission 24 (with Ca 13.9).  She was hospitalized a week, and Cr improved to 2.3 and Ca to 11.2 at discharge.  Sent home on prednisone taper.  She denies n/v as she experienced before.  BP high 184/95 initially.  She's not on antihypertensive.  Takes ditropan for overactive bladder, PPI, oral iron, folate, celexa.  No tums use.      Sarcoidosis was diagnosed about 3 years ago by bronch/biopsy.  Used to take cellcept and MTX for this, both have been discontinued.  Work up in recent hospitalization showed markedly elevated D1-25 (205), appropriately low intact PTH (7) and normal PTHrP, low-normal D25 (29).  Cr was normal in .      Review of Systems:   14 point review of systems is otherwise negative except for mentioned above on HPI    Personal History     Past Medical History:   Diagnosis Date    Arthritis     Hernia, abdominal     Sarcoidosis        Past Surgical History:   Procedure Laterality Date    CATARACT EXTRACTION W/ INTRAOCULAR LENS IMPLANT Right 2023    Procedure: CATARACT PHACO EXTRACTION WITH INTRAOCULAR LENS IMPLANT RIGHT;  Surgeon: Christ Kerr MD;  Location: [facility] OR;  Service: Ophthalmology;  Laterality: Right;    CATARACT EXTRACTION W/ INTRAOCULAR LENS IMPLANT Left 2024    Procedure: CATARACT PHACO EXTRACTION WITH INTRAOCULAR LENS IMPLANT LEFT;  Surgeon: Christ Kerr MD;  Location: [facility] OR;  Service:  Ophthalmology;  Laterality: Left;     SECTION      CHOLECYSTECTOMY      VENTRAL/INCISIONAL HERNIA REPAIR N/A 2017    Procedure:  INCISIONAL HERNIA REPAIR WITH MESH;  Surgeon: Bassam Rogers MD;  Location: [facility];  Service:        Family History: family history includes No Known Problems in her father and mother.    Social History:  reports that she has never smoked. She has never used smokeless tobacco. She reports that she does not drink alcohol and does not use drugs.    Home Medications:  Prior to Admission medications    Medication Sig Start Date End Date Taking? Authorizing Provider   iron polysaccharides (NIFEREX) 150 MG capsule Take 1 capsule by mouth Daily. 24  Yes Keo Sheffield DO   predniSONE (DELTASONE) 20 MG tablet Take 2 tablets by mouth Daily With Breakfast for 13 days, THEN 1 tablet Daily With Breakfast for 42 days. 24 Yes Keo Sheffield DO   citalopram (CeleXA) 20 MG tablet Take 1 tablet by mouth Daily.  Patient not taking: Reported on 2024    Latoya Boyd MD   folic acid (FOLVITE) 1 MG tablet Take 1 tablet by mouth Daily.  Patient not taking: Reported on 2024    Latoya Boyd MD   omeprazole (priLOSEC) 40 MG capsule Take 1 capsule by mouth Daily.  Patient not taking: Reported on 2024    Latoya Boyd MD   ondansetron ODT (ZOFRAN-ODT) 4 MG disintegrating tablet Place 1 tablet on the tongue Every 6 (Six) Hours As Needed for Nausea or Vomiting.  Patient not taking: Reported on 2024 3/22/24   Ralph Kelly MD   oxybutynin XL (DITROPAN-XL) 5 MG 24 hr tablet Take 1 tablet by mouth Daily.  Patient not taking: Reported on 2024    Latoya Boyd MD   vitamin B-12 (CYANOCOBALAMIN) 1000 MCG tablet Take 1 tablet by mouth Daily.  Patient not taking: Reported on 2024    Latoya Boyd MD   vitamin D3 125 MCG (5000 UT) capsule capsule Take 1 capsule by mouth Daily.  Patient not taking: Reported on 2024     Provider, Historical, MD       Allergies:  Allergies   Allergen Reactions    Citalopram Hydrobromide Unknown - High Severity     Couldn't function       Objective     Vitals:   Temp:  [97.5 °F (36.4 °C)-98.6 °F (37 °C)] 98.6 °F (37 °C)  Heart Rate:  [62-70] 65  Resp:  [14-15] 14  BP: (152-190)/(85-98) 152/88    Intake/Output Summary (Last 24 hours) at 4/24/2024 1837  Last data filed at 4/24/2024 1800  Gross per 24 hour   Intake 1120 ml   Output 200 ml   Net 920 ml       Physical Exam:    General Appearance: pleasant WF comfortable alert  Skin: warm and dry  HEENT: oral mucosa normal, nonicteric sclera  Neck: supple, no JVD  Lungs: CTA bilat no rales  Heart: RRR, normal S1 and S2  Abdomen: soft, nontender, nondistended  : no palpable bladder  Extremities: no edema, cyanosis or clubbing  Neuro: normal speech and mental status     Scheduled Meds:     heparin (porcine), 5,000 Units, Subcutaneous, Q12H  [START ON 4/25/2024] iron polysaccharides, 150 mg, Oral, Daily  [START ON 4/25/2024] predniSONE, 40 mg, Oral, Daily With Breakfast   Followed by  [START ON 4/27/2024] predniSONE, 20 mg, Oral, Daily With Breakfast  sodium chloride, 10 mL, Intravenous, Q12H      IV Meds:   sodium chloride, 150 mL/hr, Last Rate: 150 mL/hr (04/24/24 1706)        Results Reviewed:   I have personally reviewed the results from the time of this admission to 4/24/2024 18:37 EDT     Lab Results   Component Value Date    GLUCOSE 101 (H) 04/24/2024    CALCIUM 14.1 (C) 04/24/2024     04/24/2024    K 4.1 04/24/2024    CO2 25.4 04/24/2024    CL 98 04/24/2024    BUN 75 (H) 04/24/2024    CREATININE 3.82 (H) 04/24/2024    EGFRIFNONA 75 12/13/2017    BCR 19.6 04/24/2024    ANIONGAP 12.6 04/24/2024      Lab Results   Component Value Date    MG 2.4 04/24/2024    PHOS 3.7 04/13/2024    ALBUMIN 4.1 04/24/2024     US Renal Bilateral    Result Date: 4/7/2024  PROCEDURE: US RENAL BILATERAL-  HISTORY: buddy, hypercalcemia; N17.9-Acute kidney failure,  unspecified; E86.0-Dehydration  FINDINGS: Kidneys show a normal echo pattern. .  Right kidney measures 12.22 cm in length. Left kidney measures 13.2 cm in length.  Size is normal.  No mass or cyst is seen. No obstructive changes are present. A questionable small nonobstructing 3 mm stone is seen of the lower pole right kidney.      Impression: Unremarkable renal ultrasound.   This report was signed and finalized on 4/7/2024 7:49 PM by Frederick Jimenez MD.        Assessment / Plan     ASSESSMENT:  Non olig ANA - likely prerenal azotemia again from vol depletion due to severe hypercalcemia, Cr up to 3.8, was back to 2.3 at discharge 10 days ago, her presumed BL  CKD stage 4 - suspect chronic tubulointerstitial dz & nephroscalcionis from sarcoidosis, but substantial progression noted vs 2020 (when renal fcn normal) to now (Cr 2.3 on 3/22/24).  CT on 4/7/24: small non-obs stones.  Kidneys generous in size 12 & 13 cm on recent renal US  Hypercalcemia - recurrent.  Presumably due to sarcoidosis with Ca back up 11 to 14, with very high D1-25 level (~ 200) recently due to granulomatous dz), appropriately low iPTH, normal PTHrP, low-normal D25, and NEG SPEP/IF.  Been on prednisone taper since discharge  Immunosuppression : states was on cellcept & MTX for the sarcoidosis (?) rather than RA in past   Elevated BP - improved some, but still generous, watch for now, may need to start antihypertensive therapy   Leukocytosis - WBC 18K, likely due to steroid    PLAN:  Agree with NS  cc/hr  Calcitonin series x 4 doses  No bisphosphonate yet due to ANA  Pulm involvement again   Continuation of steroid taper noted     Thank you for involving us in the care of Jamia ARSHAD Mary Jane.  Please feel free to call with any questions.    Pb Webb MD  04/24/24  18:37 EDT    [facility]  [phone]

## 2024-04-24 NOTE — H&P
[facility]      Name:  Jamia Hinton   Age:  58 y.o.  Sex:  female  :  1965  MRN:  [id number]   Visit Number:  [id number]  Admission Date:  2024  Date Of Service:  24  Primary Care Physician:  Jaycee Deal APRN    Chief Complaint:     Worsening renal function    History Of Presenting Illness:      Ms. Hinton is a pleasant 58-year-old female who presented to the emergency department after following up with her labs with nephrology clinic which noted worsening renal function.  Pertinent past medical history of sarcoidosis, rheumatoid arthritis, and chronic kidney disease.  Patient with recent admission secondary to hypercalcemia and acute kidney injury for which she was given IV fluids/Lasix/prednisone taper.  Patient does follow with rheumatology in [place] Dr. Capone with recent discontinuation of CellCept and Methotrexate.  Patient otherwise denies any current symptoms.  Stated has been feeling well.  Had noted some mild lower extremity cramps.    Upon ED presentation patient hypertensive with blood pressure 184/95, otherwise hemodynamically stable.  Pertinent labs and imaging included urinalysis without pattern of infection, creatinine 3.8 with prior 2.2, calcium 14.1, WBC 18.7, hemoglobin 11.6.  Patient was given 1 L bolus with hospitalist consulted for further medical management.  Pulmonology and nephrology also consulted.    Review Of Systems:    All systems were reviewed and negative except as mentioned in history of presenting illness, assessment and plan.    Past Medical History: Patient  has a past medical history of Arthritis, Hernia, abdominal, and Sarcoidosis.    Past Surgical History: Patient  has a past surgical history that includes  section; Cholecystectomy; ventral/incisional hernia repair (N/A, 2017); Cataract extraction w/ intraocular lens implant (Right, 2023); and Cataract extraction w/ intraocular lens  implant (Left, 2/16/2024).    Social History: Patient  reports that she has never smoked. She has never used smokeless tobacco. She reports that she does not drink alcohol and does not use drugs.    Family History:  Patient's family history has been reviewed and found to be noncontributory.     Allergies:      Citalopram hydrobromide    Home Medications:    Prior to Admission Medications       Prescriptions Last Dose Informant Patient Reported? Taking?    citalopram (CeleXA) 20 MG tablet   Yes No    Take 1 tablet by mouth Daily.    Patient not taking:  Reported on 4/22/2024    folic acid (FOLVITE) 1 MG tablet   Yes No    Take 1 tablet by mouth Daily.    Patient not taking:  Reported on 4/22/2024    iron polysaccharides (NIFEREX) 150 MG capsule   No No    Take 1 capsule by mouth Daily.    omeprazole (priLOSEC) 40 MG capsule   Yes No    Take 1 capsule by mouth Daily.    Patient not taking:  Reported on 4/22/2024    ondansetron ODT (ZOFRAN-ODT) 4 MG disintegrating tablet   No No    Place 1 tablet on the tongue Every 6 (Six) Hours As Needed for Nausea or Vomiting.    Patient not taking:  Reported on 4/22/2024    oxybutynin XL (DITROPAN-XL) 5 MG 24 hr tablet   Yes No    Take 1 tablet by mouth Daily.    Patient not taking:  Reported on 4/22/2024    predniSONE (DELTASONE) 20 MG tablet   No No    Take 2 tablets by mouth Daily With Breakfast for 13 days, THEN 1 tablet Daily With Breakfast for 42 days.    vitamin B-12 (CYANOCOBALAMIN) 1000 MCG tablet   Yes No    Take 1 tablet by mouth Daily.    Patient not taking:  Reported on 4/22/2024    vitamin D3 125 MCG (5000 UT) capsule capsule   Yes No    Take 1 capsule by mouth Daily.    Patient not taking:  Reported on 4/22/2024          ED Medications:    Medications   sodium chloride 0.9 % flush 10 mL (has no administration in time range)   sodium chloride 0.9 % bolus 1,000 mL (1,000 mL Intravenous New Bag 4/24/24 1350)     Vital Signs:  Temp:  [97.5 °F (36.4 °C)] 97.5 °F (36.4  "°C)  Heart Rate:  [63-70] 70  Resp:  [15] 15  BP: (184-190)/(93-95) 190/93        04/24/24  1239   Weight: 113 kg (249 lb)     Body mass index is 40.19 kg/m².    Physical Exam:     Most recent vital Signs: BP (!) 190/93   Pulse 70   Temp 97.5 °F (36.4 °C) (Oral)   Resp 15   Ht 167.6 cm (66\")   Wt 113 kg (249 lb)   LMP  (LMP Unknown) Comment: posyt menopausal  SpO2 98%   BMI 40.19 kg/m²     Physical Exam  Vitals and nursing note reviewed.   Constitutional:       Appearance: Normal appearance.   HENT:      Head: Normocephalic.      Nose: Nose normal.      Mouth/Throat:      Mouth: Mucous membranes are moist.   Eyes:      Pupils: Pupils are equal, round, and reactive to light.   Cardiovascular:      Rate and Rhythm: Normal rate and regular rhythm.      Pulses: Normal pulses.      Heart sounds: Normal heart sounds.   Pulmonary:      Effort: Pulmonary effort is normal.      Breath sounds: Normal breath sounds.   Abdominal:      General: Bowel sounds are normal.      Palpations: Abdomen is soft.   Musculoskeletal:      Cervical back: Normal range of motion.   Skin:     General: Skin is warm.      Capillary Refill: Capillary refill takes less than 2 seconds.   Neurological:      General: No focal deficit present.      Mental Status: She is alert and oriented to person, place, and time.   Psychiatric:         Mood and Affect: Mood normal.       Laboratory data:    I have reviewed the labs done in the emergency room.    Results from last 7 days   Lab Units 04/24/24  1249   SODIUM mmol/L 136   POTASSIUM mmol/L 4.1   CHLORIDE mmol/L 98   CO2 mmol/L 25.4   BUN mg/dL 75*   CREATININE mg/dL 3.82*   CALCIUM mg/dL 14.1*   BILIRUBIN mg/dL 0.4   ALK PHOS U/L 64   ALT (SGPT) U/L 15   AST (SGOT) U/L 13   GLUCOSE mg/dL 101*     Results from last 7 days   Lab Units 04/24/24  1249   WBC 10*3/mm3 18.79*   HEMOGLOBIN g/dL 11.6*   HEMATOCRIT % 35.9   PLATELETS 10*3/mm3 266                             Results from last 7 days   Lab " Units 04/24/24  1400   COLOR UA  Yellow   GLUCOSE UA  Negative   KETONES UA  Negative   BLOOD UA  Trace*   LEUKOCYTES UA  Trace*   PH, URINE  6.5   BILIRUBIN UA  Negative   UROBILINOGEN UA  0.2 E.U./dL   RBC UA /HPF 3-5*   WBC UA /HPF 3-5*       Pain Management Panel  More data may exist         Latest Ref Rng & Units 4/8/2024 4/7/2024   Pain Management Panel   Creatinine, Urine mg/dL 29.9  24.8        EKG:      Sinus rhythm bpm 68    Radiology:    No radiology results for the last 3 days    Assessment:    Acute kidney injury on chronic kidney disease stage III, POA  Hypercalcemia, POA  Hypertensive urgency, POA  Sarcoidosis  Rheumatoid arthritis  Iron deficiency anemia  Anxiety and depression  Obesity    Plan:    - Nephrology and Pulmonology consulted. Appreciate recommendations.  - Sarcoidosis likely etiology of  Hypercalcemia. PTH low and elevated 1/25 Vitamin D  - continue fluids per nephrology and consider biphosphonate with improvement of renal function.  - Patient previously prescribed prednisone 40 mg for 2 weeks  then 20 mg for another 4 weeks. Initiated on 04/14/2024- Will continue for now.  -Uncontrolled hypertension likely secondary to anxiety as improved.  Will order as needed labetalol.  -Further orders pending clinical course.    Risk Assessment: High  DVT Prophylaxis: Heparin  Code Status: Full code  Diet: Renal    Advance Care Planning   ACP discussion was declined by the patient. Patient does not have an advance directive, declines further assistance.           Daniela Ji, APRN  04/24/24  14:38 EDT    Dictated utilizing Dragon dictation.

## 2024-04-24 NOTE — ED PROVIDER NOTES
Subjective:  History of Present Illness:    Patient is a 58-year-old female history of sarcoidosis, recent admission to our institution for hyperkalemia and ANA who presents today with concerns for worsening renal function.  Had outpatient labs performed several days ago with her primary care doctor and presented to her nephrologist appointment with these labs.  Showing a creatinine of greater than 3.2 with stable potassium and hyperkalemia.  Had significant elevation in renal function during her inpatient stay.  Was instructed by her nephrologist to present back to our emergency department due to concerns for ANA and need for admission.  She denies any fevers.  No shortness of breath.  Denies any chest pain.  No palpitations.  Denies any abdominal pain nausea vomiting or diarrhea.  No dysuria.      Nurses Notes reviewed and agree, including vitals, allergies, social history and prior medical history.     REVIEW OF SYSTEMS: All systems reviewed and not pertinent unless noted.  Review of Systems   Constitutional:  Negative for activity change, appetite change, chills, fatigue and fever.        ANA, hypocalcemia on lab work   HENT:  Negative for rhinorrhea, sinus pressure and sinus pain.    Eyes:  Negative for discharge and itching.   Respiratory:  Negative for cough and shortness of breath.    Cardiovascular:  Negative for chest pain and leg swelling.   Gastrointestinal:  Negative for abdominal distention, abdominal pain, nausea and vomiting.   Endocrine: Negative for cold intolerance and heat intolerance.   Genitourinary:  Negative for decreased urine volume, difficulty urinating, flank pain, frequency, urgency, vaginal bleeding, vaginal discharge and vaginal pain.   Musculoskeletal:  Negative for gait problem, neck pain and neck stiffness.   Skin:  Negative for color change.   Allergic/Immunologic: Negative for environmental allergies.   Neurological:  Negative for seizures, syncope, facial asymmetry and speech  "difficulty.   Psychiatric/Behavioral:  Negative for self-injury and suicidal ideas.        Past Medical History:   Diagnosis Date    Arthritis     Hernia, abdominal     Sarcoidosis        Allergies:    Citalopram hydrobromide      Past Surgical History:   Procedure Laterality Date    CATARACT EXTRACTION W/ INTRAOCULAR LENS IMPLANT Right 2023    Procedure: CATARACT PHACO EXTRACTION WITH INTRAOCULAR LENS IMPLANT RIGHT;  Surgeon: Christ Kerr MD;  Location: [facility];  Service: Ophthalmology;  Laterality: Right;    CATARACT EXTRACTION W/ INTRAOCULAR LENS IMPLANT Left 2024    Procedure: CATARACT PHACO EXTRACTION WITH INTRAOCULAR LENS IMPLANT LEFT;  Surgeon: Christ Kerr MD;  Location: [facility];  Service: Ophthalmology;  Laterality: Left;     SECTION      CHOLECYSTECTOMY      VENTRAL/INCISIONAL HERNIA REPAIR N/A 2017    Procedure:  INCISIONAL HERNIA REPAIR WITH MESH;  Surgeon: Bassam Rogers MD;  Location: [facility];  Service:          Social History     Socioeconomic History    Marital status:    Tobacco Use    Smoking status: Never    Smokeless tobacco: Never   Vaping Use    Vaping status: Never Used   Substance and Sexual Activity    Alcohol use: No    Drug use: No    Sexual activity: Defer         Family History   Problem Relation Age of Onset    No Known Problems Mother     No Known Problems Father        Objective  Physical Exam:  /98   Pulse 69   Temp 97.5 °F (36.4 °C) (Oral)   Resp 15   Ht 167.6 cm (66\")   Wt 113 kg (249 lb)   LMP  (LMP Unknown) Comment: posyt menopausal  SpO2 91%   BMI 40.19 kg/m²      Physical Exam  Constitutional:       General: She is not in acute distress.     Appearance: Normal appearance. She is obese. She is not ill-appearing.   HENT:      Head: Normocephalic and atraumatic.      Nose: Nose normal. No congestion or rhinorrhea.      Mouth/Throat:      Mouth: Mucous membranes are dry.      Pharynx: Oropharynx is clear. No " oropharyngeal exudate or posterior oropharyngeal erythema.   Eyes:      Extraocular Movements: Extraocular movements intact.      Conjunctiva/sclera: Conjunctivae normal.      Pupils: Pupils are equal, round, and reactive to light.   Cardiovascular:      Rate and Rhythm: Normal rate and regular rhythm.      Pulses: Normal pulses.      Heart sounds: Normal heart sounds.   Pulmonary:      Effort: Pulmonary effort is normal. No respiratory distress.      Breath sounds: Normal breath sounds.   Abdominal:      General: Abdomen is flat. Bowel sounds are normal. There is no distension.      Palpations: Abdomen is soft.      Tenderness: There is no abdominal tenderness.   Musculoskeletal:         General: No swelling or tenderness. Normal range of motion.      Cervical back: Normal range of motion and neck supple.   Skin:     General: Skin is warm and dry.      Capillary Refill: Capillary refill takes less than 2 seconds.   Neurological:      General: No focal deficit present.      Mental Status: She is alert and oriented to person, place, and time. Mental status is at baseline.      Cranial Nerves: No cranial nerve deficit.      Sensory: No sensory deficit.      Motor: No weakness.      Coordination: Coordination normal.   Psychiatric:         Mood and Affect: Mood normal.         Behavior: Behavior normal.         Thought Content: Thought content normal.         Judgment: Judgment normal.         Critical Care    Performed by: Scottie Herbert MD  Authorized by: Scottie Herbert MD    Critical care provider statement:     Critical care time (minutes):  30    Critical care was necessary to treat or prevent imminent or life-threatening deterioration of the following conditions:  Metabolic crisis (Hypercalcemia)    Critical care was time spent personally by me on the following activities:  Blood draw for specimens, discussions with primary provider, evaluation of patient's response to treatment, examination of  patient, obtaining history from patient or surrogate, ordering and performing treatments and interventions, ordering and review of laboratory studies, ordering and review of radiographic studies, pulse oximetry, re-evaluation of patient's condition and review of old charts    Care discussed with: admitting provider        ED Course:    ED Course as of 04/24/24 1527   Wed Apr 24, 2024   1345 EKG interpreted by me, normal sinus rhythm with no concerning ST changes noted, rate of 68 [JE]      ED Course User Index  [JE] Scottie Herbert MD       Lab Results (last 24 hours)       Procedure Component Value Units Date/Time    Comprehensive Metabolic Panel [[id number]]  (Abnormal) Collected: 04/24/24 1249    Specimen: Blood Updated: 04/24/24 1342     Glucose 101 mg/dL      BUN 75 mg/dL      Creatinine 3.82 mg/dL      Sodium 136 mmol/L      Potassium 4.1 mmol/L      Chloride 98 mmol/L      CO2 25.4 mmol/L      Calcium 14.1 mg/dL      Total Protein 7.3 g/dL      Albumin 4.1 g/dL      ALT (SGPT) 15 U/L      AST (SGOT) 13 U/L      Alkaline Phosphatase 64 U/L      Total Bilirubin 0.4 mg/dL      Globulin 3.2 gm/dL      A/G Ratio 1.3 g/dL      BUN/Creatinine Ratio 19.6     Anion Gap 12.6 mmol/L      eGFR 12.9 mL/min/1.73      Comment: <15 Indicative of kidney failure       Narrative:      GFR Normal >60  Chronic Kidney Disease <60  Kidney Failure <15      CBC & Differential [[id number]]  (Abnormal) Collected: 04/24/24 1249    Specimen: Blood Updated: 04/24/24 1326    Narrative:      The following orders were created for panel order CBC & Differential.  Procedure                               Abnormality         Status                     ---------                               -----------         ------                     CBC Auto Differential[[id number]]        Abnormal            Final result                 Please view results for these tests on the individual orders.    Magnesium [[id number]]  (Normal) Collected: 04/24/24  1249    Specimen: Blood Updated: 04/24/24 1357     Magnesium 2.4 mg/dL     CBC Auto Differential [[id number]]  (Abnormal) Collected: 04/24/24 1249    Specimen: Blood Updated: 04/24/24 1326     WBC 18.79 10*3/mm3      RBC 4.08 10*6/mm3      Hemoglobin 11.6 g/dL      Hematocrit 35.9 %      MCV 88.0 fL      MCH 28.4 pg      MCHC 32.3 g/dL      RDW 15.4 %      RDW-SD 49.4 fl      MPV 11.3 fL      Platelets 266 10*3/mm3      Neutrophil % 91.2 %      Lymphocyte % 2.8 %      Monocyte % 3.6 %      Eosinophil % 1.2 %      Basophil % 0.3 %      Immature Grans % 0.9 %      Neutrophils, Absolute 17.14 10*3/mm3      Lymphocytes, Absolute 0.53 10*3/mm3      Monocytes, Absolute 0.67 10*3/mm3      Eosinophils, Absolute 0.23 10*3/mm3      Basophils, Absolute 0.06 10*3/mm3      Immature Grans, Absolute 0.16 10*3/mm3      nRBC 0.0 /100 WBC     Urinalysis With Microscopic If Indicated (No Culture) - Urine, Clean Catch [[id number]]  (Abnormal) Collected: 04/24/24 1400    Specimen: Urine, Clean Catch Updated: 04/24/24 1409     Color, UA Yellow     Appearance, UA Clear     pH, UA 6.5     Specific Gravity, UA 1.010     Glucose, UA Negative     Ketones, UA Negative     Bilirubin, UA Negative     Blood, UA Trace     Protein, UA Trace     Leuk Esterase, UA Trace     Nitrite, UA Negative     Urobilinogen, UA 0.2 E.U./dL    Urinalysis, Microscopic Only - Urine, Clean Catch [[id number]]  (Abnormal) Collected: 04/24/24 1400    Specimen: Urine, Clean Catch Updated: 04/24/24 1415     RBC, UA 3-5 /HPF      WBC, UA 3-5 /HPF      Bacteria, UA Trace /HPF      Squamous Epithelial Cells, UA 0-2 /HPF      Hyaline Casts, UA None Seen /LPF      Calcium Oxalate Crystals, UA Small/1+ /HPF      Methodology Manual Light Microscopy             No radiology results from the last 24 hrs       MDM     Amount and/or Complexity of Data Reviewed  Decide to obtain previous medical records or to obtain history from someone other than the patient: yes        Initial  impression of presenting illness: Abnormal labs    DDX: includes but is not limited to: Hyperkalemia, ANA on CKD, sarcoidosis, hypercalcemia    Patient arrives stable with vitals interpreted by myself.     Pertinent features from physical exam: Heart auscultation, great rhythm, no murmur, nontender abdominal palpation, normal neuroexam.    Initial diagnostic plan: CBC, CMP, magnesium, UA, EKG    Results from initial plan were reviewed and interpreted by me revealing significant hypercalcemia with ANA noted, worse than patient's initial lab work for admission    Diagnostic information from other sources: Discussed with significant other at bedside reviewed past medical records    Interventions / Re-evaluation: IV fluids given concern for hypercalcemia    Results/clinical rationale were discussed with patient at bedside    Consultations/Discussion of results with other physicians: Concerns for hypercalcemia with worsening ANA, discussed with hospitalist admit to their service for further management    Disposition plan: Admit  -----        Final diagnoses:   Hypercalcemia   ANA (acute kidney injury)          Scottie Herbert MD  04/24/24 3404

## 2024-04-24 NOTE — PLAN OF CARE
Goal Outcome Evaluation:  Plan of Care Reviewed With: patient         Pt is a new admission from the ED.

## 2024-04-25 LAB
ALBUMIN SERPL-MCNC: 3.3 G/DL (ref 3.5–5.2)
ANION GAP SERPL CALCULATED.3IONS-SCNC: 15.2 MMOL/L (ref 5–15)
BASOPHILS # BLD AUTO: 0.04 10*3/MM3 (ref 0–0.2)
BASOPHILS NFR BLD AUTO: 0.2 % (ref 0–1.5)
BUN SERPL-MCNC: 69 MG/DL (ref 8–23)
BUN/CREAT SERPL: 20 (ref 7–25)
CALCIUM SPEC-SCNC: 10.4 MG/DL (ref 8.6–10.5)
CHLORIDE SERPL-SCNC: 106 MMOL/L (ref 98–107)
CO2 SERPL-SCNC: 18.8 MMOL/L (ref 22–29)
CREAT SERPL-MCNC: 3.45 MG/DL (ref 0.57–1)
DEPRECATED RDW RBC AUTO: 50.5 FL (ref 37–54)
EGFRCR SERPLBLD CKD-EPI 2021: 14.6 ML/MIN/1.73
EOSINOPHIL # BLD AUTO: 0.46 10*3/MM3 (ref 0–0.4)
EOSINOPHIL NFR BLD AUTO: 2.6 % (ref 0.3–6.2)
ERYTHROCYTE [DISTWIDTH] IN BLOOD BY AUTOMATED COUNT: 15.3 % (ref 12.3–15.4)
GLUCOSE SERPL-MCNC: 80 MG/DL (ref 65–99)
HCT VFR BLD AUTO: 32.7 % (ref 34–46.6)
HGB BLD-MCNC: 10.2 G/DL (ref 12–15.9)
IMM GRANULOCYTES # BLD AUTO: 0.13 10*3/MM3 (ref 0–0.05)
IMM GRANULOCYTES NFR BLD AUTO: 0.7 % (ref 0–0.5)
LYMPHOCYTES # BLD AUTO: 1.08 10*3/MM3 (ref 0.7–3.1)
LYMPHOCYTES NFR BLD AUTO: 6.2 % (ref 19.6–45.3)
MCH RBC QN AUTO: 28.2 PG (ref 26.6–33)
MCHC RBC AUTO-ENTMCNC: 31.2 G/DL (ref 31.5–35.7)
MCV RBC AUTO: 90.3 FL (ref 79–97)
MONOCYTES # BLD AUTO: 1.44 10*3/MM3 (ref 0.1–0.9)
MONOCYTES NFR BLD AUTO: 8.3 % (ref 5–12)
NEUTROPHILS NFR BLD AUTO: 14.27 10*3/MM3 (ref 1.7–7)
NEUTROPHILS NFR BLD AUTO: 82 % (ref 42.7–76)
NRBC BLD AUTO-RTO: 0 /100 WBC (ref 0–0.2)
PHOSPHATE SERPL-MCNC: 5.3 MG/DL (ref 2.5–4.5)
PLATELET # BLD AUTO: 214 10*3/MM3 (ref 140–450)
PMV BLD AUTO: 10.9 FL (ref 6–12)
POTASSIUM SERPL-SCNC: 3.9 MMOL/L (ref 3.5–5.2)
RBC # BLD AUTO: 3.62 10*6/MM3 (ref 3.77–5.28)
SODIUM SERPL-SCNC: 140 MMOL/L (ref 136–145)
WBC NRBC COR # BLD AUTO: 17.42 10*3/MM3 (ref 3.4–10.8)

## 2024-04-25 PROCEDURE — 63710000001 PREDNISONE PER 1 MG: Performed by: NURSE PRACTITIONER

## 2024-04-25 PROCEDURE — 80069 RENAL FUNCTION PANEL: CPT | Performed by: NURSE PRACTITIONER

## 2024-04-25 PROCEDURE — 99254 IP/OBS CNSLTJ NEW/EST MOD 60: CPT | Performed by: INTERNAL MEDICINE

## 2024-04-25 PROCEDURE — 63710000001 CALCITONIN PER 400 UNITS: Performed by: INTERNAL MEDICINE

## 2024-04-25 PROCEDURE — 99232 SBSQ HOSP IP/OBS MODERATE 35: CPT | Performed by: NURSE PRACTITIONER

## 2024-04-25 PROCEDURE — 25810000003 SODIUM CHLORIDE 0.9 % SOLUTION: Performed by: NURSE PRACTITIONER

## 2024-04-25 PROCEDURE — 85025 COMPLETE CBC W/AUTO DIFF WBC: CPT | Performed by: NURSE PRACTITIONER

## 2024-04-25 PROCEDURE — 25010000002 HEPARIN (PORCINE) PER 1000 UNITS: Performed by: NURSE PRACTITIONER

## 2024-04-25 PROCEDURE — 25010000002 LABETALOL 5 MG/ML SOLUTION: Performed by: NURSE PRACTITIONER

## 2024-04-25 RX ORDER — PREDNISONE 20 MG/1
40 TABLET ORAL
Status: CANCELLED | OUTPATIENT
Start: 2024-04-26 | End: 2024-05-09

## 2024-04-25 RX ORDER — AMLODIPINE BESYLATE 5 MG/1
5 TABLET ORAL
Status: DISCONTINUED | OUTPATIENT
Start: 2024-04-25 | End: 2024-04-28 | Stop reason: HOSPADM

## 2024-04-25 RX ORDER — PREDNISONE 50 MG/1
50 TABLET ORAL
Status: DISCONTINUED | OUTPATIENT
Start: 2024-04-26 | End: 2024-04-28 | Stop reason: HOSPADM

## 2024-04-25 RX ORDER — CALCITONIN SALMON 200 [USP'U]/ML
400 INJECTION, SOLUTION INTRAMUSCULAR; SUBCUTANEOUS ONCE
Status: COMPLETED | OUTPATIENT
Start: 2024-04-25 | End: 2024-04-25

## 2024-04-25 RX ADMIN — SODIUM CHLORIDE 150 ML/HR: 9 INJECTION, SOLUTION INTRAVENOUS at 00:38

## 2024-04-25 RX ADMIN — CALCITONIN SALMON 452 UNITS: 200 INJECTION, SOLUTION INTRAMUSCULAR; SUBCUTANEOUS at 20:09

## 2024-04-25 RX ADMIN — HEPARIN SODIUM 5000 UNITS: 5000 INJECTION INTRAVENOUS; SUBCUTANEOUS at 20:09

## 2024-04-25 RX ADMIN — CALCITONIN SALMON 400 UNITS: 200 INJECTION, SOLUTION INTRAMUSCULAR; SUBCUTANEOUS at 10:33

## 2024-04-25 RX ADMIN — SODIUM CHLORIDE 150 ML/HR: 9 INJECTION, SOLUTION INTRAVENOUS at 07:22

## 2024-04-25 RX ADMIN — HEPARIN SODIUM 5000 UNITS: 5000 INJECTION INTRAVENOUS; SUBCUTANEOUS at 09:03

## 2024-04-25 RX ADMIN — PREDNISONE 40 MG: 20 TABLET ORAL at 09:02

## 2024-04-25 RX ADMIN — AMLODIPINE BESYLATE 5 MG: 5 TABLET ORAL at 09:03

## 2024-04-25 RX ADMIN — Medication 150 MG: at 10:32

## 2024-04-25 RX ADMIN — LABETALOL HYDROCHLORIDE 10 MG: 5 INJECTION, SOLUTION INTRAVENOUS at 00:52

## 2024-04-25 NOTE — PLAN OF CARE
Goal Outcome Evaluation:  Plan of Care Reviewed With: patient           Outcome Evaluation: Patient's blood pressure was elevated during the night and labetalol was given. All other vitals have remained stable and will continue to monitor. No overnight events to report. Will continue to monitor. Iv fluids continued.

## 2024-04-25 NOTE — CASE MANAGEMENT/SOCIAL WORK
Case Management Readmission Assessment Note    Case Management Readmission Assessment (all recorded)       Readmission Interview       [facility] Name 04/25/24 1533             Readmission Indications    Is this hospitalization related to the prior hospital diagnosis? Yes      What was the reason you were admitted? worsening renal labs        [facility] 04/25/24 1533             Recommendation for rehospitalization    Did you speak with your physician prior to coming to the hospital Yes      If yes, what physician did you speak with? Nephrologist      Who recommended you return to the hospital? Specialist      Did you seek care elsewhere prior to coming to the hospital? No        [facility] Name 04/25/24 1533             Follow up appointment    Do you have a PCP? Yes      Did you have an appointment with PCP/specialist after hospitalization within 7 days? Yes      Did you keep your appointment? Yes        Row Name 04/25/24 1533             Medications    Did you have newly prescribed medications at discharge? Yes      Did you understand the reasons for your medications at discharge and how to take them? Yes      Did you understand the side effects of your medications? Yes      Are you taking all of you prescribed medications? Yes        Row Name 04/25/24 1533             Discharge Instructions    Did you understand your discharge instructions? Yes      Did your family/caregiver hear your instructions? Yes      Were you told to eat a special diet? Yes      Did you adhere to the diet? Yes      Were you given a number of someone to call if you had questions or concerns? Yes        Row Name 04/25/24 1533             Index discharge location/services    Where did you go upon discharge? Home      Do you have supportive family or friends in the home? Yes        [facility] Name 04/25/24 1533             Discharge Readiness    On a scale of 1-5 (5 being well prepared), how ready were you for discharge 5      Recommendation based on interview  Other (comment)  Pt attempting to be compliant and concerned her lab work not improving more than it has been

## 2024-04-25 NOTE — CONSULTS
Date of consultation:   April 25, 2024    Requested by:    Hospitalist Service. CLAUDIA Donald    PCP: Jaycee Deal APRN    Reason:  Hypercalcemia and sarcoid    History of Present Illness:  Jamia Hinton is a 58 y.o. female with known sarcoidosis previously on CellCept and methotrexate, hypercalcemia and CKD who was recently admitted to the hospital secondary to hypercalcemia and acute renal failure.  She was admitted from April 7 through the 14th secondary to this.  Initial calcium was 13.9, but by discharge was 11.2.  Initial creatinine was 3.72, but had declined to 2.29 on discharge.  She was sent home on prednisone 40 mg a day for 2 weeks which she took consistently and was still on her 40 mg dosing.  States she was seen by her PCP on Monday for repeat labs in anticipation of seeing nephrology yesterday.  She states she has been in good health and felt following other than a little fatigue and stressed because of her social situation.  She lives with her 86-year-old mother and this is a source of stress for her.  However, did not feel like she was having shortness of breath or cough.  She denies chest pain or palpitations.  She states that she has been drinking plenty of fluids and discussed this with her nephrologist and felt like she was well-hydrated.  Her appetite has not changed.  She was having mild leg cramps intermittently.    In nephrology clinic yesterday, it was noted that her creatinine was greater than 3.2 and was sent back to the emergency room.  In ER, repeat lab work showed a creatinine of 3.8, calcium 14.1.  She was hydrated overnight and given calcitonin with significant decline and calcium to 10.4.      Pulmonary was consulted for further recommendations.      Review of System: All other review of systems negative except  indicated in HPI.      Past Medical History:  Past Medical History:   Diagnosis Date    Arthritis     Hernia, abdominal     Sarcoidosis          Past Surgical  "History:  Past Surgical History:   Procedure Laterality Date    CATARACT EXTRACTION W/ INTRAOCULAR LENS IMPLANT Right 2023    Procedure: CATARACT PHACO EXTRACTION WITH INTRAOCULAR LENS IMPLANT RIGHT;  Surgeon: Christ Kerr MD;  Location: [facility];  Service: Ophthalmology;  Laterality: Right;    CATARACT EXTRACTION W/ INTRAOCULAR LENS IMPLANT Left 2024    Procedure: CATARACT PHACO EXTRACTION WITH INTRAOCULAR LENS IMPLANT LEFT;  Surgeon: Christ Kerr MD;  Location: [facility];  Service: Ophthalmology;  Laterality: Left;     SECTION      CHOLECYSTECTOMY      VENTRAL/INCISIONAL HERNIA REPAIR N/A 2017    Procedure:  INCISIONAL HERNIA REPAIR WITH MESH;  Surgeon: Bassam Rogers MD;  Location: [facility];  Service:          Family History:  Family History   Problem Relation Age of Onset    No Known Problems Mother     No Known Problems Father          Social History:  Social History     Socioeconomic History    Marital status:    Tobacco Use    Smoking status: Never    Smokeless tobacco: Never   Vaping Use    Vaping status: Never Used   Substance and Sexual Activity    Alcohol use: No    Drug use: No    Sexual activity: Defer       Intake/Output Summary (Last 24 hours) at 2024 1432  Last data filed at 2024 1100  Gross per 24 hour   Intake 4040 ml   Output 200 ml   Net 3840 ml         Physical Exam:  /81 (BP Location: Right arm, Patient Position: Lying)   Pulse 58   Temp 97.6 °F (36.4 °C) (Oral)   Resp 18   Ht 167.6 cm (66\")   Wt 113 kg (249 lb)   LMP  (LMP Unknown) Comment: posyt menopausal  SpO2 99%   BMI 40.19 kg/m²   Constitutional:Vital signs reviewed           General: No respiratory distress noted when speaking in complete sentences  Eyes: Extraocular movement was intact, non-icteric sclera  ENT:No nasal discharge noted. Oropharynx is moist and non-erythematous  Neck: Supple.  No JVD noted.Trachea midline  Cardiovascular: S1 + S2.  Regular " "rate  Lungs/Respiratory: Good air movement.  Clear to ascultation bilaterally.  Abdomen/GI: Soft.  Bowel sounds were positive.  No distension noted.  MSK/Extremities: No significant edema noted.  No cyanosis noted. No clubbing noted  Neurologic: Awake, alert and oriented x 3.Able to follow simple commands.  No tremors noted.  Psychiatric: Normal mood and affect. Does not appear anxious on exam  Skin: No rash noted on visible skin. No excessive bruising noted.      Labs: Reviewed. Pertinent labs were noted.     Results from last 7 days   Lab Units 04/25/24  0536 04/24/24  1249   WBC 10*3/mm3 17.42* 18.79*   HEMOGLOBIN g/dL 10.2* 11.6*   HEMATOCRIT % 32.7* 35.9   PLATELETS 10*3/mm3 214 266   NEUTROPHIL % % 82.0* 91.2*   NEUTROS ABS 10*3/mm3 14.27* 17.14*   EOSINOPHIL % % 2.6 1.2   EOS ABS 10*3/mm3 0.46* 0.23   LYMPHOCYTE % % 6.2* 2.8*   LYMPHS ABS 10*3/mm3 1.08 0.53*       Results from last 7 days   Lab Units 04/25/24  0536 04/24/24  1249   SODIUM mmol/L 140 136   POTASSIUM mmol/L 3.9 4.1   CHLORIDE mmol/L 106 98   CO2 mmol/L 18.8* 25.4   BUN mg/dL 69* 75*   CREATININE mg/dL 3.45* 3.82*   CALCIUM mg/dL 10.4 14.1*   ANION GAP mmol/L 15.2* 12.6   BILIRUBIN mg/dL  --  0.4   ALK PHOS U/L  --  64   ALT (SGPT) U/L  --  15   AST (SGOT) U/L  --  13   GLUCOSE mg/dL 80 101*   TOTAL PROTEIN g/dL  --  7.3   ALBUMIN g/dL 3.3* 4.1       Results from last 7 days   Lab Units 04/25/24  0536 04/24/24  1249   MAGNESIUM mg/dL  --  2.4   PHOSPHORUS mg/dL 5.3*  --        No results found for: \"PROBNP\"    No results found for: \"INR\"    No results found for: \"PROCALCITO\"    No results found for: \"CRP\"    No results found for: \"SEDRATE\"    ABG: No results found for: \"PHART\", \"GSI5NKS\", \"PO2ART\", \"HGBBG\", \"X1YQTKZE\", \"CFIO2\", \"FCOHB\", \"CARBOXYHGB\", \"FMETHB\"      Micro: As of April 25, 2024   No results found for: \"RESPCX\"  No results found for: \"BLOODCX\"  Lab Results   Component Value Date    URINECX >100,000 CFU/mL Escherichia coli (A) " "12/13/2017     No results found for: \"MRSACX\"  No results found for: \"MRSAPCR\"  No results found for: \"URCX\"  No components found for: \"LOWRESPCF\"  No results found for: \"THROATCX\"  No results found for: \"CULTURES\"  No components found for: \"STREPBCX\"  No results found for: \"STREPPNEUAG\"  No results found for: \"LEGIONELLA\"  No results found for: \"MYCOPLASCX\"  No results found for: \"GCCX\"  No results found for: \"WOUNDCX\"  No results found for: \"BODYFLDCX\"  Total  No results found for: \"FLU\"    No results found for: \"ADENOVIRUS\"  No results found for: \"HL040Y\"  No results found for: \"CVHKU1\"  No results found for: \"CVNL63\"  No results found for: \"CVOC43\"  No results found for: \"HUMETPNEVS\"  No results found for: \"HURVEV\"  No results found for: \"FLUBPCR\"  No results found for: \"PARAINFLUE\"  No results found for: \"PARAFLUV2\"  No results found for: \"PARAFLUV3\"  No results found for: \"PARAFLUV4\"  No results found for: \"BPERTPCR\"  No results found for: \"ZPOBT83808\"  No results found for: \"CPNEUPCR\"  No results found for: \"MPNEUMO\"  No results found for: \"FLUAPCR\"  No results found for: \"FLUAH3\"  No results found for: \"FLUAH1\"  No results found for: \"RSV\"  No results found for: \"BPARAPCR\"    COVID 19:  No results found for: \"COVID19\"    No results found for: \"THCURSCR\"  No results found for: \"PCPUR\"  No results found for: \"COCAINEUR\"  No results found for: \"METAMPSCNUR\"  No results found for: \"LABOPIASCN\"  No results found for: \"AMPHETSCREEN\"  No results found for: \"LABBENZSCN\"  No results found for: \"TRICYCLICSCN\"  No results found for: \"LABMETHSCN\"  No results found for: \"BARBITSCNUR\"  No results found for: \"OXYCODONESCN\"  No results found for: \"PROPOXSCN\"  No results found for: \"BUPRENORSCNU\"  No results found for: \"ETHANOLMGDL\"  No results found for: \"ETOHPCT\"   sodium chloride, 125 mL/hr, Last Rate: 125 mL/hr (04/25/24 0912)        Imaging Study: Latest imaging studies was reviewed personally.   Imaging Results (Last " 72 Hours)       ** No results found for the last 72 hours. **              ECHO:         Assessment/plan:  1.  Recurrent hypercalcemia  Patient on 40 mg daily on outpatient basis for hypercalcemia.  This is the typical dose needed, but given recurrent hypercalcemia we will go ahead and try to increase up to 50 mg daily.  It is unclear if calcium of 14 yesterday is accurate or not.  It is a little unusual that it would be 11 on Monday, increase up to 14 within 2 days and then significantly declined down to 10.4 with IV fluids and 1 dose of calcitonin.  Either way, will increase steroids for now and will need to remain on 50 mg of prednisone for at least 2 to 3 weeks at hospital discharge and keep follow-up appointment with Dr. Gloria.  If she has another episode of hypercalcemia can consider alternative treatment such as hydroxychloroquine or possibly infliximab.      I called and discussed the case with Dr. Webb from nephrology.  We both found the significant swings in calcium than usual and agreed to try a trial of increased steroids to see if this can help control calcium.    2.  Sarcoidosis  Previously on CellCept and methotrexate, but had side effects from this.  Has been off since last visit.  Continue on steroids with dosing as detailed above    3.  ANA on CKD stage IV  Creatinine improving.  Appreciate nephrology's input.  Personally discussed the case with nephrology.    4.  Hypertension  Will defer management to primary service.    5.  Leukocytosis  Most likely related to steroids.  Was on prednisone 40 mg daily    The plan was discussed with the  patient.  I have also discussed the case with the nursing staff.     Recommendations were also discussed with the referring provider, CLAUDIA Donald.     I would like to thank you for the opportunity to participate in the care of this patient.  We will communicate changes and recommendations, if and when necessary.    We have updated the admitting  attending and nursing staff, as appropriate, on the patient's current status and plan. I will be going off shift tonight and will be unavailable.  Please contact on-call pulmonologist if available.        This document was electronically signed by Cydney Walsh MD on 04/25/24 at 14:20 EDT      Dictated utilizing Dragon dictation.

## 2024-04-25 NOTE — PLAN OF CARE
Goal Outcome Evaluation:  Plan of Care Reviewed With: patient        Progress: improving       Patient tolerated new BP medication. Patient ambulating independent to the restroom. No pain this shift.

## 2024-04-25 NOTE — CASE MANAGEMENT/SOCIAL WORK
Discharge Planning Assessment  [facility]     Patient Name: Jamia Hinton  MRN: [id number]  Today's Date: 4/25/2024    Admit Date: 4/24/2024    Plan: Pt awake and alert at time of visit.  No family present.  Pt confirmed address, PCP, telephone numbers and contact number:  Veronica Hinton/daughter/[phone] .  Pt does have a Living Will on file in the EMR.  Pt reported she does not have any medical equipment or HH agency following her.  She lives in a house with her spouse who is diabled.  There is a ramp to the entry way.  Pt reports is independent of ADLs.  She stays with her spouse during the day and then spends the night with her mother who, in Dec, lost her S.O.  She reports her mother is afraid to be alone at night.  She reports using [facility] pharmacy.  Meds to Beds program explained to pt.  She is agreeable to using [facility] Beds at [place].   Pt verbalized concern for situation that she helps her mother but pt's other daughter and son report they are the POA.  She adds her concern is, while she is fine with siblings being POA since both are out of town, but she (pt) was placed on pt''s mother's checking account to sign checks.  She reports being a stressful situation.  Encouraged her to speak with her siblings.   She also would like her mother to designate a HC surrogate and/or complete a Living Will with her wishes made known  Provided information to pt for her mother re: [facility].  Pt reports plans are to return home at [place].  No needs identified at this time.  SDOH completed.   Discharge Needs Assessment       Row Name 04/25/24 1305       Living Environment    People in Home spouse    Name(s) of People in Home Spouse.    Unique Family Situation Reports stays with spouse during day (he has health issues).  Stays at her mother's home at night since Dec 2023 when mother's S.O. passed and she is afraid to be alone at night    Current Living Arrangements home    Duration at Residence 19 yrs     Potentially Unsafe Housing Conditions none;other (see comments)  Has ramp to entry for spouse    In the past 12 months has the electric, gas, oil, or water company threatened to shut off services in your home? No    Primary Care Provided by self    Provides Primary Care For other (see comments)  Assists with care for spouse as needed    Quality of Family Relationships stressful  Reports helping her mother has been stressful.    Able to Return to Prior Arrangements yes       Resource/Environmental Concerns    Resource/Environmental Concerns none    Transportation Concerns none       Transportation Needs    In the past 12 months, has lack of transportation kept you from medical appointments or from getting medications? no    In the past 12 months, has lack of transportation kept you from meetings, work, or from getting things needed for daily living? No       Food Insecurity    Within the past 12 months, you worried that your food would run out before you got the money to buy more. Never true    Within the past 12 months, the food you bought just didn't last and you didn't have money to get more. Never true       Transition Planning    Patient/Family Anticipates Transition to home with family    Patient/Family Anticipated Services at Transition none    Transportation Anticipated family or friend will provide       Discharge Needs Assessment    Current Outpatient/Agency/Support Group other (see comments)    Equipment Currently Used at Home none    Concerns to be Addressed denies needs/concerns at this time    Anticipated Changes Related to Illness none    Equipment Needed After Discharge none    Provided Post Acute Provider List? N/A    Provided Post Acute Provider Quality & Resource List? N/A    Current Discharge Risk chronically ill                   Discharge Plan       Row Name 04/25/24 4884       Plan    Plan Pt awake and alert at time of visit.  No family present.  Pt confirmed address, PCP, telephone numbers and  contact number:  Veronica Hinton/daughter/[phone] .  Pt does have a Living Will on file in the EMR.  Pt reported she does not have any medical equipment or HH agency following her.  She lives in a house with her spouse who is diabled.  There is a ramp to the entry way.  Pt reports is independent of ADLs.  She stays with her spouse during the day and then spends the night with her mother who, in Dec, lost her S.O.  She reports her mother is afraid to be alone at night.  She reports using [facility] pharmacy.  Meds to Beds program explained to pt.  She is agreeable to using Med[facility] Beds at [place].   Pt verbalized concern for situation that she helps her mother but pt's other daughter and son report they are the POA.  She adds her concern is, while she is fine with siblings being POA since both are out of town, but she (pt) was placed on pt''s mother's checking account to sign checks.  She reports being a stressful situation.  Encouraged her to speak with her siblings.   She also would like her mother to designate a HC surrogate and/or complete a Living Will with her wishes made known  Provided information to pt for her mother re: Living [facility].  Pt reports plans are to return home at [place].  No needs identified at this time.  SDOH completed.                  Continued Care and Services - Admitted Since 4/24/2024    No active coordination exists for this encounter.       Expected Discharge Date and Time       Expected Discharge Date Expected Discharge Time    Apr 26, 2024            Demographic Summary       Row Name 04/25/24 1301       General Information    Admission Type inpatient    Arrived From home    Expected Length of Stay (LOS) 72hr    Referral Source admission list    Reason for Consult discharge planning    Preferred Language English       Contact Information    Permission Granted to Share Info With ;family/designee    Contact Information Obtained for     Contact Information  Comments Veronica Hinton/daughter/[phone]      Row Name 04/25/24 1213       General Information    Admission Type inpatient                   Functional Status       Row Name 04/25/24 1302       Functional Status    Usual Activity Tolerance moderate    Current Activity Tolerance fair    Functional Status Comments Independent of ADLs       Physical Activity    On average, how many days per week do you engage in moderate to strenuous exercise (like a brisk walk)? 0 days  Reports attempts mild exercising daily.  Walks as tolerated    On average, how many minutes do you engage in exercise at this level? 0 min    Number of minutes of exercise per week 0       Functional Status, IADL    Medications independent    Meal Preparation independent    Housekeeping independent    Laundry independent    Shopping independent    IADL Comments Independent of ADLs       Mental Status    General Appearance WDL WDL       Mental Status Summary    Recent Changes in Mental Status/Cognitive Functioning no changes                   Psychosocial    No documentation.                  Abuse/Neglect    No documentation.                  Legal    No documentation.                  Substance Abuse    No documentation.                  Patient Forms    No documentation.                     Korin Adams RN

## 2024-04-25 NOTE — PROGRESS NOTES
[facility] Progress Note      Patient Name: Jamia Hinton  : 1965  MRN: [id number]  Primary Care Physician:  Jaycee Deal APRN  Date of admission: 2024    Subjective     Interval History:   F/u ANA hypercalcemia    Review of Systems:   SBP 150s to 170s, attributes to stress and poor sleep  Denies dyspnea    Objective     Vitals:   Temp:  [97.5 °F (36.4 °C)-98.6 °F (37 °C)] 97.7 °F (36.5 °C)  Heart Rate:  [61-70] 61  Resp:  [14-16] 16  BP: (150-190)/() 150/85    Intake/Output Summary (Last 24 hours) at 2024 0721  Last data filed at 2024 0038  Gross per 24 hour   Intake 2120 ml   Output 200 ml   Net 1920 ml       Physical Exam:    General Appearance: anxious pleasant WF no distress  Neck: supple, no JVD  Lungs: CTA bilat no rales  Heart: RRR, normal S1 and S2  Abdomen: soft, nontender, nondistended  : no palpable bladder  Extremities: no edema, cyanosis or clubbing  Neuro: normal speech and mental status     Scheduled Meds:     calcitonin, 4 Units/kg, Intramuscular, Q12H  heparin (porcine), 5,000 Units, Subcutaneous, Q12H  iron polysaccharides, 150 mg, Oral, Daily  predniSONE, 40 mg, Oral, Daily With Breakfast   Followed by  [START ON 2024] predniSONE, 20 mg, Oral, Daily With Breakfast  sodium chloride, 10 mL, Intravenous, Q12H      IV Meds:   sodium chloride, 150 mL/hr, Last Rate: 150 mL/hr (24 0038)        Results Reviewed:   I have personally reviewed the results from the time of this admission to 2024 07:21 EDT     Results from last 7 days   Lab Units 24  0536 24  1249   SODIUM mmol/L 140 136   POTASSIUM mmol/L 3.9 4.1   CHLORIDE mmol/L 106 98   CO2 mmol/L 18.8* 25.4   BUN mg/dL 69* 75*   CREATININE mg/dL 3.45* 3.82*   CALCIUM mg/dL 10.4 14.1*   BILIRUBIN mg/dL  --  0.4   ALK PHOS U/L  --  64   ALT (SGPT) U/L  --  15   AST (SGOT) U/L  --  13   GLUCOSE mg/dL 80 101*     Estimated Creatinine Clearance: 22.1 mL/min (A) (by  C-G formula based on SCr of 3.45 mg/dL (H)).  Results from last 7 days   Lab Units 04/25/24  0536 04/24/24  1249   MAGNESIUM mg/dL  --  2.4   PHOSPHORUS mg/dL 5.3*  --          Results from last 7 days   Lab Units 04/25/24  0536 04/24/24  1249   WBC 10*3/mm3 17.42* 18.79*   HEMOGLOBIN g/dL 10.2* 11.6*   PLATELETS 10*3/mm3 214 266           Assessment / Plan     ASSESSMENT:  Non olig ANA - prerenal azotemia again from vol depletion due to severe hypercalcemia, Cr improved 3.8 to 2.4 with IVF; was down to 2.3 at discharge 11 days ago, her presumed BL  CKD stage 4 - suspect chronic tubulointerstitial dz & nephroscalcionis from sarcoidosis, but substantial progression noted vs 2020 (when renal fcn normal) to now (Cr 2.3 on 3/22/24).  CT on 4/7/24: small non-obs stones.  Kidneys generous in size 12 & 13 cm on recent renal US  Hypercalcemia - recurrent.  Presumably due to sarcoidosis with Ca back up 11 to 14, with very high D1-25 level (~ 200) recently due to granulomatous dz), appropriately low iPTH, normal PTHrP, low-normal D25, and NEG SPEP/IF.  Been on prednisone taper since discharge.  Ca markedly better 10.4 today with IVF & calcitonin  Immunosuppression : states was on cellcept & MTX for the sarcoidosis (?) rather than RA in past   Elevated BP - No prior hx HTN.  SBP 150s to 170s, attributes to anxiety  Leukocytosis - WBC 18K, likely due to steroid       PLAN:  Dec NS IVF rate 125 cc/hr  Continue calcitonin series  Add low dose norvasc  PULM to see      Pb Webb MD  04/25/24  07:21 EDT    [facility]  [phone]

## 2024-04-25 NOTE — PROGRESS NOTES
[facility] HOSPITALIST    PROGRESS NOTE    Name:  Jamia Hinton   Age:  58 y.o.  Sex:  female  :  1965  MRN:  [id number]   Visit Number:  [id number]  Admission Date:  2024  Date Of Service:  24  Primary Care Physician:  Jaycee Deal APRN     LOS: 1 day :    Chief Complaint:      Worsening renal function    Subjective:    Patient seen and examined this morning.  States she is claustrophobic.  Otherwise feeling well.  Encouraged that her calcium had down trended.  Otherwise denies any concerns.  Initiated on Norvasc.    Hospital Course:    Ms. Hinton is a pleasant 58-year-old female who presented to the emergency department after following up with her labs with nephrology clinic which noted worsening renal function.  Pertinent past medical history of sarcoidosis, rheumatoid arthritis, and chronic kidney disease.  Patient with recent admission secondary to hypercalcemia and acute kidney injury for which she was given IV fluids/Lasix/prednisone taper.  Patient does follow with rheumatology in [place] Dr. Capone with recent discontinuation of CellCept and Methotrexate.  Patient otherwise denies any current symptoms.  Stated has been feeling well.  Had noted some mild lower extremity cramps.     Upon ED presentation patient hypertensive with blood pressure 184/95, otherwise hemodynamically stable.  Pertinent labs and imaging included urinalysis without pattern of infection, creatinine 3.8 with prior 2.2, calcium 14.1, WBC 18.7, hemoglobin 11.6.  Patient was given 1 L bolus with hospitalist consulted for further medical management.  Pulmonology and nephrology also consulted.  Patient continued on IV fluids as well as Norvasc initiated for continued uncontrolled hypertension.  She was given calcitonin injection with significant improvement of calcium levels.    Review of Systems:     All systems were reviewed and negative except as mentioned in subjective, assessment and  "plan.    Vital Signs:    Temp:  [97.5 °F (36.4 °C)-98.6 °F (37 °C)] 97.7 °F (36.5 °C)  Heart Rate:  [61-70] 61  Resp:  [14-16] 16  BP: (150-190)/() 150/85    Intake and output:    I/O last 3 completed shifts:  In: 2120 [P.O.:120; I.V.:1000; IV Piggyback:1000]  Out: 200 [Urine:200]  I/O this shift:  In: 1120 [P.O.:120; I.V.:1000]  Out: -     Physical Examination:    General Appearance:  Alert and cooperative.  Anxious middle-aged female.  Very pleasant.   Head:  Atraumatic and normocephalic.   Eyes: Conjunctivae and sclerae normal, no icterus. No pallor.   Throat: No oral lesions, no thrush, oral mucosa moist.   Neck: Supple, trachea midline, no thyromegaly.   Lungs:   Breath sounds heard bilaterally equally.  No wheezing or crackles. No Pleural rub or bronchial breathing.  On room air unlabored.   Heart:  Normal S1 and S2, no murmur, no gallop, no rub. No JVD.   Abdomen:   Normal bowel sounds, no masses, no organomegaly. Soft, nontender, nondistended, no rebound tenderness.   Extremities: Supple, trace bilateral lower extremity edema, no cyanosis, no clubbing.   Skin: No bleeding or rash.   Neurologic: Alert and oriented x 3. No facial asymmetry. Moves all four limbs. No tremors.      Laboratory results:    Results from last 7 days   Lab Units 04/25/24  0536 04/24/24  1249   SODIUM mmol/L 140 136   POTASSIUM mmol/L 3.9 4.1   CHLORIDE mmol/L 106 98   CO2 mmol/L 18.8* 25.4   BUN mg/dL 69* 75*   CREATININE mg/dL 3.45* 3.82*   CALCIUM mg/dL 10.4 14.1*   BILIRUBIN mg/dL  --  0.4   ALK PHOS U/L  --  64   ALT (SGPT) U/L  --  15   AST (SGOT) U/L  --  13   GLUCOSE mg/dL 80 101*     Results from last 7 days   Lab Units 04/25/24  0536 04/24/24  1249   WBC 10*3/mm3 17.42* 18.79*   HEMOGLOBIN g/dL 10.2* 11.6*   HEMATOCRIT % 32.7* 35.9   PLATELETS 10*3/mm3 214 266                 No results for input(s): \"PHART\", \"GYH3SHD\", \"PO2ART\", \"UAY1VNQ\", \"BASEEXCESS\" in the last 8760 hours.   I have reviewed the patient's laboratory " results.    Radiology results:    No radiology results from the last 24 hrs  I have reviewed the patient's radiology reports.    Medication Review:     I have reviewed the patient's active and prn medications.     Problem List:      Acute kidney injury      Assessment:    Acute kidney injury on chronic kidney disease stage III, POA  Hypercalcemia, POA  Hypertensive urgency, POA  Sarcoidosis  Rheumatoid arthritis  Iron deficiency anemia  Anxiety and depression  Obesity    Plan:    - Nephrology and Pulmonology consulted. Appreciate recommendations.  - Sarcoidosis likely etiology of  Hypercalcemia. PTH low and elevated 1/25 Vitamin D  - continue fluids per nephrology and consider biphosphonate with improvement of renal function.  -Calcitonin injections per nephrology with improvement of calcium noted.  - Patient previously prescribed prednisone 40 mg for 2 weeks  then 20 mg for another 4 weeks. Initiated on 04/14/2024- Will continue for now.  -Norvasc added.    I have reviewed the copied text and it is accurate as of 4/25/2024      DVT Prophylaxis: Heparin  Code Status: Full code  Diet: Renal  Discharge Plan: Home in 2 to 3 days.    Daniela Ji, APRN  04/25/24  11:05 EDT    Dictated utilizing Dragon dictation.

## 2024-04-25 NOTE — PAYOR COMM NOTE
"TO:JOHN MK  FROM:STAS GARLAND, RN PHONE [phone]  IN CLINICALS REF# [id number]    Jamia Murrieta (58 y.o. Female)       Date of Birth   1965    Social Security Number       Address   [address] [address]    Home Phone   [phone]    MRN   [id number]       Christianity   Evangelical    Marital Status                               Admission Date   24    Admission Type   Emergency    Admitting Provider   Isaac Corbin DO    Attending Provider   Isaac Corbin DO    Department, Room/Bed   [facility] TELEMETRY SDS OVERFLOW, T06       Discharge Date       Discharge Disposition       Discharge Destination                                 Attending Provider: Isaac Corbin DO    Allergies: Citalopram Hydrobromide    Isolation: None   Infection: None   Code Status: CPR    Ht: 167.6 cm (66\")   Wt: 113 kg (249 lb)    Admission Cmt: None   Principal Problem: Acute kidney injury [N17.9]                   Active Insurance as of 2024       Primary Coverage       Payor Plan Insurance Group Employer/Plan Group    [place] [id number]       Payor Plan Address Payor Plan Phone Number Payor Plan Fax Number Effective Dates    [employer] [address] [phone]  2020 - None Entered    [facility] [id number]         Subscriber Name Subscriber Birth Date Member ID       JAMIA MURRIETA 1965 [id number]                     Emergency Contacts        (Rel.) Home Phone Work Phone Mobile Phone    Veronica Murrieta (Daughter) [phone] -- [phone]                 History & Physical        Margy, CLAUDIA Crawford at 24 1437       Attestation signed by Isaac Corbin DO at 24 1825    I have reviewed this documentation and agree.                    [facility] HOSPITALIST   HISTORY AND PHYSICAL      Name:  Jamia Murrieta   Age:  58 y.o.  Sex:  female  :  " 1965  MRN:  [id number]   Visit Number:  [id number]  Admission Date:  2024  Date Of Service:  24  Primary Care Physician:  Jaycee Deal APRN    Chief Complaint:     Worsening renal function    History Of Presenting Illness:      Ms. Hinton is a pleasant 58-year-old female who presented to the emergency department after following up with her labs with nephrology clinic which noted worsening renal function.  Pertinent past medical history of sarcoidosis, rheumatoid arthritis, and chronic kidney disease.  Patient with recent admission secondary to hypercalcemia and acute kidney injury for which she was given IV fluids/Lasix/prednisone taper.  Patient does follow with rheumatology in [place] with recent discontinuation of CellCept and Methotrexate.  Patient otherwise denies any current symptoms.  Stated has been feeling well.  Had noted some mild lower extremity cramps.    Upon ED presentation patient hypertensive with blood pressure 184/95, otherwise hemodynamically stable.  Pertinent labs and imaging included urinalysis without pattern of infection, creatinine 3.8 with prior 2.2, calcium 14.1, WBC 18.7, hemoglobin 11.6.  Patient was given 1 L bolus with hospitalist consulted for further medical management.  Pulmonology and nephrology also consulted.    Review Of Systems:    All systems were reviewed and negative except as mentioned in history of presenting illness, assessment and plan.    Past Medical History: Patient  has a past medical history of Arthritis, Hernia, abdominal, and Sarcoidosis.    Past Surgical History: Patient  has a past surgical history that includes  section; Cholecystectomy; ventral/incisional hernia repair (N/A, 2017); Cataract extraction w/ intraocular lens implant (Right, 2023); and Cataract extraction w/ intraocular lens implant (Left, 2024).    Social History: Patient  reports that she has never smoked. She has never used smokeless  tobacco. She reports that she does not drink alcohol and does not use drugs.    Family History:  Patient's family history has been reviewed and found to be noncontributory.     Allergies:      Citalopram hydrobromide    Home Medications:    Prior to Admission Medications       Prescriptions Last Dose Informant Patient Reported? Taking?    citalopram (CeleXA) 20 MG tablet   Yes No    Take 1 tablet by mouth Daily.    Patient not taking:  Reported on 4/22/2024    folic acid (FOLVITE) 1 MG tablet   Yes No    Take 1 tablet by mouth Daily.    Patient not taking:  Reported on 4/22/2024    iron polysaccharides (NIFEREX) 150 MG capsule   No No    Take 1 capsule by mouth Daily.    omeprazole (priLOSEC) 40 MG capsule   Yes No    Take 1 capsule by mouth Daily.    Patient not taking:  Reported on 4/22/2024    ondansetron ODT (ZOFRAN-ODT) 4 MG disintegrating tablet   No No    Place 1 tablet on the tongue Every 6 (Six) Hours As Needed for Nausea or Vomiting.    Patient not taking:  Reported on 4/22/2024    oxybutynin XL (DITROPAN-XL) 5 MG 24 hr tablet   Yes No    Take 1 tablet by mouth Daily.    Patient not taking:  Reported on 4/22/2024    predniSONE (DELTASONE) 20 MG tablet   No No    Take 2 tablets by mouth Daily With Breakfast for 13 days, THEN 1 tablet Daily With Breakfast for 42 days.    vitamin B-12 (CYANOCOBALAMIN) 1000 MCG tablet   Yes No    Take 1 tablet by mouth Daily.    Patient not taking:  Reported on 4/22/2024    vitamin D3 125 MCG (5000 UT) capsule capsule   Yes No    Take 1 capsule by mouth Daily.    Patient not taking:  Reported on 4/22/2024          ED Medications:    Medications   sodium chloride 0.9 % flush 10 mL (has no administration in time range)   sodium chloride 0.9 % bolus 1,000 mL (1,000 mL Intravenous New Bag 4/24/24 1350)     Vital Signs:  Temp:  [97.5 °F (36.4 °C)] 97.5 °F (36.4 °C)  Heart Rate:  [63-70] 70  Resp:  [15] 15  BP: (184-190)/(93-95) 190/93        04/24/24  1239   Weight: 113 kg (249 lb)  "    Body mass index is 40.19 kg/m².    Physical Exam:     Most recent vital Signs: BP (!) 190/93   Pulse 70   Temp 97.5 °F (36.4 °C) (Oral)   Resp 15   Ht 167.6 cm (66\")   Wt 113 kg (249 lb)   LMP  (LMP Unknown) Comment: posyt menopausal  SpO2 98%   BMI 40.19 kg/m²     Physical Exam  Vitals and nursing note reviewed.   Constitutional:       Appearance: Normal appearance.   HENT:      Head: Normocephalic.      Nose: Nose normal.      Mouth/Throat:      Mouth: Mucous membranes are moist.   Eyes:      Pupils: Pupils are equal, round, and reactive to light.   Cardiovascular:      Rate and Rhythm: Normal rate and regular rhythm.      Pulses: Normal pulses.      Heart sounds: Normal heart sounds.   Pulmonary:      Effort: Pulmonary effort is normal.      Breath sounds: Normal breath sounds.   Abdominal:      General: Bowel sounds are normal.      Palpations: Abdomen is soft.   Musculoskeletal:      Cervical back: Normal range of motion.   Skin:     General: Skin is warm.      Capillary Refill: Capillary refill takes less than 2 seconds.   Neurological:      General: No focal deficit present.      Mental Status: She is alert and oriented to person, place, and time.   Psychiatric:         Mood and Affect: Mood normal.       Laboratory data:    I have reviewed the labs done in the emergency room.    Results from last 7 days   Lab Units 04/24/24  1249   SODIUM mmol/L 136   POTASSIUM mmol/L 4.1   CHLORIDE mmol/L 98   CO2 mmol/L 25.4   BUN mg/dL 75*   CREATININE mg/dL 3.82*   CALCIUM mg/dL 14.1*   BILIRUBIN mg/dL 0.4   ALK PHOS U/L 64   ALT (SGPT) U/L 15   AST (SGOT) U/L 13   GLUCOSE mg/dL 101*     Results from last 7 days   Lab Units 04/24/24  1249   WBC 10*3/mm3 18.79*   HEMOGLOBIN g/dL 11.6*   HEMATOCRIT % 35.9   PLATELETS 10*3/mm3 266                             Results from last 7 days   Lab Units 04/24/24  1400   COLOR UA  Yellow   GLUCOSE UA  Negative   KETONES UA  Negative   BLOOD UA  Trace*   LEUKOCYTES UA  " Trace*   PH, URINE  6.5   BILIRUBIN UA  Negative   UROBILINOGEN UA  0.2 E.U./dL   RBC UA /HPF 3-5*   WBC UA /HPF 3-5*       Pain Management Panel  More data may exist         Latest Ref Rng & Units 4/8/2024 4/7/2024   Pain Management Panel   Creatinine, Urine mg/dL 29.9  24.8        EKG:      Sinus rhythm bpm 68    Radiology:    No radiology results for the last 3 days    Assessment:    Acute kidney injury on chronic kidney disease stage III, POA  Hypercalcemia, POA  Hypertensive urgency, POA  Sarcoidosis  Rheumatoid arthritis  Iron deficiency anemia  Anxiety and depression  Obesity    Plan:    - Nephrology and Pulmonology consulted. Appreciate recommendations.  - Sarcoidosis likely etiology of  Hypercalcemia. PTH low and elevated 1/25 Vitamin D  - continue fluids per nephrology and consider biphosphonate with improvement of renal function.  - Patient previously prescribed prednisone 40 mg for 2 weeks  then 20 mg for another 4 weeks. Initiated on 04/14/2024- Will continue for now.  -Uncontrolled hypertension likely secondary to anxiety as improved.  Will order as needed labetalol.  -Further orders pending clinical course.    Risk Assessment: High  DVT Prophylaxis: Heparin  Code Status: Full code  Diet: Renal    Advance Care Planning   ACP discussion was declined by the patient. Patient does not have an advance directive, declines further assistance.           Daniela Ji, APRN  04/24/24  14:38 EDT    Dictated utilizing Dragon dictation.    Electronically signed by Isaac Corbin DO at 04/24/24 1825          Emergency Department Notes        Harper Porras RN at 04/24/24 1700          Report given to RN    Electronically signed by Harper Porras RN at 04/24/24 1700       Scottie Herbert MD at 04/24/24 1307        Procedure Orders    1. Critical Care [[id number]] ordered by Scottie Herbert MD                 Subjective:  History of Present Illness:    Patient is a 58-year-old female  history of sarcoidosis, recent admission to our institution for hyperkalemia and ANA who presents today with concerns for worsening renal function.  Had outpatient labs performed several days ago with her primary care doctor and presented to her nephrologist appointment with these labs.  Showing a creatinine of greater than 3.2 with stable potassium and hyperkalemia.  Had significant elevation in renal function during her inpatient stay.  Was instructed by her nephrologist to present back to our emergency department due to concerns for ANA and need for admission.  She denies any fevers.  No shortness of breath.  Denies any chest pain.  No palpitations.  Denies any abdominal pain nausea vomiting or diarrhea.  No dysuria.      Nurses Notes reviewed and agree, including vitals, allergies, social history and prior medical history.     REVIEW OF SYSTEMS: All systems reviewed and not pertinent unless noted.  Review of Systems   Constitutional:  Negative for activity change, appetite change, chills, fatigue and fever.        ANA, hypocalcemia on lab work   HENT:  Negative for rhinorrhea, sinus pressure and sinus pain.    Eyes:  Negative for discharge and itching.   Respiratory:  Negative for cough and shortness of breath.    Cardiovascular:  Negative for chest pain and leg swelling.   Gastrointestinal:  Negative for abdominal distention, abdominal pain, nausea and vomiting.   Endocrine: Negative for cold intolerance and heat intolerance.   Genitourinary:  Negative for decreased urine volume, difficulty urinating, flank pain, frequency, urgency, vaginal bleeding, vaginal discharge and vaginal pain.   Musculoskeletal:  Negative for gait problem, neck pain and neck stiffness.   Skin:  Negative for color change.   Allergic/Immunologic: Negative for environmental allergies.   Neurological:  Negative for seizures, syncope, facial asymmetry and speech difficulty.   Psychiatric/Behavioral:  Negative for self-injury and suicidal  "ideas.        Past Medical History:   Diagnosis Date    Arthritis     Hernia, abdominal     Sarcoidosis        Allergies:    Citalopram hydrobromide      Past Surgical History:   Procedure Laterality Date    CATARACT EXTRACTION W/ INTRAOCULAR LENS IMPLANT Right 2023    Procedure: CATARACT PHACO EXTRACTION WITH INTRAOCULAR LENS IMPLANT RIGHT;  Surgeon: Christ Kerr MD;  Location: [facility];  Service: Ophthalmology;  Laterality: Right;    CATARACT EXTRACTION W/ INTRAOCULAR LENS IMPLANT Left 2024    Procedure: CATARACT PHACO EXTRACTION WITH INTRAOCULAR LENS IMPLANT LEFT;  Surgeon: Christ Kerr MD;  Location: [facility];  Service: Ophthalmology;  Laterality: Left;     SECTION      CHOLECYSTECTOMY      VENTRAL/INCISIONAL HERNIA REPAIR N/A 2017    Procedure:  INCISIONAL HERNIA REPAIR WITH MESH;  Surgeon: Bassam Rogers MD;  Location: [facility];  Service:          Social History     Socioeconomic History    Marital status:    Tobacco Use    Smoking status: Never    Smokeless tobacco: Never   Vaping Use    Vaping status: Never Used   Substance and Sexual Activity    Alcohol use: No    Drug use: No    Sexual activity: Defer         Family History   Problem Relation Age of Onset    No Known Problems Mother     No Known Problems Father        Objective  Physical Exam:  /98   Pulse 69   Temp 97.5 °F (36.4 °C) (Oral)   Resp 15   Ht 167.6 cm (66\")   Wt 113 kg (249 lb)   LMP  (LMP Unknown) Comment: posyt menopausal  SpO2 91%   BMI 40.19 kg/m²      Physical Exam  Constitutional:       General: She is not in acute distress.     Appearance: Normal appearance. She is obese. She is not ill-appearing.   HENT:      Head: Normocephalic and atraumatic.      Nose: Nose normal. No congestion or rhinorrhea.      Mouth/Throat:      Mouth: Mucous membranes are dry.      Pharynx: Oropharynx is clear. No oropharyngeal exudate or posterior oropharyngeal erythema.   Eyes:      " Extraocular Movements: Extraocular movements intact.      Conjunctiva/sclera: Conjunctivae normal.      Pupils: Pupils are equal, round, and reactive to light.   Cardiovascular:      Rate and Rhythm: Normal rate and regular rhythm.      Pulses: Normal pulses.      Heart sounds: Normal heart sounds.   Pulmonary:      Effort: Pulmonary effort is normal. No respiratory distress.      Breath sounds: Normal breath sounds.   Abdominal:      General: Abdomen is flat. Bowel sounds are normal. There is no distension.      Palpations: Abdomen is soft.      Tenderness: There is no abdominal tenderness.   Musculoskeletal:         General: No swelling or tenderness. Normal range of motion.      Cervical back: Normal range of motion and neck supple.   Skin:     General: Skin is warm and dry.      Capillary Refill: Capillary refill takes less than 2 seconds.   Neurological:      General: No focal deficit present.      Mental Status: She is alert and oriented to person, place, and time. Mental status is at baseline.      Cranial Nerves: No cranial nerve deficit.      Sensory: No sensory deficit.      Motor: No weakness.      Coordination: Coordination normal.   Psychiatric:         Mood and Affect: Mood normal.         Behavior: Behavior normal.         Thought Content: Thought content normal.         Judgment: Judgment normal.         Critical Care    Performed by: Scottie Herbert MD  Authorized by: Scottie Herbert MD    Critical care provider statement:     Critical care time (minutes):  30    Critical care was necessary to treat or prevent imminent or life-threatening deterioration of the following conditions:  Metabolic crisis (Hypercalcemia)    Critical care was time spent personally by me on the following activities:  Blood draw for specimens, discussions with primary provider, evaluation of patient's response to treatment, examination of patient, obtaining history from patient or surrogate, ordering and performing  treatments and interventions, ordering and review of laboratory studies, ordering and review of radiographic studies, pulse oximetry, re-evaluation of patient's condition and review of old charts    Care discussed with: admitting provider        ED Course:    ED Course as of 04/24/24 1527   Wed Apr 24, 2024   1345 EKG interpreted by me, normal sinus rhythm with no concerning ST changes noted, rate of 68 [JE]      ED Course User Index  [JE] Scottie Herbert MD       Lab Results (last 24 hours)       Procedure Component Value Units Date/Time    Comprehensive Metabolic Panel [[id number]]  (Abnormal) Collected: 04/24/24 1249    Specimen: Blood Updated: 04/24/24 1342     Glucose 101 mg/dL      BUN 75 mg/dL      Creatinine 3.82 mg/dL      Sodium 136 mmol/L      Potassium 4.1 mmol/L      Chloride 98 mmol/L      CO2 25.4 mmol/L      Calcium 14.1 mg/dL      Total Protein 7.3 g/dL      Albumin 4.1 g/dL      ALT (SGPT) 15 U/L      AST (SGOT) 13 U/L      Alkaline Phosphatase 64 U/L      Total Bilirubin 0.4 mg/dL      Globulin 3.2 gm/dL      A/G Ratio 1.3 g/dL      BUN/Creatinine Ratio 19.6     Anion Gap 12.6 mmol/L      eGFR 12.9 mL/min/1.73      Comment: <15 Indicative of kidney failure       Narrative:      GFR Normal >60  Chronic Kidney Disease <60  Kidney Failure <15      CBC & Differential [[id number]]  (Abnormal) Collected: 04/24/24 1249    Specimen: Blood Updated: 04/24/24 1326    Narrative:      The following orders were created for panel order CBC & Differential.  Procedure                               Abnormality         Status                     ---------                               -----------         ------                     CBC Auto Differential[[id number]]        Abnormal            Final result                 Please view results for these tests on the individual orders.    Magnesium [[id number]]  (Normal) Collected: 04/24/24 1249    Specimen: Blood Updated: 04/24/24 1357     Magnesium 2.4 mg/dL     CBC  Auto Differential [[id number]]  (Abnormal) Collected: 04/24/24 1249    Specimen: Blood Updated: 04/24/24 1326     WBC 18.79 10*3/mm3      RBC 4.08 10*6/mm3      Hemoglobin 11.6 g/dL      Hematocrit 35.9 %      MCV 88.0 fL      MCH 28.4 pg      MCHC 32.3 g/dL      RDW 15.4 %      RDW-SD 49.4 fl      MPV 11.3 fL      Platelets 266 10*3/mm3      Neutrophil % 91.2 %      Lymphocyte % 2.8 %      Monocyte % 3.6 %      Eosinophil % 1.2 %      Basophil % 0.3 %      Immature Grans % 0.9 %      Neutrophils, Absolute 17.14 10*3/mm3      Lymphocytes, Absolute 0.53 10*3/mm3      Monocytes, Absolute 0.67 10*3/mm3      Eosinophils, Absolute 0.23 10*3/mm3      Basophils, Absolute 0.06 10*3/mm3      Immature Grans, Absolute 0.16 10*3/mm3      nRBC 0.0 /100 WBC     Urinalysis With Microscopic If Indicated (No Culture) - Urine, Clean Catch [[id number]]  (Abnormal) Collected: 04/24/24 1400    Specimen: Urine, Clean Catch Updated: 04/24/24 1409     Color, UA Yellow     Appearance, UA Clear     pH, UA 6.5     Specific Gravity, UA 1.010     Glucose, UA Negative     Ketones, UA Negative     Bilirubin, UA Negative     Blood, UA Trace     Protein, UA Trace     Leuk Esterase, UA Trace     Nitrite, UA Negative     Urobilinogen, UA 0.2 E.U./dL    Urinalysis, Microscopic Only - Urine, Clean Catch [[id number]]  (Abnormal) Collected: 04/24/24 1400    Specimen: Urine, Clean Catch Updated: 04/24/24 1415     RBC, UA 3-5 /HPF      WBC, UA 3-5 /HPF      Bacteria, UA Trace /HPF      Squamous Epithelial Cells, UA 0-2 /HPF      Hyaline Casts, UA None Seen /LPF      Calcium Oxalate Crystals, UA Small/1+ /HPF      Methodology Manual Light Microscopy             No radiology results from the last 24 hrs       MDM     Amount and/or Complexity of Data Reviewed  Decide to obtain previous medical records or to obtain history from someone other than the patient: yes        Initial impression of presenting illness: Abnormal labs    DDX: includes but is not limited  to: Hyperkalemia, ANA on CKD, sarcoidosis, hypercalcemia    Patient arrives stable with vitals interpreted by myself.     Pertinent features from physical exam: Heart auscultation, great rhythm, no murmur, nontender abdominal palpation, normal neuroexam.    Initial diagnostic plan: CBC, CMP, magnesium, UA, EKG    Results from initial plan were reviewed and interpreted by me revealing significant hypercalcemia with ANA noted, worse than patient's initial lab work for admission    Diagnostic information from other sources: Discussed with significant other at bedside reviewed past medical records    Interventions / Re-evaluation: IV fluids given concern for hypercalcemia    Results/clinical rationale were discussed with patient at bedside    Consultations/Discussion of results with other physicians: Concerns for hypercalcemia with worsening ANA, discussed with hospitalist admit to their service for further management    Disposition plan: Admit  -----        Final diagnoses:   Hypercalcemia   ANA (acute kidney injury)          Scottie Herbert MD  04/24/24 1527      Electronically signed by Scottie Herbert MD at 04/24/24 1527       Vital Signs (last day)       Date/Time Temp Temp src Pulse Resp BP Patient Position SpO2    04/25/24 1112 97.6 (36.4) Oral 58 18 146/81 Lying 99    04/25/24 0637 97.7 (36.5) Oral 61 16 150/85 Lying 99    04/24/24 2320 98.1 (36.7) Oral 63 16 165/104 Lying 97    04/24/24 2016 97.9 (36.6) Oral 62 16 177/87 Lying 95    04/24/24 1740 98.6 (37) Oral 65 14 152/88 Sitting 98    04/24/24 1630 -- -- 62 -- 174/85 -- 97    04/24/24 1600 -- -- 69 -- 162/89 -- 97    04/24/24 1530 -- -- 65 -- 172/86 -- 99    04/24/24 1500 -- -- 69 -- 177/98 -- 91    04/24/24 1400 -- -- 70 -- 190/93 -- 98    04/24/24 1239 97.5 (36.4) Oral 63 15 184/95 Sitting 95          Current Facility-Administered Medications   Medication Dose Route Frequency Provider Last Rate Last Admin    acetaminophen (TYLENOL) tablet 650 mg   650 mg Oral Q4H PRN MargyDaniela boyer APRN        amLODIPine (NORVASC) tablet 5 mg  5 mg Oral Q24H Pb Webb MD   5 mg at 04/25/24 0903    sennosides-docusate (PERICOLACE) 8.6-50 MG per tablet 2 tablet  2 tablet Oral BID PRN Margy, Daniela HAYNES APRN        And    polyethylene glycol (MIRALAX) packet 17 g  17 g Oral Daily PRN Margy, Daniela HAYNES APRN        And    bisacodyl (DULCOLAX) EC tablet 5 mg  5 mg Oral Daily PRN Margy, CLAUDIA Crawford        And    bisacodyl (DULCOLAX) suppository 10 mg  10 mg Rectal Daily PRN Daniela Ji APRN        calcitonin (MIACALCIN) injection 452 Units  4 Units/kg Intramuscular Q12H Pb Webb MD   452 Units at 04/24/24 2011    heparin (porcine) 5000 UNIT/ML injection 5,000 Units  5,000 Units Subcutaneous Q12H MargyDaniela boyer APRN   5,000 Units at 04/25/24 0903    iron polysaccharides (NIFEREX) capsule 150 mg  150 mg Oral Daily Daniela Ji APRN   150 mg at 04/25/24 1032    labetalol (NORMODYNE,TRANDATE) injection 10 mg  10 mg Intravenous Q6H PRN Daniela Ji APRN   10 mg at 04/25/24 0052    melatonin tablet 5 mg  5 mg Oral Nightly PRN Daniela Ji APRN        nitroglycerin (NITROSTAT) SL tablet 0.4 mg  0.4 mg Sublingual Q5 Min PRN Daniela Ji APRN        ondansetron ODT (ZOFRAN-ODT) disintegrating tablet 4 mg  4 mg Oral Q6H PRN Daniela Ji APRN        Or    ondansetron (ZOFRAN) injection 4 mg  4 mg Intravenous Q6H PRN Daniela Ji APRN        predniSONE (DELTASONE) tablet 40 mg  40 mg Oral Daily With Breakfast MargyDaniela boyer APRN   40 mg at 04/25/24 0902    Followed by    [START ON 4/27/2024] predniSONE (DELTASONE) tablet 20 mg  20 mg Oral Daily With Breakfast Daniela Ji APRN        sodium chloride 0.9 % flush 10 mL  10 mL Intravenous PRN Scottie Herbert MD        sodium chloride 0.9 % flush 10 mL  10 mL Intravenous Q12H Daniela Ji APRN   10 mL at 04/24/24 2015    sodium chloride 0.9 % flush 10 mL   10 mL Intravenous PRN Margy, Daniela J, APRN        sodium chloride 0.9 % infusion 40 mL  40 mL Intravenous PRN Margy, Daniela J, APRN        sodium chloride 0.9 % infusion  125 mL/hr Intravenous Continuous Pb Webb  mL/hr at 04/25/24 0912 125 mL/hr at 04/25/24 0912     Lab Results (last 24 hours)       Procedure Component Value Units Date/Time    Renal Function Panel [[id number]]  (Abnormal) Collected: 04/25/24 0536    Specimen: Blood Updated: 04/25/24 0632     Glucose 80 mg/dL      BUN 69 mg/dL      Creatinine 3.45 mg/dL      Sodium 140 mmol/L      Potassium 3.9 mmol/L      Chloride 106 mmol/L      CO2 18.8 mmol/L      Calcium 10.4 mg/dL      Albumin 3.3 g/dL      Phosphorus 5.3 mg/dL      Anion Gap 15.2 mmol/L      BUN/Creatinine Ratio 20.0     eGFR 14.6 mL/min/1.73      Comment: <15 Indicative of kidney failure       Narrative:      GFR Normal >60  Chronic Kidney Disease <60  Kidney Failure <15      CBC Auto Differential [[id number]]  (Abnormal) Collected: 04/25/24 0536    Specimen: Blood Updated: 04/25/24 0610     WBC 17.42 10*3/mm3      RBC 3.62 10*6/mm3      Hemoglobin 10.2 g/dL      Hematocrit 32.7 %      MCV 90.3 fL      MCH 28.2 pg      MCHC 31.2 g/dL      RDW 15.3 %      RDW-SD 50.5 fl      MPV 10.9 fL      Platelets 214 10*3/mm3      Neutrophil % 82.0 %      Lymphocyte % 6.2 %      Monocyte % 8.3 %      Eosinophil % 2.6 %      Basophil % 0.2 %      Immature Grans % 0.7 %      Neutrophils, Absolute 14.27 10*3/mm3      Lymphocytes, Absolute 1.08 10*3/mm3      Monocytes, Absolute 1.44 10*3/mm3      Eosinophils, Absolute 0.46 10*3/mm3      Basophils, Absolute 0.04 10*3/mm3      Immature Grans, Absolute 0.13 10*3/mm3      nRBC 0.0 /100 WBC     Urinalysis, Microscopic Only - Urine, Clean Catch [[id number]]  (Abnormal) Collected: 04/24/24 1400    Specimen: Urine, Clean Catch Updated: 04/24/24 1415     RBC, UA 3-5 /HPF      WBC, UA 3-5 /HPF      Bacteria, UA Trace /HPF      Squamous  Epithelial Cells, UA 0-2 /HPF      Hyaline Casts, UA None Seen /LPF      Calcium Oxalate Crystals, UA Small/1+ /HPF      Methodology Manual Light Microscopy    Urinalysis With Microscopic If Indicated (No Culture) - Urine, Clean Catch [[id number]]  (Abnormal) Collected: 04/24/24 1400    Specimen: Urine, Clean Catch Updated: 04/24/24 1409     Color, UA Yellow     Appearance, UA Clear     pH, UA 6.5     Specific Gravity, UA 1.010     Glucose, UA Negative     Ketones, UA Negative     Bilirubin, UA Negative     Blood, UA Trace     Protein, UA Trace     Leuk Esterase, UA Trace     Nitrite, UA Negative     Urobilinogen, UA 0.2 E.U./dL    Magnesium [[id number]]  (Normal) Collected: 04/24/24 1249    Specimen: Blood Updated: 04/24/24 1357     Magnesium 2.4 mg/dL     Comprehensive Metabolic Panel [[id number]]  (Abnormal) Collected: 04/24/24 1249    Specimen: Blood Updated: 04/24/24 1342     Glucose 101 mg/dL      BUN 75 mg/dL      Creatinine 3.82 mg/dL      Sodium 136 mmol/L      Potassium 4.1 mmol/L      Chloride 98 mmol/L      CO2 25.4 mmol/L      Calcium 14.1 mg/dL      Total Protein 7.3 g/dL      Albumin 4.1 g/dL      ALT (SGPT) 15 U/L      AST (SGOT) 13 U/L      Alkaline Phosphatase 64 U/L      Total Bilirubin 0.4 mg/dL      Globulin 3.2 gm/dL      A/G Ratio 1.3 g/dL      BUN/Creatinine Ratio 19.6     Anion Gap 12.6 mmol/L      eGFR 12.9 mL/min/1.73      Comment: <15 Indicative of kidney failure       Narrative:      GFR Normal >60  Chronic Kidney Disease <60  Kidney Failure <15      CBC & Differential [[id number]]  (Abnormal) Collected: 04/24/24 1249    Specimen: Blood Updated: 04/24/24 1326    Narrative:      The following orders were created for panel order CBC & Differential.  Procedure                               Abnormality         Status                     ---------                               -----------         ------                     CBC Auto Differential[[id number]]        Abnormal            Final result                  Please view results for these tests on the individual orders.    CBC Auto Differential [[id number]]  (Abnormal) Collected: 04/24/24 1249    Specimen: Blood Updated: 04/24/24 1326     WBC 18.79 10*3/mm3      RBC 4.08 10*6/mm3      Hemoglobin 11.6 g/dL      Hematocrit 35.9 %      MCV 88.0 fL      MCH 28.4 pg      MCHC 32.3 g/dL      RDW 15.4 %      RDW-SD 49.4 fl      MPV 11.3 fL      Platelets 266 10*3/mm3      Neutrophil % 91.2 %      Lymphocyte % 2.8 %      Monocyte % 3.6 %      Eosinophil % 1.2 %      Basophil % 0.3 %      Immature Grans % 0.9 %      Neutrophils, Absolute 17.14 10*3/mm3      Lymphocytes, Absolute 0.53 10*3/mm3      Monocytes, Absolute 0.67 10*3/mm3      Eosinophils, Absolute 0.23 10*3/mm3      Basophils, Absolute 0.06 10*3/mm3      Immature Grans, Absolute 0.16 10*3/mm3      nRBC 0.0 /100 WBC     [place] Draw [[id number]] Collected: 04/24/24 1249    Specimen: Blood Updated: 04/24/24 1315    Narrative:      The following orders were created for panel order [place] Draw.  Procedure                               Abnormality         Status                     ---------                               -----------         ------                     Green Top (Gel)[[id number]]                                  Final result               Lavender Top[[id number]]                                     Final result               Gold Top - SST[[id number]]                                   Final result               Light Blue Top[[id number]]                                   Final result                 Please view results for these tests on the individual orders.    Light Blue Top [[id number]] Collected: 04/24/24 1249    Specimen: Blood Updated: 04/24/24 1315     Extra Tube Hold for add-ons.     Comment: Auto resulted       Green Top (Gel) [[id number]] Collected: 04/24/24 1249    Specimen: Blood Updated: 04/24/24 1315     Extra Tube Hold for add-ons.     Comment: Auto resulted.       Lavender Top  [[id number]] Collected: 24 1249    Specimen: Blood Updated: 24 1315     Extra Tube hold for add-on     Comment: Auto resulted       Gold Top - SST [[id number]] Collected: 24 1249    Specimen: Blood Updated: 24 1315     Extra Tube Hold for add-ons.     Comment: Auto resulted.             Imaging Results (Last 24 Hours)       ** No results found for the last 24 hours. **             Physician Progress Notes (last 24 hours)        Daniela Ji APRN at 24 1105              [facility]    Name:  Jamia Hinton   Age:  58 y.o.  Sex:  female  :  1965  MRN:  [id number]   Visit Number:  [id number]  Admission Date:  2024  Date Of Service:  24  Primary Care Physician:  Jaycee Deal APRN     LOS: 1 day :    Chief Complaint:      Worsening renal function    Subjective:    Patient seen and examined this morning.  States she is claustrophobic.  Otherwise feeling well.  Encouraged that her calcium had down trended.  Otherwise denies any concerns.  Initiated on Norvasc.    Hospital Course:    Ms. Hinton is a pleasant 58-year-old female who presented to the emergency department after following up with her labs with nephrology clinic which noted worsening renal function.  Pertinent past medical history of sarcoidosis, rheumatoid arthritis, and chronic kidney disease.  Patient with recent admission secondary to hypercalcemia and acute kidney injury for which she was given IV fluids/Lasix/prednisone taper.  Patient does follow with rheumatology in [place] Dr. Capone with recent discontinuation of CellCept and Methotrexate.  Patient otherwise denies any current symptoms.  Stated has been feeling well.  Had noted some mild lower extremity cramps.     Upon ED presentation patient hypertensive with blood pressure 184/95, otherwise hemodynamically stable.  Pertinent labs and imaging included urinalysis without pattern of infection, creatinine 3.8  with prior 2.2, calcium 14.1, WBC 18.7, hemoglobin 11.6.  Patient was given 1 L bolus with hospitalist consulted for further medical management.  Pulmonology and nephrology also consulted.  Patient continued on IV fluids as well as Norvasc initiated for continued uncontrolled hypertension.  She was given calcitonin injection with significant improvement of calcium levels.    Review of Systems:     All systems were reviewed and negative except as mentioned in subjective, assessment and plan.    Vital Signs:    Temp:  [97.5 °F (36.4 °C)-98.6 °F (37 °C)] 97.7 °F (36.5 °C)  Heart Rate:  [61-70] 61  Resp:  [14-16] 16  BP: (150-190)/() 150/85    Intake and output:    I/O last 3 completed shifts:  In: 2120 [P.O.:120; I.V.:1000; IV Piggyback:1000]  Out: 200 [Urine:200]  I/O this shift:  In: 1120 [P.O.:120; I.V.:1000]  Out: -     Physical Examination:    General Appearance:  Alert and cooperative.  Anxious middle-aged female.  Very pleasant.   Head:  Atraumatic and normocephalic.   Eyes: Conjunctivae and sclerae normal, no icterus. No pallor.   Throat: No oral lesions, no thrush, oral mucosa moist.   Neck: Supple, trachea midline, no thyromegaly.   Lungs:   Breath sounds heard bilaterally equally.  No wheezing or crackles. No Pleural rub or bronchial breathing.  On room air unlabored.   Heart:  Normal S1 and S2, no murmur, no gallop, no rub. No JVD.   Abdomen:   Normal bowel sounds, no masses, no organomegaly. Soft, nontender, nondistended, no rebound tenderness.   Extremities: Supple, trace bilateral lower extremity edema, no cyanosis, no clubbing.   Skin: No bleeding or rash.   Neurologic: Alert and oriented x 3. No facial asymmetry. Moves all four limbs. No tremors.      Laboratory results:    Results from last 7 days   Lab Units 04/25/24  0536 04/24/24  1249   SODIUM mmol/L 140 136   POTASSIUM mmol/L 3.9 4.1   CHLORIDE mmol/L 106 98   CO2 mmol/L 18.8* 25.4   BUN mg/dL 69* 75*   CREATININE mg/dL 3.45* 3.82*  "  CALCIUM mg/dL 10.4 14.1*   BILIRUBIN mg/dL  --  0.4   ALK PHOS U/L  --  64   ALT (SGPT) U/L  --  15   AST (SGOT) U/L  --  13   GLUCOSE mg/dL 80 101*     Results from last 7 days   Lab Units 04/25/24  0536 04/24/24  1249   WBC 10*3/mm3 17.42* 18.79*   HEMOGLOBIN g/dL 10.2* 11.6*   HEMATOCRIT % 32.7* 35.9   PLATELETS 10*3/mm3 214 266                 No results for input(s): \"PHART\", \"XOW5QSB\", \"PO2ART\", \"JKG0WOY\", \"BASEEXCESS\" in the last 8760 hours.   I have reviewed the patient's laboratory results.    Radiology results:    No radiology results from the last 24 hrs  I have reviewed the patient's radiology reports.    Medication Review:     I have reviewed the patient's active and prn medications.     Problem List:      Acute kidney injury      Assessment:    Acute kidney injury on chronic kidney disease stage III, POA  Hypercalcemia, POA  Hypertensive urgency, POA  Sarcoidosis  Rheumatoid arthritis  Iron deficiency anemia  Anxiety and depression  Obesity    Plan:    - Nephrology and Pulmonology consulted. Appreciate recommendations.  - Sarcoidosis likely etiology of  Hypercalcemia. PTH low and elevated 1/25 Vitamin D  - continue fluids per nephrology and consider biphosphonate with improvement of renal function.  -Calcitonin injections per nephrology with improvement of calcium noted.  - Patient previously prescribed prednisone 40 mg for 2 weeks  then 20 mg for another 4 weeks. Initiated on 04/14/2024- Will continue for now.  -Norvasc added.    I have reviewed the copied text and it is accurate as of 4/25/2024      DVT Prophylaxis: Heparin  Code Status: Full code  Diet: Renal  Discharge Plan: Home in 2 to 3 days.    CLAUDIA Azul  04/25/24  11:05 EDT    Dictated utilizing Dragon dictation.      Electronically signed by Daniela Ji APRN at 04/25/24 1911       Pb Webb MD at 04/25/24 0357              [facility] Progress Note      Patient Name: Jamia ARSHAD" Mary Jane  : 1965  MRN: [id number]  Primary Care Physician:  Jaycee Deal APRN  Date of admission: 2024    Subjective     Interval History:   F/u ANA hypercalcemia    Review of Systems:   SBP 150s to 170s, attributes to stress and poor sleep  Denies dyspnea    Objective     Vitals:   Temp:  [97.5 °F (36.4 °C)-98.6 °F (37 °C)] 97.7 °F (36.5 °C)  Heart Rate:  [61-70] 61  Resp:  [14-16] 16  BP: (150-190)/() 150/85    Intake/Output Summary (Last 24 hours) at 2024 0721  Last data filed at 2024 0038  Gross per 24 hour   Intake 2120 ml   Output 200 ml   Net 1920 ml       Physical Exam:    General Appearance: anxious pleasant WF no distress  Neck: supple, no JVD  Lungs: CTA bilat no rales  Heart: RRR, normal S1 and S2  Abdomen: soft, nontender, nondistended  : no palpable bladder  Extremities: no edema, cyanosis or clubbing  Neuro: normal speech and mental status     Scheduled Meds:     calcitonin, 4 Units/kg, Intramuscular, Q12H  heparin (porcine), 5,000 Units, Subcutaneous, Q12H  iron polysaccharides, 150 mg, Oral, Daily  predniSONE, 40 mg, Oral, Daily With Breakfast   Followed by  [START ON 2024] predniSONE, 20 mg, Oral, Daily With Breakfast  sodium chloride, 10 mL, Intravenous, Q12H      IV Meds:   sodium chloride, 150 mL/hr, Last Rate: 150 mL/hr (24)        Results Reviewed:   I have personally reviewed the results from the time of this admission to 2024 07:21 EDT     Results from last 7 days   Lab Units 24  0536 24  1249   SODIUM mmol/L 140 136   POTASSIUM mmol/L 3.9 4.1   CHLORIDE mmol/L 106 98   CO2 mmol/L 18.8* 25.4   BUN mg/dL 69* 75*   CREATININE mg/dL 3.45* 3.82*   CALCIUM mg/dL 10.4 14.1*   BILIRUBIN mg/dL  --  0.4   ALK PHOS U/L  --  64   ALT (SGPT) U/L  --  15   AST (SGOT) U/L  --  13   GLUCOSE mg/dL 80 101*     Estimated Creatinine Clearance: 22.1 mL/min (A) (by C-G formula based on SCr of 3.45 mg/dL (H)).  Results from last 7 days   Lab Units  24  0536 24  1249   MAGNESIUM mg/dL  --  2.4   PHOSPHORUS mg/dL 5.3*  --          Results from last 7 days   Lab Units 24  0536 24  1249   WBC 10*3/mm3 17.42* 18.79*   HEMOGLOBIN g/dL 10.2* 11.6*   PLATELETS 10*3/mm3 214 266           Assessment / Plan     ASSESSMENT:  Non olig ANA - prerenal azotemia again from vol depletion due to severe hypercalcemia, Cr improved 3.8 to 2.4 with IVF; was down to 2.3 at discharge 11 days ago, her presumed BL  CKD stage 4 - suspect chronic tubulointerstitial dz & nephroscalcionis from sarcoidosis, but substantial progression noted vs  (when renal fcn normal) to now (Cr 2.3 on 3/22/24).  CT on 24: small non-obs stones.  Kidneys generous in size 12 & 13 cm on recent renal US  Hypercalcemia - recurrent.  Presumably due to sarcoidosis with Ca back up 11 to 14, with very high D1-25 level (~ 200) recently due to granulomatous dz), appropriately low iPTH, normal PTHrP, low-normal D25, and NEG SPEP/IF.  Been on prednisone taper since discharge.  Ca markedly better 10.4 today with IVF & calcitonin  Immunosuppression : states was on cellcept & MTX for the sarcoidosis (?) rather than RA in past   Elevated BP - No prior hx HTN.  SBP 150s to 170s, attributes to anxiety  Leukocytosis - WBC 18K, likely due to steroid       PLAN:  Dec NS IVF rate 125 cc/hr  Continue calcitonin series  Add low dose norvasc  PULM to see      Pb Webb MD  24  07:21 EDT    [facility]  [phone]    Electronically signed by Pb Webb MD at 24 8606          Consult Notes (last 24 hours)        Pb Webb MD at 24 3387        Consult Orders    1. Inpatient Nephrology Consult [[id number]] ordered by Daniela Ji APRN at 24 1433                   [facility] Consult Note      Patient Name: Jamia Hinton  : 1965  MRN: [id number]  Primary Care Physician:   Jaycee Deal APRN  Referring Physician: No ref. provider found  Date of admission: 2024    Subjective     Reason for Consult:  ANA CKD     HPI:   Jamia Hinton is a 58 y.o. female with presumed CKD stage 4, sarcoidosis, RA directed to ER by Jenny MCNEIL from our office for worsening renal fcn and hypercalcemia.  BUN/Cr 75/3.8 with Ca 14.1.  Cr was 2.3 a month ago on 3/22/24 (Ca = 11) but up to 3.7 upon admission 24 (with Ca 13.9).  She was hospitalized a week, and Cr improved to 2.3 and Ca to 11.2 at discharge.  Sent home on prednisone taper.  She denies n/v as she experienced before.  BP high 184/95 initially.  She's not on antihypertensive.  Takes ditropan for overactive bladder, PPI, oral iron, folate, celexa.  No tums use.      Sarcoidosis was diagnosed about 3 years ago by bronch/biopsy.  Used to take cellcept and MTX for this, both have been discontinued.  Work up in recent hospitalization showed markedly elevated D1-25 (205), appropriately low intact PTH (7) and normal PTHrP, low-normal D25 (29).  Cr was normal in .      Review of Systems:   14 point review of systems is otherwise negative except for mentioned above on HPI    Personal History     Past Medical History:   Diagnosis Date    Arthritis     Hernia, abdominal     Sarcoidosis        Past Surgical History:   Procedure Laterality Date    CATARACT EXTRACTION W/ INTRAOCULAR LENS IMPLANT Right 2023    Procedure: CATARACT PHACO EXTRACTION WITH INTRAOCULAR LENS IMPLANT RIGHT;  Surgeon: Christ Kerr MD;  Location: [facility] OR;  Service: Ophthalmology;  Laterality: Right;    CATARACT EXTRACTION W/ INTRAOCULAR LENS IMPLANT Left 2024    Procedure: CATARACT PHACO EXTRACTION WITH INTRAOCULAR LENS IMPLANT LEFT;  Surgeon: Christ Kerr MD;  Location: [facility] OR;  Service: Ophthalmology;  Laterality: Left;     SECTION      CHOLECYSTECTOMY      VENTRAL/INCISIONAL HERNIA REPAIR N/A 2017    Procedure:   INCISIONAL HERNIA REPAIR WITH MESH;  Surgeon: Bassam Rogers MD;  Location: [facility];  Service:        Family History: family history includes No Known Problems in her father and mother.    Social History:  reports that she has never smoked. She has never used smokeless tobacco. She reports that she does not drink alcohol and does not use drugs.    Home Medications:  Prior to Admission medications    Medication Sig Start Date End Date Taking? Authorizing Provider   iron polysaccharides (NIFEREX) 150 MG capsule Take 1 capsule by mouth Daily. 4/14/24  Yes Keo Sheffield DO   predniSONE (DELTASONE) 20 MG tablet Take 2 tablets by mouth Daily With Breakfast for 13 days, THEN 1 tablet Daily With Breakfast for 42 days. 4/14/24 6/7/24 Yes Keo Sheffield DO   citalopram (CeleXA) 20 MG tablet Take 1 tablet by mouth Daily.  Patient not taking: Reported on 4/22/2024    Latoya Boyd MD   folic acid (FOLVITE) 1 MG tablet Take 1 tablet by mouth Daily.  Patient not taking: Reported on 4/22/2024    Latoya Boyd MD   omeprazole (priLOSEC) 40 MG capsule Take 1 capsule by mouth Daily.  Patient not taking: Reported on 4/22/2024    Latoya Boyd MD   ondansetron ODT (ZOFRAN-ODT) 4 MG disintegrating tablet Place 1 tablet on the tongue Every 6 (Six) Hours As Needed for Nausea or Vomiting.  Patient not taking: Reported on 4/22/2024 3/22/24   Ralph Kelly MD   oxybutynin XL (DITROPAN-XL) 5 MG 24 hr tablet Take 1 tablet by mouth Daily.  Patient not taking: Reported on 4/22/2024    Latoya Boyd MD   vitamin B-12 (CYANOCOBALAMIN) 1000 MCG tablet Take 1 tablet by mouth Daily.  Patient not taking: Reported on 4/22/2024    Latoya Boyd MD   vitamin D3 125 MCG (5000 UT) capsule capsule Take 1 capsule by mouth Daily.  Patient not taking: Reported on 4/22/2024    Latoya Boyd MD       Allergies:  Allergies   Allergen Reactions    Citalopram Hydrobromide Unknown - High Severity     Couldn't  function       Objective     Vitals:   Temp:  [97.5 °F (36.4 °C)-98.6 °F (37 °C)] 98.6 °F (37 °C)  Heart Rate:  [62-70] 65  Resp:  [14-15] 14  BP: (152-190)/(85-98) 152/88    Intake/Output Summary (Last 24 hours) at 4/24/2024 1837  Last data filed at 4/24/2024 1800  Gross per 24 hour   Intake 1120 ml   Output 200 ml   Net 920 ml       Physical Exam:    General Appearance: pleasant WF comfortable alert  Skin: warm and dry  HEENT: oral mucosa normal, nonicteric sclera  Neck: supple, no JVD  Lungs: CTA bilat no rales  Heart: RRR, normal S1 and S2  Abdomen: soft, nontender, nondistended  : no palpable bladder  Extremities: no edema, cyanosis or clubbing  Neuro: normal speech and mental status     Scheduled Meds:     heparin (porcine), 5,000 Units, Subcutaneous, Q12H  [START ON 4/25/2024] iron polysaccharides, 150 mg, Oral, Daily  [START ON 4/25/2024] predniSONE, 40 mg, Oral, Daily With Breakfast   Followed by  [START ON 4/27/2024] predniSONE, 20 mg, Oral, Daily With Breakfast  sodium chloride, 10 mL, Intravenous, Q12H      IV Meds:   sodium chloride, 150 mL/hr, Last Rate: 150 mL/hr (04/24/24 1706)        Results Reviewed:   I have personally reviewed the results from the time of this admission to 4/24/2024 18:37 EDT     Lab Results   Component Value Date    GLUCOSE 101 (H) 04/24/2024    CALCIUM 14.1 (C) 04/24/2024     04/24/2024    K 4.1 04/24/2024    CO2 25.4 04/24/2024    CL 98 04/24/2024    BUN 75 (H) 04/24/2024    CREATININE 3.82 (H) 04/24/2024    EGFRIFNONA 75 12/13/2017    BCR 19.6 04/24/2024    ANIONGAP 12.6 04/24/2024      Lab Results   Component Value Date    MG 2.4 04/24/2024    PHOS 3.7 04/13/2024    ALBUMIN 4.1 04/24/2024     US Renal Bilateral    Result Date: 4/7/2024  PROCEDURE: US RENAL BILATERAL-  HISTORY: buddy, hypercalcemia; N17.9-Acute kidney failure, unspecified; E86.0-Dehydration  FINDINGS: Kidneys show a normal echo pattern. .  Right kidney measures 12.22 cm in length. Left kidney measures  13.2 cm in length.  Size is normal.  No mass or cyst is seen. No obstructive changes are present. A questionable small nonobstructing 3 mm stone is seen of the lower pole right kidney.      Impression: Unremarkable renal ultrasound.   This report was signed and finalized on 4/7/2024 7:49 PM by Frederick Jimenez MD.        Assessment / Plan     ASSESSMENT:  Non olig ANA - likely prerenal azotemia again from vol depletion due to severe hypercalcemia, Cr up to 3.8, was back to 2.3 at discharge 10 days ago, her presumed BL  CKD stage 4 - suspect chronic tubulointerstitial dz & nephroscalcionis from sarcoidosis, but substantial progression noted vs 2020 (when renal fcn normal) to now (Cr 2.3 on 3/22/24).  CT on 4/7/24: small non-obs stones.  Kidneys generous in size 12 & 13 cm on recent renal US  Hypercalcemia - recurrent.  Presumably due to sarcoidosis with Ca back up 11 to 14, with very high D1-25 level (~ 200) recently due to granulomatous dz), appropriately low iPTH, normal PTHrP, low-normal D25, and NEG SPEP/IF.  Been on prednisone taper since discharge  Immunosuppression : states was on cellcept & MTX for the sarcoidosis (?) rather than RA in past   Elevated BP - improved some, but still generous, watch for now, may need to start antihypertensive therapy   Leukocytosis - WBC 18K, likely due to steroid    PLAN:  Agree with NS  cc/hr  Calcitonin series x 4 doses  No bisphosphonate yet due to ANA  Pulm involvement again   Continuation of steroid taper noted     Thank you for involving us in the care of Jamia JEANCARLOS Hinton.  Please feel free to call with any questions.    Pb Webb MD  04/24/24  18:37 EDT    [facility]  [phone]    Electronically signed by Pb Webb MD at 04/24/24 6718

## 2024-04-26 LAB
ALBUMIN SERPL-MCNC: 3.6 G/DL (ref 3.5–5.2)
ANION GAP SERPL CALCULATED.3IONS-SCNC: 11.8 MMOL/L (ref 5–15)
BASOPHILS # BLD AUTO: 0.02 10*3/MM3 (ref 0–0.2)
BASOPHILS NFR BLD AUTO: 0.1 % (ref 0–1.5)
BUN SERPL-MCNC: 53 MG/DL (ref 8–23)
BUN/CREAT SERPL: 18.2 (ref 7–25)
CALCIUM SPEC-SCNC: 9.7 MG/DL (ref 8.6–10.5)
CHLORIDE SERPL-SCNC: 108 MMOL/L (ref 98–107)
CO2 SERPL-SCNC: 19.2 MMOL/L (ref 22–29)
CREAT SERPL-MCNC: 2.92 MG/DL (ref 0.57–1)
DEPRECATED RDW RBC AUTO: 49.5 FL (ref 37–54)
EGFRCR SERPLBLD CKD-EPI 2021: 17.9 ML/MIN/1.73
EOSINOPHIL # BLD AUTO: 0.58 10*3/MM3 (ref 0–0.4)
EOSINOPHIL NFR BLD AUTO: 3.4 % (ref 0.3–6.2)
ERYTHROCYTE [DISTWIDTH] IN BLOOD BY AUTOMATED COUNT: 15 % (ref 12.3–15.4)
GLUCOSE SERPL-MCNC: 82 MG/DL (ref 65–99)
HCT VFR BLD AUTO: 33.6 % (ref 34–46.6)
HGB BLD-MCNC: 10.5 G/DL (ref 12–15.9)
IMM GRANULOCYTES # BLD AUTO: 0.11 10*3/MM3 (ref 0–0.05)
IMM GRANULOCYTES NFR BLD AUTO: 0.6 % (ref 0–0.5)
LYMPHOCYTES # BLD AUTO: 1.05 10*3/MM3 (ref 0.7–3.1)
LYMPHOCYTES NFR BLD AUTO: 6.2 % (ref 19.6–45.3)
MCH RBC QN AUTO: 28.2 PG (ref 26.6–33)
MCHC RBC AUTO-ENTMCNC: 31.3 G/DL (ref 31.5–35.7)
MCV RBC AUTO: 90.1 FL (ref 79–97)
MONOCYTES # BLD AUTO: 1.39 10*3/MM3 (ref 0.1–0.9)
MONOCYTES NFR BLD AUTO: 8.2 % (ref 5–12)
NEUTROPHILS NFR BLD AUTO: 13.78 10*3/MM3 (ref 1.7–7)
NEUTROPHILS NFR BLD AUTO: 81.5 % (ref 42.7–76)
NRBC BLD AUTO-RTO: 0 /100 WBC (ref 0–0.2)
PHOSPHATE SERPL-MCNC: 3.6 MG/DL (ref 2.5–4.5)
PLATELET # BLD AUTO: 233 10*3/MM3 (ref 140–450)
PMV BLD AUTO: 11.4 FL (ref 6–12)
POTASSIUM SERPL-SCNC: 4.1 MMOL/L (ref 3.5–5.2)
RBC # BLD AUTO: 3.73 10*6/MM3 (ref 3.77–5.28)
SODIUM SERPL-SCNC: 139 MMOL/L (ref 136–145)
WBC NRBC COR # BLD AUTO: 16.93 10*3/MM3 (ref 3.4–10.8)

## 2024-04-26 PROCEDURE — 25010000002 HEPARIN (PORCINE) PER 1000 UNITS: Performed by: NURSE PRACTITIONER

## 2024-04-26 PROCEDURE — 25010000002 LABETALOL 5 MG/ML SOLUTION: Performed by: NURSE PRACTITIONER

## 2024-04-26 PROCEDURE — 25810000003 SODIUM CHLORIDE 0.9 % SOLUTION: Performed by: INTERNAL MEDICINE

## 2024-04-26 PROCEDURE — 80069 RENAL FUNCTION PANEL: CPT | Performed by: INTERNAL MEDICINE

## 2024-04-26 PROCEDURE — 85025 COMPLETE CBC W/AUTO DIFF WBC: CPT | Performed by: INTERNAL MEDICINE

## 2024-04-26 PROCEDURE — 99232 SBSQ HOSP IP/OBS MODERATE 35: CPT | Performed by: NURSE PRACTITIONER

## 2024-04-26 PROCEDURE — 63710000001 CALCITONIN PER 400 UNITS: Performed by: INTERNAL MEDICINE

## 2024-04-26 PROCEDURE — 63710000001 PREDNISONE PER 1 MG: Performed by: INTERNAL MEDICINE

## 2024-04-26 RX ORDER — HYDROXYZINE HYDROCHLORIDE 25 MG/1
25 TABLET, FILM COATED ORAL 3 TIMES DAILY PRN
Status: DISCONTINUED | OUTPATIENT
Start: 2024-04-26 | End: 2024-04-28 | Stop reason: HOSPADM

## 2024-04-26 RX ADMIN — PREDNISONE 50 MG: 20 TABLET ORAL at 09:06

## 2024-04-26 RX ADMIN — HYDROXYZINE HYDROCHLORIDE 25 MG: 25 TABLET, FILM COATED ORAL at 20:01

## 2024-04-26 RX ADMIN — HEPARIN SODIUM 5000 UNITS: 5000 INJECTION INTRAVENOUS; SUBCUTANEOUS at 09:05

## 2024-04-26 RX ADMIN — AMLODIPINE BESYLATE 5 MG: 5 TABLET ORAL at 09:06

## 2024-04-26 RX ADMIN — LABETALOL HYDROCHLORIDE 10 MG: 5 INJECTION, SOLUTION INTRAVENOUS at 18:34

## 2024-04-26 RX ADMIN — SODIUM CHLORIDE 125 ML/HR: 9 INJECTION, SOLUTION INTRAVENOUS at 00:30

## 2024-04-26 RX ADMIN — CALCITONIN SALMON 452 UNITS: 200 INJECTION, SOLUTION INTRAMUSCULAR; SUBCUTANEOUS at 11:09

## 2024-04-26 RX ADMIN — SODIUM CHLORIDE 125 ML/HR: 9 INJECTION, SOLUTION INTRAVENOUS at 08:43

## 2024-04-26 RX ADMIN — Medication 150 MG: at 11:18

## 2024-04-26 RX ADMIN — Medication 10 ML: at 20:02

## 2024-04-26 RX ADMIN — HEPARIN SODIUM 5000 UNITS: 5000 INJECTION INTRAVENOUS; SUBCUTANEOUS at 20:01

## 2024-04-26 NOTE — PROGRESS NOTES
[facility] Progress Note      Patient Name: Jamia Hinton  : 1965  MRN: [id number]  Primary Care Physician:  Jaycee Deal APRN  Date of admission: 2024    Subjective     Interval History:   F/u ANA CKD4 hypercalcemia     Review of Systems:   C/o abd and facial bloating but no dyspnea     Objective     Vitals:   Temp:  [97.6 °F (36.4 °C)-97.9 °F (36.6 °C)] 97.9 °F (36.6 °C)  Heart Rate:  [61-64] 61  Resp:  [18] 18  BP: (124-158)/(64-81) 124/69    Intake/Output Summary (Last 24 hours) at 2024 1327  Last data filed at 2024 1200  Gross per 24 hour   Intake 1160 ml   Output --   Net 1160 ml       Physical Exam:    General Appearance: alert, comfortable WF on RA  Neck: supple, no JVD  Lungs: CTA bilat no rales  Heart: RRR, normal S1 and S2  Abdomen: soft, nontender, nondistended  : no palpable bladder  Extremities: no edema, cyanosis or clubbing  Neuro: normal speech and mental status     Scheduled Meds:     amLODIPine, 5 mg, Oral, Q24H  calcitonin, 4 Units/kg, Intramuscular, Q12H  heparin (porcine), 5,000 Units, Subcutaneous, Q12H  iron polysaccharides, 150 mg, Oral, Daily  predniSONE, 50 mg, Oral, Daily With Breakfast  sodium chloride, 10 mL, Intravenous, Q12H      IV Meds:   sodium chloride, 125 mL/hr, Last Rate: 125 mL/hr (24 0843)        Results Reviewed:   I have personally reviewed the results from the time of this admission to 2024 13:27 EDT     Results from last 7 days   Lab Units 24  0626 24  0536 24  1249   SODIUM mmol/L 139 140 136   POTASSIUM mmol/L 4.1 3.9 4.1   CHLORIDE mmol/L 108* 106 98   CO2 mmol/L 19.2* 18.8* 25.4   BUN mg/dL 53* 69* 75*   CREATININE mg/dL 2.92* 3.45* 3.82*   CALCIUM mg/dL 9.7 10.4 14.1*   BILIRUBIN mg/dL  --   --  0.4   ALK PHOS U/L  --   --  64   ALT (SGPT) U/L  --   --  15   AST (SGOT) U/L  --   --  13   GLUCOSE mg/dL 82 80 101*     Estimated Creatinine Clearance: 26 mL/min (A) (by C-G formula  based on SCr of 2.92 mg/dL (H)).  Results from last 7 days   Lab Units 04/26/24  0626 04/25/24  0536 04/24/24  1249   MAGNESIUM mg/dL  --   --  2.4   PHOSPHORUS mg/dL 3.6 5.3*  --          Results from last 7 days   Lab Units 04/26/24  0626 04/25/24  0536 04/24/24  1249   WBC 10*3/mm3 16.93* 17.42* 18.79*   HEMOGLOBIN g/dL 10.5* 10.2* 11.6*   PLATELETS 10*3/mm3 233 214 266           Assessment / Plan     ASSESSMENT:  Non olig ANA - prerenal azotemia again from vol depletion due to severe hypercalcemia, Cr improving with IVF, 2.9 today (peak 3.8); Cr was down to 2.3 at discharge 2 weeks ago, her presumed BL.  DC IVF, starting to have some abd / facial bloating  CKD stage 4 - suspect chronic tubulointerstitial dz & nephroscalcionis from sarcoidosis, but substantial progression noted vs 2020 (when renal fcn normal) to now (Cr 2.3 on 3/22/24).  CT on 4/7/24: small non-obs stones.  Kidneys generous in size 12 & 13 cm on recent renal US  Hypercalcemia - recurrent.  Presumably due to sarcoidosis with Ca back up 11 to 14, with very high D1-25 level (~ 200) recently due to granulomatous dz), appropriately low iPTH, normal PTHrP, low-normal D25, and NEG SPEP/IF.  Been on prednisone taper since discharge.  Ca markedly better 10.4 yesterday with IVF & calcitonin, 9.7 today, faster improvement than expected.  Did not give bisphosphonate  Immunosuppression : states was on cellcept & MTX for the sarcoidosis (?) rather than RA in past   HTN - new onset, added norvasc 5mg, good response thus far  Leukocytosis - WBC 17K, likely due to steroid    PLAN:  DC IVF  Completed calcitonin series   D/W PULM yesterday, appreciate input, inc'd prednisone 50 mg  No objection to discharge tomorrow from nephrology standpoint but will need close follow up ie labs / visit 1 week       Pb Webb MD  04/26/24  13:27 EDT    [facility]  [phone]

## 2024-04-26 NOTE — PLAN OF CARE
Goal Outcome Evaluation:                 Problem: Adult Inpatient Plan of Care  Goal: Plan of Care Review  Outcome: Ongoing, Progressing  Goal: Patient-Specific Goal (Individualized)  Outcome: Ongoing, Progressing  Goal: Absence of Hospital-Acquired Illness or Injury  Outcome: Ongoing, Progressing  Intervention: Identify and Manage Fall Risk  Description: Perform standard risk assessment on admission using a validated tool or comprehensive approach appropriate to the patient; reassess fall risk frequently, with change in status or transfer to another level of care.  Communicate fall injury risk to interprofessional healthcare team.  Determine need for increased observation, equipment and environmental modification, such as low bed, signage and supportive, nonskid footwear.  Adjust safety measures to individual developmental age, stage and identified risk factors.  Reinforce the importance of safety and physical activity with patient and family.  Perform regular intentional rounding to assess need for position change, pain assessment and personal needs, including assistance with toileting.  Recent Flowsheet Documentation  Taken 4/26/2024 1800 by Viktoria Molina RN  Safety Promotion/Fall Prevention:   activity supervised   assistive device/personal items within reach   clutter free environment maintained   nonskid shoes/slippers when out of bed   room organization consistent   safety round/check completed  Taken 4/26/2024 1600 by Viktoria Molina RN  Safety Promotion/Fall Prevention:   activity supervised   assistive device/personal items within reach   clutter free environment maintained   nonskid shoes/slippers when out of bed   room organization consistent   safety round/check completed  Intervention: Prevent Skin Injury  Description: Perform a screening for skin injury risk, such as pressure or moisture associated skin damage on admission and at regular intervals throughout hospital stay.  Keep all areas  of skin (especially folds) clean and dry.  Maintain adequate skin hydration.  Relieve and redistribute pressure and protect bony prominences; implement measures based on patient-specific risk factors.  Match turning and repositioning schedule to clinical condition.  Encourage weight shift frequently; assist with reposition if unable to complete independently.  Float heels off bed; avoid pressure on the Achilles tendon.  Keep skin free from extended contact with medical devices.  Encourage functional activity and mobility, as early as tolerated.  Use aids (e.g., slide boards, mechanical lift) during transfer.  Recent Flowsheet Documentation  Taken 4/26/2024 1800 by Viktoria Molina, RN  Body Position: sitting up in bed  Taken 4/26/2024 1600 by Viktoria Molina, RN  Body Position: sitting up in bed  Intervention: Prevent and Manage VTE (Venous Thromboembolism) Risk  Description: Assess for VTE (venous thromboembolism) risk.  Encourage and assist with early ambulation.  Initiate and maintain compression or other therapy, as indicated, based on identified risk in accordance with organizational protocol and provider order.  Encourage both active and passive leg exercises while in bed, if unable to ambulate.  Recent Flowsheet Documentation  Taken 4/26/2024 1800 by Viktoria Molina, RN  Activity Management: up ad magalie  Taken 4/26/2024 1600 by Viktoria Molina RN  Activity Management: up ad magalie  Goal: Optimal Comfort and Wellbeing  Outcome: Ongoing, Progressing  Goal: Readiness for Transition of Care  Outcome: Ongoing, Progressing     Problem: Electrolyte Imbalance  Goal: Electrolyte Balance  Outcome: Ongoing, Progressing     Problem: Fluid and Electrolyte Imbalance (Acute Kidney Injury/Impairment)  Goal: Fluid and Electrolyte Balance  Outcome: Ongoing, Progressing     Problem: Oral Intake Inadequate (Acute Kidney Injury/Impairment)  Goal: Optimal Nutrition Intake  Outcome: Ongoing, Progressing     Problem:  Renal Function Impairment (Acute Kidney Injury/Impairment)  Goal: Effective Renal Function  Outcome: Ongoing, Progressing  Intervention: Monitor and Support Renal Function  Description: Identify and address cause (e.g., perfusion, hypovolemia, infection, toxic substance, inflammation).  Classify risk, considering exposures and susceptibilities; implement preventative strategies.  Evaluate current medications [e.g., NSAID (nonsteroidal anti-inflammatory drug), aminoglycoside, iodinated contrast agent, ACE (angiotensin-converting enzyme) inhibitor] for renal excretion or toxicity; adjust as needed to preserve renal function.  Consider pharmacologic therapy (e.g., sodium bicarbonate, allopurinol) to correct acid-base imbalance and decrease uric acid.  Monitor and maintain blood pressure within desired range with fluid management and pharmacologic therapy (e.g., diuretic, antihypertensive, vasopressor).  Anticipate plasmapheresis and immunosuppressive pharmacologic therapy for vasculitis cause of renal injury.  Use skin care emollient to decrease pruritus; discourage scratching.  Anticipate renal replacement therapy (e.g., continuous, intermittent) with need for removal of waste products and fluid.  Recent Flowsheet Documentation  Taken 4/26/2024 1800 by Viktoria Molina RN  Medication Review/Management: medications reviewed  Taken 4/26/2024 1600 by Viktoria Molina RN  Medication Review/Management: medications reviewed     Problem: Fall Injury Risk  Goal: Absence of Fall and Fall-Related Injury  Outcome: Ongoing, Progressing  Intervention: Identify and Manage Contributors  Description: Develop a fall prevention plan with the patient and caregiver/family.  Provide reorientation, appropriate sensory stimulation and routines with changes in mental status to decrease risk of fall.  Promote use of personal vision and auditory aids.  Assess assistance level required for safe and effective self-care; provide support  as needed, such as toileting, mobilization. For age 65 and older, implement timed toileting with assistance.  Encourage physical activity, such as performance of mobility and self-care at highest level of patient ability, multicomponent exercise program and provision of appropriate assistive devices.  If fall occurs, assess the severity of injury; implement fall injury protocol. Determine the cause and revise fall injury prevention plan.  Regularly review medication contribution to fall risk; adjust medication administration times to minimize risk of falling.  Consider risk related to polypharmacy and age.  Balance adequate pain management with potential for oversedation.  Recent Flowsheet Documentation  Taken 4/26/2024 1800 by Viktoria Molina, RN  Medication Review/Management: medications reviewed  Taken 4/26/2024 1600 by Viktoria Molina RN  Medication Review/Management: medications reviewed  Intervention: Promote Injury-Free Environment  Description: Provide a safe, barrier-free environment that encourages independent activity.  Keep care area uncluttered and well-lighted.  Determine need for increased observation or monitoring.  Avoid use of devices that minimize mobility, such as restraints or indwelling urinary catheter.  Recent Flowsheet Documentation  Taken 4/26/2024 1800 by Viktoria Molina, RN  Safety Promotion/Fall Prevention:   activity supervised   assistive device/personal items within reach   clutter free environment maintained   nonskid shoes/slippers when out of bed   room organization consistent   safety round/check completed  Taken 4/26/2024 1600 by Viktoria Molina, RN  Safety Promotion/Fall Prevention:   activity supervised   assistive device/personal items within reach   clutter free environment maintained   nonskid shoes/slippers when out of bed   room organization consistent   safety round/check completed

## 2024-04-26 NOTE — PLAN OF CARE
Goal Outcome Evaluation:  Plan of Care Reviewed With: patient        Progress: improving  Outcome Evaluation: VSS on RA, no acute events noted overnight, patient ambulating to BR independent overnight. IVF infusing, plan of care continued. Discharge pending.

## 2024-04-26 NOTE — CASE MANAGEMENT/SOCIAL WORK
DCP; Return home with family when medically stable. Met with pt in the room, no DME needs anticipated at this time. CM continues to follow.

## 2024-04-26 NOTE — PROGRESS NOTES
[facility] HOSPITALIST    PROGRESS NOTE    Name:  Jamia Hinton   Age:  58 y.o.  Sex:  female  :  1965  MRN:  [id number]   Visit Number:  [id number]  Admission Date:  2024  Date Of Service:  24  Primary Care Physician:  Jaycee Deal APRN     LOS: 2 days :    Chief Complaint:      Worsening renal function    Subjective:    Patient seen and examined this morning.  States she does not feel well.  Would like to get a shower today and ambulate more.  Updated labs are improving and encouraging.    Hospital Course:    Ms. Hinton is a pleasant 58-year-old female who presented to the emergency department after following up with her labs with nephrology clinic which noted worsening renal function.  Pertinent past medical history of sarcoidosis, rheumatoid arthritis, and chronic kidney disease.  Patient with recent admission secondary to hypercalcemia and acute kidney injury for which she was given IV fluids/Lasix/prednisone taper.  Patient does follow with rheumatology in [place] Dr. Capone with recent discontinuation of CellCept and Methotrexate.  Patient otherwise denies any current symptoms.  Stated has been feeling well.  Had noted some mild lower extremity cramps.     Upon ED presentation patient hypertensive with blood pressure 184/95, otherwise hemodynamically stable.  Pertinent labs and imaging included urinalysis without pattern of infection, creatinine 3.8 with prior 2.2, calcium 14.1, WBC 18.7, hemoglobin 11.6.  Patient was given 1 L bolus with hospitalist consulted for further medical management.  Pulmonology and nephrology also consulted.  Patient continued on IV fluids as well as Norvasc initiated for continued uncontrolled hypertension.  She was given calcitonin injection with significant improvement of calcium levels.  Creatinine levels also improving.  Patient prednisone increased to 50 mg daily x 2 to 3 weeks.  Per pulmonology if recurrent hypercalcemia noted consider  alternative such as hydroxychloroquine or possibly infliximab.    Review of Systems:     All systems were reviewed and negative except as mentioned in subjective, assessment and plan.    Vital Signs:    Temp:  [97.6 °F (36.4 °C)-97.9 °F (36.6 °C)] 97.9 °F (36.6 °C)  Heart Rate:  [61-64] 61  Resp:  [18] 18  BP: (124-158)/(64-81) 124/69    Intake and output:    I/O last 3 completed shifts:  In: 3160 [P.O.:1160; I.V.:2000]  Out: -   I/O this shift:  In: 240 [P.O.:240]  Out: -     Physical Examination:    General Appearance:  Alert and cooperative.  Anxious middle-aged female.  Very pleasant.  No acute distress noted.   Head:  Atraumatic and normocephalic.   Eyes: Conjunctivae and sclerae normal, no icterus. No pallor.   Throat: No oral lesions, no thrush, oral mucosa moist.   Neck: Supple, trachea midline, no thyromegaly.   Lungs:   Breath sounds heard bilaterally equally.  No wheezing or crackles. No Pleural rub or bronchial breathing.  On room air unlabored.   Heart:  Normal S1 and S2, no murmur, no gallop, no rub. No JVD.   Abdomen:   Normal bowel sounds, no masses, no organomegaly. Soft, nontender, nondistended, no rebound tenderness.   Extremities: Supple, trace bilateral lower extremity edema, no cyanosis, no clubbing.   Skin: No bleeding or rash.   Neurologic: Alert and oriented x 3. No facial asymmetry. Moves all four limbs. No tremors.      Laboratory results:    Results from last 7 days   Lab Units 04/26/24  0626 04/25/24  0536 04/24/24  1249   SODIUM mmol/L 139 140 136   POTASSIUM mmol/L 4.1 3.9 4.1   CHLORIDE mmol/L 108* 106 98   CO2 mmol/L 19.2* 18.8* 25.4   BUN mg/dL 53* 69* 75*   CREATININE mg/dL 2.92* 3.45* 3.82*   CALCIUM mg/dL 9.7 10.4 14.1*   BILIRUBIN mg/dL  --   --  0.4   ALK PHOS U/L  --   --  64   ALT (SGPT) U/L  --   --  15   AST (SGOT) U/L  --   --  13   GLUCOSE mg/dL 82 80 101*     Results from last 7 days   Lab Units 04/26/24  0626 04/25/24  0536 04/24/24  1249   WBC 10*3/mm3 16.93* 17.42*  "18.79*   HEMOGLOBIN g/dL 10.5* 10.2* 11.6*   HEMATOCRIT % 33.6* 32.7* 35.9   PLATELETS 10*3/mm3 233 214 266                 No results for input(s): \"PHART\", \"KJC8LUP\", \"PO2ART\", \"YJF1YFN\", \"BASEEXCESS\" in the last 8760 hours.   I have reviewed the patient's laboratory results.    Radiology results:    No radiology results from the last 24 hrs  I have reviewed the patient's radiology reports.    Medication Review:     I have reviewed the patient's active and prn medications.     Problem List:      Acute kidney injury      Assessment:    Acute kidney injury on chronic kidney disease stage IV, POA  Hypercalcemia, POA  Hypertensive urgency, POA  Sarcoidosis  Rheumatoid arthritis  Iron deficiency anemia  Anxiety and depression  Obesity    Plan:    - Nephrology and Pulmonology consulted. Appreciate recommendations.  - Sarcoidosis likely etiology of  Hypercalcemia. PTH low and elevated 1/25 Vitamin D  - continue fluids per nephrology with creatinine improving.  -Calcitonin injections per nephrology with improvement of calcium noted.  - Patient previously prescribed prednisone 40 mg for 2 weeks  then 20 mg for another 4 weeks. Initiated on 04/14/2024-increased to 50 mg daily 4/25/2024.  Will need to stay on this dose for 2 to 3 weeks until follow-up with pulmonology.  -Norvasc added.  -Echo cytosis likely secondary to steroid use.  Otherwise no signs of infection noted.    I have reviewed the copied text and it is accurate as of 4/26/2024      DVT Prophylaxis: Heparin  Code Status: Full code  Diet: Renal  Discharge Plan: Home in 1 to 2 days.    Daniela Ji, APRN  04/26/24  12:06 EDT    Dictated utilizing Dragon dictation.    "

## 2024-04-27 LAB
ALBUMIN SERPL-MCNC: 3.3 G/DL (ref 3.5–5.2)
ANION GAP SERPL CALCULATED.3IONS-SCNC: 14.3 MMOL/L (ref 5–15)
BASOPHILS # BLD AUTO: 0.03 10*3/MM3 (ref 0–0.2)
BASOPHILS NFR BLD AUTO: 0.2 % (ref 0–1.5)
BUN SERPL-MCNC: 45 MG/DL (ref 8–23)
BUN/CREAT SERPL: 17.4 (ref 7–25)
CALCIUM SPEC-SCNC: 9.8 MG/DL (ref 8.6–10.5)
CHLORIDE SERPL-SCNC: 108 MMOL/L (ref 98–107)
CO2 SERPL-SCNC: 17.7 MMOL/L (ref 22–29)
CREAT SERPL-MCNC: 2.59 MG/DL (ref 0.57–1)
DEPRECATED RDW RBC AUTO: 49.7 FL (ref 37–54)
EGFRCR SERPLBLD CKD-EPI 2021: 20.6 ML/MIN/1.73
EOSINOPHIL # BLD AUTO: 0.42 10*3/MM3 (ref 0–0.4)
EOSINOPHIL NFR BLD AUTO: 2.9 % (ref 0.3–6.2)
ERYTHROCYTE [DISTWIDTH] IN BLOOD BY AUTOMATED COUNT: 15.2 % (ref 12.3–15.4)
GLUCOSE SERPL-MCNC: 96 MG/DL (ref 65–99)
HCT VFR BLD AUTO: 31.1 % (ref 34–46.6)
HGB BLD-MCNC: 9.6 G/DL (ref 12–15.9)
IMM GRANULOCYTES # BLD AUTO: 0.09 10*3/MM3 (ref 0–0.05)
IMM GRANULOCYTES NFR BLD AUTO: 0.6 % (ref 0–0.5)
LYMPHOCYTES # BLD AUTO: 1 10*3/MM3 (ref 0.7–3.1)
LYMPHOCYTES NFR BLD AUTO: 6.9 % (ref 19.6–45.3)
MCH RBC QN AUTO: 27.7 PG (ref 26.6–33)
MCHC RBC AUTO-ENTMCNC: 30.9 G/DL (ref 31.5–35.7)
MCV RBC AUTO: 89.9 FL (ref 79–97)
MONOCYTES # BLD AUTO: 1.26 10*3/MM3 (ref 0.1–0.9)
MONOCYTES NFR BLD AUTO: 8.6 % (ref 5–12)
NEUTROPHILS NFR BLD AUTO: 11.79 10*3/MM3 (ref 1.7–7)
NEUTROPHILS NFR BLD AUTO: 80.8 % (ref 42.7–76)
NRBC BLD AUTO-RTO: 0 /100 WBC (ref 0–0.2)
PHOSPHATE SERPL-MCNC: 4 MG/DL (ref 2.5–4.5)
PLATELET # BLD AUTO: 202 10*3/MM3 (ref 140–450)
PMV BLD AUTO: 11.1 FL (ref 6–12)
POTASSIUM SERPL-SCNC: 3.8 MMOL/L (ref 3.5–5.2)
RBC # BLD AUTO: 3.46 10*6/MM3 (ref 3.77–5.28)
SODIUM SERPL-SCNC: 140 MMOL/L (ref 136–145)
WBC NRBC COR # BLD AUTO: 14.59 10*3/MM3 (ref 3.4–10.8)

## 2024-04-27 PROCEDURE — 85025 COMPLETE CBC W/AUTO DIFF WBC: CPT | Performed by: INTERNAL MEDICINE

## 2024-04-27 PROCEDURE — 25010000002 HEPARIN (PORCINE) PER 1000 UNITS: Performed by: NURSE PRACTITIONER

## 2024-04-27 PROCEDURE — 63710000001 PREDNISONE PER 5 MG: Performed by: INTERNAL MEDICINE

## 2024-04-27 PROCEDURE — 80069 RENAL FUNCTION PANEL: CPT | Performed by: INTERNAL MEDICINE

## 2024-04-27 PROCEDURE — 63710000001 PREDNISONE PER 1 MG: Performed by: INTERNAL MEDICINE

## 2024-04-27 PROCEDURE — 99232 SBSQ HOSP IP/OBS MODERATE 35: CPT | Performed by: FAMILY MEDICINE

## 2024-04-27 RX ADMIN — HEPARIN SODIUM 5000 UNITS: 5000 INJECTION INTRAVENOUS; SUBCUTANEOUS at 08:28

## 2024-04-27 RX ADMIN — HEPARIN SODIUM 5000 UNITS: 5000 INJECTION INTRAVENOUS; SUBCUTANEOUS at 20:17

## 2024-04-27 RX ADMIN — Medication 10 ML: at 20:19

## 2024-04-27 RX ADMIN — Medication 10 ML: at 08:28

## 2024-04-27 RX ADMIN — PREDNISONE 50 MG: 20 TABLET ORAL at 08:28

## 2024-04-27 RX ADMIN — Medication 150 MG: at 08:28

## 2024-04-27 RX ADMIN — AMLODIPINE BESYLATE 5 MG: 5 TABLET ORAL at 08:32

## 2024-04-27 NOTE — PLAN OF CARE
Goal Outcome Evaluation:  Plan of Care Reviewed With: patient        Progress: no change  Outcome Evaluation: No acute events overnight. Patient has rested some. States that she feels better. VSS. Plan of care ongoing.

## 2024-04-27 NOTE — PROGRESS NOTES
[facility] HOSPITALIST    PROGRESS NOTE    Name:  Jaima Hinton   Age:  58 y.o.  Sex:  female  :  1965  MRN:  [id number]   Visit Number:  [id number]  Admission Date:  2024  Date Of Service:  24  Primary Care Physician:  Jaycee Deal APRN     LOS: 3 days :    Chief Complaint:      Worsening renal function    Subjective:    Patient seen evaluated.  No acute issues.  Eating, drinking, ambulating, urinating, defecating all without issue.    Hospital Course:    Ms. Hinton is a pleasant 58-year-old female who presented to the emergency department after following up with her labs with nephrology clinic which noted worsening renal function, admitted on 2024..  Pertinent past medical history of sarcoidosis, rheumatoid arthritis, and chronic kidney disease.  Patient with recent admission secondary to hypercalcemia and acute kidney injury for which she was given IV fluids/Lasix/prednisone taper.  Patient does follow with rheumatology in [place] Dr. Capone with recent discontinuation of CellCept and Methotrexate.  Patient otherwise denies any current symptoms.  Stated has been feeling well.  Had noted some mild lower extremity cramps.     Upon ED presentation patient hypertensive with blood pressure 184/95, otherwise hemodynamically stable.  Pertinent labs and imaging included urinalysis without pattern of infection, creatinine 3.8 with prior 2.2, calcium 14.1, WBC 18.7, hemoglobin 11.6.  Patient was given 1 L bolus with hospitalist consulted for further medical management.  Pulmonology and nephrology also consulted.  Patient continued on IV fluids as well as Norvasc initiated for continued uncontrolled hypertension.  She was given calcitonin injection with significant improvement of calcium levels.  Creatinine levels also improving.  Patient prednisone increased to 50 mg daily x 2 to 3 weeks.  Per pulmonology if recurrent hypercalcemia noted consider alternative such as  hydroxychloroquine or possibly infliximab.    Nephrology currently following, creatinine and GFR improving.  Likely discharge tomorrow or day, once creatinine returns to baseline creatinine of 2.3.    Review of Systems:     All systems were reviewed and negative except as mentioned in subjective, assessment and plan.    Vital Signs:    Temp:  [97.4 °F (36.3 °C)-97.7 °F (36.5 °C)] 97.5 °F (36.4 °C)  Heart Rate:  [53-78] 53  Resp:  [18] 18  BP: (149-190)/(79-99) 149/82    Intake and output:    I/O last 3 completed shifts:  In: 1160 [P.O.:1160]  Out: 1450 [Urine:1450]  No intake/output data recorded.    Physical Examination:    General Appearance:  Alert and cooperative   Head:  Atraumatic and normocephalic.   Eyes: Conjunctivae and sclerae normal, no icterus. No pallor.   Throat: No oral lesions, no thrush, oral mucosa moist.   Neck: Supple, trachea midline, no thyromegaly.   Lungs:   Breath sounds heard bilaterally equally.  No wheezing or crackles. No Pleural rub or bronchial breathing.   Heart:  Normal S1 and S2, no murmur, no gallop, no rub. No JVD.   Abdomen:   Normal bowel sounds, no masses, no organomegaly. Soft, nontender, nondistended, no rebound tenderness.   Extremities: Supple, no edema, no cyanosis, no clubbing.   Skin: No bleeding or rash.   Neurologic: Alert and oriented x 3. No facial asymmetry. Moves all four limbs. No tremors.      Laboratory results:    Results from last 7 days   Lab Units 04/27/24  0701 04/26/24  0626 04/25/24  0536 04/24/24  1249   SODIUM mmol/L 140 139 140 136   POTASSIUM mmol/L 3.8 4.1 3.9 4.1   CHLORIDE mmol/L 108* 108* 106 98   CO2 mmol/L 17.7* 19.2* 18.8* 25.4   BUN mg/dL 45* 53* 69* 75*   CREATININE mg/dL 2.59* 2.92* 3.45* 3.82*   CALCIUM mg/dL 9.8 9.7 10.4 14.1*   BILIRUBIN mg/dL  --   --   --  0.4   ALK PHOS U/L  --   --   --  64   ALT (SGPT) U/L  --   --   --  15   AST (SGOT) U/L  --   --   --  13   GLUCOSE mg/dL 96 82 80 101*     Results from last 7 days   Lab Units  "04/27/24  0701 04/26/24  0626 04/25/24  0536   WBC 10*3/mm3 14.59* 16.93* 17.42*   HEMOGLOBIN g/dL 9.6* 10.5* 10.2*   HEMATOCRIT % 31.1* 33.6* 32.7*   PLATELETS 10*3/mm3 202 233 214                 No results for input(s): \"PHART\", \"BZF6YVK\", \"PO2ART\", \"QOO9XSD\", \"BASEEXCESS\" in the last 8760 hours.   I have reviewed the patient's laboratory results.    Radiology results:    No radiology results from the last 24 hrs  I have reviewed the patient's radiology reports.    Medication Review:     I have reviewed the patient's active and prn medications.     Problem List:      Acute kidney injury      Assessment:    Acute kidney injury on chronic kidney disease stage IV, POA  Hypercalcemia, POA  Hypertensive urgency, POA  Sarcoidosis  Rheumatoid arthritis  Iron deficiency anemia  Anxiety and depression  Obesity       Plan:    - Nephrology and Pulmonology consulted. Appreciate recommendations.  - Sarcoidosis likely etiology of  Hypercalcemia. PTH low and elevated 1/25 Vitamin D  - continue fluids per nephrology with creatinine improving.  -Calcitonin injections per nephrology with improvement of calcium noted.  - Patient previously prescribed prednisone 40 mg for 2 weeks  then 20 mg for another 4 weeks. Initiated on 04/14/2024-increased to 50 mg daily 4/25/2024.  Will need to stay on this dose for 2 to 3 weeks until follow-up with pulmonology.  -Norvasc added.  -Leukocytosis likely secondary to steroid use.  Otherwise no signs of infection noted.     DVT Prophylaxis: Heparin  Code Status: Full code  Diet: Renal  Discharge Plan: Tomorrow likely    Keo Sheffield DO  04/27/24  09:57 EDT    Dictated utilizing Dragon dictation.     "

## 2024-04-27 NOTE — PROGRESS NOTES
[facility] Progress Note      Patient Name: Jamia Hinton  : 1965  MRN: [id number]  Primary Care Physician:  Jaycee Deal APRN  Date of admission: 2024    Subjective     Interval History:   F/u ANA CKD4 hypercalcemia     Revie feels abdominal bloating.  Denies chest pain or shortness of breath.  No nausea or vomiting.    Objective     Vitals:   Temp:  [97.4 °F (36.3 °C)-97.7 °F (36.5 °C)] 97.5 °F (36.4 °C)  Heart Rate:  [53-78] 53  Resp:  [18] 18  BP: (149-190)/(79-99) 149/82    Intake/Output Summary (Last 24 hours) at 2024 1014  Last data filed at 2024 0400  Gross per 24 hour   Intake 920 ml   Output 1450 ml   Net -530 ml       Physical Exam:    General Appearance: alert, comfortable WF on RA  Neck: supple, no JVD  Lungs: CTA bilat no rales  Heart: RRR, normal S1 and S2  Abdomen: soft, nontender, nondistended  : no palpable bladder  Extremities: no edema, cyanosis or clubbing  Neuro: normal speech and mental status     Scheduled Meds:     amLODIPine, 5 mg, Oral, Q24H  heparin (porcine), 5,000 Units, Subcutaneous, Q12H  iron polysaccharides, 150 mg, Oral, Daily  predniSONE, 50 mg, Oral, Daily With Breakfast  sodium chloride, 10 mL, Intravenous, Q12H      IV Meds:          Results Reviewed:   I have personally reviewed the results from the time of this admission to 2024 10:14 EDT     Results from last 7 days   Lab Units 24  0701 24  0626 24  0536 24  1249   SODIUM mmol/L 140 139 140 136   POTASSIUM mmol/L 3.8 4.1 3.9 4.1   CHLORIDE mmol/L 108* 108* 106 98   CO2 mmol/L 17.7* 19.2* 18.8* 25.4   BUN mg/dL 45* 53* 69* 75*   CREATININE mg/dL 2.59* 2.92* 3.45* 3.82*   CALCIUM mg/dL 9.8 9.7 10.4 14.1*   BILIRUBIN mg/dL  --   --   --  0.4   ALK PHOS U/L  --   --   --  64   ALT (SGPT) U/L  --   --   --  15   AST (SGOT) U/L  --   --   --  13   GLUCOSE mg/dL 96 82 80 101*     Estimated Creatinine Clearance: 29.2 mL/min (A) (by C-G formula  based on SCr of 2.59 mg/dL (H)).  Results from last 7 days   Lab Units 04/27/24  0701 04/26/24  0626 04/25/24  0536 04/24/24  1249   MAGNESIUM mg/dL  --   --   --  2.4   PHOSPHORUS mg/dL 4.0 3.6 5.3*  --          Results from last 7 days   Lab Units 04/27/24  0701 04/26/24  0626 04/25/24  0536 04/24/24  1249   WBC 10*3/mm3 14.59* 16.93* 17.42* 18.79*   HEMOGLOBIN g/dL 9.6* 10.5* 10.2* 11.6*   PLATELETS 10*3/mm3 202 233 214 266           Assessment / Plan     ASSESSMENT:  Non olig ANA - prerenal azotemia again from vol depletion due to severe hypercalcemia, Cr improving with IVF, 2.9 today (peak 3.8); Cr was down to 2.3 at discharge 2 weeks ago, her presumed BL.  DC IVF, starting to have some abd / facial bloating  CKD stage 4 - suspect chronic tubulointerstitial dz & nephroscalcionis from sarcoidosis, but substantial progression noted vs 2020 (when renal fcn normal) to now (Cr 2.3 on 3/22/24).  CT on 4/7/24: small non-obs stones.  Kidneys generous in size 12 & 13 cm on recent renal US  Hypercalcemia - recurrent.  Presumably due to sarcoidosis with Ca back up 11 to 14, with very high D1-25 level (~ 200) recently due to granulomatous dz), appropriately low iPTH, normal PTHrP, low-normal D25, and NEG SPEP/IF.  Been on prednisone taper since discharge.  Ca  improved with calcitonin   Immunosuppression : states was on cellcept & MTX for the sarcoidosis (?) rather than RA in past   HTN - new onset, added norvasc 5mg, good response thus far  Leukocytosis - WBC 17K, likely due to steroid    PLAN:  Calcium corrected calcium slightly up from yesterday but overall improving  Continue prednisone at current dose  Labs in a.m.      Dora Hopkins MD  04/27/24  10:14 EDT    [facility]  [phone]

## 2024-04-28 ENCOUNTER — READMISSION MANAGEMENT (OUTPATIENT)
Dept: CALL CENTER | Facility: HOSPITAL | Age: 59
End: 2024-04-28
Payer: MEDICAID

## 2024-04-28 VITALS
OXYGEN SATURATION: 95 % | BODY MASS INDEX: 39.19 KG/M2 | TEMPERATURE: 97.3 F | RESPIRATION RATE: 16 BRPM | HEART RATE: 68 BPM | DIASTOLIC BLOOD PRESSURE: 73 MMHG | HEIGHT: 66 IN | WEIGHT: 243.83 LBS | SYSTOLIC BLOOD PRESSURE: 146 MMHG

## 2024-04-28 LAB
ALBUMIN SERPL-MCNC: 3.5 G/DL (ref 3.5–5.2)
ANION GAP SERPL CALCULATED.3IONS-SCNC: 12.4 MMOL/L (ref 5–15)
BASOPHILS # BLD AUTO: 0.04 10*3/MM3 (ref 0–0.2)
BASOPHILS NFR BLD AUTO: 0.3 % (ref 0–1.5)
BUN SERPL-MCNC: 43 MG/DL (ref 8–23)
BUN/CREAT SERPL: 16.4 (ref 7–25)
CALCIUM SPEC-SCNC: 10 MG/DL (ref 8.6–10.5)
CHLORIDE SERPL-SCNC: 105 MMOL/L (ref 98–107)
CO2 SERPL-SCNC: 20.6 MMOL/L (ref 22–29)
CREAT SERPL-MCNC: 2.62 MG/DL (ref 0.57–1)
DEPRECATED RDW RBC AUTO: 48.2 FL (ref 37–54)
EGFRCR SERPLBLD CKD-EPI 2021: 20.3 ML/MIN/1.73
EOSINOPHIL # BLD AUTO: 0.49 10*3/MM3 (ref 0–0.4)
EOSINOPHIL NFR BLD AUTO: 3.1 % (ref 0.3–6.2)
ERYTHROCYTE [DISTWIDTH] IN BLOOD BY AUTOMATED COUNT: 15.1 % (ref 12.3–15.4)
GLUCOSE SERPL-MCNC: 81 MG/DL (ref 65–99)
HCT VFR BLD AUTO: 32.7 % (ref 34–46.6)
HGB BLD-MCNC: 10.4 G/DL (ref 12–15.9)
IMM GRANULOCYTES # BLD AUTO: 0.12 10*3/MM3 (ref 0–0.05)
IMM GRANULOCYTES NFR BLD AUTO: 0.8 % (ref 0–0.5)
LYMPHOCYTES # BLD AUTO: 1.09 10*3/MM3 (ref 0.7–3.1)
LYMPHOCYTES NFR BLD AUTO: 6.9 % (ref 19.6–45.3)
MCH RBC QN AUTO: 28 PG (ref 26.6–33)
MCHC RBC AUTO-ENTMCNC: 31.8 G/DL (ref 31.5–35.7)
MCV RBC AUTO: 88.1 FL (ref 79–97)
MONOCYTES # BLD AUTO: 1.39 10*3/MM3 (ref 0.1–0.9)
MONOCYTES NFR BLD AUTO: 8.8 % (ref 5–12)
NEUTROPHILS NFR BLD AUTO: 12.7 10*3/MM3 (ref 1.7–7)
NEUTROPHILS NFR BLD AUTO: 80.1 % (ref 42.7–76)
NRBC BLD AUTO-RTO: 0 /100 WBC (ref 0–0.2)
PHOSPHATE SERPL-MCNC: 3.3 MG/DL (ref 2.5–4.5)
PLATELET # BLD AUTO: 234 10*3/MM3 (ref 140–450)
PMV BLD AUTO: 11.6 FL (ref 6–12)
POTASSIUM SERPL-SCNC: 4.1 MMOL/L (ref 3.5–5.2)
RBC # BLD AUTO: 3.71 10*6/MM3 (ref 3.77–5.28)
SODIUM SERPL-SCNC: 138 MMOL/L (ref 136–145)
WBC NRBC COR # BLD AUTO: 15.83 10*3/MM3 (ref 3.4–10.8)

## 2024-04-28 PROCEDURE — 63710000001 PREDNISONE PER 5 MG: Performed by: INTERNAL MEDICINE

## 2024-04-28 PROCEDURE — 25010000002 HEPARIN (PORCINE) PER 1000 UNITS: Performed by: NURSE PRACTITIONER

## 2024-04-28 PROCEDURE — 80069 RENAL FUNCTION PANEL: CPT | Performed by: INTERNAL MEDICINE

## 2024-04-28 PROCEDURE — 85025 COMPLETE CBC W/AUTO DIFF WBC: CPT | Performed by: INTERNAL MEDICINE

## 2024-04-28 PROCEDURE — 99239 HOSP IP/OBS DSCHRG MGMT >30: CPT | Performed by: FAMILY MEDICINE

## 2024-04-28 RX ORDER — AMLODIPINE BESYLATE 5 MG/1
10 TABLET ORAL
Qty: 30 TABLET | Refills: 0 | Status: SHIPPED | OUTPATIENT
Start: 2024-04-29

## 2024-04-28 RX ORDER — CHOLECALCIFEROL (VITAMIN D3) 125 MCG
5 CAPSULE ORAL NIGHTLY
Qty: 30 TABLET | Refills: 0 | Status: SHIPPED | OUTPATIENT
Start: 2024-04-28

## 2024-04-28 RX ADMIN — Medication 150 MG: at 08:00

## 2024-04-28 RX ADMIN — HEPARIN SODIUM 5000 UNITS: 5000 INJECTION INTRAVENOUS; SUBCUTANEOUS at 08:00

## 2024-04-28 RX ADMIN — Medication 10 ML: at 08:01

## 2024-04-28 RX ADMIN — AMLODIPINE BESYLATE 5 MG: 5 TABLET ORAL at 08:00

## 2024-04-28 RX ADMIN — PREDNISONE 50 MG: 20 TABLET ORAL at 08:00

## 2024-04-28 NOTE — PROGRESS NOTES
[facility] Progress Note      Patient Name: Jamia Hinton  : 1965  MRN: [id number]  Primary Care Physician:  Jaycee Deal APRN  Date of admission: 2024    Subjective     Interval History:   F/u ANA CKD4 hypercalcemia   Doing well overall.  Denies nausea or vomiting.  No fever or chills.      Objective     Vitals:   Temp:  [97.6 °F (36.4 °C)-98 °F (36.7 °C)] 98 °F (36.7 °C)  Heart Rate:  [53-71] 68  Resp:  [16-18] 16  BP: (131-169)/(65-83) 143/83    Intake/Output Summary (Last 24 hours) at 2024 1259  Last data filed at 2024 1143  Gross per 24 hour   Intake 1800 ml   Output 3275 ml   Net -1475 ml       Physical Exam:    General Appearance: alert, comfortable WF on RA  Neck: supple, no JVD  Lungs: CTA bilat no rales  Heart: RRR, normal S1 and S2  Abdomen: soft, nontender, nondistended  : no palpable bladder  Extremities: no edema, cyanosis or clubbing  Neuro: normal speech and mental status     Scheduled Meds:     amLODIPine, 5 mg, Oral, Q24H  heparin (porcine), 5,000 Units, Subcutaneous, Q12H  iron polysaccharides, 150 mg, Oral, Daily  predniSONE, 50 mg, Oral, Daily With Breakfast  sodium chloride, 10 mL, Intravenous, Q12H      IV Meds:          Results Reviewed:   I have personally reviewed the results from the time of this admission to 2024 12:59 EDT     Results from last 7 days   Lab Units 24  0601 24  0701 24  0626 24  0536 24  1249   SODIUM mmol/L 138 140 139   < > 136   POTASSIUM mmol/L 4.1 3.8 4.1   < > 4.1   CHLORIDE mmol/L 105 108* 108*   < > 98   CO2 mmol/L 20.6* 17.7* 19.2*   < > 25.4   BUN mg/dL 43* 45* 53*   < > 75*   CREATININE mg/dL 2.62* 2.59* 2.92*   < > 3.82*   CALCIUM mg/dL 10.0 9.8 9.7   < > 14.1*   BILIRUBIN mg/dL  --   --   --   --  0.4   ALK PHOS U/L  --   --   --   --  64   ALT (SGPT) U/L  --   --   --   --  15   AST (SGOT) U/L  --   --   --   --  13   GLUCOSE mg/dL 81 96 82   < > 101*    < > = values  in this interval not displayed.     Estimated Creatinine Clearance: 28.8 mL/min (A) (by C-G formula based on SCr of 2.62 mg/dL (H)).  Results from last 7 days   Lab Units 04/28/24  0601 04/27/24  0701 04/26/24  0626 04/25/24  0536 04/24/24  1249   MAGNESIUM mg/dL  --   --   --   --  2.4   PHOSPHORUS mg/dL 3.3 4.0 3.6   < >  --     < > = values in this interval not displayed.         Results from last 7 days   Lab Units 04/28/24  0601 04/27/24  0701 04/26/24  0626 04/25/24  0536 04/24/24  1249   WBC 10*3/mm3 15.83* 14.59* 16.93* 17.42* 18.79*   HEMOGLOBIN g/dL 10.4* 9.6* 10.5* 10.2* 11.6*   PLATELETS 10*3/mm3 234 202 233 214 266           Assessment / Plan     ASSESSMENT:  Non olig ANA - prerenal azotemia again from vol depletion due to severe hypercalcemia, Cr improving with IVF, 2.9 today (peak 3.8); Cr was down to 2.3 at discharge 2 weeks ago, her presumed BL.  DC IVF, starting to have some abd / facial bloating  CKD stage 4 - suspect chronic tubulointerstitial dz & nephroscalcionis from sarcoidosis, but substantial progression noted vs 2020 (when renal fcn normal) to now (Cr 2.3 on 3/22/24).  CT on 4/7/24: small non-obs stones.  Kidneys generous in size 12 & 13 cm on recent renal US  Hypercalcemia - recurrent.  Presumably due to sarcoidosis with Ca back up 11 to 14, with very high D1-25 level (~ 200) recently due to granulomatous dz), appropriately low iPTH, normal PTHrP, low-normal D25, and NEG SPEP/IF.  Been on prednisone taper since discharge.  Ca  improved with calcitonin   Immunosuppression : states was on cellcept & MTX for the sarcoidosis (?) rather than RA in past   HTN - new onset, added norvasc 5mg, good response thus far  Leukocytosis - WBC 17K, likely due to steroid    PLAN:  Calcium remains stable from yesterday.  Will continue prednisone at current dose.  Okay to discharge home from nephrology standpoint arrange follow-up in nephrology clinic in 1 to 2 weeks.      Dora Hopkins,  MD  04/28/24  12:59 EDT    [facility]  [phone]

## 2024-04-28 NOTE — DISCHARGE SUMMARY
[facility] HOSPITALIST   DISCHARGE SUMMARY      Name:  Jamia Hinton   Age:  58 y.o.  Sex:  female  :  1965  MRN:  [id number]   Visit Number:  [id number]    Admission Date:  2024  Date of Discharge:  2024  Primary Care Physician:  Jaycee Deal APRN    Important issues to note:    Acute kidney injury on chronic kidney disease stage IV, POA  Hypercalcemia, POA  Hypertensive urgency, POA  Sarcoidosis  Rheumatoid arthritis  Iron deficiency anemia  Anxiety and depression  Obesity       Discharge Diagnoses:     Acute kidney injury on chronic kidney disease stage IV, POA  Hypercalcemia, POA  Hypertensive urgency, POA  Sarcoidosis  Rheumatoid arthritis  Iron deficiency anemia  Anxiety and depression  Obesity      Problem List:     Active Hospital Problems    Diagnosis  POA    **Acute kidney injury [N17.9]  Yes      Resolved Hospital Problems   No resolved problems to display.     Presenting Problem:    Chief Complaint   Patient presents with    Abnormal Lab      Consults:     Consulting Physician(s)         Provider   Role Specialty     Panfilo Jo MD, RUDY      Consulting Physician Nephrology     Cydney Walsh MD      Consulting Physician Pulmonary Disease          Procedures Performed: none      History of presenting illness/Hospital Course:      Ms. Hinton is a pleasant 58-year-old female who presented to the emergency department after following up with her labs with nephrology clinic which noted worsening renal function, admitted on 2024..  Pertinent past medical history of sarcoidosis, rheumatoid arthritis, and chronic kidney disease.  Patient with recent admission secondary to hypercalcemia and acute kidney injury for which she was given IV fluids/Lasix/prednisone taper.  Patient does follow with rheumatology in [place] Dr. Capone with recent discontinuation of CellCept and Methotrexate.  Patient otherwise denies any current symptoms.  Stated has been  feeling well.  Had noted some mild lower extremity cramps.     Upon ED presentation patient hypertensive with blood pressure 184/95, otherwise hemodynamically stable.  Pertinent labs and imaging included urinalysis without pattern of infection, creatinine 3.8 with prior 2.2, calcium 14.1, WBC 18.7, hemoglobin 11.6.  Patient was given 1 L bolus with hospitalist consulted for further medical management.  Pulmonology and nephrology also consulted.  Patient continued on IV fluids as well as Norvasc initiated for continued uncontrolled hypertension.  She was given calcitonin injection with significant improvement of calcium levels.  Creatinine levels also improving.  Patient prednisone increased to 50 mg daily x 2 to 3 weeks.  Per pulmonology if recurrent hypercalcemia noted consider alternative such as hydroxychloroquine or possibly infliximab.     Nephrology currently following, creatinine and GFR improving.  Nephrology recommends follow-up outpatient in 1 week, with continuation of current steroid dose.  Patient has reached maximum medical improvement from her inpatient hospitalization, will be discharged home in stable condition with .    Vital Signs:    Temp:  [97.3 °F (36.3 °C)-98 °F (36.7 °C)] 97.3 °F (36.3 °C)  Heart Rate:  [53-71] 68  Resp:  [16] 16  BP: (131-169)/(65-83) 146/73    Physical Exam:    General Appearance:  Alert and cooperative   Head:  Atraumatic and normocephalic.   Eyes: Conjunctivae and sclerae normal, no icterus. No pallor.   Ears:  Ears with no abnormalities noted.   Throat: No oral lesions, no thrush, oral mucosa moist.   Neck: Supple, trachea midline, no thyromegaly.   Back:   No kyphoscoliosis present. No tenderness to palpation.   Lungs:   Breath sounds heard bilaterally equally.  No crackles or wheezing. No Pleural rub or bronchial breathing.   Heart:  Normal S1 and S2, no murmur, no gallop, no rub. No JVD.   Abdomen:   Normal bowel sounds, no masses, no organomegaly. Soft,  nontender, nondistended, no rebound tenderness.   Extremities: Supple, no edema, no cyanosis, no clubbing.   Pulses: Pulses palpable bilaterally.   Skin: No bleeding or rash.   Neurologic: Alert and oriented x 3. No facial asymmetry. Moves all four limbs. No tremors.     Pertinent Lab Results:     Results from last 7 days   Lab Units 04/28/24  0601 04/27/24  0701 04/26/24  0626 04/25/24  0536 04/24/24  1249   SODIUM mmol/L 138 140 139   < > 136   POTASSIUM mmol/L 4.1 3.8 4.1   < > 4.1   CHLORIDE mmol/L 105 108* 108*   < > 98   CO2 mmol/L 20.6* 17.7* 19.2*   < > 25.4   BUN mg/dL 43* 45* 53*   < > 75*   CREATININE mg/dL 2.62* 2.59* 2.92*   < > 3.82*   CALCIUM mg/dL 10.0 9.8 9.7   < > 14.1*   BILIRUBIN mg/dL  --   --   --   --  0.4   ALK PHOS U/L  --   --   --   --  64   ALT (SGPT) U/L  --   --   --   --  15   AST (SGOT) U/L  --   --   --   --  13   GLUCOSE mg/dL 81 96 82   < > 101*    < > = values in this interval not displayed.     Results from last 7 days   Lab Units 04/28/24  0601 04/27/24  0701 04/26/24  0626   WBC 10*3/mm3 15.83* 14.59* 16.93*   HEMOGLOBIN g/dL 10.4* 9.6* 10.5*   HEMATOCRIT % 32.7* 31.1* 33.6*   PLATELETS 10*3/mm3 234 202 233       Pertinent Radiology Results:    Imaging Results (All)       None          Condition on Discharge:      Stable.    Code status during the hospital stay:    Code Status and Medical Interventions:   Ordered at: 04/24/24 1521     Level Of Support Discussed With:    Patient     Code Status (Patient has no pulse and is not breathing):    CPR (Attempt to Resuscitate)     Medical Interventions (Patient has pulse or is breathing):    Full Support     Discharge Disposition:    Home or Self Care    Discharge Medications:       Discharge Medications        New Medications        Instructions Start Date   amLODIPine 5 MG tablet  Commonly known as: NORVASC   10 mg, Oral, Every 24 Hours Scheduled   Start Date: April 29, 2024     melatonin 5 MG tablet tablet   5 mg, Oral, Nightly              Continue These Medications        Instructions Start Date   iron polysaccharides 150 MG capsule  Commonly known as: NIFEREX   150 mg, Oral, Daily      predniSONE 20 MG tablet  Commonly known as: DELTASONE   Take 2 tablets by mouth Daily With Breakfast for 13 days, THEN 1 tablet Daily With Breakfast for 42 days.   Start Date: April 14, 2024            ASK your doctor about these medications        Instructions Start Date   citalopram 20 MG tablet  Commonly known as: CeleXA   20 mg, Daily      folic acid 1 MG tablet  Commonly known as: FOLVITE   1 mg, Daily      omeprazole 40 MG capsule  Commonly known as: priLOSEC   40 mg, Daily      ondansetron ODT 4 MG disintegrating tablet  Commonly known as: ZOFRAN-ODT   4 mg, Translingual, Every 6 Hours PRN      oxybutynin XL 5 MG 24 hr tablet  Commonly known as: DITROPAN-XL   5 mg, Daily      vitamin B-12 1000 MCG tablet  Commonly known as: CYANOCOBALAMIN   1,000 mcg, Daily      vitamin D3 125 MCG (5000 UT) capsule capsule   5,000 Units, Daily             Discharge Diet: Renal      Activity at Discharge: As tolerated      Follow-up Appointments:     Follow-up Information       Jaycee Deal APRN .    Specialty: Family Medicine  Contact information:  [address] [place] [id number] [phone]                           Future Appointments   Date Time Provider Department Center   7/5/2024 10:00 AM Cydney Walsh MD [facility]     Nephrology in 1 week.    Test Results Pending at Discharge: None           Keo Sheffield DO  04/28/24  16:50 EDT    Time: I spent 35 minutes on this discharge activity which included: face-to-face encounter with the patient, reviewing the data in the system, coordination of the care with the nursing staff as well as consultants, documentation, and entering orders.     Dictated utilizing Dragon dictation.

## 2024-04-28 NOTE — PLAN OF CARE
Problem: Adult Inpatient Plan of Care  Goal: Plan of Care Review  Outcome: Ongoing, Progressing  Goal: Patient-Specific Goal (Individualized)  Outcome: Ongoing, Progressing  Goal: Absence of Hospital-Acquired Illness or Injury  Outcome: Ongoing, Progressing  Intervention: Identify and Manage Fall Risk  Recent Flowsheet Documentation  Taken 4/27/2024 2000 by Carly Kennedy RN  Safety Promotion/Fall Prevention: safety round/check completed  Intervention: Prevent Skin Injury  Recent Flowsheet Documentation  Taken 4/27/2024 2000 by Carly Kennedy RN  Body Position: sitting up in bed  Intervention: Prevent and Manage VTE (Venous Thromboembolism) Risk  Recent Flowsheet Documentation  Taken 4/27/2024 2000 by Carly Kennedy RN  Activity Management: activity minimized  Goal: Optimal Comfort and Wellbeing  Outcome: Ongoing, Progressing  Goal: Readiness for Transition of Care  Outcome: Ongoing, Progressing     Problem: Electrolyte Imbalance  Goal: Electrolyte Balance  Outcome: Ongoing, Progressing     Problem: Fluid and Electrolyte Imbalance (Acute Kidney Injury/Impairment)  Goal: Fluid and Electrolyte Balance  Outcome: Ongoing, Progressing     Problem: Oral Intake Inadequate (Acute Kidney Injury/Impairment)  Goal: Optimal Nutrition Intake  Outcome: Ongoing, Progressing     Problem: Renal Function Impairment (Acute Kidney Injury/Impairment)  Goal: Effective Renal Function  Outcome: Ongoing, Progressing     Problem: Fall Injury Risk  Goal: Absence of Fall and Fall-Related Injury  Outcome: Ongoing, Progressing  Intervention: Promote Injury-Free Environment  Recent Flowsheet Documentation  Taken 4/27/2024 2000 by Carly Kennedy RN  Safety Promotion/Fall Prevention: safety round/check completed   Goal Outcome Evaluation:

## 2024-04-28 NOTE — CASE MANAGEMENT/SOCIAL WORK
Case Management Discharge Note           Provided Post Acute Provider List?: N/A  Provided Post Acute Provider Quality & Resource List?: N/A    Selected Continued Care - Admitted Since 4/24/2024       Destination    No services have been selected for the patient.                Durable Medical Equipment    No services have been selected for the patient.                Dialysis/Infusion    No services have been selected for the patient.                Home Medical Care    No services have been selected for the patient.                Therapy    No services have been selected for the patient.                Community Resources    No services have been selected for the patient.                Community & DME    No services have been selected for the patient.                    Transportation Services  Private: Car    Final Discharge Disposition Code: 01 - home or self-care

## 2024-04-28 NOTE — PROGRESS NOTES
"    [facility] HOSPITALIST    PROGRESS NOTE    Name:  Jamia Hinton   Age:  58 y.o.  Sex:  female  :  1965  MRN:  [id number]   Visit Number:  [id number]  Admission Date:  2024  Date Of Service:  24  Primary Care Physician:  Jaycee Deal APRN     LOS: 4 days :    Chief Complaint:      Worsening renal function    Subjective:    Patient seen and examined.  No other acute issues.  Patient is eating, drinking, ambulating, urinating, defecating without issue.  Patient feels \"back to normal\".  Creatinine has not improved much overnight, currently stalled at 2.6.  Unclear if this is patient's new baseline creatinine given progressive kidney disease secondary to sarcoidosis.  Nephrology has yet to round and evaluate, will await their recommendations for today.  If agreeable with nephrology, patient likely can be discharged with outpatient follow-ups.    Hospital Course:    Ms. Hinton is a pleasant 58-year-old female who presented to the emergency department after following up with her labs with nephrology clinic which noted worsening renal function, admitted on 2024..  Pertinent past medical history of sarcoidosis, rheumatoid arthritis, and chronic kidney disease.  Patient with recent admission secondary to hypercalcemia and acute kidney injury for which she was given IV fluids/Lasix/prednisone taper.  Patient does follow with rheumatology in [place] Dr. Capone with recent discontinuation of CellCept and Methotrexate.  Patient otherwise denies any current symptoms.  Stated has been feeling well.  Had noted some mild lower extremity cramps.     Upon ED presentation patient hypertensive with blood pressure 184/95, otherwise hemodynamically stable.  Pertinent labs and imaging included urinalysis without pattern of infection, creatinine 3.8 with prior 2.2, calcium 14.1, WBC 18.7, hemoglobin 11.6.  Patient was given 1 L bolus with hospitalist consulted for further medical management. "  Pulmonology and nephrology also consulted.  Patient continued on IV fluids as well as Norvasc initiated for continued uncontrolled hypertension.  She was given calcitonin injection with significant improvement of calcium levels.  Creatinine levels also improving.  Patient prednisone increased to 50 mg daily x 2 to 3 weeks.  Per pulmonology if recurrent hypercalcemia noted consider alternative such as hydroxychloroquine or possibly infliximab.     Nephrology currently following, creatinine and GFR improving.     Review of Systems:     All systems were reviewed and negative except as mentioned in subjective, assessment and plan.    Vital Signs:    Temp:  [97.6 °F (36.4 °C)-98 °F (36.7 °C)] 97.6 °F (36.4 °C)  Heart Rate:  [53-71] 71  Resp:  [16-18] 16  BP: (131-169)/(65-89) 143/73    Intake and output:    I/O last 3 completed shifts:  In: 1640 [P.O.:1640]  Out: 5075 [Urine:5075]  I/O this shift:  In: 240 [P.O.:240]  Out: 450 [Urine:450]    Physical Examination:    General Appearance:  Alert and cooperative.    Head:  Atraumatic and normocephalic.   Eyes: Conjunctivae and sclerae normal, no icterus. No pallor.   Throat: No oral lesions, no thrush, oral mucosa moist.   Neck: Supple, trachea midline, no thyromegaly.   Lungs:   Breath sounds heard bilaterally equally.  No wheezing or crackles. No Pleural rub or bronchial breathing.   Heart:  Normal S1 and S2, no murmur, no gallop, no rub. No JVD.   Abdomen:   Normal bowel sounds, no masses, no organomegaly. Soft, nontender, nondistended, no rebound tenderness.   Extremities: Supple, no edema, no cyanosis, no clubbing.   Skin: No bleeding or rash.   Neurologic: Alert and oriented x 3. No facial asymmetry. Moves all four limbs. No tremors.      Laboratory results:    Results from last 7 days   Lab Units 04/28/24  0601 04/27/24  0701 04/26/24  0626 04/25/24  0536 04/24/24  1249   SODIUM mmol/L 138 140 139   < > 136   POTASSIUM mmol/L 4.1 3.8 4.1   < > 4.1   CHLORIDE mmol/L  "105 108* 108*   < > 98   CO2 mmol/L 20.6* 17.7* 19.2*   < > 25.4   BUN mg/dL 43* 45* 53*   < > 75*   CREATININE mg/dL 2.62* 2.59* 2.92*   < > 3.82*   CALCIUM mg/dL 10.0 9.8 9.7   < > 14.1*   BILIRUBIN mg/dL  --   --   --   --  0.4   ALK PHOS U/L  --   --   --   --  64   ALT (SGPT) U/L  --   --   --   --  15   AST (SGOT) U/L  --   --   --   --  13   GLUCOSE mg/dL 81 96 82   < > 101*    < > = values in this interval not displayed.     Results from last 7 days   Lab Units 04/28/24  0601 04/27/24  0701 04/26/24  0626   WBC 10*3/mm3 15.83* 14.59* 16.93*   HEMOGLOBIN g/dL 10.4* 9.6* 10.5*   HEMATOCRIT % 32.7* 31.1* 33.6*   PLATELETS 10*3/mm3 234 202 233                 No results for input(s): \"PHART\", \"UHJ9WJH\", \"PO2ART\", \"CAQ7RJP\", \"BASEEXCESS\" in the last 8760 hours.   I have reviewed the patient's laboratory results.    Radiology results:    No radiology results from the last 24 hrs  I have reviewed the patient's radiology reports.    Medication Review:     I have reviewed the patient's active and prn medications.     Problem List:      Acute kidney injury      Assessment:    Acute kidney injury on chronic kidney disease stage IV, POA  Hypercalcemia, POA  Hypertensive urgency, POA  Sarcoidosis  Rheumatoid arthritis  Iron deficiency anemia  Anxiety and depression  Obesity    Plan:    - Nephrology and Pulmonology consulted. Appreciate recommendations.  - Sarcoidosis likely etiology of  Hypercalcemia. PTH low and elevated 1/25 Vitamin D  - continue fluids per nephrology with creatinine improved, but currently 2.6 for two days.   -Calcitonin injections per nephrology with improvement of calcium noted.  - Patient previously prescribed prednisone 40 mg for 2 weeks  then 20 mg for another 4 weeks. Initiated on 04/14/2024-increased to 50 mg daily 4/25/2024.  Will need to stay on this dose for 2 to 3 weeks until follow-up with pulmonology.  -Norvasc added.  -Leukocytosis likely secondary to steroid use.  Otherwise no signs of " infection noted.    DVT Prophylaxis: Heparin  Code Status: Full code  Diet: Renal  Discharge Plan: Later today Tomorrow likely, pending nephrology recs.    Keo Sheffield DO  04/28/24  09:21 EDT    Dictated utilizing Dragon dictation.

## 2024-04-28 NOTE — NURSING NOTE
Patient being discharged home. Instructed follow up appointments will be made tomorrow. IV access removed. Instructed to  medications. Patient verbalizes understanding of discharge teaching.

## 2024-04-28 NOTE — PLAN OF CARE
Goal Outcome Evaluation:   No complaints of pain. No acute events during shift. Vitals stable.

## 2024-04-28 NOTE — PLAN OF CARE
Goal Outcome Evaluation:   Patient being discharged home.

## 2024-04-29 ENCOUNTER — TELEPHONE (OUTPATIENT)
Dept: PULMONOLOGY | Facility: CLINIC | Age: 59
End: 2024-04-29
Payer: MEDICAID

## 2024-04-29 NOTE — TELEPHONE ENCOUNTER
Hub staff attempted to follow warm transfer process and was unsuccessful     Caller: Jamia Hinton    Relationship to patient: Self    Best call back number: [phone]    Patient is needing: PATIENT STATES DR. CANAS HAD UPED HER PREDISONE, AND IT WAS NOT ON THE LIST TO KEEP TAKING IT. NEEDING TO KNOW WHAT SHE SHOULD DO

## 2024-04-29 NOTE — OUTREACH NOTE
Prep Survey      Flowsheet Row Responses   [facility] facility patient discharged from? [facility]   Is LACE score < 7 ? No   Eligibility Readm Mgmt   Discharge diagnosis Acute kidney injury   Does the patient have one of the following disease processes/diagnoses(primary or secondary)? Other   Does the patient have Home health ordered? No   Is there a DME ordered? No   Prep survey completed? Yes            Catherine ARSHAD - Registered Nurse

## 2024-04-29 NOTE — PAYOR COMM NOTE
"To:  [employer]  From: Brigida Beckwith RN  Phone: [phone]  Fax: [phone]  NPI: [id number]  TIN: [id number]  Member ID: [id number]   MRN: [id number]    Jamia Murrieta (58 y.o. Female)       Date of Birth   1965    Social Security Number       Address   [address] [place], [place] [address]    Home Phone   [phone]    MRN   [id number]       Jainism   [employer]    Marital Status                               Admission Date   24    Admission Type   Emergency    Admitting Provider   Isaac Corbin DO    Attending Provider       Department, Room/Bed   [facility] OB GYN, W2       Discharge Date   2024    Discharge Disposition   Home or Self Care    Discharge Destination   Home                              Attending Provider: (none)   Allergies: Citalopram Hydrobromide    Isolation: None   Infection: None   Code Status: Prior    Ht: 167.6 cm (66\")   Wt: 111 kg (243 lb 13.3 oz)    Admission Cmt: None   Principal Problem: Acute kidney injury [N17.9]                   Active Insurance as of 2024       Primary Coverage       Payor Plan Insurance Group Employer/Plan Group    [place] [id number]       Payor Plan Address Payor Plan Phone Number Payor Plan Fax Number Effective Dates    [employer] [address] [address] [phone]  2020 - None Entered    [facility] [id number]         Subscriber Name Subscriber Birth Date Member ID       JAMIA MURRIETA 1965 [id number]                     Emergency Contacts        (Rel.) Home Phone Work Phone Mobile Phone    Veronica Murrieta (Daughter) [phone] -- [phone]                 Discharge Summary        Keo Sheffield DO at 24 1650              [facility] HOSPITALIST   DISCHARGE SUMMARY      Name:  Jamia Murrieta   Age:  58 y.o.  Sex:  female  :  1965  MRN:  [id number]   Visit Number:  [id number]    Admission Date:  2024  Date of Discharge:  " 4/28/2024  Primary Care Physician:  Jaycee Deal APRN    Important issues to note:    Acute kidney injury on chronic kidney disease stage IV, POA  Hypercalcemia, POA  Hypertensive urgency, POA  Sarcoidosis  Rheumatoid arthritis  Iron deficiency anemia  Anxiety and depression  Obesity       Discharge Diagnoses:     Acute kidney injury on chronic kidney disease stage IV, POA  Hypercalcemia, POA  Hypertensive urgency, POA  Sarcoidosis  Rheumatoid arthritis  Iron deficiency anemia  Anxiety and depression  Obesity      Problem List:     Active Hospital Problems    Diagnosis  POA    **Acute kidney injury [N17.9]  Yes      Resolved Hospital Problems   No resolved problems to display.     Presenting Problem:    Chief Complaint   Patient presents with    Abnormal Lab      Consults:     Consulting Physician(s)         Provider   Role Specialty     Panfilo Jo MD, RUDY      Consulting Physician Nephrology     Cydney Walsh MD      Consulting Physician Pulmonary Disease          Procedures Performed: none      History of presenting illness/Hospital Course:      Ms. Hinton is a pleasant 58-year-old female who presented to the emergency department after following up with her labs with nephrology clinic which noted worsening renal function, admitted on April 24, 2024..  Pertinent past medical history of sarcoidosis, rheumatoid arthritis, and chronic kidney disease.  Patient with recent admission secondary to hypercalcemia and acute kidney injury for which she was given IV fluids/Lasix/prednisone taper.  Patient does follow with rheumatology in [place] with recent discontinuation of CellCept and Methotrexate.  Patient otherwise denies any current symptoms.  Stated has been feeling well.  Had noted some mild lower extremity cramps.     Upon ED presentation patient hypertensive with blood pressure 184/95, otherwise hemodynamically stable.  Pertinent labs and imaging included urinalysis without pattern of  infection, creatinine 3.8 with prior 2.2, calcium 14.1, WBC 18.7, hemoglobin 11.6.  Patient was given 1 L bolus with hospitalist consulted for further medical management.  Pulmonology and nephrology also consulted.  Patient continued on IV fluids as well as Norvasc initiated for continued uncontrolled hypertension.  She was given calcitonin injection with significant improvement of calcium levels.  Creatinine levels also improving.  Patient prednisone increased to 50 mg daily x 2 to 3 weeks.  Per pulmonology if recurrent hypercalcemia noted consider alternative such as hydroxychloroquine or possibly infliximab.     Nephrology currently following, creatinine and GFR improving.  Nephrology recommends follow-up outpatient in 1 week, with continuation of current steroid dose.  Patient has reached maximum medical improvement from her inpatient hospitalization, will be discharged home in stable condition with .    Vital Signs:    Temp:  [97.3 °F (36.3 °C)-98 °F (36.7 °C)] 97.3 °F (36.3 °C)  Heart Rate:  [53-71] 68  Resp:  [16] 16  BP: (131-169)/(65-83) 146/73    Physical Exam:    General Appearance:  Alert and cooperative   Head:  Atraumatic and normocephalic.   Eyes: Conjunctivae and sclerae normal, no icterus. No pallor.   Ears:  Ears with no abnormalities noted.   Throat: No oral lesions, no thrush, oral mucosa moist.   Neck: Supple, trachea midline, no thyromegaly.   Back:   No kyphoscoliosis present. No tenderness to palpation.   Lungs:   Breath sounds heard bilaterally equally.  No crackles or wheezing. No Pleural rub or bronchial breathing.   Heart:  Normal S1 and S2, no murmur, no gallop, no rub. No JVD.   Abdomen:   Normal bowel sounds, no masses, no organomegaly. Soft, nontender, nondistended, no rebound tenderness.   Extremities: Supple, no edema, no cyanosis, no clubbing.   Pulses: Pulses palpable bilaterally.   Skin: No bleeding or rash.   Neurologic: Alert and oriented x 3. No facial asymmetry. Moves  all four limbs. No tremors.     Pertinent Lab Results:     Results from last 7 days   Lab Units 04/28/24  0601 04/27/24  0701 04/26/24  0626 04/25/24  0536 04/24/24  1249   SODIUM mmol/L 138 140 139   < > 136   POTASSIUM mmol/L 4.1 3.8 4.1   < > 4.1   CHLORIDE mmol/L 105 108* 108*   < > 98   CO2 mmol/L 20.6* 17.7* 19.2*   < > 25.4   BUN mg/dL 43* 45* 53*   < > 75*   CREATININE mg/dL 2.62* 2.59* 2.92*   < > 3.82*   CALCIUM mg/dL 10.0 9.8 9.7   < > 14.1*   BILIRUBIN mg/dL  --   --   --   --  0.4   ALK PHOS U/L  --   --   --   --  64   ALT (SGPT) U/L  --   --   --   --  15   AST (SGOT) U/L  --   --   --   --  13   GLUCOSE mg/dL 81 96 82   < > 101*    < > = values in this interval not displayed.     Results from last 7 days   Lab Units 04/28/24  0601 04/27/24  0701 04/26/24  0626   WBC 10*3/mm3 15.83* 14.59* 16.93*   HEMOGLOBIN g/dL 10.4* 9.6* 10.5*   HEMATOCRIT % 32.7* 31.1* 33.6*   PLATELETS 10*3/mm3 234 202 233       Pertinent Radiology Results:    Imaging Results (All)       None          Condition on Discharge:      Stable.    Code status during the hospital stay:    Code Status and Medical Interventions:   Ordered at: 04/24/24 1521     Level Of Support Discussed With:    Patient     Code Status (Patient has no pulse and is not breathing):    CPR (Attempt to Resuscitate)     Medical Interventions (Patient has pulse or is breathing):    Full Support     Discharge Disposition:    Home or Self Care    Discharge Medications:       Discharge Medications        New Medications        Instructions Start Date   amLODIPine 5 MG tablet  Commonly known as: NORVASC   10 mg, Oral, Every 24 Hours Scheduled   Start Date: April 29, 2024     melatonin 5 MG tablet tablet   5 mg, Oral, Nightly             Continue These Medications        Instructions Start Date   iron polysaccharides 150 MG capsule  Commonly known as: NIFEREX   150 mg, Oral, Daily      predniSONE 20 MG tablet  Commonly known as: DELTASONE   Take 2 tablets by mouth  Daily With Breakfast for 13 days, THEN 1 tablet Daily With Breakfast for 42 days.   Start Date: April 14, 2024            ASK your doctor about these medications        Instructions Start Date   citalopram 20 MG tablet  Commonly known as: CeleXA   20 mg, Daily      folic acid 1 MG tablet  Commonly known as: FOLVITE   1 mg, Daily      omeprazole 40 MG capsule  Commonly known as: priLOSEC   40 mg, Daily      ondansetron ODT 4 MG disintegrating tablet  Commonly known as: ZOFRAN-ODT   4 mg, Translingual, Every 6 Hours PRN      oxybutynin XL 5 MG 24 hr tablet  Commonly known as: DITROPAN-XL   5 mg, Daily      vitamin B-12 1000 MCG tablet  Commonly known as: CYANOCOBALAMIN   1,000 mcg, Daily      vitamin D3 125 MCG (5000 UT) capsule capsule   5,000 Units, Daily             Discharge Diet: Renal      Activity at Discharge: As tolerated      Follow-up Appointments:     Follow-up Information       Jaycee Deal APRN .    Specialty: Family Medicine  Contact information:  [address] [place] [id number] [phone]                           Future Appointments   Date Time Provider Department Center   7/5/2024 10:00 AM Cydney Walsh MD [facility]     Nephrology in 1 week.    Test Results Pending at Discharge: None           Keo Sheffield DO  04/28/24  16:50 EDT    Time: I spent 35 minutes on this discharge activity which included: face-to-face encounter with the patient, reviewing the data in the system, coordination of the care with the nursing staff as well as consultants, documentation, and entering orders.     Dictated utilizing Dragon dictation.       Electronically signed by Keo Sheffield DO at 04/28/24 2297

## 2024-04-30 DIAGNOSIS — E83.52 HYPERCALCEMIA: Primary | ICD-10-CM

## 2024-04-30 RX ORDER — PREDNISONE 20 MG/1
50 TABLET ORAL DAILY
Qty: 75 TABLET | Refills: 3 | Status: SHIPPED | OUTPATIENT
Start: 2024-04-30

## 2024-05-01 NOTE — TELEPHONE ENCOUNTER
Called and informed patient she stated that she wooiuld continue to take the prednisone until her visit with Dr. MAY in July.

## 2024-05-07 ENCOUNTER — TRANSCRIBE ORDERS (OUTPATIENT)
Dept: GENERAL RADIOLOGY | Facility: HOSPITAL | Age: 59
End: 2024-05-07

## 2024-05-07 DIAGNOSIS — Z12.31 VISIT FOR SCREENING MAMMOGRAM: Primary | ICD-10-CM

## 2024-05-09 ENCOUNTER — READMISSION MANAGEMENT (OUTPATIENT)
Dept: CALL CENTER | Facility: HOSPITAL | Age: 59
End: 2024-05-09
Payer: MEDICAID

## 2024-05-14 ENCOUNTER — HOSPITAL ENCOUNTER (OUTPATIENT)
Dept: MAMMOGRAPHY | Facility: HOSPITAL | Age: 59
Discharge: HOME | End: 2024-05-14
Payer: MEDICAID

## 2024-05-14 VITALS — HEIGHT: 67 IN | WEIGHT: 261 LBS | BODY MASS INDEX: 40.97 KG/M2

## 2024-05-14 DIAGNOSIS — Z12.31 VISIT FOR SCREENING MAMMOGRAM: ICD-10-CM

## 2024-05-14 PROCEDURE — 77063 BREAST TOMOSYNTHESIS BI: CPT

## 2024-06-03 ENCOUNTER — TRANSCRIBE ORDERS (OUTPATIENT)
Dept: ADMINISTRATIVE | Facility: HOSPITAL | Age: 59
End: 2024-06-03
Payer: MEDICAID

## 2024-06-03 DIAGNOSIS — R93.1 ABNORMAL FINDINGS DIAGNOSTIC IMAGING OF HEART AND CORONARY CIRCULATION: Primary | ICD-10-CM

## 2024-06-26 ENCOUNTER — TRANSCRIBE ORDERS (OUTPATIENT)
Dept: ADMINISTRATIVE | Facility: HOSPITAL | Age: 59
End: 2024-06-26
Payer: MEDICAID

## 2024-06-26 DIAGNOSIS — R93.1 ABNORMAL ECHOCARDIOGRAM: Primary | ICD-10-CM

## 2024-06-26 DIAGNOSIS — N19 RENAL FAILURE, UNSPECIFIED CHRONICITY: ICD-10-CM

## 2024-06-26 DIAGNOSIS — D86.9 SARCOIDOSIS: ICD-10-CM

## 2024-06-28 ENCOUNTER — HOSPITAL ENCOUNTER (OUTPATIENT)
Facility: HOSPITAL | Age: 59
Discharge: HOME | End: 2024-06-28
Payer: MEDICAID

## 2024-06-28 DIAGNOSIS — R93.1 ABNORMAL ECHOCARDIOGRAM: ICD-10-CM

## 2024-06-28 DIAGNOSIS — D86.9 SARCOIDOSIS: ICD-10-CM

## 2024-06-28 DIAGNOSIS — N19 RENAL FAILURE, UNSPECIFIED CHRONICITY: ICD-10-CM

## 2024-06-28 LAB — GLUCOSE BLDC GLUCOMTR-MCNC: 90 MG/DL (ref 70–130)

## 2024-06-28 PROCEDURE — 82948 REAGENT STRIP/BLOOD GLUCOSE: CPT

## 2024-06-28 PROCEDURE — A9552 F18 FDG: HCPCS | Performed by: NURSE PRACTITIONER

## 2024-06-28 PROCEDURE — 0 FLUDEOXYGLUCOSE F18 SOLUTION: Performed by: NURSE PRACTITIONER

## 2024-06-28 PROCEDURE — 78815 PET IMAGE W/CT SKULL-THIGH: CPT

## 2024-06-28 RX ADMIN — FLUDEOXYGLUCOSE F18 1 DOSE: 300 INJECTION INTRAVENOUS at 08:38

## 2024-07-05 ENCOUNTER — OFFICE VISIT (OUTPATIENT)
Dept: PULMONOLOGY | Facility: CLINIC | Age: 59
End: 2024-07-05
Payer: MEDICAID

## 2024-07-05 VITALS
HEART RATE: 73 BPM | DIASTOLIC BLOOD PRESSURE: 78 MMHG | OXYGEN SATURATION: 96 % | RESPIRATION RATE: 18 BRPM | SYSTOLIC BLOOD PRESSURE: 148 MMHG | HEIGHT: 66 IN | BODY MASS INDEX: 45.8 KG/M2 | WEIGHT: 285 LBS

## 2024-07-05 DIAGNOSIS — D86.9 SARCOIDOSIS: ICD-10-CM

## 2024-07-05 DIAGNOSIS — M06.9 RHEUMATOID ARTHRITIS, INVOLVING UNSPECIFIED SITE, UNSPECIFIED WHETHER RHEUMATOID FACTOR PRESENT: ICD-10-CM

## 2024-07-05 DIAGNOSIS — E83.52 HYPERCALCEMIA: Primary | ICD-10-CM

## 2024-07-05 DIAGNOSIS — N18.32 STAGE 3B CHRONIC KIDNEY DISEASE: ICD-10-CM

## 2024-07-05 PROBLEM — K21.9 GASTRO-ESOPHAGEAL REFLUX DISEASE WITHOUT ESOPHAGITIS: Status: ACTIVE | Noted: 2024-04-22

## 2024-07-05 PROBLEM — D63.1 ANEMIA IN CHRONIC KIDNEY DISEASE: Chronic | Status: ACTIVE | Noted: 2024-04-22

## 2024-07-05 PROBLEM — I12.9 BENIGN HYPERTENSION WITH CHRONIC KIDNEY DISEASE: Status: ACTIVE | Noted: 2024-06-03

## 2024-07-05 PROBLEM — N18.9 VITAMIN D DEFICIENCY DUE TO CHRONIC KIDNEY DISEASE: Status: ACTIVE | Noted: 2024-04-22

## 2024-07-05 PROBLEM — H20.9 UVEITIS: Status: ACTIVE | Noted: 2022-09-08

## 2024-07-05 PROBLEM — N18.9 ANEMIA IN CHRONIC KIDNEY DISEASE: Chronic | Status: ACTIVE | Noted: 2024-04-22

## 2024-07-05 PROBLEM — F41.8 MIXED ANXIETY DEPRESSIVE DISORDER: Status: ACTIVE | Noted: 2024-04-22

## 2024-07-05 PROBLEM — E55.9 VITAMIN D DEFICIENCY DUE TO CHRONIC KIDNEY DISEASE: Status: ACTIVE | Noted: 2024-04-22

## 2024-07-05 PROCEDURE — 3078F DIAST BP <80 MM HG: CPT | Performed by: INTERNAL MEDICINE

## 2024-07-05 PROCEDURE — 99214 OFFICE O/P EST MOD 30 MIN: CPT | Performed by: INTERNAL MEDICINE

## 2024-07-05 PROCEDURE — 1159F MED LIST DOCD IN RCRD: CPT | Performed by: INTERNAL MEDICINE

## 2024-07-05 PROCEDURE — 3077F SYST BP >= 140 MM HG: CPT | Performed by: INTERNAL MEDICINE

## 2024-07-05 PROCEDURE — 1160F RVW MEDS BY RX/DR IN RCRD: CPT | Performed by: INTERNAL MEDICINE

## 2024-07-05 RX ORDER — PREDNISONE 10 MG/1
40 TABLET ORAL DAILY
Qty: 98 TABLET | Refills: 0 | Status: SHIPPED | OUTPATIENT
Start: 2024-07-05

## 2024-07-05 RX ORDER — AMLODIPINE BESYLATE 10 MG/1
10 TABLET ORAL DAILY
COMMUNITY
Start: 2024-04-29

## 2024-07-05 RX ORDER — CLOTRIMAZOLE 10 MG/1
LOZENGE ORAL; TOPICAL 3 TIMES DAILY
COMMUNITY
Start: 2024-06-19 | End: 2024-07-05

## 2024-07-05 RX ORDER — BENZONATATE 200 MG/1
200 CAPSULE ORAL 3 TIMES DAILY PRN
COMMUNITY
End: 2024-07-05

## 2024-07-05 RX ORDER — PREDNISONE 10 MG/1
20 TABLET ORAL DAILY
Qty: 60 TABLET | Refills: 1 | Status: SHIPPED | OUTPATIENT
Start: 2024-07-05

## 2024-07-05 NOTE — PROGRESS NOTES
New Pulmonary Patient Office Visit      Patient Name: Jamia Hinton    Referring Physician: No ref. provider found    Chief Complaint:    Chief Complaint   Patient presents with    Breathing Problem    Consult       History of Present Illness: Jamia Hinton is a 58 y.o. female who is here today for hospital follow-up.    Patient was initially seen in April 2024 in the hospital secondary to hypercalcemia felt to be related to her history of sarcoidosis.  She was initially diagnosed with sarcoidosis about 3 years ago after an eye exam and there was referred to Dr. Da Silva at [facility] was initially started on methotrexate, folic acid and mycophenolate, but does not appear to have ever done a trial of prednisone.  Her physician was then transitioned to Dr. Ceballos after Dr. Da Silva left the clinic and was continued on the same regimen, but appears that lab work was not done.  She was then referred to Dr. Capone and diagnosed with rheumatoid arthritis and remains on methotrexate and CellCept.  However, she missed her last rheumatology appointment.  She then developed nausea vomiting and fatigue and was seen by primary care where methotrexate and CellCept were stopped secondary to lab abnormalities.  Initially admission calcium was 13.9 and creatinine was 3.7.  Patient was treated with IV fluid hydration and Lasix and creatinine went down to 2.29 and calcium down to 11.2 prior to discharge.  However required readmission secondary to calcium 14.1 and creatinine 3.8 was treated with fluid resuscitation, calcitonin with improvement to creatinine of 2.62 and calcium of 10.  Her labs improved from a calcium of 14 down to 10 within 24 hours and there is some question whether or not the calcium of 14 was not accurate result.  She was also discharged home on prednisone of 50 mg daily.    She has continued to take the prednisone and has noticed improved overall energy level, but has had difficulty sleeping as well as  significant weight gain with this.    She continues to follow with nephrology and primary care.  Last lab results showed calcium of 9.1 last month.  There is also been noted a thyroid nodule as well as uterine abnormalities on recent PET scan which was ordered by another provider.  Has further workup planned for these.    She has not seen rheumatology since hospital discharge.    Subjective      Review of Systems:   Review of Systems   Constitutional:  Positive for unexpected weight gain.   Respiratory:  Negative for shortness of breath.    Psychiatric/Behavioral:  Positive for sleep disturbance.        Past Medical History:   Past Medical History:   Diagnosis Date    Arthritis     Benign hypertension with chronic kidney disease 2024    Hernia, abdominal     Hypercalcemia     Rheumatoid arthritis     Sarcoidosis        Past Surgical History:   Past Surgical History:   Procedure Laterality Date    CATARACT EXTRACTION W/ INTRAOCULAR LENS IMPLANT Right 2023    Procedure: CATARACT PHACO EXTRACTION WITH INTRAOCULAR LENS IMPLANT RIGHT;  Surgeon: Christ Kerr MD;  Location: [facility];  Service: Ophthalmology;  Laterality: Right;    CATARACT EXTRACTION W/ INTRAOCULAR LENS IMPLANT Left 2024    Procedure: CATARACT PHACO EXTRACTION WITH INTRAOCULAR LENS IMPLANT LEFT;  Surgeon: Christ Kerr MD;  Location: [facility];  Service: Ophthalmology;  Laterality: Left;     SECTION      CHOLECYSTECTOMY      VENTRAL/INCISIONAL HERNIA REPAIR N/A 2017    Procedure:  INCISIONAL HERNIA REPAIR WITH MESH;  Surgeon: Bassam Rogers MD;  Location: [facility];  Service:        Family History:   Family History   Problem Relation Age of Onset    No Known Problems Mother     No Known Problems Father        Social History:   Social History     Socioeconomic History    Marital status:    Tobacco Use    Smoking status: Never    Smokeless tobacco: Never   Vaping Use    Vaping status: Never Used  "  Substance and Sexual Activity    Alcohol use: No    Drug use: No    Sexual activity: Defer       Medications:     Current Outpatient Medications:     amLODIPine (NORVASC) 10 MG tablet, Take 1 tablet by mouth Daily., Disp: , Rfl:     iron polysaccharides (NIFEREX) 150 MG capsule, Take 1 capsule by mouth Daily., Disp: 30 capsule, Rfl: 0    predniSONE (DELTASONE) 20 MG tablet, Take 2.5 tablets by mouth Daily., Disp: 75 tablet, Rfl: 3    predniSONE (DELTASONE) 10 MG tablet, Take 4 tablets by mouth Daily. 4 tabs daily for 2 weeks then 3 tabs daily for 2 weeks, Disp: 98 tablet, Rfl: 0    predniSONE (DELTASONE) 10 MG tablet, Take 2 tablets by mouth Daily. Start after finishing previous tapering prescription, Disp: 60 tablet, Rfl: 1    Allergies:   Allergies   Allergen Reactions    Citalopram Hydrobromide Unknown - High Severity     Couldn't function       Objective     Physical Exam:  Vital Signs:   Vitals:    07/05/24 0959   BP: 148/78   Pulse: 73   Resp: 18   SpO2: 96%   Weight: 129 kg (285 lb)   Height: 167.6 cm (66\")       Physical Exam  Vitals and nursing note reviewed.   Constitutional:       General: She is not in acute distress.     Appearance: She is well-developed. She is obese.   HENT:      Head: Normocephalic and atraumatic.      Comments: + steroid facies     Right Ear: External ear normal.      Left Ear: External ear normal.      Nose: Nose normal. No congestion or rhinorrhea.      Mouth/Throat:      Mouth: Mucous membranes are moist.      Pharynx: Oropharynx is clear. No oropharyngeal exudate or posterior oropharyngeal erythema.   Eyes:      General: No scleral icterus.        Right eye: No discharge.         Left eye: No discharge.      Extraocular Movements: Extraocular movements intact.      Conjunctiva/sclera: Conjunctivae normal.   Neck:      Trachea: No tracheal deviation.   Cardiovascular:      Rate and Rhythm: Normal rate and regular rhythm.      Heart sounds: No murmur heard.  Pulmonary:      " "Effort: No respiratory distress.      Breath sounds: No wheezing or rhonchi.   Abdominal:      General: There is no distension.      Palpations: Abdomen is soft.   Musculoskeletal:         General: No tenderness. Normal range of motion.      Cervical back: Normal range of motion and neck supple.      Right lower leg: Edema present.      Left lower leg: Edema present.      Comments: Trace pedal edema   Skin:     General: Skin is warm and dry.      Findings: No rash.   Neurological:      Mental Status: She is alert and oriented to person, place, and time.      Motor: No weakness.      Coordination: Coordination normal.      Gait: Gait normal.   Psychiatric:         Mood and Affect: Mood normal.         Behavior: Behavior normal.         Thought Content: Thought content normal.         Judgment: Judgment normal.       Mallampati Score: II (hard and soft palate, upper portion of tonsils anduvula visible)    Results Review:   Labs: Reviewed.  June 2024 outside labs showed creatinine 1.49 and calcium 9.1  May 2024 outside labs showed creatinine 1.67, calcium 8.7    Lab Results   Component Value Date    GLUCOSE 81 04/28/2024    BUN 43 (H) 04/28/2024    CREATININE 2.62 (H) 04/28/2024    EGFR 20.3 (L) 04/28/2024    BCR 16.4 04/28/2024    K 4.1 04/28/2024    CO2 20.6 (L) 04/28/2024    CALCIUM 10.0 04/28/2024    ALBUMIN 3.5 04/28/2024    BILITOT 0.4 04/24/2024    AST 13 04/24/2024    ALT 15 04/24/2024       No results found for: \"CBCDIF\", \"CMP\"     Micro: As of July 5, 2024   No results found for: \"RESPCX\"  No results found for: \"BLOODCX\"  Lab Results   Component Value Date    URINECX >100,000 CFU/mL Escherichia coli (A) 12/13/2017     No results found for: \"MRSACX\"  No results found for: \"MRSAPCR\"  No results found for: \"URCX\"  No components found for: \"LOWRESPCF\"  No results found for: \"THROATCX\"  No results found for: \"CULTURES\"  No components found for: \"STREPBCX\"  No results found for: \"STREPPNEUAG\"  No results found for: " "\"LEGIONELLA\"  No results found for: \"MYCOPLASCX\"  No results found for: \"GCCX\"  No results found for: \"WOUNDCX\"  No results found for: \"BODYFLDCX\"    ABG: No results found for: \"PHART\", \"OIR6NHV\", \"PO2ART\", \"HGBBG\", \"Q9TXPRCD\", \"CFIO2\", \"FCOHB\", \"CARBOXYHGB\", \"FMETHB\"    Echo:     Radiology Scans:   Last CT scan was reviewed in great detail with the patient. Images reviewed personally.   NM PET/CT SKULL BASE TO MID THIGH     Date of Exam: 6/28/2024 8:43 AM EDT     Indication: r93.1 d86.9 n19. Sarcoidosis, remote history of cervical cancer     Comparison: CT chest, abdomen, and pelvis of 4/7/2024. No prior PET exams.     Technique: 12.38 mCi of F-18 FDG was administered intravenously. PET imaging was obtained from skull base to mid-thigh approximately 60 minutes after radiotracer injection. A low dose non contrast CT was obtained for attenuation correction and anatomic   localization. Fused PET-CT and 3D MIP reconstructions were utilized for image interpretation.  Fasting blood glucose level: 90 mg/dl.     Reference uptake values:  Mediastinum: 4.77 SUVmax  Liver: 5.53 SUVmax  Normalization method: Body Weight     Findings:  Head and neck: There is a calcified hypermetabolic small lesion of the left thyroid lobe with an SUV max of 8.29. There is normal uptake by the muscles of phonation. There is symmetric brain activity. There are no enlarged or hypermetabolic neck nodes.     CHEST: There is no hypermetabolic mass or adenopathy. No pulmonary nodules or masses are identified, given the limitations of nonbreath-hold low-dose technique.     Abdomen and pelvis: There is expected excretion by the GI and  tracts. There is no abdominal mass or adenopathy. There is hypermetabolic activity associated with the uterine fundus which appears lobulated in contour and demonstrates SUV max of 28.42.   There is no hypermetabolic activity of the uterine cervix.     MUSCULOSKELETAL: No hypermetabolic bone lesions are present.   "   IMPRESSION:  Impression:  Hypermetabolic focus of the left thyroid lobe. Correlation with thyroid ultrasound is recommended.     Hypermetabolic focus associated with the uterine fundus. Malignancy is not excluded. Correlation with pelvic ultrasound is recommended.     Otherwise negative PET exam.        Electronically Signed: Akanksha Lucas MD    7/3/2024 8:34 AM EDT    Workstation ID: [id number]    Results for orders placed during the hospital encounter of 04/07/24    CT Chest Without Contrast Diagnostic    Narrative  PROCEDURE: CT CHEST WO CONTRAST DIAGNOSTIC-    HISTORY: Weakness, hx of sarcoidosis, s/p endoscopy    COMPARISON: No previous chest CT for comparison.    PROCEDURE: Axial images were obtained from the lung apex to the mid  abdomen by computed tomography. This study was performed with techniques  to keep radiation doses as low as reasonably achievable, (ALARA).  Individualized dose reduction techniques using automated exposure  control or adjustment of mA and/or kV according to the patient size were  employed.    FINDINGS:    CHEST: There is no suspicious axillary adenopathy. Vascular  calcifications noted. There is mildly prominent right pretracheal lymph  node in question fullness of the right hilum. Chest CT with contrast  would be helpful for further evaluation in this patient with known  sarcoidosis. The heart is proper size. There is no pericardial or  pleural effusion.No suspicious infiltrate or nodule identified. Limited  images of the upper abdomen demonstrate cholecystectomy clips. Spleen is  incompletely visualized but appears at least mildly enlarged to 13 cm.  No bony destructive lesion seen. No mediastinal air identified. No  significant wall thickening of the esophagus identified. No pneumothorax  seen.    Impression  No acute infiltrate identified.    Question mild mediastinal and hilar adenopathy, consider chest CT with  intravenous contrast.        CTDI: 7.44 mGy  DLP:1288.51  [email or web address]    This report was signed and finalized on 4/7/2024 3:36 PM by Nova Koch MD.        PFT IMPRESSION:   None available for review    Assessment / Plan      Assessment/Plan:    1. Hypercalcemia  Has remained low on prednisone and felt to be related back to her sarcoidosis.  However, has remained off sarcoidosis treatment with methotrexate and CellCept given her renal dysfunction.  Start tapering prednisone.  Decrease to 40mg a day for 2 weeks then   30mg daily for 2 weeks then   20mg daily.  We will follow up with lab work after being on 20mg daily for 2 weeks    - predniSONE (DELTASONE) 10 MG tablet; Take 4 tablets by mouth Daily. 4 tabs daily for 2 weeks then 3 tabs daily for 2 weeks  Dispense: 98 tablet; Refill: 0  - predniSONE (DELTASONE) 10 MG tablet; Take 2 tablets by mouth Daily. Start after finishing previous tapering prescription  Dispense: 60 tablet; Refill: 1    2. Sarcoidosis  Will start steroid taper as she is having significant side effects and continues to have reasonable control over calcium.  Taper over the next month down to 20 mg daily.  Will repeat labs at next clinic visit and if still being maintained on 20 mg we will consider further weaning.  Remarkably her blood glucose has not been an issue despite the steroid use.  Most recent blood glucose was 90 and that was done a week ago.    - predniSONE (DELTASONE) 10 MG tablet; Take 4 tablets by mouth Daily. 4 tabs daily for 2 weeks then 3 tabs daily for 2 weeks  Dispense: 98 tablet; Refill: 0  - predniSONE (DELTASONE) 10 MG tablet; Take 2 tablets by mouth Daily. Start after finishing previous tapering prescription  Dispense: 60 tablet; Refill: 1    3. Rheumatoid arthritis, involving unspecified site, unspecified whether rheumatoid factor present  Advised her to follow-up with her rheumatologist, Dr. Capone, and send another referral.  He was previously prescribing mycophenolate and methotrexate for her RA and sarcoid.  These have been held  secondary to renal dysfunction.  Unclear if she will need to be on an alternative medication given the multiple diagnoses.    - Ambulatory Referral to Rheumatology    4. Stage 3b chronic kidney disease  Appears to be improving.  Unclear if all of this was related back to medication side effects.  Continues to follow with nephrology       Follow Up:   Return in about 6 weeks (around 8/16/2024) for Labs today.    Cydney Walsh MD  Pulmonary/Critical Care Physician   [place]    This is electronically signed by Cydney Walsh MD  07/05/2024 10:08 EDT       Please note that portions of this note may have been completed with a voice recognition program. Efforts were made to edit the dictations, but occasionally words are mistranscribed.

## 2024-07-08 ENCOUNTER — TRANSCRIBE ORDERS (OUTPATIENT)
Dept: GENERAL RADIOLOGY | Facility: HOSPITAL | Age: 59
End: 2024-07-08

## 2024-07-08 DIAGNOSIS — R93.89 ABNORMAL FINDINGS ON EXAMINATION OF BODY STRUCTURE: Primary | ICD-10-CM

## 2024-07-09 ENCOUNTER — PATIENT ROUNDING (BHMG ONLY) (OUTPATIENT)
Dept: PULMONOLOGY | Facility: CLINIC | Age: 59
End: 2024-07-09
Payer: MEDICAID

## 2024-07-15 ENCOUNTER — HOSPITAL ENCOUNTER (OUTPATIENT)
Dept: ULTRASOUND IMAGING | Facility: HOSPITAL | Age: 59
Discharge: HOME | End: 2024-07-15
Payer: MEDICAID

## 2024-07-15 DIAGNOSIS — R93.89 ABNORMAL FINDINGS ON EXAMINATION OF BODY STRUCTURE: ICD-10-CM

## 2024-07-15 PROCEDURE — 76536 US EXAM OF HEAD AND NECK: CPT

## 2024-07-15 PROCEDURE — 76856 US EXAM PELVIC COMPLETE: CPT

## 2024-07-17 ENCOUNTER — HOSPITAL ENCOUNTER (OUTPATIENT)
Dept: GENERAL RADIOLOGY | Facility: HOSPITAL | Age: 59
Discharge: HOME | End: 2024-07-17
Admitting: INTERNAL MEDICINE
Payer: MEDICAID

## 2024-07-17 ENCOUNTER — TRANSCRIBE ORDERS (OUTPATIENT)
Dept: GENERAL RADIOLOGY | Facility: HOSPITAL | Age: 59
End: 2024-07-17
Payer: MEDICAID

## 2024-07-17 DIAGNOSIS — M15.0 PRIMARY GENERALIZED HYPERTROPHIC OSTEOARTHROSIS: ICD-10-CM

## 2024-07-17 DIAGNOSIS — D86.9 SARCOIDOSIS: ICD-10-CM

## 2024-07-17 DIAGNOSIS — M15.0 PRIMARY GENERALIZED HYPERTROPHIC OSTEOARTHROSIS: Primary | ICD-10-CM

## 2024-07-17 DIAGNOSIS — M06.09 RHEUMATOID ARTHRITIS OF MULTIPLE SITES WITHOUT RHEUMATOID FACTOR: ICD-10-CM

## 2024-07-17 PROCEDURE — 73521 X-RAY EXAM HIPS BI 2 VIEWS: CPT

## 2024-07-23 ENCOUNTER — TELEPHONE (OUTPATIENT)
Dept: PULMONOLOGY | Facility: CLINIC | Age: 59
End: 2024-07-23
Payer: MEDICAID

## 2024-07-23 NOTE — TELEPHONE ENCOUNTER
Hub staff attempted to follow warm transfer process and was unsuccessful     Caller: Jamia Hinton    Relationship: Self    Best call back number: [phone]     Which medication are you concerned about:   - METHOTREXATE  - FOLIC ACID  AND   - PREDNISONE STEROIDS    Who prescribed you this medication: PATIENT WAS PRESCRIBED THE FIRST TWO BY  AND HAS NOT BEEN ABLE TO GET [facility] OF HIS OFFICE TO ASK IF IT IS OKAY TO CONTINUE THE PREDNISONE WHILE TAKING THE NEW MEDS.

## 2024-07-23 NOTE — TELEPHONE ENCOUNTER
Called patient and advised her to talk to ask provider that started her on the methotrexate.  Patient states she will call rheumatologist for advise.

## 2024-08-07 DIAGNOSIS — E83.52 HYPERCALCEMIA: ICD-10-CM

## 2024-08-07 RX ORDER — PREDNISONE 20 MG/1
50 TABLET ORAL DAILY
Qty: 75 TABLET | Refills: 3 | Status: SHIPPED | OUTPATIENT
Start: 2024-08-07

## 2024-08-16 ENCOUNTER — OFFICE VISIT (OUTPATIENT)
Dept: PULMONOLOGY | Facility: CLINIC | Age: 59
End: 2024-08-16
Payer: MEDICAID

## 2024-08-16 VITALS
OXYGEN SATURATION: 97 % | DIASTOLIC BLOOD PRESSURE: 82 MMHG | HEIGHT: 66 IN | BODY MASS INDEX: 46.28 KG/M2 | WEIGHT: 288 LBS | HEART RATE: 79 BPM | SYSTOLIC BLOOD PRESSURE: 144 MMHG | RESPIRATION RATE: 18 BRPM

## 2024-08-16 DIAGNOSIS — E83.52 HYPERCALCEMIA: Primary | ICD-10-CM

## 2024-08-16 DIAGNOSIS — D86.9 SARCOIDOSIS: ICD-10-CM

## 2024-08-16 DIAGNOSIS — R06.02 SHORTNESS OF BREATH: ICD-10-CM

## 2024-08-16 DIAGNOSIS — N18.32 STAGE 3B CHRONIC KIDNEY DISEASE: ICD-10-CM

## 2024-08-16 DIAGNOSIS — M06.9 RHEUMATOID ARTHRITIS, INVOLVING UNSPECIFIED SITE, UNSPECIFIED WHETHER RHEUMATOID FACTOR PRESENT: ICD-10-CM

## 2024-08-16 PROCEDURE — 3077F SYST BP >= 140 MM HG: CPT | Performed by: INTERNAL MEDICINE

## 2024-08-16 PROCEDURE — 3079F DIAST BP 80-89 MM HG: CPT | Performed by: INTERNAL MEDICINE

## 2024-08-16 PROCEDURE — 99214 OFFICE O/P EST MOD 30 MIN: CPT | Performed by: INTERNAL MEDICINE

## 2024-08-16 RX ORDER — CITALOPRAM 20 MG/1
1 TABLET ORAL DAILY
COMMUNITY
Start: 2024-08-07 | End: 2024-08-16

## 2024-08-16 RX ORDER — PREDNISONE 5 MG/1
15 TABLET ORAL DAILY
Qty: 42 TABLET | Refills: 0 | Status: SHIPPED | OUTPATIENT
Start: 2024-08-16 | End: 2024-08-30

## 2024-08-16 RX ORDER — SUCRALFATE 1 G/1
TABLET ORAL
COMMUNITY
Start: 2024-08-07 | End: 2024-08-16

## 2024-08-16 RX ORDER — PREDNISONE 10 MG/1
TABLET ORAL
COMMUNITY
Start: 2024-08-10 | End: 2024-08-16

## 2024-08-16 RX ORDER — OMEPRAZOLE 40 MG/1
1 CAPSULE, DELAYED RELEASE ORAL DAILY
COMMUNITY
Start: 2024-08-07

## 2024-08-16 RX ORDER — FOLIC ACID 1 MG/1
1 TABLET ORAL DAILY
COMMUNITY
Start: 2024-08-07 | End: 2024-08-16

## 2024-08-16 RX ORDER — PREDNISONE 5 MG/1
10 TABLET ORAL DAILY
Qty: 28 TABLET | Refills: 0 | Status: SHIPPED | OUTPATIENT
Start: 2024-08-30 | End: 2024-09-13

## 2024-08-16 RX ORDER — PREDNISONE 5 MG/1
5 TABLET ORAL DAILY
Qty: 30 TABLET | Refills: 1 | Status: SHIPPED | OUTPATIENT
Start: 2024-09-13

## 2024-08-16 NOTE — PROGRESS NOTES
Pulmonary follow-up office Visit      Patient Name: Jamia Hinton    Chief Complaint:    Chief Complaint   Patient presents with    Breathing Problem    Follow-up       Subjective: Jamia Hinton is a 58 y.o. female who is here today for follow-up.    Past medical history:   Patient was initially seen in April 2024 in the hospital secondary to hypercalcemia felt to be related to her history of sarcoidosis.  She was initially diagnosed with sarcoidosis about 3 years ago after an eye exam and there was referred to Dr. Da Silva at [facility] was initially started on methotrexate, folic acid and mycophenolate, but does not appear to have ever done a trial of prednisone.  Her physician was then transitioned to Dr. Ceballos after Dr. Da Silva left the clinic and was continued on the same regimen, but appears that lab work was not done.  She was then referred to Dr. Capone and diagnosed with rheumatoid arthritis and remains on methotrexate and CellCept.  However, she missed her last rheumatology appointment.  She then developed nausea vomiting and fatigue and was seen by primary care where methotrexate and CellCept were stopped secondary to lab abnormalities.  Initially admission calcium was 13.9 and creatinine was 3.7.  Patient was treated with IV fluid hydration and Lasix and creatinine went down to 2.29 and calcium down to 11.2 prior to discharge.  However required readmission secondary to calcium 14.1 and creatinine 3.8 was treated with fluid resuscitation, calcitonin with improvement to creatinine of 2.62 and calcium of 10.  Her labs improved from a calcium of 14 down to 10 within 24 hours and there is some question whether or not the calcium of 14 was not accurate result.  She was also discharged home on prednisone of 50 mg daily.    Since last visit, she has been seen by rheumatology and started on methotrexate, but had to be stopped due to increasing creatinine levels.  It was stopped last week. Labs from  "earlier this week showed a calcium of 9.7.  Overall, feeling better after methotrexate was stopped.    Currently on prednisone 20 mg daily and doing well with that.  She has noticed less difficulty with sleeping and appetite since weaning down on steroids.  She feels like her face is no longer as swollen.      Subjective      Review of Systems:   Review of Systems   Constitutional:  Positive for unexpected weight gain.   Respiratory:  Negative for shortness of breath.        Social History:   Social History     Socioeconomic History    Marital status:    Tobacco Use    Smoking status: Never    Smokeless tobacco: Never   Vaping Use    Vaping status: Never Used   Substance and Sexual Activity    Alcohol use: No    Drug use: No    Sexual activity: Defer       Medications:     Current Outpatient Medications:     amLODIPine (NORVASC) 10 MG tablet, Take 1 tablet by mouth Daily., Disp: , Rfl:     iron polysaccharides (NIFEREX) 150 MG capsule, Take 1 capsule by mouth Daily., Disp: 30 capsule, Rfl: 0    omeprazole (priLOSEC) 40 MG capsule, Take 1 capsule by mouth Daily., Disp: , Rfl:     predniSONE (DELTASONE) 5 MG tablet, Take 3 tablets by mouth Daily for 14 days., Disp: 42 tablet, Rfl: 0    [START ON 8/30/2024] predniSONE (DELTASONE) 5 MG tablet, Take 2 tablets by mouth Daily for 14 days., Disp: 28 tablet, Rfl: 0    [START ON 9/13/2024] predniSONE (DELTASONE) 5 MG tablet, Take 1 tablet by mouth Daily., Disp: 30 tablet, Rfl: 1    Allergies:   Allergies   Allergen Reactions    Citalopram Hydrobromide Unknown - High Severity     Couldn't function       Objective     Physical Exam:  Vital Signs:   Vitals:    08/16/24 1017   BP: 144/82   Pulse: 79   Resp: 18   SpO2: 97%   Weight: 131 kg (288 lb)   Height: 167.6 cm (66\")       Physical Exam  Vitals and nursing note reviewed.   Constitutional:       General: She is not in acute distress.     Appearance: She is well-developed. She is obese.   HENT:      Head: Normocephalic " and atraumatic.      Comments: Improved steroid facies     Right Ear: External ear normal.      Left Ear: External ear normal.      Nose: Nose normal. No congestion or rhinorrhea.      Mouth/Throat:      Mouth: Mucous membranes are moist.      Pharynx: Oropharynx is clear. No oropharyngeal exudate or posterior oropharyngeal erythema.   Eyes:      General:         Right eye: No discharge.         Left eye: No discharge.      Extraocular Movements: Extraocular movements intact.      Conjunctiva/sclera: Conjunctivae normal.   Neck:      Trachea: No tracheal deviation.   Cardiovascular:      Rate and Rhythm: Normal rate and regular rhythm.      Heart sounds: No murmur heard.  Pulmonary:      Effort: No respiratory distress.      Breath sounds: No wheezing or rhonchi.   Abdominal:      General: There is no distension.      Palpations: Abdomen is soft.   Musculoskeletal:         General: No tenderness. Normal range of motion.      Cervical back: Normal range of motion and neck supple.      Right lower leg: No edema.      Left lower leg: No edema.   Skin:     General: Skin is warm and dry.      Findings: No rash.   Neurological:      Mental Status: She is alert and oriented to person, place, and time.      Motor: No weakness.      Coordination: Coordination normal.      Gait: Gait normal.   Psychiatric:         Mood and Affect: Mood normal.         Behavior: Behavior normal.         Thought Content: Thought content normal.         Judgment: Judgment normal.       Mallampati Score: II (hard and soft palate, upper portion of tonsils anduvula visible)    Results Review:   Labs: Reviewed.  August 12, 2024 creatinine 1.52, calcium 9.7, glucose 100, hemoglobin 13.8, platelets 278, eosinophils 144  June 2024 outside labs showed creatinine 1.49 and calcium 9.1  May 2024 outside labs showed creatinine 1.67, calcium 8.7    Lab Results   Component Value Date    GLUCOSE 81 04/28/2024    BUN 43 (H) 04/28/2024    CREATININE 2.62 (H)  "04/28/2024    EGFR 20.3 (L) 04/28/2024    BCR 16.4 04/28/2024    K 4.1 04/28/2024    CO2 20.6 (L) 04/28/2024    CALCIUM 10.0 04/28/2024    ALBUMIN 3.5 04/28/2024    BILITOT 0.4 04/24/2024    AST 13 04/24/2024    ALT 15 04/24/2024       No results found for: \"CBCDIF\", \"CMP\"     Micro: As of August 16, 2024   No results found for: \"RESPCX\"  No results found for: \"BLOODCX\"  Lab Results   Component Value Date    URINECX >100,000 CFU/mL Escherichia coli (A) 12/13/2017     No results found for: \"MRSACX\"  No results found for: \"MRSAPCR\"  No results found for: \"URCX\"  No components found for: \"LOWRESPCF\"  No results found for: \"THROATCX\"  No results found for: \"CULTURES\"  No components found for: \"STREPBCX\"  No results found for: \"STREPPNEUAG\"  No results found for: \"LEGIONELLA\"  No results found for: \"MYCOPLASCX\"  No results found for: \"GCCX\"  No results found for: \"WOUNDCX\"  No results found for: \"BODYFLDCX\"    ABG: No results found for: \"PHART\", \"NQA9EYN\", \"PO2ART\", \"HGBBG\", \"L3EQDAVC\", \"CFIO2\", \"FCOHB\", \"CARBOXYHGB\", \"FMETHB\"    Echo:     Radiology Scans:   Last CT scan was reviewed in great detail with the patient. Images reviewed personally.   NM PET/CT SKULL BASE TO MID THIGH     Date of Exam: 6/28/2024 8:43 AM EDT     Indication: r93.1 d86.9 n19. Sarcoidosis, remote history of cervical cancer     Comparison: CT chest, abdomen, and pelvis of 4/7/2024. No prior PET exams.     Technique: 12.38 mCi of F-18 FDG was administered intravenously. PET imaging was obtained from skull base to mid-thigh approximately 60 minutes after radiotracer injection. A low dose non contrast CT was obtained for attenuation correction and anatomic   localization. Fused PET-CT and 3D MIP reconstructions were utilized for image interpretation.  Fasting blood glucose level: 90 mg/dl.     Reference uptake values:  Mediastinum: 4.77 SUVmax  Liver: 5.53 SUVmax  Normalization method: Body Weight     Findings:  Head and neck: There is a calcified " hypermetabolic small lesion of the left thyroid lobe with an SUV max of 8.29. There is normal uptake by the muscles of phonation. There is symmetric brain activity. There are no enlarged or hypermetabolic neck nodes.     CHEST: There is no hypermetabolic mass or adenopathy. No pulmonary nodules or masses are identified, given the limitations of nonbreath-hold low-dose technique.     Abdomen and pelvis: There is expected excretion by the GI and  tracts. There is no abdominal mass or adenopathy. There is hypermetabolic activity associated with the uterine fundus which appears lobulated in contour and demonstrates SUV max of 28.42.   There is no hypermetabolic activity of the uterine cervix.     MUSCULOSKELETAL: No hypermetabolic bone lesions are present.     IMPRESSION:  Impression:  Hypermetabolic focus of the left thyroid lobe. Correlation with thyroid ultrasound is recommended.     Hypermetabolic focus associated with the uterine fundus. Malignancy is not excluded. Correlation with pelvic ultrasound is recommended.     Otherwise negative PET exam.        Electronically Signed: Akanksha Lucas MD    7/3/2024 8:34 AM EDT    Workstation ID: [id number]    Results for orders placed during the hospital encounter of 04/07/24    CT Chest Without Contrast Diagnostic    Narrative  PROCEDURE: CT CHEST WO CONTRAST DIAGNOSTIC-    HISTORY: Weakness, hx of sarcoidosis, s/p endoscopy    COMPARISON: No previous chest CT for comparison.    PROCEDURE: Axial images were obtained from the lung apex to the mid  abdomen by computed tomography. This study was performed with techniques  to keep radiation doses as low as reasonably achievable, (ALARA).  Individualized dose reduction techniques using automated exposure  control or adjustment of mA and/or kV according to the patient size were  employed.    FINDINGS:    CHEST: There is no suspicious axillary adenopathy. Vascular  calcifications noted. There is mildly prominent right  pretracheal lymph  node in question fullness of the right hilum. Chest CT with contrast  would be helpful for further evaluation in this patient with known  sarcoidosis. The heart is proper size. There is no pericardial or  pleural effusion.No suspicious infiltrate or nodule identified. Limited  images of the upper abdomen demonstrate cholecystectomy clips. Spleen is  incompletely visualized but appears at least mildly enlarged to 13 cm.  No bony destructive lesion seen. No mediastinal air identified. No  significant wall thickening of the esophagus identified. No pneumothorax  seen.    Impression  No acute infiltrate identified.    Question mild mediastinal and hilar adenopathy, consider chest CT with  intravenous contrast.        CTDI: 7.44 mGy  DLP:1288.51 mGy.cm    This report was signed and finalized on 4/7/2024 3:36 PM by Nova Koch MD.        PFT IMPRESSION:   None available for review    Assessment / Plan      Assessment/Plan:    1. Hypercalcemia  Appears to be stable and will continue tapering prednisone.  Decrease to 15 mg a day for 2 weeks then   10mg daily for 2 weeks then   5 mg daily.  We will follow up with lab work after being on 5mg daily for 2 weeks    Reviewed recent labs with patient and her son.  I have gone ahead and ordered repeat lab work to be done prior to next clinic visit or earlier if she starts feeling worse.    - predniSONE (DELTASONE) 5 MG tablet; Take 3 tablets by mouth Daily for 14 days.  Dispense: 42 tablet; Refill: 0  - predniSONE (DELTASONE) 5 MG tablet; Take 2 tablets by mouth Daily for 14 days.  Dispense: 28 tablet; Refill: 0  - predniSONE (DELTASONE) 5 MG tablet; Take 1 tablet by mouth Daily.  Dispense: 30 tablet; Refill: 1  - Comprehensive Metabolic Panel; Future    2. Sarcoidosis  Because of her refractory hypercalcemia.  Doing well on prednisone 20 mg daily and will continue to taper as calcium allows.    - predniSONE (DELTASONE) 5 MG tablet; Take 3 tablets by mouth  Daily for 14 days.  Dispense: 42 tablet; Refill: 0  - predniSONE (DELTASONE) 5 MG tablet; Take 2 tablets by mouth Daily for 14 days.  Dispense: 28 tablet; Refill: 0  - predniSONE (DELTASONE) 5 MG tablet; Take 1 tablet by mouth Daily.  Dispense: 30 tablet; Refill: 1    3. Stage 3b chronic kidney disease  Still following with nephrology.  Creatinine appears to be stable.    4. Rheumatoid arthritis, involving unspecified site, unspecified whether rheumatoid factor present  Request rheumatology records    5. Shortness of breath  Check PFTs prior to next clinic visit.  Has noticed some increasing shortness of breath, but may be related back to 40 pound weight gain with her prednisone treatment.    - Complete PFT - Pre & Post Bronchodilator; Future      Follow Up:   Return in about 5 weeks (around 9/20/2024) for Labs today.    Cydney Walsh MD  Pulmonary/Critical Care Physician   [place]    This is electronically signed by Cydney Walsh MD  07/05/2024 10:08 EDT       Please note that portions of this note may have been completed with a voice recognition program. Efforts were made to edit the dictations, but occasionally words are mistranscribed.

## 2024-08-20 ENCOUNTER — OFFICE VISIT (OUTPATIENT)
Dept: OBSTETRICS AND GYNECOLOGY | Facility: CLINIC | Age: 59
End: 2024-08-20
Payer: MEDICAID

## 2024-08-20 VITALS
HEIGHT: 66 IN | BODY MASS INDEX: 46.28 KG/M2 | SYSTOLIC BLOOD PRESSURE: 142 MMHG | WEIGHT: 288 LBS | DIASTOLIC BLOOD PRESSURE: 72 MMHG

## 2024-08-20 DIAGNOSIS — D21.9 FIBROID: ICD-10-CM

## 2024-08-20 DIAGNOSIS — R93.89 THICKENED ENDOMETRIUM: Primary | ICD-10-CM

## 2024-08-20 PROCEDURE — 3077F SYST BP >= 140 MM HG: CPT | Performed by: OBSTETRICS & GYNECOLOGY

## 2024-08-20 PROCEDURE — 3078F DIAST BP <80 MM HG: CPT | Performed by: OBSTETRICS & GYNECOLOGY

## 2024-08-20 PROCEDURE — 99203 OFFICE O/P NEW LOW 30 MIN: CPT | Performed by: OBSTETRICS & GYNECOLOGY

## 2024-08-20 PROCEDURE — 1159F MED LIST DOCD IN RCRD: CPT | Performed by: OBSTETRICS & GYNECOLOGY

## 2024-08-20 PROCEDURE — 1160F RVW MEDS BY RX/DR IN RCRD: CPT | Performed by: OBSTETRICS & GYNECOLOGY

## 2024-09-03 PROBLEM — D21.9 FIBROID: Status: ACTIVE | Noted: 2024-09-03

## 2024-09-03 PROBLEM — R93.89 THICKENED ENDOMETRIUM: Status: ACTIVE | Noted: 2024-09-03

## 2024-09-03 NOTE — PROGRESS NOTES
"GYN Office Visit  Subjective   Chief Complaint   Patient presents with    Fibroids     TVS- fibroids and thickened endometrium        Jamia Hinton is a 58 y.o. year old  presenting to be seen for uterine fibroids and thickened endometrium.    This patient was found on outside imaging to have several small intramural/subserosal fibroids.  The largest fibroid measured roughly 1 cm.  The endometrium was mildly thickened.  No vaginal bleeding in 2-3 years.    OB history:  x 1,  x 1  Pap smear:   Mammogram:        Objective   /72   Ht 167.6 cm (66\")   Wt 131 kg (288 lb)   LMP  (LMP Unknown) Comment: posyt menopausal  BMI 46.48 kg/m²     Physical Exam:  None    Medical decision making:    I reviewed her ultrasound results.  Normal-sized, anteverted uterus with at least 2 small intramural/subserosal fibroids present. The largest fibroid measures 1.1 cm. The endometrium measures 9.6 mm. Neither ovary is well-visualized. Multiple nabothian cysts are noted. No free fluid in the cul-de-sac.        Assessment   Small uterine fibroids  Thickened endometrium noted on ultrasound    We reviewed her situation in detail today.  She does have several small uterine fibroids and a mildly thickened endometrial lining on the ultrasound today. However, she does not have any vaginal bleeding.  We discussed hysteroscopic evaluation, endometrial biopsy and expectant management.  She would like to repeat an ultrasound in 4 months.  Call/return precautions reviewed.  All questions answered.    Coding/billing based on time: 30 total minutes were required for this encounter.    Evans Myers MD  Obstetrics and Gynecology  [facility]   "

## 2024-09-18 ENCOUNTER — LAB (OUTPATIENT)
Dept: LAB | Facility: HOSPITAL | Age: 59
End: 2024-09-18
Payer: MEDICAID

## 2024-09-18 ENCOUNTER — OFFICE VISIT (OUTPATIENT)
Dept: PULMONOLOGY | Facility: CLINIC | Age: 59
End: 2024-09-18
Payer: MEDICAID

## 2024-09-18 DIAGNOSIS — R06.02 SHORTNESS OF BREATH: ICD-10-CM

## 2024-09-18 PROCEDURE — 80053 COMPREHEN METABOLIC PANEL: CPT | Performed by: INTERNAL MEDICINE

## 2024-09-20 ENCOUNTER — OFFICE VISIT (OUTPATIENT)
Dept: PULMONOLOGY | Facility: CLINIC | Age: 59
End: 2024-09-20
Payer: MEDICAID

## 2024-09-20 VITALS
HEIGHT: 66 IN | WEIGHT: 290 LBS | SYSTOLIC BLOOD PRESSURE: 164 MMHG | HEART RATE: 70 BPM | OXYGEN SATURATION: 97 % | BODY MASS INDEX: 46.61 KG/M2 | DIASTOLIC BLOOD PRESSURE: 82 MMHG

## 2024-09-20 DIAGNOSIS — N18.32 STAGE 3B CHRONIC KIDNEY DISEASE: ICD-10-CM

## 2024-09-20 DIAGNOSIS — D86.9 SARCOIDOSIS: ICD-10-CM

## 2024-09-20 DIAGNOSIS — F32.9 REACTIVE DEPRESSION: ICD-10-CM

## 2024-09-20 DIAGNOSIS — E83.52 HYPERCALCEMIA: Primary | ICD-10-CM

## 2024-09-20 DIAGNOSIS — M06.9 RHEUMATOID ARTHRITIS, INVOLVING UNSPECIFIED SITE, UNSPECIFIED WHETHER RHEUMATOID FACTOR PRESENT: ICD-10-CM

## 2024-09-20 PROCEDURE — 99214 OFFICE O/P EST MOD 30 MIN: CPT | Performed by: INTERNAL MEDICINE

## 2024-09-20 PROCEDURE — 3079F DIAST BP 80-89 MM HG: CPT | Performed by: INTERNAL MEDICINE

## 2024-09-20 PROCEDURE — 3077F SYST BP >= 140 MM HG: CPT | Performed by: INTERNAL MEDICINE

## 2024-09-20 RX ORDER — AMLODIPINE BESYLATE 5 MG/1
2 TABLET ORAL DAILY
COMMUNITY
Start: 2024-08-31

## 2024-09-20 RX ORDER — PREDNISONE 5 MG/1
10 TABLET ORAL DAILY
Qty: 30 TABLET | Refills: 1 | Status: SHIPPED | OUTPATIENT
Start: 2024-09-20

## 2024-09-20 RX ORDER — TETRACYCLINE HYDROCHLORIDE 500 MG/1
500 CAPSULE ORAL
COMMUNITY
Start: 2024-09-10 | End: 2024-09-24

## 2024-09-20 RX ORDER — FUROSEMIDE 20 MG
1 TABLET ORAL DAILY
COMMUNITY
Start: 2024-09-05 | End: 2025-09-05

## 2024-10-18 DIAGNOSIS — E83.52 HYPERCALCEMIA: ICD-10-CM

## 2024-10-18 DIAGNOSIS — D86.9 SARCOIDOSIS: ICD-10-CM

## 2024-10-18 RX ORDER — PREDNISONE 5 MG/1
10 TABLET ORAL DAILY
Qty: 30 TABLET | Refills: 1 | Status: SHIPPED | OUTPATIENT
Start: 2024-10-18

## 2024-10-25 ENCOUNTER — OFFICE VISIT (OUTPATIENT)
Dept: PULMONOLOGY | Facility: CLINIC | Age: 59
End: 2024-10-25
Payer: MEDICAID

## 2024-10-25 VITALS
HEIGHT: 66 IN | BODY MASS INDEX: 46.93 KG/M2 | RESPIRATION RATE: 18 BRPM | OXYGEN SATURATION: 95 % | WEIGHT: 292 LBS | HEART RATE: 87 BPM | DIASTOLIC BLOOD PRESSURE: 72 MMHG | SYSTOLIC BLOOD PRESSURE: 132 MMHG

## 2024-10-25 DIAGNOSIS — D86.9 SARCOIDOSIS: ICD-10-CM

## 2024-10-25 DIAGNOSIS — E83.52 HYPERCALCEMIA: Primary | ICD-10-CM

## 2024-10-25 DIAGNOSIS — M06.9 RHEUMATOID ARTHRITIS, INVOLVING UNSPECIFIED SITE, UNSPECIFIED WHETHER RHEUMATOID FACTOR PRESENT: ICD-10-CM

## 2024-10-25 DIAGNOSIS — N18.32 STAGE 3B CHRONIC KIDNEY DISEASE: ICD-10-CM

## 2024-10-25 PROCEDURE — 3075F SYST BP GE 130 - 139MM HG: CPT | Performed by: INTERNAL MEDICINE

## 2024-10-25 PROCEDURE — 99214 OFFICE O/P EST MOD 30 MIN: CPT | Performed by: INTERNAL MEDICINE

## 2024-10-25 PROCEDURE — 3078F DIAST BP <80 MM HG: CPT | Performed by: INTERNAL MEDICINE

## 2024-10-25 RX ORDER — ALLOPURINOL 100 MG/1
100 TABLET ORAL DAILY
COMMUNITY
Start: 2024-10-07

## 2024-10-25 RX ORDER — PREDNISONE 10 MG/1
TABLET ORAL
COMMUNITY
Start: 2024-09-24 | End: 2024-10-25

## 2024-10-25 NOTE — PROGRESS NOTES
Pulmonary follow-up office Visit      Patient Name: Jamia Hinton    Chief Complaint:    Chief Complaint   Patient presents with    Breathing Problem    Follow-up       Subjective: Jamia Hinton is a 58 y.o. female who is here today for follow-up.    Past medical history:   Patient was initially seen in April 2024 in the hospital secondary to hypercalcemia felt to be related to her history of sarcoidosis.  She was initially diagnosed with sarcoidosis about 3 years ago after an eye exam and there was referred to Dr. Da Silva at [facility] was initially started on methotrexate, folic acid and mycophenolate, but does not appear to have ever done a trial of prednisone.  Her physician was then transitioned to Dr. Ceballos after Dr. Da Silva left the clinic and was continued on the same regimen, but appears that lab work was not done.  She was then referred to Dr. Capone and diagnosed with rheumatoid arthritis and remains on methotrexate and CellCept.  However, she missed her last rheumatology appointment.  She then developed nausea vomiting and fatigue and was seen by primary care where methotrexate and CellCept were stopped secondary to lab abnormalities.  Initially admission calcium was 13.9 and creatinine was 3.7.  Patient was treated with IV fluid hydration and Lasix and creatinine went down to 2.29 and calcium down to 11.2 prior to discharge.  However required readmission secondary to calcium 14.1 and creatinine 3.8 was treated with fluid resuscitation, calcitonin with improvement to creatinine of 2.62 and calcium of 10.  Her labs improved from a calcium of 14 down to 10 within 24 hours and there is some question whether or not the calcium of 14 was not accurate result.  She was also discharged home on prednisone of 50 mg daily.    Since last visit, she has been weaned off prednisone and has been off since 10/7 and last Ca 10.8.  She actually essentially did this at her own discretion because she was having  "such difficulty with the mood changes and felt so bad on the prednisone.  Unable to be seen at  [facility] due to her insurance. She is able to be seen at [facility] next month.    Overall, feeling much better off the steroids.  Her daughter is getting  and hoping to see [place] after that.    She has recently been seen by rheumatology and nephrology.  Creatinine had gone up to around 3 and was actually a little better on most recent check.    Subjective      Review of Systems:   Review of Systems   Constitutional:  Positive for fatigue.   Respiratory:  Negative for cough, shortness of breath and wheezing.    Cardiovascular:  Negative for chest pain.   Gastrointestinal:  Positive for nausea.   Psychiatric/Behavioral:  Negative for depressed mood.        Social History:   Social History     Socioeconomic History    Marital status:    Tobacco Use    Smoking status: Never    Smokeless tobacco: Never   Vaping Use    Vaping status: Never Used   Substance and Sexual Activity    Alcohol use: No    Drug use: No    Sexual activity: Not Currently       Medications:     Current Outpatient Medications:     allopurinol (ZYLOPRIM) 100 MG tablet, Take 1 tablet by mouth Daily., Disp: , Rfl:     amLODIPine (NORVASC) 5 MG tablet, Take 2 tablets by mouth Daily., Disp: , Rfl:     furosemide (LASIX) 20 MG tablet, Take 1 tablet by mouth Daily., Disp: , Rfl:     iron polysaccharides (NIFEREX) 150 MG capsule, Take 1 capsule by mouth Daily., Disp: 30 capsule, Rfl: 0    omeprazole (priLOSEC) 40 MG capsule, Take 1 capsule by mouth Daily., Disp: , Rfl:     Allergies:   Allergies   Allergen Reactions    Citalopram Hydrobromide Unknown - High Severity     Couldn't function       Objective     Physical Exam:  Vital Signs:   Vitals:    10/25/24 0949   BP: 132/72   Pulse: 87   Resp: 18   SpO2: 95%   Weight: 132 kg (292 lb)   Height: 167.6 cm (66\")         Physical Exam  Vitals and nursing note reviewed.   Constitutional: "       General: She is not in acute distress.     Appearance: She is well-developed. She is obese.   HENT:      Head: Normocephalic and atraumatic.      Comments: Improved steroid facies     Right Ear: External ear normal.      Left Ear: External ear normal.      Nose: Nose normal. No congestion.      Mouth/Throat:      Mouth: Mucous membranes are moist.      Pharynx: Oropharynx is clear. No oropharyngeal exudate or posterior oropharyngeal erythema.   Eyes:      General:         Right eye: No discharge.         Left eye: No discharge.      Extraocular Movements: Extraocular movements intact.      Conjunctiva/sclera: Conjunctivae normal.   Neck:      Trachea: No tracheal deviation.   Cardiovascular:      Rate and Rhythm: Normal rate and regular rhythm.      Heart sounds: No murmur heard.  Pulmonary:      Effort: No respiratory distress.      Breath sounds: No wheezing or rhonchi.   Abdominal:      General: There is no distension.      Palpations: Abdomen is soft.   Musculoskeletal:         General: No tenderness. Normal range of motion.      Cervical back: Normal range of motion and neck supple.      Right lower leg: No edema.      Left lower leg: No edema.   Skin:     General: Skin is warm and dry.      Findings: No rash.   Neurological:      Mental Status: She is alert and oriented to person, place, and time.      Motor: No weakness.      Coordination: Coordination normal.      Gait: Gait normal.   Psychiatric:         Mood and Affect: Mood normal.         Thought Content: Thought content normal.       Mallampati Score: II (hard and soft palate, upper portion of tonsils anduvula visible)    Results Review:   Labs: Reviewed.  October 23, 2024 creatinine 2.67, Ca 10.8  October 7, 2024 Ca 10.6  October 2, 2024 Ca 10.2  August 12, 2024 creatinine 1.52, calcium 9.7, glucose 100, hemoglobin 13.8, platelets 278, eosinophils 144  June 2024 outside labs showed creatinine 1.49 and calcium 9.1  May 2024 outside labs showed  "creatinine 1.67, calcium 8.7    Lab Results   Component Value Date    GLUCOSE 94 09/18/2024    BUN 37 (H) 09/18/2024    CREATININE 1.81 (H) 09/18/2024    EGFR 31.7 (L) 09/18/2024    BCR 20.4 09/18/2024    K 4.0 09/18/2024    CO2 29.5 (H) 09/18/2024    CALCIUM 10.4 09/18/2024    ALBUMIN 4.0 09/18/2024    BILITOT 0.5 09/18/2024    AST 23 09/18/2024    ALT 18 09/18/2024     Lab Results   Component Value Date    WBC 15.83 (H) 04/28/2024    HGB 10.4 (L) 04/28/2024    HCT 32.7 (L) 04/28/2024    MCV 88.1 04/28/2024     04/28/2024         No results found for: \"CBCDIF\", \"CMP\"     Micro: As of October 25, 2024   No results found for: \"RESPCX\"  No results found for: \"BLOODCX\"  Lab Results   Component Value Date    URINECX >100,000 CFU/mL Escherichia coli (A) 12/13/2017     No results found for: \"MRSACX\"  No results found for: \"MRSAPCR\"  No results found for: \"URCX\"  No components found for: \"LOWRESPCF\"  No results found for: \"THROATCX\"  No results found for: \"CULTURES\"  No components found for: \"STREPBCX\"  No results found for: \"STREPPNEUAG\"  No results found for: \"LEGIONELLA\"  No results found for: \"MYCOPLASCX\"  No results found for: \"GCCX\"  No results found for: \"WOUNDCX\"  No results found for: \"BODYFLDCX\"    ABG: No results found for: \"PHART\", \"TYB3QNM\", \"PO2ART\", \"HGBBG\", \"S3GEWWXG\", \"CFIO2\", \"FCOHB\", \"CARBOXYHGB\", \"FMETHB\"    Echo:     Radiology Scans:   Last CT scan was reviewed in great detail with the patient. Images reviewed personally.   NM PET/CT SKULL BASE TO MID THIGH     Date of Exam: 6/28/2024 8:43 AM EDT     Indication: r93.1 d86.9 n19. Sarcoidosis, remote history of cervical cancer     Comparison: CT chest, abdomen, and pelvis of 4/7/2024. No prior PET exams.     Technique: 12.38 mCi of F-18 FDG was administered intravenously. PET imaging was obtained from skull base to mid-thigh approximately 60 minutes after radiotracer injection. A low dose non contrast CT was obtained for attenuation correction and " anatomic   localization. Fused PET-CT and 3D MIP reconstructions were utilized for image interpretation.  Fasting blood glucose level: 90 mg/dl.     Reference uptake values:  Mediastinum: 4.77 SUVmax  Liver: 5.53 SUVmax  Normalization method: Body Weight     Findings:  Head and neck: There is a calcified hypermetabolic small lesion of the left thyroid lobe with an SUV max of 8.29. There is normal uptake by the muscles of phonation. There is symmetric brain activity. There are no enlarged or hypermetabolic neck nodes.     CHEST: There is no hypermetabolic mass or adenopathy. No pulmonary nodules or masses are identified, given the limitations of nonbreath-hold low-dose technique.     Abdomen and pelvis: There is expected excretion by the GI and  tracts. There is no abdominal mass or adenopathy. There is hypermetabolic activity associated with the uterine fundus which appears lobulated in contour and demonstrates SUV max of 28.42.   There is no hypermetabolic activity of the uterine cervix.     MUSCULOSKELETAL: No hypermetabolic bone lesions are present.     IMPRESSION:  Impression:  Hypermetabolic focus of the left thyroid lobe. Correlation with thyroid ultrasound is recommended.     Hypermetabolic focus associated with the uterine fundus. Malignancy is not excluded. Correlation with pelvic ultrasound is recommended.     Otherwise negative PET exam.        Electronically Signed: Akanksha Lucas MD    7/3/2024 8:34 AM EDT    Workstation ID: [id number]    Results for orders placed during the hospital encounter of 04/07/24    CT Chest Without Contrast Diagnostic    Narrative  PROCEDURE: CT CHEST WO CONTRAST DIAGNOSTIC-    HISTORY: Weakness, hx of sarcoidosis, s/p endoscopy    COMPARISON: No previous chest CT for comparison.    PROCEDURE: Axial images were obtained from the lung apex to the mid  abdomen by computed tomography. This study was performed with techniques  to keep radiation doses as low as reasonably  achievable, (ALARA).  Individualized dose reduction techniques using automated exposure  control or adjustment of mA and/or kV according to the patient size were  employed.    FINDINGS:    CHEST: There is no suspicious axillary adenopathy. Vascular  calcifications noted. There is mildly prominent right pretracheal lymph  node in question fullness of the right hilum. Chest CT with contrast  would be helpful for further evaluation in this patient with known  sarcoidosis. The heart is proper size. There is no pericardial or  pleural effusion.No suspicious infiltrate or nodule identified. Limited  images of the upper abdomen demonstrate cholecystectomy clips. Spleen is  incompletely visualized but appears at least mildly enlarged to 13 cm.  No bony destructive lesion seen. No mediastinal air identified. No  significant wall thickening of the esophagus identified. No pneumothorax  seen.    Impression  No acute infiltrate identified.    Question mild mediastinal and hilar adenopathy, consider chest CT with  intravenous contrast.        CTDI: 7.44 mGy  DLP:1288.51 mGy.cm    This report was signed and finalized on 4/7/2024 3:36 PM by Nova Koch MD.        PFT IMPRESSION:   October 2024 FEV1 81%, FEV1/FVC 83. No significant bronchodilator responsiveness noted.  TLC 98%.   DL/VA  114%.     Assessment / Plan      Assessment/Plan:    1. Hypercalcemia  Last check was 10.8 and has been off steroids for about 2 weeks.  She is feeling better overall since stopping steroids secondary to the mood disturbances from chronic prednisone.    Has follow-up with nephrology coming up as well.  Will go ahead and repeat labs in a few weeks to ensure calcium is not significantly increasing.  She would really like to not have to be on chronic steroids if at all possible.    We had discussed possible [facility] referral at last visit, but appears she will be at [facility] due to insurance.  I agree that she needs [facility] given her significant and persistent hypercalcemia secondary to sarcoidosis.    2. Sarcoidosis  Off steroids currently.  Last calcium 10.8.    3. Stage 3b chronic kidney disease  Following with nephrology.    4. Rheumatoid arthritis, involving unspecified site, unspecified whether rheumatoid factor present  Following with rheumatology.  Request records again.    Follow Up:   Return in about 4 months (around 2/25/2025).    Cydney Walsh MD  Pulmonary/Critical Care Physician   [place]    This document was electronically signed by Cydney Walsh MD on 10/25/24 at 10:51 EDT        Please note that portions of this note may have been completed with a voice recognition program. Efforts were made to edit the dictations, but occasionally words are mistranscribed.

## 2024-11-14 ENCOUNTER — LAB (OUTPATIENT)
Dept: LAB | Facility: HOSPITAL | Age: 59
End: 2024-11-14
Payer: MEDICAID

## 2024-11-14 PROCEDURE — 80053 COMPREHEN METABOLIC PANEL: CPT | Performed by: INTERNAL MEDICINE

## 2024-11-15 ENCOUNTER — TELEPHONE (OUTPATIENT)
Dept: PULMONOLOGY | Facility: CLINIC | Age: 59
End: 2024-11-15
Payer: MEDICAID

## 2024-11-15 DIAGNOSIS — E83.52 HYPERCALCEMIA: Primary | ICD-10-CM

## 2024-11-15 DIAGNOSIS — D86.9 SARCOIDOSIS: ICD-10-CM

## 2024-11-15 NOTE — TELEPHONE ENCOUNTER
I called and discussed her calcium result of 13.8.  Patient states that overall she feels good from an energy level, but has been having more lower extremity swelling but have related it to being busy and trying to get ready for her daughter's wedding tomorrow.  I discussed that normally I would ask her to get admitted and get hydration and diuretics and further medical management of her calcium, but she does not want to do this with her daughter's wedding tomorrow.    I have encouraged her to really push hydration over the next few days.  She already drinks 1 IV hydration supplement daily and I have asked her to increase this and may need to increase her Lasix if needed.  I have asked her to go back on prednisone 20 mg today and tomorrow, but after her daughter's wedding to go back up to 40 mg daily.  She already has enough pills at home for this.  I have also asked her to repeat labs on Monday.  She was very appreciative of us coming up with a plan to keep her out of the hospital.  However, I explained that if her calcium is still this high then she will most likely need admission.  I also asked her to go to the emergency room if anything worsens over the weekend.

## 2024-11-18 ENCOUNTER — LAB (OUTPATIENT)
Dept: LAB | Facility: HOSPITAL | Age: 59
End: 2024-11-18
Payer: MEDICAID

## 2024-11-18 PROCEDURE — 80053 COMPREHEN METABOLIC PANEL: CPT | Performed by: INTERNAL MEDICINE

## 2024-11-19 ENCOUNTER — TELEPHONE (OUTPATIENT)
Dept: PULMONOLOGY | Facility: CLINIC | Age: 59
End: 2024-11-19
Payer: MEDICAID

## 2024-11-19 DIAGNOSIS — E83.52 HYPERCALCEMIA: Primary | ICD-10-CM

## 2024-11-19 NOTE — TELEPHONE ENCOUNTER
Called and discussed lab work with her.  Calcium has significantly reduced with addition of steroids and aggressive hydration over the weekend.  At this point she is on prednisone 40 mg daily and we plan to use decrease to prednisone 20mg daily for 1 week then go down to 10mg daily.  Will follow-up in clinic in about 1 month and she will get lab work drawn a few days prior to clinic visit.    I also discussed possible alternative therapy such as infliximab versus Acthar.  Given the dose adjustment needed for infliximab, she elected to use Acthar.  I will fill out paperwork and drug company will reach out to her via navigator to discuss the medication.

## 2024-12-06 ENCOUNTER — LAB (OUTPATIENT)
Dept: LAB | Facility: HOSPITAL | Age: 59
End: 2024-12-06
Payer: MEDICAID

## 2024-12-06 PROCEDURE — 80053 COMPREHEN METABOLIC PANEL: CPT | Performed by: INTERNAL MEDICINE

## 2024-12-10 ENCOUNTER — TELEPHONE (OUTPATIENT)
Dept: PULMONOLOGY | Facility: CLINIC | Age: 59
End: 2024-12-10

## 2024-12-13 ENCOUNTER — OFFICE VISIT (OUTPATIENT)
Dept: PULMONOLOGY | Facility: CLINIC | Age: 59
End: 2024-12-13
Payer: MEDICAID

## 2024-12-13 VITALS
DIASTOLIC BLOOD PRESSURE: 74 MMHG | HEART RATE: 78 BPM | WEIGHT: 293 LBS | SYSTOLIC BLOOD PRESSURE: 138 MMHG | RESPIRATION RATE: 18 BRPM | HEIGHT: 66 IN | BODY MASS INDEX: 47.09 KG/M2 | OXYGEN SATURATION: 94 %

## 2024-12-13 DIAGNOSIS — E83.52 HYPERCALCEMIA: Primary | ICD-10-CM

## 2024-12-13 DIAGNOSIS — M06.9 RHEUMATOID ARTHRITIS, INVOLVING UNSPECIFIED SITE, UNSPECIFIED WHETHER RHEUMATOID FACTOR PRESENT: ICD-10-CM

## 2024-12-13 DIAGNOSIS — D86.9 SARCOIDOSIS: ICD-10-CM

## 2024-12-13 DIAGNOSIS — N18.4 CKD (CHRONIC KIDNEY DISEASE) STAGE 4, GFR 15-29 ML/MIN: ICD-10-CM

## 2024-12-13 PROCEDURE — 3078F DIAST BP <80 MM HG: CPT | Performed by: INTERNAL MEDICINE

## 2024-12-13 PROCEDURE — 99214 OFFICE O/P EST MOD 30 MIN: CPT | Performed by: INTERNAL MEDICINE

## 2024-12-13 PROCEDURE — 3075F SYST BP GE 130 - 139MM HG: CPT | Performed by: INTERNAL MEDICINE

## 2024-12-13 RX ORDER — CARVEDILOL 25 MG/1
25 TABLET ORAL 2 TIMES DAILY WITH MEALS
COMMUNITY

## 2024-12-13 RX ORDER — ALBUTEROL SULFATE 90 UG/1
2 INHALANT RESPIRATORY (INHALATION) EVERY 4 HOURS PRN
COMMUNITY

## 2024-12-13 NOTE — PROGRESS NOTES
Pulmonary follow-up office Visit      Patient Name: Jamia Hinton    Chief Complaint:    Chief Complaint   Patient presents with    Breathing Problem    Follow-up       Subjective: Jamia Hinton is a 59 y.o. female who is here today for follow-up.    Past medical history:   Patient was initially seen in April 2024 in the hospital secondary to hypercalcemia felt to be related to her history of sarcoidosis.  She was initially diagnosed with sarcoidosis about 3 years ago after an eye exam and there was referred to Dr. Da Silva at [facility] was initially started on methotrexate, folic acid and mycophenolate, but does not appear to have ever done a trial of prednisone.  Her physician was then transitioned to Dr. Ceballos after Dr. Da Silva left the clinic and was continued on the same regimen, but appears that lab work was not done.  She was then referred to Dr. Capone and diagnosed with rheumatoid arthritis and remains on methotrexate and CellCept.  However, she missed her last rheumatology appointment.  She then developed nausea vomiting and fatigue and was seen by primary care where methotrexate and CellCept were stopped secondary to lab abnormalities.  Initially admission calcium was 13.9 and creatinine was 3.7.  Patient was treated with IV fluid hydration and Lasix and creatinine went down to 2.29 and calcium down to 11.2 prior to discharge.  However required readmission secondary to calcium 14.1 and creatinine 3.8 was treated with fluid resuscitation, calcitonin with improvement to creatinine of 2.62 and calcium of 10.  Her labs improved from a calcium of 14 down to 10 within 24 hours and there is some question whether or not the calcium of 14 was not accurate result.  She was also discharged home on prednisone of 50 mg daily.    Since last visit, she has been feeling better and calcium level has been decreasing with prednisone.  Has been on prednisone 10 mg daily and followed up with nephrology recently.   Last calcium level drawn there was 9.9.  Acthar gel is supposed to be shipped to her home today for her to start.  Still having trouble with significant fatigue, but there has been difficulty keeping her renal function under good control.  Still having difficulty sleeping and wakes up multiple times per night.  Less mood disturbance with this dose of prednisone.    Has appointment in [place] in February 2025.    Subjective      Review of Systems:   Review of Systems   Constitutional:  Positive for fatigue.   Respiratory:  Negative for cough and shortness of breath.    Gastrointestinal:  Positive for nausea.   Psychiatric/Behavioral:  Negative for depressed mood.        Social History:   Social History     Socioeconomic History    Marital status:    Tobacco Use    Smoking status: Never    Smokeless tobacco: Never   Vaping Use    Vaping status: Never Used   Substance and Sexual Activity    Alcohol use: No    Drug use: No    Sexual activity: Not Currently       Medications:     Current Outpatient Medications:     albuterol sulfate  (90 Base) MCG/ACT inhaler, Inhale 2 puffs Every 4 (Four) Hours As Needed for Wheezing., Disp: , Rfl:     allopurinol (ZYLOPRIM) 100 MG tablet, Take 1 tablet by mouth Daily. Takes 1/2 tablet daily, Disp: , Rfl:     AZITHROMYCIN PO, Take  by mouth., Disp: , Rfl:     carvedilol (COREG) 25 MG tablet, Take 1 tablet by mouth 2 (Two) Times a Day With Meals., Disp: , Rfl:     iron polysaccharides (NIFEREX) 150 MG capsule, Take 1 capsule by mouth Daily., Disp: 30 capsule, Rfl: 0    omeprazole (priLOSEC) 40 MG capsule, Take 1 capsule by mouth Daily., Disp: , Rfl:     amLODIPine (NORVASC) 5 MG tablet, Take 2 tablets by mouth Daily., Disp: , Rfl:     furosemide (LASIX) 20 MG tablet, Take 1 tablet by mouth Daily. (Patient not taking: Reported on 12/13/2024), Disp: , Rfl:     Allergies:   Allergies   Allergen Reactions    Citalopram Hydrobromide Unknown - High Severity     Couldn't  "function       Objective     Physical Exam:  Vital Signs:   Vitals:    12/13/24 0936   BP: 138/74   Pulse: 78   Resp: 18   SpO2: 94%   Weight: 134 kg (296 lb)   Height: 167.6 cm (66\")         Physical Exam  Vitals and nursing note reviewed.   Constitutional:       General: She is not in acute distress.     Appearance: She is well-developed. She is obese.   HENT:      Head: Normocephalic and atraumatic.      Comments: Improved steroid facies     Right Ear: External ear normal.      Left Ear: External ear normal.      Nose: Nose normal. No congestion.      Mouth/Throat:      Mouth: Mucous membranes are moist.      Pharynx: Oropharynx is clear.   Eyes:      Extraocular Movements: Extraocular movements intact.      Conjunctiva/sclera: Conjunctivae normal.   Neck:      Trachea: No tracheal deviation.   Cardiovascular:      Rate and Rhythm: Normal rate and regular rhythm.      Heart sounds: No murmur heard.  Pulmonary:      Effort: No respiratory distress.      Breath sounds: No wheezing or rhonchi.   Abdominal:      General: There is no distension.      Palpations: Abdomen is soft.   Musculoskeletal:         General: No tenderness. Normal range of motion.      Cervical back: Normal range of motion and neck supple.      Right lower leg: No edema.      Left lower leg: No edema.   Skin:     General: Skin is warm and dry.      Findings: No rash.   Neurological:      Mental Status: She is alert and oriented to person, place, and time.      Motor: No weakness.      Coordination: Coordination normal.      Gait: Gait normal.   Psychiatric:         Mood and Affect: Mood normal.         Thought Content: Thought content normal.       Mallampati Score: II (hard and soft palate, upper portion of tonsils anduvula visible)    Results Review:   Labs: Reviewed.      Lab Results   Component Value Date    GLUCOSE 107 (H) 12/06/2024    BUN 42 (H) 12/06/2024    CREATININE 3.06 (H) 12/06/2024    EGFR 16.8 (L) 12/06/2024    BCR 13.7 12/06/2024 " "   K 4.3 12/06/2024    CO2 26.0 12/06/2024    CALCIUM 10.1 12/06/2024    ALBUMIN 3.6 12/06/2024    BILITOT 0.5 12/06/2024    AST 16 12/06/2024    ALT 14 12/06/2024     Lab Results   Component Value Date    WBC 15.83 (H) 04/28/2024    HGB 10.4 (L) 04/28/2024    HCT 32.7 (L) 04/28/2024    MCV 88.1 04/28/2024     04/28/2024         No results found for: \"CBCDIF\", \"CMP\"     Micro: As of December 13, 2024   No results found for: \"RESPCX\"  No results found for: \"BLOODCX\"  Lab Results   Component Value Date    URINECX >100,000 CFU/mL Escherichia coli (A) 12/13/2017     No results found for: \"MRSACX\"  No results found for: \"MRSAPCR\"  No results found for: \"URCX\"  No components found for: \"LOWRESPCF\"  No results found for: \"THROATCX\"  No results found for: \"CULTURES\"  No components found for: \"STREPBCX\"  No results found for: \"STREPPNEUAG\"  No results found for: \"LEGIONELLA\"  No results found for: \"MYCOPLASCX\"  No results found for: \"GCCX\"  No results found for: \"WOUNDCX\"  No results found for: \"BODYFLDCX\"    ABG: No results found for: \"PHART\", \"YIK6GVW\", \"PO2ART\", \"HGBBG\", \"G1DCQWGF\", \"CFIO2\", \"FCOHB\", \"CARBOXYHGB\", \"FMETHB\"    Echo:     Radiology Scans:   Last CT scan was reviewed in great detail with the patient. Images reviewed personally.   NM PET/CT SKULL BASE TO MID THIGH     Date of Exam: 6/28/2024 8:43 AM EDT     Indication: r93.1 d86.9 n19. Sarcoidosis, remote history of cervical cancer     Comparison: CT chest, abdomen, and pelvis of 4/7/2024. No prior PET exams.     Technique: 12.38 mCi of F-18 FDG was administered intravenously. PET imaging was obtained from skull base to mid-thigh approximately 60 minutes after radiotracer injection. A low dose non contrast CT was obtained for attenuation correction and anatomic   localization. Fused PET-CT and 3D MIP reconstructions were utilized for image interpretation.  Fasting blood glucose level: 90 mg/dl.     Reference uptake values:  Mediastinum: 4.77 " SUVmax  Liver: 5.53 SUVmax  Normalization method: Body Weight     Findings:  Head and neck: There is a calcified hypermetabolic small lesion of the left thyroid lobe with an SUV max of 8.29. There is normal uptake by the muscles of phonation. There is symmetric brain activity. There are no enlarged or hypermetabolic neck nodes.     CHEST: There is no hypermetabolic mass or adenopathy. No pulmonary nodules or masses are identified, given the limitations of nonbreath-hold low-dose technique.     Abdomen and pelvis: There is expected excretion by the GI and  tracts. There is no abdominal mass or adenopathy. There is hypermetabolic activity associated with the uterine fundus which appears lobulated in contour and demonstrates SUV max of 28.42.   There is no hypermetabolic activity of the uterine cervix.     MUSCULOSKELETAL: No hypermetabolic bone lesions are present.     IMPRESSION:  Impression:  Hypermetabolic focus of the left thyroid lobe. Correlation with thyroid ultrasound is recommended.     Hypermetabolic focus associated with the uterine fundus. Malignancy is not excluded. Correlation with pelvic ultrasound is recommended.     Otherwise negative PET exam.        Electronically Signed: Akanksha Lucas MD    7/3/2024 8:34 AM EDT    Workstation ID: [id number]    Results for orders placed during the hospital encounter of 04/07/24    CT Chest Without Contrast Diagnostic    Narrative  PROCEDURE: CT CHEST WO CONTRAST DIAGNOSTIC-    HISTORY: Weakness, hx of sarcoidosis, s/p endoscopy    COMPARISON: No previous chest CT for comparison.    PROCEDURE: Axial images were obtained from the lung apex to the mid  abdomen by computed tomography. This study was performed with techniques  to keep radiation doses as low as reasonably achievable, (ALARA).  Individualized dose reduction techniques using automated exposure  control or adjustment of mA and/or kV according to the patient size were  employed.    FINDINGS:    CHEST: There  is no suspicious axillary adenopathy. Vascular  calcifications noted. There is mildly prominent right pretracheal lymph  node in question fullness of the right hilum. Chest CT with contrast  would be helpful for further evaluation in this patient with known  sarcoidosis. The heart is proper size. There is no pericardial or  pleural effusion.No suspicious infiltrate or nodule identified. Limited  images of the upper abdomen demonstrate cholecystectomy clips. Spleen is  incompletely visualized but appears at least mildly enlarged to 13 cm.  No bony destructive lesion seen. No mediastinal air identified. No  significant wall thickening of the esophagus identified. No pneumothorax  seen.    Impression  No acute infiltrate identified.    Question mild mediastinal and hilar adenopathy, consider chest CT with  intravenous contrast.        CTDI: 7.44 mGy  DLP:1288.51 mGy.cm    This report was signed and finalized on 4/7/2024 3:36 PM by Nova Koch MD.        PFT IMPRESSION:   October 2024 FEV1 81%, FEV1/FVC 83. No significant bronchodilator responsiveness noted.  TLC 98%.   DL/VA  114%.     Assessment / Plan      Assessment/Plan:    1. Hypercalcemia  Prednisone 10 mg daily currently, but plans to start Acthar gel today.  Advised to taper prednisone once Acthar is started as below:  Prednisone 10 mg daily for 1 week after starting Acthar  Then decrease to prednisone 5 mg daily for 1 week   Then decrease to 5 mg every other day for 1 week and then stop prednisone  Will repeat labs prior to next clinic visit and let me know if she has symptoms with decreasing doses of prednisone    - Comprehensive Metabolic Panel; Future    2. Sarcoidosis  Very difficult to control her calcium and fatigue as her biggest symptoms.  Trying alternative with Acthar as she had difficulty with methotrexate previously.  She would like to avoid a biologic if at all possible.  Also has an appointment for sarcoidosis clinic at [facility]  in a few months  - Comprehensive Metabolic Panel; Future    3. Rheumatoid arthritis, involving unspecified site, unspecified whether rheumatoid factor present  Follows with rheumatology Dr. Nicolas    4. CKD (chronic kidney disease) stage 4, GFR 15-29 ml/min  Continues to follow with nephrology.  Has advanced to stage IV kidney disease.        Follow Up:   Return in about 2 months (around 2/13/2025) for [place] is fine.    Cydney Walsh MD  Pulmonary/Critical Care Physician   [place]    This document was electronically signed by Cydney Walsh MD on 12/13/24 at 14:01 EST        Please note that portions of this note may have been completed with a voice recognition program. Efforts were made to edit the dictations, but occasionally words are mistranscribed.

## 2024-12-20 ENCOUNTER — PREP FOR SURGERY (OUTPATIENT)
Dept: OTHER | Facility: HOSPITAL | Age: 59
End: 2024-12-20
Payer: MEDICAID

## 2024-12-20 ENCOUNTER — OFFICE VISIT (OUTPATIENT)
Dept: OBSTETRICS AND GYNECOLOGY | Facility: CLINIC | Age: 59
End: 2024-12-20
Payer: MEDICAID

## 2024-12-20 ENCOUNTER — PRE-ADMISSION TESTING (OUTPATIENT)
Dept: PREADMISSION TESTING | Facility: HOSPITAL | Age: 59
End: 2024-12-20
Payer: MEDICAID

## 2024-12-20 VITALS
DIASTOLIC BLOOD PRESSURE: 80 MMHG | HEIGHT: 66 IN | SYSTOLIC BLOOD PRESSURE: 158 MMHG | BODY MASS INDEX: 47.09 KG/M2 | WEIGHT: 293 LBS

## 2024-12-20 VITALS — BODY MASS INDEX: 47.9 KG/M2 | WEIGHT: 293 LBS

## 2024-12-20 DIAGNOSIS — N85.9 LESION OF ENDOMETRIUM: ICD-10-CM

## 2024-12-20 DIAGNOSIS — R93.89 ENDOMETRIAL THICKENING ON ULTRASOUND: ICD-10-CM

## 2024-12-20 DIAGNOSIS — R93.89 ENDOMETRIAL THICKENING ON ULTRASOUND: Primary | ICD-10-CM

## 2024-12-20 LAB
ALBUMIN SERPL-MCNC: 4 G/DL (ref 3.5–5.2)
ALBUMIN/GLOB SERPL: 1.5 G/DL
ALP SERPL-CCNC: 47 U/L (ref 39–117)
ALT SERPL W P-5'-P-CCNC: 19 U/L (ref 1–33)
ANION GAP SERPL CALCULATED.3IONS-SCNC: 13.2 MMOL/L (ref 5–15)
AST SERPL-CCNC: 16 U/L (ref 1–32)
BACTERIA UR QL AUTO: ABNORMAL /HPF
BASOPHILS # BLD AUTO: 0.03 10*3/MM3 (ref 0–0.2)
BASOPHILS NFR BLD AUTO: 0.2 % (ref 0–1.5)
BILIRUB SERPL-MCNC: 0.3 MG/DL (ref 0–1.2)
BILIRUB UR QL STRIP: NEGATIVE
BUN SERPL-MCNC: 44 MG/DL (ref 8–23)
BUN/CREAT SERPL: 19.6 (ref 7–25)
CALCIUM SPEC-SCNC: 9.9 MG/DL (ref 8.6–10.5)
CHLORIDE SERPL-SCNC: 104 MMOL/L (ref 98–107)
CLARITY UR: CLEAR
CO2 SERPL-SCNC: 24.8 MMOL/L (ref 22–29)
COLOR UR: YELLOW
CREAT SERPL-MCNC: 2.24 MG/DL (ref 0.57–1)
DEPRECATED RDW RBC AUTO: 45.1 FL (ref 37–54)
EGFRCR SERPLBLD CKD-EPI 2021: 24.4 ML/MIN/1.73
EOSINOPHIL # BLD AUTO: 0.07 10*3/MM3 (ref 0–0.4)
EOSINOPHIL NFR BLD AUTO: 0.5 % (ref 0.3–6.2)
ERYTHROCYTE [DISTWIDTH] IN BLOOD BY AUTOMATED COUNT: 14.2 % (ref 12.3–15.4)
GLOBULIN UR ELPH-MCNC: 2.7 GM/DL
GLUCOSE SERPL-MCNC: 167 MG/DL (ref 65–99)
GLUCOSE UR STRIP-MCNC: ABNORMAL MG/DL
HCT VFR BLD AUTO: 38.5 % (ref 34–46.6)
HGB BLD-MCNC: 12.4 G/DL (ref 12–15.9)
HGB UR QL STRIP.AUTO: ABNORMAL
HYALINE CASTS UR QL AUTO: ABNORMAL /LPF
IMM GRANULOCYTES # BLD AUTO: 0.1 10*3/MM3 (ref 0–0.05)
IMM GRANULOCYTES NFR BLD AUTO: 0.6 % (ref 0–0.5)
KETONES UR QL STRIP: NEGATIVE
LEUKOCYTE ESTERASE UR QL STRIP.AUTO: ABNORMAL
LYMPHOCYTES # BLD AUTO: 0.74 10*3/MM3 (ref 0.7–3.1)
LYMPHOCYTES NFR BLD AUTO: 4.8 % (ref 19.6–45.3)
MCH RBC QN AUTO: 27.8 PG (ref 26.6–33)
MCHC RBC AUTO-ENTMCNC: 32.2 G/DL (ref 31.5–35.7)
MCV RBC AUTO: 86.3 FL (ref 79–97)
MONOCYTES # BLD AUTO: 0.96 10*3/MM3 (ref 0.1–0.9)
MONOCYTES NFR BLD AUTO: 6.2 % (ref 5–12)
NEUTROPHILS NFR BLD AUTO: 13.63 10*3/MM3 (ref 1.7–7)
NEUTROPHILS NFR BLD AUTO: 87.7 % (ref 42.7–76)
NITRITE UR QL STRIP: NEGATIVE
NRBC BLD AUTO-RTO: 0 /100 WBC (ref 0–0.2)
PH UR STRIP.AUTO: 6.5 [PH] (ref 5–8)
PLATELET # BLD AUTO: 232 10*3/MM3 (ref 140–450)
PMV BLD AUTO: 10.7 FL (ref 6–12)
POTASSIUM SERPL-SCNC: 4.4 MMOL/L (ref 3.5–5.2)
PROT SERPL-MCNC: 6.7 G/DL (ref 6–8.5)
PROT UR QL STRIP: ABNORMAL
RBC # BLD AUTO: 4.46 10*6/MM3 (ref 3.77–5.28)
RBC # UR STRIP: ABNORMAL /HPF
REF LAB TEST METHOD: ABNORMAL
SODIUM SERPL-SCNC: 142 MMOL/L (ref 136–145)
SP GR UR STRIP: 1.01 (ref 1–1.03)
SQUAMOUS #/AREA URNS HPF: ABNORMAL /HPF
UROBILINOGEN UR QL STRIP: ABNORMAL
WBC # UR STRIP: ABNORMAL /HPF
WBC NRBC COR # BLD AUTO: 15.53 10*3/MM3 (ref 3.4–10.8)

## 2024-12-20 PROCEDURE — 3079F DIAST BP 80-89 MM HG: CPT | Performed by: OBSTETRICS & GYNECOLOGY

## 2024-12-20 PROCEDURE — 80053 COMPREHEN METABOLIC PANEL: CPT

## 2024-12-20 PROCEDURE — 85025 COMPLETE CBC W/AUTO DIFF WBC: CPT

## 2024-12-20 PROCEDURE — 36415 COLL VENOUS BLD VENIPUNCTURE: CPT

## 2024-12-20 PROCEDURE — 99214 OFFICE O/P EST MOD 30 MIN: CPT | Performed by: OBSTETRICS & GYNECOLOGY

## 2024-12-20 PROCEDURE — 81001 URINALYSIS AUTO W/SCOPE: CPT

## 2024-12-20 PROCEDURE — 3077F SYST BP >= 140 MM HG: CPT | Performed by: OBSTETRICS & GYNECOLOGY

## 2024-12-20 RX ORDER — SPIRONOLACTONE 25 MG/1
1 TABLET ORAL DAILY
COMMUNITY
Start: 2024-10-29 | End: 2025-01-27

## 2024-12-20 RX ORDER — PREDNISONE 5 MG/1
5 TABLET ORAL DAILY
COMMUNITY

## 2024-12-20 RX ORDER — SODIUM CHLORIDE 9 MG/ML
40 INJECTION, SOLUTION INTRAVENOUS AS NEEDED
OUTPATIENT
Start: 2024-12-20

## 2024-12-20 RX ORDER — SODIUM CHLORIDE 0.9 % (FLUSH) 0.9 %
3 SYRINGE (ML) INJECTION EVERY 12 HOURS SCHEDULED
OUTPATIENT
Start: 2024-12-20

## 2024-12-20 RX ORDER — SODIUM CHLORIDE 0.9 % (FLUSH) 0.9 %
10 SYRINGE (ML) INJECTION AS NEEDED
OUTPATIENT
Start: 2024-12-20

## 2024-12-20 NOTE — PRE-PROCEDURE INSTRUCTIONS
PAT phone history completed with patient for upcoming procedure on 1/3/24.    PAT PASS reviewed with patient and he/she verbalized understanding of the following:     Do not eat or drink anything after midnight the night before procedure unless otherwise instructed by physician/surgeon's office, this includes no gum, candy, mints, tobacco products or e-cigarettes.  Do not shave the area to be operated on at least 48 hours prior to procedure.  Do not wear makeup, lotion, hair products, or nail polish.  Do not wear any jewelry and remove all piercings.  Do not wear any adhesive if you wear dentures.  Do not wear contacts; bring in glasses if needed.  Only take medications on the morning of procedure as instructed by PAT nurse per anesthesia guidelines or as instructed by physician's office.   If you are on any blood thinners reach out to the physician/surgeon's office for instructions on when/if they will need to be stopped prior to procedure.   Bring in picture ID and insurance card, advanced directive copies if applicable, CPAP/BIPAP/Inhalers if indicated morning of procedure, leave any other valuables at home.  Ensure you have arranged for someone to drive you home the day of your procedure and someone to care for you at home afterwards. It is recommended that you do not drive, drink alcohol, or make any major legal decisions for at least 24 hours after your procedure is complete.  ERAS instructions given to patient unless otherwise instructed per surgeon's orders.     Instructions given on hospital entrance and registration location.

## 2025-01-03 ENCOUNTER — HOSPITAL ENCOUNTER (OUTPATIENT)
Facility: HOSPITAL | Age: 60
Setting detail: HOSPITAL OUTPATIENT SURGERY
Discharge: HOME | End: 2025-01-03
Attending: OBSTETRICS & GYNECOLOGY | Admitting: OBSTETRICS & GYNECOLOGY
Payer: MEDICAID

## 2025-01-03 ENCOUNTER — ANESTHESIA (OUTPATIENT)
Dept: PERIOP | Facility: HOSPITAL | Age: 60
End: 2025-01-03
Payer: MEDICAID

## 2025-01-03 ENCOUNTER — ANESTHESIA EVENT (OUTPATIENT)
Dept: PERIOP | Facility: HOSPITAL | Age: 60
End: 2025-01-03
Payer: MEDICAID

## 2025-01-03 VITALS
SYSTOLIC BLOOD PRESSURE: 128 MMHG | OXYGEN SATURATION: 97 % | HEART RATE: 68 BPM | TEMPERATURE: 97 F | DIASTOLIC BLOOD PRESSURE: 70 MMHG | RESPIRATION RATE: 16 BRPM

## 2025-01-03 DIAGNOSIS — N85.9 LESION OF ENDOMETRIUM: ICD-10-CM

## 2025-01-03 DIAGNOSIS — R93.89 ENDOMETRIAL THICKENING ON ULTRASOUND: ICD-10-CM

## 2025-01-03 PROCEDURE — 25010000002 FENTANYL CITRATE (PF) 50 MCG/ML SOLUTION: Performed by: NURSE ANESTHETIST, CERTIFIED REGISTERED

## 2025-01-03 PROCEDURE — S0260 H&P FOR SURGERY: HCPCS | Performed by: OBSTETRICS & GYNECOLOGY

## 2025-01-03 PROCEDURE — 58558 HYSTEROSCOPY BIOPSY: CPT | Performed by: OBSTETRICS & GYNECOLOGY

## 2025-01-03 PROCEDURE — 25010000002 GLYCOPYRROLATE PF 0.4 MG/2ML SOLUTION: Performed by: NURSE ANESTHETIST, CERTIFIED REGISTERED

## 2025-01-03 PROCEDURE — 25010000002 MIDAZOLAM PER 1MG: Performed by: NURSE ANESTHETIST, CERTIFIED REGISTERED

## 2025-01-03 PROCEDURE — 25010000002 PROPOFOL 10 MG/ML EMULSION: Performed by: NURSE ANESTHETIST, CERTIFIED REGISTERED

## 2025-01-03 PROCEDURE — 25010000002 LIDOCAINE 1% - EPINEPHRINE 1:100000 1 %-1:100000 SOLUTION: Performed by: OBSTETRICS & GYNECOLOGY

## 2025-01-03 PROCEDURE — 25010000002 METOCLOPRAMIDE PER 10 MG: Performed by: NURSE ANESTHETIST, CERTIFIED REGISTERED

## 2025-01-03 PROCEDURE — C1782 MORCELLATOR: HCPCS | Performed by: OBSTETRICS & GYNECOLOGY

## 2025-01-03 RX ORDER — MAGNESIUM HYDROXIDE 1200 MG/15ML
LIQUID ORAL AS NEEDED
Status: DISCONTINUED | OUTPATIENT
Start: 2025-01-03 | End: 2025-01-03 | Stop reason: HOSPADM

## 2025-01-03 RX ORDER — KETAMINE HCL IN NACL, ISO-OSM 100MG/10ML
SYRINGE (ML) INJECTION AS NEEDED
Status: DISCONTINUED | OUTPATIENT
Start: 2025-01-03 | End: 2025-01-03 | Stop reason: SURG

## 2025-01-03 RX ORDER — SODIUM CHLORIDE 0.9 % (FLUSH) 0.9 %
10 SYRINGE (ML) INJECTION AS NEEDED
Status: DISCONTINUED | OUTPATIENT
Start: 2025-01-03 | End: 2025-01-03 | Stop reason: HOSPADM

## 2025-01-03 RX ORDER — SODIUM CHLORIDE 0.9 % (FLUSH) 0.9 %
3 SYRINGE (ML) INJECTION EVERY 12 HOURS SCHEDULED
Status: DISCONTINUED | OUTPATIENT
Start: 2025-01-03 | End: 2025-01-03 | Stop reason: HOSPADM

## 2025-01-03 RX ORDER — MIDAZOLAM HYDROCHLORIDE 2 MG/2ML
INJECTION, SOLUTION INTRAMUSCULAR; INTRAVENOUS AS NEEDED
Status: DISCONTINUED | OUTPATIENT
Start: 2025-01-03 | End: 2025-01-03 | Stop reason: SURG

## 2025-01-03 RX ORDER — LIDOCAINE HYDROCHLORIDE AND EPINEPHRINE 10; 10 MG/ML; UG/ML
INJECTION, SOLUTION INFILTRATION; PERINEURAL AS NEEDED
Status: DISCONTINUED | OUTPATIENT
Start: 2025-01-03 | End: 2025-01-03 | Stop reason: HOSPADM

## 2025-01-03 RX ORDER — SODIUM CHLORIDE 9 MG/ML
40 INJECTION, SOLUTION INTRAVENOUS AS NEEDED
Status: DISCONTINUED | OUTPATIENT
Start: 2025-01-03 | End: 2025-01-03 | Stop reason: HOSPADM

## 2025-01-03 RX ORDER — ACETAMINOPHEN 500 MG
1000 TABLET ORAL EVERY 8 HOURS PRN
Qty: 60 TABLET | Refills: 0 | Status: SHIPPED | OUTPATIENT
Start: 2025-01-03 | End: 2026-01-03

## 2025-01-03 RX ORDER — METOCLOPRAMIDE HYDROCHLORIDE 5 MG/ML
INJECTION INTRAMUSCULAR; INTRAVENOUS AS NEEDED
Status: DISCONTINUED | OUTPATIENT
Start: 2025-01-03 | End: 2025-01-03 | Stop reason: SURG

## 2025-01-03 RX ORDER — HYDROCODONE BITARTRATE AND ACETAMINOPHEN 5; 325 MG/1; MG/1
1 TABLET ORAL ONCE AS NEEDED
Status: DISCONTINUED | OUTPATIENT
Start: 2025-01-03 | End: 2025-01-03 | Stop reason: HOSPADM

## 2025-01-03 RX ORDER — IBUPROFEN 600 MG/1
600 TABLET, FILM COATED ORAL EVERY 6 HOURS PRN
Status: DISCONTINUED | OUTPATIENT
Start: 2025-01-03 | End: 2025-01-03 | Stop reason: HOSPADM

## 2025-01-03 RX ORDER — FENTANYL CITRATE 50 UG/ML
INJECTION, SOLUTION INTRAMUSCULAR; INTRAVENOUS AS NEEDED
Status: DISCONTINUED | OUTPATIENT
Start: 2025-01-03 | End: 2025-01-03 | Stop reason: SURG

## 2025-01-03 RX ADMIN — MIDAZOLAM HYDROCHLORIDE 2 MG: 1 INJECTION, SOLUTION INTRAMUSCULAR; INTRAVENOUS at 10:48

## 2025-01-03 RX ADMIN — PROPOFOL 160 MCG/KG/MIN: 10 INJECTION, EMULSION INTRAVENOUS at 10:49

## 2025-01-03 RX ADMIN — Medication 25 MG: at 10:49

## 2025-01-03 RX ADMIN — METOCLOPRAMIDE HYDROCHLORIDE 5 MG: 5 INJECTION, SOLUTION INTRAMUSCULAR; INTRAVENOUS at 10:43

## 2025-01-03 RX ADMIN — GLYCOPYRROLATE 0.2 MCG: 0.2 INJECTION, SOLUTION INTRAMUSCULAR; INTRAVENOUS at 10:43

## 2025-01-03 RX ADMIN — Medication 25 MG: at 11:00

## 2025-01-03 RX ADMIN — FENTANYL CITRATE 50 MCG: 50 INJECTION, SOLUTION INTRAMUSCULAR; INTRAVENOUS at 10:48

## 2025-01-03 NOTE — PROGRESS NOTES
"GYN Office Visit  Subjective   Chief Complaint   Patient presents with    thickened endometrium     TVS- 4 month f/u thickened endometrium       Jamia Hinton is a 59 y.o. year old  presenting to be seen for uterine fibroids and thickened endometrium.    This patient was found on outside imaging to have several small intramural/subserosal fibroids.  The largest fibroid measured roughly 1 cm.  The endometrium was mildly thickened (9.6 mm).  No vaginal bleeding in 2-3 years.    OB history:  x 1,  x 1  Pap smear:   Mammogram:        Objective   /80   Ht 167.6 cm (66\")   Wt 134 kg (296 lb)   LMP  (LMP Unknown) Comment: posyt menopausal  BMI 47.78 kg/m²     Physical Exam:  None    Medical decision making:    I reviewed her ultrasound results from  and today.  I reviewed her creatinine value from  (3.06).    Anteverted uterus measuring 7.5 cm with 2 small intramural fibroids noted.  The endometrium remains thickened, now measuring 11.8 mm.  Neither ovary is well-visualized.  No free fluid in cul-de-sac.        Assessment   Small uterine fibroids  Thickened endometrium noted on ultrasound, increased from previous U/S  CKD    We reviewed her situation in detail today.  She does have several small uterine masses, likely reflecting fibroids and a thickened endometrial lining that has increased since her last ultrasound. We discussed hysteroscopic evaluation, endometrial biopsy and expectant management.  She would like to proceed with hysteroscopic evaluation and endometrial sampling to rule out hyperplasia/malignancy.  I that is very reasonable given the situation.  Plan for hysteroscopy with possible MyoSure, dilation and curettage and all other indicated procedures.  Risks for the procedure were reviewed in detail today.  All questions answered.    Evans Myers MD  Obstetrics and Gynecology  [facility]   "

## 2025-01-03 NOTE — ANESTHESIA PREPROCEDURE EVALUATION
Anesthesia Evaluation     Patient summary reviewed and Nursing notes reviewed   no history of anesthetic complications:   NPO Solid Status: > 8 hours  NPO Liquid Status: > 2 hours           Airway   Mallampati: II  TM distance: >3 FB  Neck ROM: full  possible difficult intubation and difficult intubation highly probable  Dental - normal exam   (+) edentulous    Pulmonary    (+) asthma,shortness of breath, sleep apnea ( mot treated), decreased breath sounds  (-) not a smoker  Cardiovascular - normal exam  Exercise tolerance: poor (<4 METS)    ECG reviewed  Patient on routine beta blocker  Rhythm: regular    (+) hypertension poorly controlled, past MI ( unknown mi per ekg) , CAD ( morb obesity, jocelyn, htn pvd dm), HUERTA, PVD      Neuro/Psych  (+) headaches, psychiatric history  GI/Hepatic/Renal/Endo    (+) morbid obesity, GERD, renal disease- CRI, diabetes mellitus ( morb obesity inc fbs no meds) type 2 poorly controlled    Musculoskeletal     (+) arthralgias, back pain, chronic pain, gait problem  Abdominal   (+) obese   Substance History      OB/GYN          Other   arthritis,     ROS/Med Hx Other: Labs reviewed   fbs 104 k 4.0  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 Last time was 3 yrs ago  Low pain tolerance  Surgeon aware of abnormal ekg and wishes to proceed d/t emergency nature of pt's condition                Anesthesia Plan    ASA 4     MAC     (Risks and benefits discussed including risk of aspiration, recall and dental damage. Discussed risks with pt., pt increased  intraop and postop risks for cv, resp, and neuro events,All patient questions answered.    Will continue with plan of care.)  intravenous induction     Anesthetic plan, risks, benefits, and alternatives have been provided, discussed and informed consent has been obtained with: patient.  Pre-procedure education provided

## 2025-01-03 NOTE — DISCHARGE INSTRUCTIONS
Pelvic Surgery  Home Care Instructions:  Dr. Evans Myers      Thank you for choosing [facility]. Please let us know if you have questions or concerns or do not understand the information we give you. Always ask us to explain words or phrases you do not understand.    You have had pelvic surgery. Here are some basic guidelines to help you recover more quickly at home. Your doctor may give you more guidelines. If you have any questions after you go home, please call the numbers listed on the next page.    General Guidelines    1. You may resume normal daily activities.  2. Do not put anything in the vagina (no tampons or douching).    3.   Do not have sex until cleared by your physician.  4.   You may climb steps.  5. You may bathe or shower.  6. The only exercise you should do is walking. This is important for your recovery.  7. Do not drive while taking narcotics.  8. Take the medicines you were taking before surgery. Other medicines you need to take at home are:    Alternate Motrin and Tylenol around the clock. Take each every 8 hours in alternation so that you are getting one of the medications every 4 hours.   Roxicodone 5mg tab  - One tablet by mouth every 4-6 hours as needed for breakthrough pain   Metamucil + Colace daily to keep bowel movements soft        If you have questions about your medicines, let us know. Or, talk to your local pharmacist.    Surgery, lack of activity, pain medicines and iron pills tend to cause constipation. Take something every day to prevent constipation and straining.  Taking something like Metamucil® every day to increase fiber may prevent constipation. Follow the instructions on the container. If you need a gentle laxative, then something like Milk of Magnesia® or Correctol® work fairly well. You should not need to take laxatives often.    10. Call your doctor if you have:     a. Fever of 101 degrees or higher.  b. Heavy vaginal bleeding.  c. Worsening nausea  or pain.  d. Other problems or concern.    If you have any questions or concerns about your usual routines, please talk to the nurse or doctor.       To talk with your doctor at [facility], call:  Obstetrics and Gynecology Outpatient Clinic  Phone: [phone]      We appreciate the opportunity you have given us to participate in your care.

## 2025-01-03 NOTE — H&P
GYNECOLOGY HISTORY AND PHYSICAL    CHIEF COMPLAINT: Scheduled surgery    DIAGNOSIS:  Small uterine fibroids  Thickened endometrium noted on ultrasound, increased from previous U/S  CKD    ASSESSMENT/PLAN     59 y.o.  female who presents for scheduled hysteroscopy with possible MyoSure, dilation and curettage and all other indicated procedures.    - Proceed to the OR for scheduled surgery  - Risks for the procedure reviewed  - SCDs for DVT ppx  - Antibiotics: none    HISTORY OF PRESENT ILLNESS     59 y.o.  female who presents for the scheduled procedure listed above.    She has no complaints today and there are no interval changes to the history.    REVIEW OF SYSTEMS: A complete review of systems was performed and was specifically negative for headache, changes in vision, RUQ pain, shortness of breath, chest pain, lower extremity edema and dysuria.     HISTORY:  Obstetrical History:  OB History    Para Term  AB Living   2 2 2     2   SAB IAB Ectopic Molar Multiple Live Births             2      # Outcome Date GA Lbr Rafiq/2nd Weight Sex Type Anes PTL Lv   2 Term      CS-LTranv   ALEX   1 Term      Vag-Spont   ALEX       Past Medical History:  Past Medical History:   Diagnosis Date    Acid reflux     occasional - omeprazole PRN    Anxiety     Arthritis     Asthma     Benign hypertension with chronic kidney disease 2024    CKD (chronic kidney disease), stage IV     Fibroid 2024    Hernia, abdominal     Hypercalcemia     Migraine     occasionally - every 3-4 months per report on 24    Osteoarthritis     Rheumatoid arthritis     Sarcoidosis     followed by Dr. Jillian MCLEOD (shortness of breath) on exertion     secondary to sardoidosis       Past Surgical History:  Past Surgical History:   Procedure Laterality Date    BREAST BIOPSY Left     CATARACT EXTRACTION W/ INTRAOCULAR LENS IMPLANT Right 2023    Procedure: CATARACT PHACO EXTRACTION WITH INTRAOCULAR LENS IMPLANT  RIGHT;  Surgeon: Christ Kerr MD;  Location: [facility];  Service: Ophthalmology;  Laterality: Right;    CATARACT EXTRACTION W/ INTRAOCULAR LENS IMPLANT Left 2024    Procedure: CATARACT PHACO EXTRACTION WITH INTRAOCULAR LENS IMPLANT LEFT;  Surgeon: Christ Kerr MD;  Location: [facility];  Service: Ophthalmology;  Laterality: Left;     SECTION      CHOLECYSTECTOMY      VENTRAL/INCISIONAL HERNIA REPAIR N/A 2017    Procedure:  INCISIONAL HERNIA REPAIR WITH MESH;  Surgeon: Bassam Rogers MD;  Location: [facility];  Service:        Social History:  Social History     Tobacco Use    Smoking status: Never    Smokeless tobacco: Never   Vaping Use    Vaping status: Never Used   Substance Use Topics    Alcohol use: No    Drug use: No       Family History  Family History   Problem Relation Age of Onset    Diabetes Mother     Hypertension Mother     No Known Problems Father         Allergies:   Allergies   Allergen Reactions    Citalopram Hydrobromide Mental Status Change     Couldn't function       OBJECTIVE   VITALS:  Temp:  [96.9 °F (36.1 °C)] 96.9 °F (36.1 °C)  Heart Rate:  [66] 66  Resp:  [16] 16  BP: (139)/(79) 139/79    PHYSICAL EXAM:  GENERAL: NAD, alert  CHEST: No increased work of breathing, CTAB  CV: RRR, WWP  ABDOMEN: Soft, NTTP  EXTREMITIES:  Warm and well-perfused, nontender, nonedematous  NEURO: AAO x 3, CN II-XII grossly intact    No current facility-administered medications on file prior to encounter.     Current Outpatient Medications on File Prior to Encounter   Medication Sig Dispense Refill    albuterol sulfate  (90 Base) MCG/ACT inhaler Inhale 2 puffs Every 4 (Four) Hours As Needed for Wheezing.      allopurinol (ZYLOPRIM) 100 MG tablet Take 1 tablet by mouth Daily. Takes 1/2 tablet daily      carvedilol (COREG) 25 MG tablet Take 1 tablet by mouth 2 (Two) Times a Day With Meals.      iron polysaccharides (NIFEREX) 150 MG capsule Take 1 capsule by mouth Daily. 30  "capsule 0    omeprazole (priLOSEC) 40 MG capsule Take 1 capsule by mouth As Needed (states she rarely needs it).      amLODIPine (NORVASC) 5 MG tablet Take 2 tablets by mouth Daily.         DIAGNOSTIC STUDIES:        Invalid input(s): \"LABALB\", \"UA\"    No results found for this or any previous visit (from the past 24 hours).    Evans Myers MD  Obstetrics and Gynecology  [facility]     "

## 2025-01-03 NOTE — OP NOTE
Almas ARSHAD [facility]  : 1965  MRN: [id number]  CSN: [id number]  Date: 1/3/2025    Operative Report    Pre-op Diagnosis:  Endometrial thickening on ultrasound   Post-op Diagnosis:  Same   Procedure: Hysteroscopy with MyoSure  Dilation and curettage   Surgeon:  Surgical assistant: ISELA Farias was responsible for performing the following activities: Retraction and manipulation of hysteroscopic instruments  and their skilled assistance was necessary for the success of this case.     OR Staff: Circulator: Marcial Richard RN  Scrub Person: Judi Beckwith     Anesthesia: Sedation   Estimated Blood Loss: 5 mls   ABx: none   Specimens:  Endometrial curettings       Complications: None           Findings:   Normal external genitalia and vaginal vault. Normal-appearing cervix. Cervical canal was normal in appearance. Anteverted uterus. Significantly thickened endometrium throughout the cavity. No specific lesion. Both tubal ostia were visualized.     Description of Procedure:   Following confirmation of informed consent, the patient was taken to the operating room where her anesthesia was obtained without difficulty. She was prepped and draped in the usual sterile fashion in the dorsal lithotomy position. A surgical timeout for safety was performed and agreed upon by all team members. A heavy-weighted speculum and Malcolm retractor were placed into her vagina and the cervix was visualized. A paracervical block was performed using 1% lidocaine with epinephrine. A long Allis clamp was used to grasp the anterior lip of the cervix. Gentle traction was applied and the uterus was sounded. The cervix was gently dilated with sterile dilators to allow for entrance of the operative hysteroscope. This hysteroscope was then gently advanced to the uterine fundus and the above findings were noted. Both ostia were visualized. The Myosure device was inserted into the uterine cavity and used to resect the thickened  endometrium and globally sample the endometrial lining. The hysteroscope was then removed and a sharp curettage was performed using a non-serrated curette until a gritty texture was noted throughout. All surgical specimens were labeled and sent to pathology for review. At the end of the procedure, excellent hemostasis was noted and all instruments were removed from the vagina. All counts were correct per the OR staff. The patient was awakened and taken to the recovery room in stable condition.    Evans Myers MD  Obstetrics and Gynecology  [facility]

## 2025-01-03 NOTE — ANESTHESIA POSTPROCEDURE EVALUATION
Patient: Jamia Hinton    Procedure Summary       Date: 01/03/25 Room / Location: [facility] OR  /  [facility]    Anesthesia Start: 1042 Anesthesia Stop:     Procedure: HYSTEROSCOPY WITH MYOSURE, DILATION AND CURETTAGE AND ALL OTHER INDICATED PROCEDURES (Vagina) Diagnosis:       Endometrial thickening on ultrasound      Lesion of endometrium      (Endometrial thickening on ultrasound [R93.89])      (Lesion of endometrium [N85.9])    Surgeons: Evans Myers MD Provider: Indra Quintanilla CRNA    Anesthesia Type: MAC ASA Status: 4            Anesthesia Type: MAC    Vitals  No vitals data found for the desired time range.          Post Anesthesia Care and Evaluation    Patient location during evaluation: PHASE II  Patient participation: complete - patient participated  Level of consciousness: awake  Pain score: 0  Pain management: adequate    Airway patency: patent  Anesthetic complications: No anesthetic complications  PONV Status: none  Cardiovascular status: acceptable  Respiratory status: acceptable, spontaneous ventilation and face mask  Hydration status: acceptable    Comments: vsss resp spont, reflexes intact, responsive, report given to pacu nurseSee R.N. note for postop vital signs.

## 2025-01-08 LAB — REF LAB TEST METHOD: NORMAL

## 2025-01-09 DIAGNOSIS — C54.1 ENDOMETRIAL CARCINOMA: Primary | ICD-10-CM

## 2025-01-14 ENCOUNTER — PREP FOR SURGERY (OUTPATIENT)
Dept: OTHER | Facility: HOSPITAL | Age: 60
End: 2025-01-14
Payer: MEDICAID

## 2025-01-14 ENCOUNTER — OFFICE VISIT (OUTPATIENT)
Dept: GYNECOLOGIC ONCOLOGY | Facility: CLINIC | Age: 60
End: 2025-01-14
Payer: MEDICAID

## 2025-01-14 VITALS
RESPIRATION RATE: 18 BRPM | SYSTOLIC BLOOD PRESSURE: 161 MMHG | OXYGEN SATURATION: 95 % | DIASTOLIC BLOOD PRESSURE: 74 MMHG | HEART RATE: 67 BPM | WEIGHT: 293 LBS | BODY MASS INDEX: 48.36 KG/M2 | TEMPERATURE: 97.3 F

## 2025-01-14 DIAGNOSIS — C54.1 ENDOMETRIAL CANCER: Primary | ICD-10-CM

## 2025-01-14 DIAGNOSIS — D86.9 SARCOIDOSIS: ICD-10-CM

## 2025-01-14 DIAGNOSIS — N18.32 STAGE 3B CHRONIC KIDNEY DISEASE: ICD-10-CM

## 2025-01-14 DIAGNOSIS — Z87.19 HISTORY OF ABDOMINAL HERNIA: ICD-10-CM

## 2025-01-14 RX ORDER — CLONIDINE HYDROCHLORIDE 0.1 MG/1
TABLET ORAL
COMMUNITY
Start: 2025-01-07

## 2025-01-14 RX ORDER — PREGABALIN 150 MG/1
150 CAPSULE ORAL ONCE
OUTPATIENT
Start: 2025-01-14 | End: 2025-01-14

## 2025-01-14 RX ORDER — ACETAMINOPHEN 500 MG
1000 TABLET ORAL ONCE
OUTPATIENT
Start: 2025-01-14 | End: 2025-01-14

## 2025-01-14 RX ORDER — SODIUM CHLORIDE 0.9 % (FLUSH) 0.9 %
10 SYRINGE (ML) INJECTION AS NEEDED
OUTPATIENT
Start: 2025-01-14

## 2025-01-14 RX ORDER — SODIUM CHLORIDE 9 MG/ML
40 INJECTION, SOLUTION INTRAVENOUS AS NEEDED
OUTPATIENT
Start: 2025-01-14

## 2025-01-14 RX ORDER — SODIUM CHLORIDE 0.9 % (FLUSH) 0.9 %
10 SYRINGE (ML) INJECTION EVERY 12 HOURS SCHEDULED
OUTPATIENT
Start: 2025-01-14

## 2025-01-14 RX ORDER — SCOLOPAMINE TRANSDERMAL SYSTEM 1 MG/1
1 PATCH, EXTENDED RELEASE TRANSDERMAL CONTINUOUS
OUTPATIENT
Start: 2025-01-14 | End: 2025-01-17

## 2025-01-14 RX ORDER — HEPARIN SODIUM 5000 [USP'U]/ML
5000 INJECTION, SOLUTION INTRAVENOUS; SUBCUTANEOUS ONCE
OUTPATIENT
Start: 2025-01-14 | End: 2025-01-14

## 2025-01-14 NOTE — PROGRESS NOTES
Jamia Hinton  [id number]  1965      Reason for visit: Endometrial cancer    Consultation:  Patient is being seen at the request of Dr. Evans Myers    History of present illness:  The patient is a 59 y.o. year old female who presents today for treatment and evaluation of the above issues.    Patient was noted to have abnormal PET performed for sarcoidosis.  She saw GYN and fibroid uterus, thickened endometrium was noted on ultrasound.  She underwent hysteroscopy, dilation and curettage, Myosure on 1/3/2025.  She was diagnosed with a grade 1 endometrioid adenocarcinoma.  Today, she reports no VB.    Her past medical history is remarkable for sarcoidosis and she is on immunosuppression for this.  She has stage IV CKD as well.  For new patients, PFS intake form from today was reviewed and confirmed.    OBGYN History:  She is a .  She does not use HRT. She does not have a history of abnormal pap smears.    Oncologic History:  Oncology/Hematology History    No history exists.         Past Medical History:   Diagnosis Date    Acid reflux     occasional - omeprazole PRN    Anxiety     Arthritis     Asthma     Benign hypertension with chronic kidney disease 2024    CKD (chronic kidney disease), stage IV     Fibroid 2024    Hernia, abdominal     Hypercalcemia     Migraine     occasionally - every 3-4 months per report on 24    Osteoarthritis     Rheumatoid arthritis     Sarcoidosis     followed by Dr. Jillian MCLEOD (shortness of breath) on exertion     secondary to sardoidosis       Past Surgical History:   Procedure Laterality Date    BREAST BIOPSY Left     CATARACT EXTRACTION W/ INTRAOCULAR LENS IMPLANT Right 2023    Procedure: CATARACT PHACO EXTRACTION WITH INTRAOCULAR LENS IMPLANT RIGHT;  Surgeon: Christ Kerr MD;  Location: [facility];  Service: Ophthalmology;  Laterality: Right;    CATARACT EXTRACTION W/ INTRAOCULAR LENS IMPLANT Left 2024    Procedure: CATARACT  PHACO EXTRACTION WITH INTRAOCULAR LENS IMPLANT LEFT;  Surgeon: Christ Kerr MD;  Location: [facility];  Service: Ophthalmology;  Laterality: Left;     SECTION      CHOLECYSTECTOMY      HYSTEROSCOPY N/A 1/3/2025    Procedure: HYSTEROSCOPY WITH MYOSURE, DILATION AND CURETTAGE;  Surgeon: Evans Myers MD;  Location: [facility];  Service: Obstetrics/Gynecology;  Laterality: N/A;    VENTRAL/INCISIONAL HERNIA REPAIR N/A 2017    Procedure:  INCISIONAL HERNIA REPAIR WITH MESH;  Surgeon: Bassam Rogers MD;  Location: [facility];  Service:        MEDICATIONS:    Current Outpatient Medications:     acetaminophen (TYLENOL) 500 MG tablet, Take 2 tablets by mouth Every 8 (Eight) Hours As Needed for Mild Pain., Disp: 60 tablet, Rfl: 0    albuterol sulfate  (90 Base) MCG/ACT inhaler, Inhale 2 puffs Every 4 (Four) Hours As Needed for Wheezing., Disp: , Rfl:     allopurinol (ZYLOPRIM) 100 MG tablet, Take 1 tablet by mouth Daily. Takes 1/2 tablet daily, Disp: , Rfl:     carvedilol (COREG) 25 MG tablet, Take 1 tablet by mouth 2 (Two) Times a Day With Meals., Disp: , Rfl:     cloNIDine (CATAPRES) 0.1 MG tablet, TAKE 1 TABLET BY MOUTH TWO TIMES A DAY FOR BLOOD PRESSURE HIGHER THAN 150/100, Disp: , Rfl:     Corticotropin (ACTHAR GEL SC), Inject  under the skin into the appropriate area as directed Every 72 (Seventy-Two) Hours., Disp: , Rfl:     iron polysaccharides (NIFEREX) 150 MG capsule, Take 1 capsule by mouth Daily., Disp: 30 capsule, Rfl: 0    omeprazole (priLOSEC) 40 MG capsule, Take 1 capsule by mouth As Needed (states she rarely needs it)., Disp: , Rfl:     predniSONE (DELTASONE) 5 MG tablet, Take 1 tablet by mouth Daily., Disp: , Rfl:     spironolactone (ALDACTONE) 25 MG tablet, Take 1 tablet by mouth Daily., Disp: , Rfl:      Allergies:  is allergic to citalopram hydrobromide.    Social History:   Social History     Socioeconomic History    Marital status:    Tobacco Use    Smoking  status: Never    Smokeless tobacco: Never   Vaping Use    Vaping status: Never Used   Substance and Sexual Activity    Alcohol use: No    Drug use: No    Sexual activity: Not Currently       Family History:    Family History   Problem Relation Age of Onset    Diabetes Mother     Hypertension Mother     No Known Problems Father        Health Maintenance:    Health Maintenance   Topic Date Due    COLORECTAL CANCER SCREENING  Never done    TDAP/TD VACCINES (1 - Tdap) Never done    ZOSTER VACCINE (1 of 2) Never done    HEPATITIS C SCREENING  Never done    ANNUAL PHYSICAL  Never done    PAP SMEAR  Never done    INFLUENZA VACCINE  Never done    COVID-19 Vaccine (1 - 2024-25 season) Never done    BMI FOLLOWUP  03/27/2025    MAMMOGRAM  05/14/2026    Pneumococcal Vaccine 0-64  Aged Out       Review of Systems:  Please refer to history of present illness.  Review of systems otherwise negative.    Physical Exam:  Vitals:    01/14/25 1009   BP: 161/74   Pulse: 67   Resp: 18   Temp: 97.3 °F (36.3 °C)   TempSrc: Temporal   SpO2: 95%   Weight: 136 kg (299 lb 9.6 oz)   PainSc: 0-No pain     Body mass index is 48.36 kg/m².  Wt Readings from Last 3 Encounters:   01/14/25 136 kg (299 lb 9.6 oz)   12/20/24 135 kg (296 lb 12.8 oz)   12/20/24 134 kg (296 lb)     PHQ-9 Depression Screening  Little interest or pleasure in doing things? Several days   Feeling down, depressed, or hopeless? Almost all   PHQ-2 Total Score 4   Trouble falling or staying asleep, or sleeping too much? Almost all   Feeling tired or having little energy? Almost all   Poor appetite or overeating? Over half   Feeling bad about yourself - or that you are a failure or have let yourself or your family down? Not at all   Trouble concentrating on things, such as reading the newspaper or watching television? Several days   Moving or speaking so slowly that other people could have noticed? Or the opposite - being so fidgety or restless that you have been moving around a lot  more than usual? Not at all   Thoughts that you would be better off dead, or of hurting yourself in some way? Not at all   PHQ-9 Total Score 13   If you checked off any problems, how difficult have these problems made it for you to do your work, take care of things at home, or get along with other people? Not difficult at all       GENERAL: Alert, well-appearing female appearing her stated age who is in no apparent distress.   HEENT: Sclera anicteric. Head normocephalic, atraumatic. Mucus membranes moist.   NECK: Trachea midline, supple, without masses.  No thyromegaly.   BREASTS: Deferred  CARDIOVASCULAR: Normal rate, regular rhythm, no murmurs, rubs, or gallops.  2+ BLE peripheral edema.  RESPIRATORY: Clear to auscultation bilaterally, normal respiratory effort  BACK:  No CVA tenderness, no vertebral tenderness on palpation  GASTROINTESTINAL:  Abdomen is soft, non-tender, non-distended, no rebound or guarding, no masses. No HSM.  Recurrent ventral hernia.  SKIN:  Warm, dry, well-perfused.  All visible areas intact.  No rashes, lesions, ulcers.  PSYCHIATRIC: AO x3, with appropriate affect, normal thought processes.  NEUROLOGIC: No focal deficits.  Moves extremities well.  MUSCULOSKELETAL: Normal gait and station.   EXTREMITIES:   No cyanosis, clubbing, symmetric.  LYMPHATICS:  No cervical or inguinal adenopathy noted.     PELVIC exam:  External genitalia are free from lesion. On speculum examination, the cervix was free from lesion. On bimanual examination no mass was appreciated.  Uterus was normal in size and shape. There is no cervical motion or uterine tenderness. No cervical mass was palpated. Parametria were smooth. Rectovaginal exam was deferred.     ECOG PS 1    PROCEDURES:  None    Diagnostic Data:    No Images in the past 120 days found..    Lab Results   Component Value Date    WBC 15.53 (H) 12/20/2024    HGB 12.4 12/20/2024    HCT 38.5 12/20/2024    MCV 86.3 12/20/2024     12/20/2024    NEUTROABS  "13.63 (H) 12/20/2024    GLUCOSE 167 (H) 12/20/2024    BUN 44 (H) 12/20/2024    CREATININE 2.24 (H) 12/20/2024    EGFRIFNONA 75 12/13/2017     12/20/2024    K 4.4 12/20/2024     12/20/2024    CO2 24.8 12/20/2024    MG 2.5 (H) 11/26/2024    PHOS 3.3 04/28/2024    CALCIUM 9.9 12/20/2024    ALBUMIN 4.0 12/20/2024    AST 16 12/20/2024    ALT 19 12/20/2024    BILITOT 0.3 12/20/2024     Lab Results   Component Value Date    TSH 2.770 04/07/2024    TSH 2.240 03/22/2024    TSH 2.57 07/09/2019     No results found for: \"FT4\"  No results found for: \"\"    Assessment & Plan   This is a 59 y.o. woman with grade 1 endometrioid diagnosed at the time of dilation and curettage/Myosure, clinical stage I ([id number])   Encounter Diagnoses   Name Primary?    Endometrial cancer Yes    History of abdominal hernia     Sarcoidosis     Stage 3b chronic kidney disease      Patient was consented for INJECTION OF ICG DYE, TOTAL LAPAROSCOPIC HYSTERECTOMY, BILATERAL SALPINGOOPHORECTOMY, SENTINEL/COMPLETION NODE DISSECTION WITH DAVINCI ROBOT.  Patient has a history of prior hernia repair with mesh.  I would anticipate a left upper quadrant entry point at the time of surgery.    Risks and benefits of surgery were discussed.  This included, but was not limited to, infection and bleeding like when the skin is cut; damage to surrounding structures; and incisional complications.  Risk of DVT was addressed for major surgeries.  Standard of care efforts to minimize these risks were reviewed.  Typical hospital stay and recovery were discussed as well as post-procedure precautions.  Surgical implications of chronic illnesses on recovery and surgical outcome were reviewed.     Pain medication regimen for postoperative care was discussed.  Typical regimen and avoidance of narcotics was discussed.  Patient was educated that other factors, such as existing narcotic use, can impact postoperative pain management.    Risks and benefits of lymph " node dissection were further discussed.  This included lymphocyst, hematoma, lymphedema, vascular injury, and nerve injury.  Patient verbalized understanding of the plan including the risks and benefits.  Appropriate perioperative testing including laboratory evaluation, EKG as clinically indicated, chest x-ray as clinically indicated, and preadmission evaluation were all ordered as a part of this patient's care.    We discussed increased risk of damage to the intestines this patient has a history of abdominal hernia repair with mesh.  Discussed risk of increased pulmonary complications related patient sarcoidosis.  NSAIDs will be avoided.  She follows up with allergist tomorrow and asked her to make sure that her nephrologist knows that she is undergoing surgery.  Will need perioperative risk assessment by pulmonary team.    Pain assessment was performed today as a part of patient’s care.  For patients with pain related to surgery, gynecologic malignancy or cancer treatment, the plan is as noted in the assessment/plan.  For patients with pain not related to these issues, they are to seek any further needed care from a more appropriate provider, such as PCP.      No orders of the defined types were placed in this encounter.      FOLLOW UP: Surgery    I spent 60 minutes caring for Jamia on this date of service. This time includes time spent by me in the following activities: preparing for the visit, reviewing tests, performing a medically appropriate examination and/or evaluation, counseling and educating the patient/family/caregiver, referring and communicating with other health care professionals, documenting information in the medical record, care coordination, ordering medications, ordering test(s), and ordering procedure(s)    Electronically Signed by: Citlali Eli MD  Date: 1/14/2025

## 2025-01-14 NOTE — PATIENT INSTRUCTIONS
Surgery Instructions            Jamia Hinton  [id number]  1965      SURGEON:  Citlali Eli MD    Surgery Coordinator: Laura CONDE    Gynecological Oncology  [address] [facility] [address] [place] [address], [facility]  Phone: [phone]                   Fax: [phone]      Pre-Admission Testing Appointment    You have a Pre-Admission Testing (PAT) appointment on 1/29/2025  at 1230  You will need to be at hospital registration 10 minutes before that time. Directions to PAT and Registration will be listed below.    We understand your time is valuable. To prevent delays, please bring the following to your PAT apt. if it applies to you:  Written physician orders (if given to you by your physician)  All medications in the original bottles including over-the-counter medications (not a list)  Copy of living will or power of  documents   Copy of recent test results (EKG, stress test, echo, heart cath, etc.)  Copy of pacemaker or ICD cards and date of last interrogation   Copy of cardiac clearance letter from your cardiologist or primary care physician if history of heart problems  Name and phone number of your pharmacy, primary care physician and/or cardiologist  CPAP or BiPAP settings    Surgery Appointment      Your surgery has been scheduled on 02/06/2025.  You will need to go to Main Registration to check in at 0900. Then you will be sent to the [address] [place] [address] second floor surgery registration desk to check in.    Nothing by mouth after midnight on 02/05.    If you are feeling sick, have a fever or cough and have seen your PCP let our office know 48 hours prior to surgery. It may be subject to rescheduling.       The Day of Surgery:    Do not chew gum or tobacco, smoke, or eat mints or hard candy. Shower and wash your hair. You may brush your teeth but do not swallow water. Use any wipes that Pre-admission testing has given you.      Please arrive for surgery as instructed by the pre-op nurse, often one to two hours before your surgery.  Once you are called to go to your pre-op room, no one will be allowed in the pre op room.   Please note no one under age 12 is permitted to stay in the waiting area without supervision.  Remove all jewelry, including rings and piercings. Do not bring valuables to the hospital.  Wear loose-fitting clothing.  Avoid wearing eye makeup or contact lenses  We make every effort to begin surgery at your scheduled start time but delays do occur. We will keep you and your family updated about any delays  Please note: you MUST have a  over the age of 18 to drive you home from the hospital. You may not use Uber, Lyft or a taxi.    Please remember to bring:    Photo ID and current medical insurance card  Advanced directives, living will or power of  (if applicable)  Current list of all medications, including over-the- counter and herbal supplements  List of allergies  CPAP device if you have sleep apnea  Any assistive devices or equipment needed after surgery    While You are In the Pre-Op Room:  The nurse will review your health history and will place an IV (into the vein) in your hand or arm for fluids and medicines.  An anesthesia provider will talk with you about anesthesia and pain control during and after surgery.  A member of the surgical team can answer your questions.    Directions to [facility]  [address] [facility] ? [place] [address] ? [phone]    From [address] and [address] [facility]:  Take [address] to the [address] exit. Go right on [address] to [address]. Right on [address] to [employer].   Left on [address] to [facility] which is on the left.    From [address] of [place]:  Get off [address] at the [place] exit. Go left on [address] to [address]. Right on [address] to [employer]. Left on   [address]  to [facility] which is on the left.     From the South ([address]):  Follow [address] to approximately one mile inside [address]. [facility] is on the right at [address] and   [facility].     Parking:  Free  Parking - Take Entrance 2 off of [address] and go straight ahead to [address] [place] [address].  Self Parking - Take Entrance 1 off of [address], bear left and follow the road to [facility].    Directions to Registration:  If entering through front of [address] [place] [address] ( parking), take a right and proceed up the hallway connecting [address] [place] [address] to   [facility]. Registration is on the left about detention up the [address].    If entering from [facility], take garage elevator to first floor (1), exit to the right and proceed through the doors to outside, follow the covered sidewalk to entrance of [facility], follow signs to [address] [place] [address], this leads to the [facility] lobby and information desk. Proceed past the information desk to the hallway that connects [address] [place] [address] to the [facility]. Registration is on the left about detention up the [address].    Directions to Pre-admission Testing:  Follow directions to Registration and Pre-admission Testing is next door to Registration             PREPARING FOR SURGERY  **Disability or Work Release Forms     Work: The amount of time you will be off work after surgery depends on both your surgery and your job. Discuss this with your doctor before surgery. If you have any questions about this, call your doctor.  You must provide all forms completed and signed to the GYN ONCOLOGY office.    FORM FOR AUHTHORIZATION FOR USE AND/OR DISCLOSURE OF PROCTED HEALTH INFORMATION CAN BE PROVIDED UPON REQUEST.    Preoperative Evaluation and Optimization  If your doctor tells you to get a preoperative evaluation from your primary care provider, cardiologist, or other specialist, it is your responsibility to make sure to complete these well before  "your surgery. We want you to get evaluated to make sure you are as healthy as possible when you have your surgery. If the evaluation, including all recommended testing, is not done in time, your surgery will be postponed.    If you take diabetic medications please consult with the prescriber.  Continue antidepressants, Beta Blockers \"olol\", anti-seizure medication, GERD medication (heartburn), Opioids and Parkinson's medication.  Let us know if you have a history of blood clots or are taking a blood thinner before your surgery, this will need to be held and you will need to discuss this with staff.   If you are taking any weight loss medications please let our staff now. Ideally they will need to be held 2 weeks prior to your procedure.  You are allowed 1 visitor that may remain in the waiting room at both locations.  Visitors cannot come back to pre-op or post-op areas.    Please note: you MUST have a  over the age of 18 to drive you home from the hospital. You may not use Uber, Lyft or a taxi.    Physical Fitness  Research shows that getting more physical activity before surgery can lower your risk for problems after surgery. Walking is a great way to improve your fitness level before surgery. Even if you start walking just a few weeks before surgery, it can make a big difference.     Quit Smoking  If you smoke, your risk of having a lung problem is at least twice that of a non-smoker.    Surgical incisions will not heal as well and you have a higher risk of infection  The heart has to work harder.  It is best to quit smoking 6 to 8 weeks before surgery. This gives your lungs more time to recover.    Outpatient Surgery  You will need to have someone bring you to the hospital, stay in the waiting room during your procedure and take you home at discharge. It is recommended that someone stay with you 24 hours after your procedure.     If you live more than a 4-hour drive away from the hospital, or live in an " area without easy access to an emergency department, we recommend you plan to spend another night or two close to the hospital before you go home. For assistance with hotel, prices and vouchers let our office know and we can let you talk with our Oncology Social worker, Ginny Keith.     Post-Operative Visit  You will be scheduled a post-operative appointment for 3 weeks after your surgical procedure. If you do not have an appointment please call the office and have that scheduled.     How to prevent nausea  The best way to prevent nausea is to eat frequent small meals. It is especially important to eat something before taking pain medication. Take your ondansetron if you are feeling nauseous do not wait.    Pain Management after Surgery    If you have kidney disease or liver disease and are not to take ibuprofen or Tylenol please let your doctor or nurse know.     Driving: Do not drive while you are taking prescription pain medications.     It is normal to have some pain after surgery. The goal of managing your acute pain after surgery is to minimize your pain so you feel comfortable enough to get up, take deep breaths, wash, get dressed, and do simple tasks in your home. Some discomfort is likely. We do not expect you to be completely free of pain.   Pain is usually worst the first 24-48 hours after surgery.    What can I do to relieve pain without medications?   Apply heat with a warm compress, hot water bottle, or heating pad. Do not put anything hot directly on your skin or lie on top of it.   Apply cooling with a cold gel pack, bag of peas, or crushed ice. Wrap in a soft cloth or towel.   Do not push or press on your incision. It is normal for your incision to be sore for up to 6 weeks if you push on it.   Unless your doctor gives you a different plan, ibuprofen and acetaminophen are the main medicines you will use to manage your pain.   You may also get a prescription for an opioid such as oxycodone or  hydrocodone. Opioids should only be added as needed to reduce pain that is not adequately relieved by ibuprofen and acetaminophen.                                                                                         Typical Pain Medication Schedule  6 am Ibuprofen 600 mg   9 am acetaminophen 650 mg   12:00 pm Noon Ibuprofen 600 mg   3:00 pm Acetaminophen 650 mg   6:00 pm Ibuprofen 600 mg   9:00 pm Acetaminophen 650 mg   12:00 am Midnight  Ibuprofen 600 mg.      What if this schedule does not control my pain?   Please call the office and let us know at [phone]  Reduce the number and frequency of opioids as soon as you can. Do not take more opioid medication than your doctor has prescribed.   Common side effects and risks of opioids include drowsiness, mental confusion, dizziness, nausea, constipation, itching, dry mouth, and slowed breathing.   Never mix opioids with alcohol, sleep aids or anti-anxiety medications. These are dangerous combinations that increase the harmful effects of opioid pain medication. Many overdose deaths from opioids also involve at least one other drug or alcohol.   It is illegal to sell or share an opioid without a prescription properly issued by a licensed health care prescriber.    What is the best way to stop taking pain medications?  1. Stop opioid use.  2. Stop acetaminophen.  3. Gradually decrease how often you take ibuprofen. It is a good idea to take a 600 mg pill before you start a more tiring activity such as going shopping or for a long walk.  Once you get more active, you may have a day when your pain gets a little worse. If this happens, take ibuprofen. If ibuprofen does not relieve the pain, add acetaminophen.    What do I need to know about bowel movements?   Starting as soon as you get home, take 17 grams of Miralax (one capful) twice a day to keep your stool soft and prevent constipation. It is important to prevent constipation because straining can damage your  stitches. Your stool should be as soft as toothpaste. If your stool gets too loose, cut back to using Miralax only once a day.   If you used a bowel prep before surgery, it is common not to have a bowel movement on the first and second day after surgery.   If you have not had a bowel movement by 7 p.m. on the third day after surgery, do one of the following at bedtime:  Drink 1 ounce (2 tablespoons) of Milk of Magnesia (MOM). If you have used MOM before and know you need to take 2 ounces for it to work for you, it is OK to do this, or Take 2 Senekot tablets.   Go for short walks. Walking and being active will help you have a bowel movement.   If you have not had a bowel movement by noon on the fourth day after surgery, call the clinic where you were seen and ask to speak with a nurse.    What kind of vaginal bleeding is normal?  Spotting of pink or red blood from the vagina is normal. Brown-colored discharge that gradually changes to a light yellow or cream color is also normal and can last up to 6 weeks. The brownish discharge is old blood and often has a strong odor, this is okay. Call us if it becomes heavier or foul smelling or you are saturating a maxi pad within an hour.     At Home after Surgery: If you experience a medical emergency call 911 or have someone drive you to your nearest emergency department.     When should I call my doctor?  Call your doctor right away, any time of the day or night, including on weekends and holidays, if you have any of the following signs or symptoms:   A temperature over 100.4°F (38°C) If you don't have one, please buy a thermometer before your surgery.   Heavy bleeding (soaking a regular pad in an hour or less)   Severe pain in your abdomen or pelvis that the pain medication is not helping   Chest pain or difficulty breathing   Swelling, redness, or pain in your legs   An incision that opens   An incision that is red or hot   Fluid or blood leaking from an incision   New  bruising after leaving the hospital that is large or spreading. A little bit of bruising around an incision is normal.   Nausea and vomiting    Skin rash   Unable to urinate at all   Pain or stinging when you pass urine   Blood or cloudiness in your urine   Non-stop urge to pass urine, but only dribbling when you try to go   A sense that something is wrong.    Caring for post-surgical incisions     Do not have vaginal intercourse until your doctor evaluates you at a postop visit and tells you OK.     Showers: You may shower starting 24 hours after your surgery.    NO BATHS: do not take a tub bath up to 6 weeks after surgery.   Do not put any lotion, oil, gel, or powder on or near your incisions.     For incisions inside your vagina: Incisions inside the vagina are closed with dissolvable stitches. When they dissolve you may see little bits of suture material that look like thin pieces of string on your underwear or on toilet tissue after wiping. This is normal. Do not put anything inside the vagina until your doctor evaluates you at a postop visit and tells you when it will be OK.      For incisions on your skin: If there is a dressing over the incision, remove it before your first shower. Leave the slim adhesive strips that are under the dressing in place. During the week after surgery, they will usually curl up at the edges and then come off on their own. If they are still there a week after surgery, gently remove them.  To clean the incisions, first wash your hands, and then get your hands sudsy with soap and gently wash or let the sudsy water run down over the incisions. Dry the incisions well after washing by gently patting with a towel. You may use a blow dryer, but it must be on a low-heat setting.    When will my bladder function get back to normal?   You received extra fluid through your I.V. while you were in the hospital, so it is normal to urinate (pee) more than usual when you first get home.   It is  normal for your bladder function to be different after surgery. You may notice a pause before your urine stream starts or that your urine stream is slower. This will gradually get better, but it may take up to 6 months before you are back to normal. Be patient, relax, and sit on the toilet a little longer.   Drinking more water than usual will not help the bladder recover faster.    What is a normal energy level?  It is normal to have a decreased energy level after surgery. Listen to your body. If you need to rest, do it. Give yourself permission to take it easy. Once you settle into a normal routine at home, you will find that you slowly begin to feel better. Walking around the house and taking short walks outside will help you get back to normal.    What kind of exercise/activities can I do?   Exercise is important for a healthy recovery. We encourage you to begin normal physical activity, like walking, within hours of surgery. Start with short walks and gradually increase the distance and length of time that you walk.   Allow your body time to heal. Do not restart a difficult exercise routine until you have had your post-op exam and your doctor says it is OK.   Lifting: Unless you are given other instructions, for 6 weeks after your surgery do not lift anything over 15 pounds.   Travel: It is best if you do not go far away from home before your postop visit with your doctor. If you have travel plans, talk to your doctor about this before your surgery.      Financial Assistance:    If you have any questions or need assistance, contact your [facility] financial counseling office from 8:30 a.m.-4:30  p.m. Monday through Friday. Closed weekends.   [place]: [phone] or, or visit at [address] [facility], near the entrance.  Financial Assistance Application available upon request      Patient Payments and Correspondence  Customer service representatives are available to assist you from 8:00 a.m.  to 6:00 p.m. Eastern Standard Time by calling [phone] Monday through Friday. You can also contact us through [employer].    [facility]  [address]  [place] [address] [phone]

## 2025-01-15 ENCOUNTER — DOCUMENTATION (OUTPATIENT)
Dept: PULMONOLOGY | Facility: CLINIC | Age: 60
End: 2025-01-15
Payer: MEDICAID

## 2025-01-15 NOTE — PROGRESS NOTES
Since last clinic visit, patient has been diagnosed with endometrial cancer with planned surgery. I see no reason from a pulmonary standpoint that patient cannot proceed with planned surgery. 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 Patient cleared for surgery with Dr. Martins from a pulmonary perspective.  "I'm no different".

## 2025-01-29 ENCOUNTER — PRE-ADMISSION TESTING (OUTPATIENT)
Dept: PREADMISSION TESTING | Facility: HOSPITAL | Age: 60
End: 2025-01-29
Payer: MEDICAID

## 2025-01-29 VITALS — BODY MASS INDEX: 47.09 KG/M2 | WEIGHT: 293 LBS | HEIGHT: 66 IN

## 2025-01-29 DIAGNOSIS — C54.1 ENDOMETRIAL CANCER: ICD-10-CM

## 2025-01-29 LAB
ALBUMIN SERPL-MCNC: 4.2 G/DL (ref 3.5–5.2)
ALBUMIN/GLOB SERPL: 1.6 G/DL
ALP SERPL-CCNC: 51 U/L (ref 39–117)
ALT SERPL W P-5'-P-CCNC: 14 U/L (ref 1–33)
ANION GAP SERPL CALCULATED.3IONS-SCNC: 10 MMOL/L (ref 5–15)
AST SERPL-CCNC: 25 U/L (ref 1–32)
BASOPHILS # BLD AUTO: 0.08 10*3/MM3 (ref 0–0.2)
BASOPHILS NFR BLD AUTO: 1.1 % (ref 0–1.5)
BILIRUB SERPL-MCNC: 0.6 MG/DL (ref 0–1.2)
BUN SERPL-MCNC: 33 MG/DL (ref 8–23)
BUN/CREAT SERPL: 15.2 (ref 7–25)
CALCIUM SPEC-SCNC: 10.8 MG/DL (ref 8.6–10.5)
CHLORIDE SERPL-SCNC: 103 MMOL/L (ref 98–107)
CO2 SERPL-SCNC: 29 MMOL/L (ref 22–29)
CREAT SERPL-MCNC: 2.17 MG/DL (ref 0.57–1)
DEPRECATED RDW RBC AUTO: 48.6 FL (ref 37–54)
EGFRCR SERPLBLD CKD-EPI 2021: 25.3 ML/MIN/1.73
EOSINOPHIL # BLD AUTO: 0.33 10*3/MM3 (ref 0–0.4)
EOSINOPHIL NFR BLD AUTO: 4.4 % (ref 0.3–6.2)
ERYTHROCYTE [DISTWIDTH] IN BLOOD BY AUTOMATED COUNT: 15.1 % (ref 12.3–15.4)
GLOBULIN UR ELPH-MCNC: 2.6 GM/DL
GLUCOSE SERPL-MCNC: 101 MG/DL (ref 65–99)
HCT VFR BLD AUTO: 40.6 % (ref 34–46.6)
HGB BLD-MCNC: 12.9 G/DL (ref 12–15.9)
IMM GRANULOCYTES # BLD AUTO: 0.02 10*3/MM3 (ref 0–0.05)
IMM GRANULOCYTES NFR BLD AUTO: 0.3 % (ref 0–0.5)
LYMPHOCYTES # BLD AUTO: 1.09 10*3/MM3 (ref 0.7–3.1)
LYMPHOCYTES NFR BLD AUTO: 14.5 % (ref 19.6–45.3)
MCH RBC QN AUTO: 27.7 PG (ref 26.6–33)
MCHC RBC AUTO-ENTMCNC: 31.8 G/DL (ref 31.5–35.7)
MCV RBC AUTO: 87.3 FL (ref 79–97)
MONOCYTES # BLD AUTO: 0.87 10*3/MM3 (ref 0.1–0.9)
MONOCYTES NFR BLD AUTO: 11.6 % (ref 5–12)
NEUTROPHILS NFR BLD AUTO: 5.13 10*3/MM3 (ref 1.7–7)
NEUTROPHILS NFR BLD AUTO: 68.1 % (ref 42.7–76)
NRBC BLD AUTO-RTO: 0 /100 WBC (ref 0–0.2)
PLATELET # BLD AUTO: 248 10*3/MM3 (ref 140–450)
PMV BLD AUTO: 11.1 FL (ref 6–12)
POTASSIUM SERPL-SCNC: 4.1 MMOL/L (ref 3.5–5.2)
PROT SERPL-MCNC: 6.8 G/DL (ref 6–8.5)
RBC # BLD AUTO: 4.65 10*6/MM3 (ref 3.77–5.28)
SODIUM SERPL-SCNC: 142 MMOL/L (ref 136–145)
WBC NRBC COR # BLD AUTO: 7.52 10*3/MM3 (ref 3.4–10.8)

## 2025-01-29 PROCEDURE — 80053 COMPREHEN METABOLIC PANEL: CPT

## 2025-01-29 PROCEDURE — 85025 COMPLETE CBC W/AUTO DIFF WBC: CPT

## 2025-01-29 PROCEDURE — 93005 ELECTROCARDIOGRAM TRACING: CPT

## 2025-01-29 PROCEDURE — 36415 COLL VENOUS BLD VENIPUNCTURE: CPT

## 2025-01-29 NOTE — PAT
An arrival time for procedure was not provided during PAT visit. If patient had any questions or concerns about their arrival time, they were instructed to contact their surgeon/physician.  Additionally, if the patient referred to an arrival time that was acquired from their my chart account, patient was encouraged to verify that time with their surgeon/physician. Arrival times are NOT provided in Pre Admission Testing Department.    Patient denies any current skin issues.     Patient to apply Chlorhexadine wipes  to surgical area (as instructed) the night before procedure and the AM of procedure. Wipes provided.    Patient viewed general PAT education video as instructed in their preoperative information received from their surgeon.  Patient stated the general PAT education video was viewed in its entirety and survey completed.  Copies of PAT general education handouts (Incentive Spirometry, Meds to Beds Program, Patient Belongings, Pre-op skin preparation instructions, Blood Glucose testing, Visitor policy, Surgery FAQ, Code H) distributed to patient if not printed. Education related to the PAT pass and skin preparation for surgery (if applicable) completed in PAT as a reinforcement to PAT education video. Patient instructed to return PAT pass provided today as well as completed skin preparation sheet (if applicable) on the day of procedure.     Additionally if patient had not viewed video yet but intended to view it at home or in our waiting area, then referred them to the handout with QR code/link provided during PAT visit.  Encouraged patient/family to read PAT general education handouts thoroughly and notify PAT staff with any questions or concerns. Patient verbalized understanding of all information and priority content.

## 2025-01-30 LAB
QT INTERVAL: 408 MS
QTC INTERVAL: 400 MS

## 2025-02-05 ENCOUNTER — TELEPHONE (OUTPATIENT)
Dept: GYNECOLOGIC ONCOLOGY | Facility: CLINIC | Age: 60
End: 2025-02-05
Payer: MEDICAID

## 2025-02-05 NOTE — TELEPHONE ENCOUNTER
CONFIRM SURGERY ON 02/06/025. PLEASE ARRIVE AT 10:15 AM TO MAIN REGISTRATION AT [facility].     NPO AFTER MIDNIGHT ON 02/05/2025

## 2025-02-06 ENCOUNTER — ANESTHESIA EVENT (OUTPATIENT)
Dept: PERIOP | Facility: HOSPITAL | Age: 60
End: 2025-02-06
Payer: MEDICAID

## 2025-02-06 ENCOUNTER — ANESTHESIA (OUTPATIENT)
Dept: PERIOP | Facility: HOSPITAL | Age: 60
End: 2025-02-06
Payer: MEDICAID

## 2025-02-06 ENCOUNTER — HOSPITAL ENCOUNTER (OUTPATIENT)
Facility: HOSPITAL | Age: 60
Discharge: HOME | End: 2025-02-06
Attending: OBSTETRICS & GYNECOLOGY | Admitting: OBSTETRICS & GYNECOLOGY
Payer: MEDICAID

## 2025-02-06 VITALS
HEIGHT: 66 IN | TEMPERATURE: 98 F | OXYGEN SATURATION: 98 % | DIASTOLIC BLOOD PRESSURE: 76 MMHG | BODY MASS INDEX: 47.09 KG/M2 | HEART RATE: 59 BPM | RESPIRATION RATE: 16 BRPM | SYSTOLIC BLOOD PRESSURE: 132 MMHG | WEIGHT: 293 LBS

## 2025-02-06 DIAGNOSIS — C54.1 ENDOMETRIAL CANCER: ICD-10-CM

## 2025-02-06 PROCEDURE — S2900 ROBOTIC SURGICAL SYSTEM: HCPCS | Performed by: OBSTETRICS & GYNECOLOGY

## 2025-02-06 PROCEDURE — 25010000002 INDOCYANINE GREEN 25 MG RECONSTITUTED SOLUTION: Performed by: OBSTETRICS & GYNECOLOGY

## 2025-02-06 PROCEDURE — 88309 TISSUE EXAM BY PATHOLOGIST: CPT | Performed by: OBSTETRICS & GYNECOLOGY

## 2025-02-06 PROCEDURE — 25010000002 HEPARIN (PORCINE) PER 1000 UNITS: Performed by: OBSTETRICS & GYNECOLOGY

## 2025-02-06 PROCEDURE — 25810000003 SODIUM CHLORIDE 0.9 % SOLUTION: Performed by: ANESTHESIOLOGY

## 2025-02-06 PROCEDURE — 25010000002 ONDANSETRON PER 1 MG: Performed by: NURSE ANESTHETIST, CERTIFIED REGISTERED

## 2025-02-06 PROCEDURE — 25010000002 CEFAZOLIN 3 G RECONSTITUTED SOLUTION 1 EACH VIAL: Performed by: OBSTETRICS & GYNECOLOGY

## 2025-02-06 PROCEDURE — S0260 H&P FOR SURGERY: HCPCS | Performed by: OBSTETRICS & GYNECOLOGY

## 2025-02-06 PROCEDURE — 25010000002 FENTANYL CITRATE (PF) 50 MCG/ML SOLUTION

## 2025-02-06 PROCEDURE — 25010000002 DEXAMETHASONE PER 1 MG: Performed by: NURSE ANESTHETIST, CERTIFIED REGISTERED

## 2025-02-06 PROCEDURE — 88342 IMHCHEM/IMCYTCHM 1ST ANTB: CPT | Performed by: OBSTETRICS & GYNECOLOGY

## 2025-02-06 PROCEDURE — 25810000003 SODIUM CHLORIDE PER 500 ML: Performed by: OBSTETRICS & GYNECOLOGY

## 2025-02-06 PROCEDURE — 88307 TISSUE EXAM BY PATHOLOGIST: CPT | Performed by: OBSTETRICS & GYNECOLOGY

## 2025-02-06 PROCEDURE — 25010000002 LIDOCAINE PF 1% 1 % SOLUTION: Performed by: NURSE ANESTHETIST, CERTIFIED REGISTERED

## 2025-02-06 PROCEDURE — 38900 IO MAP OF SENT LYMPH NODE: CPT | Performed by: OBSTETRICS & GYNECOLOGY

## 2025-02-06 PROCEDURE — 25010000002 SUGAMMADEX 200 MG/2ML SOLUTION: Performed by: NURSE ANESTHETIST, CERTIFIED REGISTERED

## 2025-02-06 PROCEDURE — 38571 LAPAROSCOPY LYMPHADENECTOMY: CPT | Performed by: OBSTETRICS & GYNECOLOGY

## 2025-02-06 PROCEDURE — 25810000003 SODIUM CHLORIDE 0.9 % SOLUTION: Performed by: NURSE ANESTHETIST, CERTIFIED REGISTERED

## 2025-02-06 PROCEDURE — 58571 TLH W/T/O 250 G OR LESS: CPT | Performed by: OBSTETRICS & GYNECOLOGY

## 2025-02-06 PROCEDURE — 25010000002 PROPOFOL 10 MG/ML EMULSION: Performed by: NURSE ANESTHETIST, CERTIFIED REGISTERED

## 2025-02-06 PROCEDURE — 25010000002 LIDOCAINE PF 1% 1 % SOLUTION: Performed by: ANESTHESIOLOGY

## 2025-02-06 DEVICE — FLOSEAL WITH RECOTHROM - 10ML.
Type: IMPLANTABLE DEVICE | Site: PELVIS | Status: FUNCTIONAL
Brand: FLOSEAL HEMOSTATIC MATRIX

## 2025-02-06 RX ORDER — DEXAMETHASONE SODIUM PHOSPHATE 4 MG/ML
INJECTION, SOLUTION INTRA-ARTICULAR; INTRALESIONAL; INTRAMUSCULAR; INTRAVENOUS; SOFT TISSUE AS NEEDED
Status: DISCONTINUED | OUTPATIENT
Start: 2025-02-06 | End: 2025-02-06 | Stop reason: SURG

## 2025-02-06 RX ORDER — ONDANSETRON 2 MG/ML
4 INJECTION INTRAMUSCULAR; INTRAVENOUS ONCE AS NEEDED
Status: DISCONTINUED | OUTPATIENT
Start: 2025-02-06 | End: 2025-02-06 | Stop reason: HOSPADM

## 2025-02-06 RX ORDER — LIDOCAINE HYDROCHLORIDE 10 MG/ML
0.5 INJECTION, SOLUTION EPIDURAL; INFILTRATION; INTRACAUDAL; PERINEURAL ONCE AS NEEDED
Status: COMPLETED | OUTPATIENT
Start: 2025-02-06 | End: 2025-02-06

## 2025-02-06 RX ORDER — ACETAMINOPHEN 500 MG
1000 TABLET ORAL EVERY 6 HOURS PRN
Qty: 60 TABLET | Refills: 0 | Status: SHIPPED | OUTPATIENT
Start: 2025-02-06

## 2025-02-06 RX ORDER — SODIUM CHLORIDE 9 MG/ML
9 INJECTION, SOLUTION INTRAVENOUS ONCE
Status: COMPLETED | OUTPATIENT
Start: 2025-02-06 | End: 2025-02-06

## 2025-02-06 RX ORDER — EPHEDRINE SULFATE 50 MG/ML
INJECTION INTRAVENOUS AS NEEDED
Status: DISCONTINUED | OUTPATIENT
Start: 2025-02-06 | End: 2025-02-06 | Stop reason: SURG

## 2025-02-06 RX ORDER — HEPARIN SODIUM 5000 [USP'U]/ML
INJECTION, SOLUTION INTRAVENOUS; SUBCUTANEOUS AS NEEDED
Status: DISCONTINUED | OUTPATIENT
Start: 2025-02-06 | End: 2025-02-06 | Stop reason: HOSPADM

## 2025-02-06 RX ORDER — ONDANSETRON 2 MG/ML
INJECTION INTRAMUSCULAR; INTRAVENOUS AS NEEDED
Status: DISCONTINUED | OUTPATIENT
Start: 2025-02-06 | End: 2025-02-06 | Stop reason: SURG

## 2025-02-06 RX ORDER — SODIUM CHLORIDE 9 MG/ML
INJECTION, SOLUTION INTRAVENOUS AS NEEDED
Status: DISCONTINUED | OUTPATIENT
Start: 2025-02-06 | End: 2025-02-06 | Stop reason: HOSPADM

## 2025-02-06 RX ORDER — SODIUM CHLORIDE 0.9 % (FLUSH) 0.9 %
10 SYRINGE (ML) INJECTION EVERY 12 HOURS SCHEDULED
Status: DISCONTINUED | OUTPATIENT
Start: 2025-02-06 | End: 2025-02-06 | Stop reason: HOSPADM

## 2025-02-06 RX ORDER — PREGABALIN 150 MG/1
150 CAPSULE ORAL ONCE
Status: COMPLETED | OUTPATIENT
Start: 2025-02-06 | End: 2025-02-06

## 2025-02-06 RX ORDER — SODIUM CHLORIDE 0.9 % (FLUSH) 0.9 %
10 SYRINGE (ML) INJECTION AS NEEDED
Status: DISCONTINUED | OUTPATIENT
Start: 2025-02-06 | End: 2025-02-06 | Stop reason: HOSPADM

## 2025-02-06 RX ORDER — SODIUM CHLORIDE 9 MG/ML
INJECTION, SOLUTION INTRAVENOUS CONTINUOUS PRN
Status: DISCONTINUED | OUTPATIENT
Start: 2025-02-06 | End: 2025-02-06 | Stop reason: SURG

## 2025-02-06 RX ORDER — BUPIVACAINE HYDROCHLORIDE AND EPINEPHRINE 5; 5 MG/ML; UG/ML
INJECTION, SOLUTION PERINEURAL AS NEEDED
Status: DISCONTINUED | OUTPATIENT
Start: 2025-02-06 | End: 2025-02-06 | Stop reason: HOSPADM

## 2025-02-06 RX ORDER — SCOPOLAMINE 1 MG/3D
1 PATCH, EXTENDED RELEASE TRANSDERMAL CONTINUOUS
Status: DISCONTINUED | OUTPATIENT
Start: 2025-02-06 | End: 2025-02-06 | Stop reason: HOSPADM

## 2025-02-06 RX ORDER — PROPOFOL 10 MG/ML
VIAL (ML) INTRAVENOUS AS NEEDED
Status: DISCONTINUED | OUTPATIENT
Start: 2025-02-06 | End: 2025-02-06 | Stop reason: SURG

## 2025-02-06 RX ORDER — INDOCYANINE GREEN AND WATER 25 MG
KIT INJECTION AS NEEDED
Status: DISCONTINUED | OUTPATIENT
Start: 2025-02-06 | End: 2025-02-06 | Stop reason: HOSPADM

## 2025-02-06 RX ORDER — FAMOTIDINE 10 MG/ML
20 INJECTION, SOLUTION INTRAVENOUS ONCE
Status: CANCELLED | OUTPATIENT
Start: 2025-02-06 | End: 2025-02-06

## 2025-02-06 RX ORDER — SODIUM CHLORIDE 9 MG/ML
40 INJECTION, SOLUTION INTRAVENOUS AS NEEDED
Status: DISCONTINUED | OUTPATIENT
Start: 2025-02-06 | End: 2025-02-06 | Stop reason: HOSPADM

## 2025-02-06 RX ORDER — ACETAMINOPHEN 500 MG
1000 TABLET ORAL ONCE
Status: DISCONTINUED | OUTPATIENT
Start: 2025-02-06 | End: 2025-02-06 | Stop reason: HOSPADM

## 2025-02-06 RX ORDER — DOCUSATE SODIUM 100 MG/1
200 CAPSULE, LIQUID FILLED ORAL 2 TIMES DAILY PRN
Start: 2025-02-06

## 2025-02-06 RX ORDER — FENTANYL CITRATE 50 UG/ML
50 INJECTION, SOLUTION INTRAMUSCULAR; INTRAVENOUS
Status: DISCONTINUED | OUTPATIENT
Start: 2025-02-06 | End: 2025-02-06 | Stop reason: HOSPADM

## 2025-02-06 RX ORDER — FENTANYL CITRATE 50 UG/ML
INJECTION, SOLUTION INTRAMUSCULAR; INTRAVENOUS
Status: COMPLETED
Start: 2025-02-06 | End: 2025-02-06

## 2025-02-06 RX ORDER — POLYETHYLENE GLYCOL 3350 17 G/17G
17 POWDER, FOR SOLUTION ORAL DAILY
Start: 2025-02-06

## 2025-02-06 RX ORDER — MIDAZOLAM HYDROCHLORIDE 1 MG/ML
1 INJECTION, SOLUTION INTRAMUSCULAR; INTRAVENOUS
Status: DISCONTINUED | OUTPATIENT
Start: 2025-02-06 | End: 2025-02-06 | Stop reason: HOSPADM

## 2025-02-06 RX ORDER — ROCURONIUM BROMIDE 10 MG/ML
INJECTION, SOLUTION INTRAVENOUS AS NEEDED
Status: DISCONTINUED | OUTPATIENT
Start: 2025-02-06 | End: 2025-02-06 | Stop reason: SURG

## 2025-02-06 RX ORDER — FAMOTIDINE 20 MG/1
20 TABLET, FILM COATED ORAL ONCE
Status: COMPLETED | OUTPATIENT
Start: 2025-02-06 | End: 2025-02-06

## 2025-02-06 RX ORDER — SODIUM CHLORIDE, SODIUM LACTATE, POTASSIUM CHLORIDE, CALCIUM CHLORIDE 600; 310; 30; 20 MG/100ML; MG/100ML; MG/100ML; MG/100ML
9 INJECTION, SOLUTION INTRAVENOUS CONTINUOUS
Status: CANCELLED | OUTPATIENT
Start: 2025-02-07 | End: 2025-02-07

## 2025-02-06 RX ORDER — HYDROMORPHONE HYDROCHLORIDE 1 MG/ML
0.5 INJECTION, SOLUTION INTRAMUSCULAR; INTRAVENOUS; SUBCUTANEOUS
Status: DISCONTINUED | OUTPATIENT
Start: 2025-02-06 | End: 2025-02-06 | Stop reason: HOSPADM

## 2025-02-06 RX ORDER — LIDOCAINE HYDROCHLORIDE 10 MG/ML
INJECTION, SOLUTION EPIDURAL; INFILTRATION; INTRACAUDAL; PERINEURAL AS NEEDED
Status: DISCONTINUED | OUTPATIENT
Start: 2025-02-06 | End: 2025-02-06 | Stop reason: SURG

## 2025-02-06 RX ORDER — TRAMADOL HYDROCHLORIDE 50 MG/1
50 TABLET ORAL EVERY 6 HOURS PRN
Qty: 5 TABLET | Refills: 0 | Status: SHIPPED | OUTPATIENT
Start: 2025-02-06

## 2025-02-06 RX ORDER — HEPARIN SODIUM 5000 [USP'U]/ML
5000 INJECTION, SOLUTION INTRAVENOUS; SUBCUTANEOUS ONCE
Status: DISCONTINUED | OUTPATIENT
Start: 2025-02-06 | End: 2025-02-06 | Stop reason: HOSPADM

## 2025-02-06 RX ADMIN — DEXAMETHASONE SODIUM PHOSPHATE 8 MG: 4 INJECTION INTRA-ARTICULAR; INTRALESIONAL; INTRAMUSCULAR; INTRAVENOUS; SOFT TISSUE at 12:41

## 2025-02-06 RX ADMIN — EPHEDRINE SULFATE 10 MG: 50 INJECTION INTRAVENOUS at 13:47

## 2025-02-06 RX ADMIN — SODIUM CHLORIDE: 9 INJECTION, SOLUTION INTRAVENOUS at 14:52

## 2025-02-06 RX ADMIN — SCOPOLAMINE 1 PATCH: 1.5 PATCH, EXTENDED RELEASE TRANSDERMAL at 10:54

## 2025-02-06 RX ADMIN — SODIUM CHLORIDE 3000 MG: 900 INJECTION INTRAVENOUS at 12:41

## 2025-02-06 RX ADMIN — FAMOTIDINE 20 MG: 20 TABLET, FILM COATED ORAL at 10:53

## 2025-02-06 RX ADMIN — ROCURONIUM BROMIDE 50 MG: 10 INJECTION INTRAVENOUS at 12:38

## 2025-02-06 RX ADMIN — ROCURONIUM BROMIDE 25 MG: 10 INJECTION INTRAVENOUS at 13:21

## 2025-02-06 RX ADMIN — FENTANYL CITRATE 50 MCG: 50 INJECTION, SOLUTION INTRAMUSCULAR; INTRAVENOUS at 15:48

## 2025-02-06 RX ADMIN — LIDOCAINE HYDROCHLORIDE 0.5 ML: 10 INJECTION, SOLUTION EPIDURAL; INFILTRATION; INTRACAUDAL; PERINEURAL at 10:54

## 2025-02-06 RX ADMIN — SODIUM CHLORIDE 9 ML/HR: 9 INJECTION, SOLUTION INTRAVENOUS at 10:54

## 2025-02-06 RX ADMIN — SODIUM CHLORIDE: 9 INJECTION, SOLUTION INTRAVENOUS at 12:30

## 2025-02-06 RX ADMIN — SUGAMMADEX 200 MG: 100 INJECTION, SOLUTION INTRAVENOUS at 14:55

## 2025-02-06 RX ADMIN — PREGABALIN 150 MG: 150 CAPSULE ORAL at 10:55

## 2025-02-06 RX ADMIN — ONDANSETRON 4 MG: 2 INJECTION INTRAMUSCULAR; INTRAVENOUS at 14:55

## 2025-02-06 RX ADMIN — PROPOFOL 200 MG: 10 INJECTION, EMULSION INTRAVENOUS at 12:38

## 2025-02-06 RX ADMIN — LIDOCAINE HYDROCHLORIDE 50 MG: 10 INJECTION, SOLUTION EPIDURAL; INFILTRATION; INTRACAUDAL; PERINEURAL at 12:38

## 2025-02-06 NOTE — H&P
Pre-Op H&P  Jamia Hinton  [id number]  1965    Chief complaint: endometrial cancer    HPI:    Patient is a 59 y.o.female who presents with a history of endometrial cancer. She presents to the operating room today for surgical management with a robot assisted total laparoscopic hysterectomy, bilateral salpingo oophorectomy, sentinel lymph node dissection possible complete lymphadenectomy.      Review of Systems:  General ROS: negative for chills, fever or skin lesions;  No changes since last office visit.  Neg for recent sick exposure  Cardiovascular ROS: no chest pain or dyspnea on exertion  Respiratory ROS: no cough, shortness of breath, or wheezing    Allergies:   Allergies   Allergen Reactions    Citalopram Hydrobromide Mental Status Change     Couldn't function       Home Meds:    No current facility-administered medications on file prior to encounter.     Current Outpatient Medications on File Prior to Encounter   Medication Sig Dispense Refill    allopurinol (ZYLOPRIM) 100 MG tablet Take 0.5 tablets by mouth Daily. Takes 1/2 tablet daily      carvedilol (COREG) 25 MG tablet Take 1 tablet by mouth 2 (Two) Times a Day With Meals.      iron polysaccharides (NIFEREX) 150 MG capsule Take 1 capsule by mouth Daily. 30 capsule 0    albuterol sulfate  (90 Base) MCG/ACT inhaler Inhale 2 puffs Every 4 (Four) Hours As Needed for Wheezing.      cloNIDine (CATAPRES) 0.1 MG tablet Take 1 tablet by mouth As Needed for High Blood Pressure.      Corticotropin (ACTHAR GEL SC) Inject 1 dose under the skin into the appropriate area as directed Every 72 (Seventy-Two) Hours.      omeprazole (priLOSEC) 40 MG capsule Take 1 capsule by mouth As Needed (states she rarely needs it).         PMH:   Past Medical History:   Diagnosis Date    Acid reflux     occasional - omeprazole PRN    Anxiety     Arthritis     Asthma     Benign hypertension with chronic kidney disease 06/03/2024    Cancer     cervical    CKD (chronic kidney  "disease), stage IV     Fibroid 2024    Hernia, abdominal     Hypercalcemia     Migraine     occasionally - every 3-4 months per report on 24    Osteoarthritis     Rheumatoid arthritis     Sarcoidosis     followed by Dr. Jililan MCLEOD (shortness of breath) on exertion     secondary to sardoidosis     PSH:    Past Surgical History:   Procedure Laterality Date    BREAST BIOPSY Left     CATARACT EXTRACTION W/ INTRAOCULAR LENS IMPLANT Right 2023    Procedure: CATARACT PHACO EXTRACTION WITH INTRAOCULAR LENS IMPLANT RIGHT;  Surgeon: Christ Kerr MD;  Location: [facility];  Service: Ophthalmology;  Laterality: Right;    CATARACT EXTRACTION W/ INTRAOCULAR LENS IMPLANT Left 2024    Procedure: CATARACT PHACO EXTRACTION WITH INTRAOCULAR LENS IMPLANT LEFT;  Surgeon: Christ Kerr MD;  Location: [facility];  Service: Ophthalmology;  Laterality: Left;     SECTION      CHOLECYSTECTOMY      COLONOSCOPY      HYSTEROSCOPY N/A 2025    Procedure: HYSTEROSCOPY WITH MYOSURE, DILATION AND CURETTAGE;  Surgeon: Evans Myers MD;  Location: [facility];  Service: Obstetrics/Gynecology;  Laterality: N/A;    VENTRAL/INCISIONAL HERNIA REPAIR N/A 2017    Procedure:  INCISIONAL HERNIA REPAIR WITH MESH;  Surgeon: Bassam Rogers MD;  Location: [facility];  Service:        Immunization History:  Influenza: denies  Pneumococcal: denies  Tetanus: denies    Social History:   Tobacco:   Social History     Tobacco Use   Smoking Status Never   Smokeless Tobacco Never      Alcohol:     Social History     Substance and Sexual Activity   Alcohol Use No       Vitals:           /94 (BP Location: Right arm, Patient Position: Lying)   Pulse 70   Temp 97.6 °F (36.4 °C) (Temporal)   Resp 18   Ht 167.6 cm (66\")   Wt 134 kg (296 lb)   LMP  (LMP Unknown) Comment: posyt menopausal  SpO2 98%   BMI 47.78 kg/m²     Physical Exam:  General Appearance:    Alert, cooperative, no distress, " appears stated age   Head:    Normocephalic, without obvious abnormality, atraumatic   Lungs:     Clear to auscultation bilaterally, respirations unlabored    Heart:   Regular rate and rhythm, S1 and S2 normal, no murmur, rub    or gallop    Abdomen:    Soft, nontender.  +bowel sounds   Breast Exam:    deferred   Genitalia:    deferred   Extremities:   Extremities normal, atraumatic, no cyanosis or edema   Skin:   Skin color, texture, turgor normal, no rashes or lesions   Neurologic:   Grossly intact   Results Review  LABS:  Lab Results   Component Value Date    WBC 7.52 01/29/2025    HGB 12.9 01/29/2025    HCT 40.6 01/29/2025    MCV 87.3 01/29/2025     01/29/2025    NEUTROABS 5.13 01/29/2025    GLUCOSE 101 (H) 01/29/2025    BUN 33 (H) 01/29/2025    CREATININE 2.17 (H) 01/29/2025    EGFRIFNONA 75 12/13/2017     01/29/2025    K 4.1 01/29/2025     01/29/2025    CO2 29.0 01/29/2025    MG 2.1 01/13/2025    PHOS 3.3 04/28/2024    CALCIUM 10.8 (H) 01/29/2025    ALBUMIN 4.2 01/29/2025    AST 25 01/29/2025    ALT 14 01/29/2025    BILITOT 0.6 01/29/2025       RADIOLOGY:  No radiology results for the last 3 days     Cancer Staging (if applicable)  Cancer Patient: _x_ yes __no __unknown; If yes, clinical stage T:_1_ N:_0_M:_0_, stage group i    Impression: endometrial cancer    Plan: robot assisted total laparoscopic hysterectomy, bilateral salpingo oophorectomy, sentinel lymph node dissection possible complete lymphadenectomy       Scooby Poole PA-C   02/06/25   12:07 PM EST     I saw and evaluated the patient. I agree with the findings and the plan of care as documented in the note.    Citlali Eli MD  02/06/25  12:29 EST

## 2025-02-06 NOTE — ANESTHESIA PROCEDURE NOTES
Airway  Urgency: elective    Date/Time: 2/6/2025 12:44 PM  Airway not difficult    General Information and Staff    Patient location during procedure: OR  CRNA/CAA: Junior NIC Beebe, CRNA    Indications and Patient Condition  Indications for airway management: airway protection    Preoxygenated: yes  MILS not maintained throughout  Mask difficulty assessment: 1 - vent by mask    Final Airway Details  Final airway type: endotracheal airway      Successful airway: ETT  Cuffed: yes   Successful intubation technique: direct laryngoscopy  Endotracheal tube insertion site: oral  Blade: Jesse  Blade size: 3  ETT size (mm): 7.5  Cormack-Lehane Classification: grade I - full view of glottis  Placement verified by: chest auscultation and capnometry   Measured from: lips  ETT/EBT  to lips (cm): 20  Number of attempts at approach: 1  Assessment: lips, teeth, and gum same as pre-op and atraumatic intubation    Additional Comments  Negative epigastric sounds, Breath sound equal bilaterally with symmetric chest rise and fall

## 2025-02-06 NOTE — ANESTHESIA PREPROCEDURE EVALUATION
Anesthesia Evaluation     Patient summary reviewed and Nursing notes reviewed   no history of anesthetic complications:   NPO Solid Status: > 8 hours  NPO Liquid Status: > 8 hours           Airway   Mallampati: III  TM distance: >3 FB  Neck ROM: full  No difficulty expected  Dental      Pulmonary - normal exam   (+) asthma,shortness of breath  Cardiovascular - normal exam    (+) hypertension      Neuro/Psych  (+) headaches, psychiatric history  GI/Hepatic/Renal/Endo    (+) morbid obesity, GERD, renal disease-    Musculoskeletal     Abdominal    Substance History      OB/GYN          Other   arthritis,                 Anesthesia Plan    ASA 3     general     intravenous induction     Anesthetic plan, risks, benefits, and alternatives have been provided, discussed and informed consent has been obtained with: patient.    Plan discussed with CRNA.    CODE STATUS:

## 2025-02-06 NOTE — OP NOTE
Subjective     Date of Service:  02/06/25  Time of Service:  14:51 EST    Surgical Staff: Surgeons and Role:     * Citlali Eli MD - Primary   Additional Staff:   Assistant: Scooby Poole PA-C  was responsible for performing the following activities: Retraction, Suction, Irrigation, Closing, and Placing Dressing and their skilled assistance was necessary for the success of this case.     Pre-operative diagnosis(es): Pre-Op Diagnosis Codes:      * Endometrial cancer [C54.1]  BMI 47.78   Post-operative diagnosis(es): Post-Op Diagnosis Codes:     * Endometrial cancer [C54.1]  BMI 47.78   Procedure(s): Procedure(s):  INJECTION OF ICG DYE, TOTAL LAPAROSCOPIC HYSTERECTOMY, BILATERAL SALPINGOOPHORECTOMY, SENTINEL NODE DISSECTION WITH DAVINCI ROBOT, LYSIS OF ADHESIONS (30 minutes)     Antibiotics: cefazolin (Ancef) ordered on call to OR     Anesthesia: Type: General  ASA:  III     Objective      Operative findings: At the time of placement of the vaginal instruments, cervix was quite high in the abdominal cavity.  At the time of laparoscopy, there was adhesive disease involving prior placed mesh.  this was mainly involving the omentum but there was some small bowel adhered as well.  Fibroid uterus was noted.  Adnexa were unremarkable.  Patriot lymph nodes were identified at the bilateral pelvis overlying the external iliac vein.   Specimens removed: ID Type Source Tests Collected by Time   A : LEFT PELVIC SENTINEL LYMPH NODE FOR PERMANENT Tissue Patriot Lymph Node TISSUE PATHOLOGY EXAM Citlali Eli MD 2/6/2025 1337   B : RIGHT PELVIC SENTINEL LYMPH NODE FOR PERMANENT Tissue Patriot Lymph Node TISSUE PATHOLOGY EXAM Citlali Eli MD 2/6/2025 1350   C : UTERUS, CERVIX, BILATERAL TUBES AND OVARIES FOR PERMANENT Tissue Uterus with Cervix, Bilateral Tubes and Ovaries TISSUE PATHOLOGY EXAM Citlali Eli MD 2/6/2025 1422      Fluid Intake and Output: I/O this shift:  In: 100 [IV Piggyback:100]  Out: 300  [Urine:300] estimated blood loss 50 mL   Blood products used: No   Drains: NG/OG Tube Orogastric 18 Fr Center mouth (Active)       [REMOVED] Urethral Catheter Silicone 16 Fr. (Removed)      Implant Information: Implant Name Type Inv. Item Serial No.  Lot No. LRB No. Used Action   KT SEAL HEMOS ABS FLOSEAL MATRX 1.5/FAST/PREP 5000/IU 10ML - [id number] Implant KT SEAL HEMOS ABS FLOSEAL MATRX 1.5/FAST/PREP 5000/IU 10ML  [facility] [id number] N/A 1 Implanted      Complications: No immediate   Intraoperative consult(s):    Condition: Stable   Disposition: to PACU and then discharge home       Indications:  Patient is a pleasant 59-year-old woman who is diagnosed with endometrial cancer.  Risks and benefits of surgery were discussed.  Consent was signed and on chart.    Procedure: After obtaining informed consent, the patient was taken to the operating room and underwent general endotracheal anesthesia after patient and site verification. The feet were placed in Michael stirrups. The arms were tucked at the sides. Steep Trendelenburg positioning was tested and found to be adequate. The abdomen, perineum, and vagina were prepped and draped in the usual sterile fashion. Guevara catheter was anchored. Retractors were used to visualize the cervix.  Cervix was injected at 3 and 9:00 with ICG dye in the standard fashion.  Cervix was sounded and the appropriate uterine manipulator was called for.  Uterine manipulator was secured with out difficulty.  Attention was turned to the abdomen. Prior to each incision, skin was injected with 0.5% Marcaine with epinephrine.  Varies needle was inserted at the left upper quadrant and the abdomen was insufflated to pressure 15 mmHg with CO2 gas.  An 8 mm trocar was inserted at the left upper quadrant.  Laparoscope was introduced to confirm positioning. Steep Trendelenburg was called for.  Working around adhesions, supraumbilical 8 mm trocar was placed under direct visualization.     1 left-sided 8 mm and 2 right-sided 8 mm trochars were all placed under direct visualization.  The above findings were noted.  Da Isaias robot was docked to the patient and surgeon retired to the operating console.    Adhesions of the omentum and a portion of the intestines were serially divided using bipolar cautery and monopolar scissors.  The peritoneum overlying the pelvic sidewalls was divided with monopolar scissors. Infundibulopelvic ligaments were isolated from surrounding structures and pedicles were created using bipolar cautery and scissors. Likewise, the round ligaments were divided. Anterior and posterior leaves of the broad ligament were divided with monopolar scissors. A bladder flap was developed.  Uterine vessels were isolated. Pedicles were created at the level of the uterine vessels using bipolar cautery and scissors. Cardinal ligament and uterosacral ligament complexes were divided in a similar fashion. Monopolar scissors were used to perform a circumferential colpotomy and the specimen was handed off intact via the vagina for permanent pathology.     Bilateral pelvic sentinel lymph node dissection was performed according to the usual anatomic landmarks including the aorta and its bifurcation, the common iliac arteries and their bifurcations, the external and internal iliac arteries, the obturator and genitofemoral nerves, and the umbilical ligament. Pelvic lymph nodes were placed in Endo Catch bags and removed from the field.  No further lymph node dissection could be safely performed due to habitus.    The vaginal cuff was closed with 0 Vicryl suture in a running locking fashion x2 layers. Good hemostasis was noted. Additional hemostasis was achieved at the pelvis as needed using bipolar cautery. Da Isaias robot was undocked from the patient and the surgeon returned to the bedside.  Trochars were removed under direct visualization.  CO2 gas was allowed to escape from the abdominal cavity. At  that point, no fascial defects could be palpated.  The skin was closed with 3-0 Monocryl in subcuticular stitch and glue was placed at the skin. The vagina was inspected.  Occluder and all instruments had been removed from the vagina.  Good hemostasis was noted at the vagina.  Bladder was back filled with 120 mL of sterile solution and the condon was discontinued.  The patient was taken to the recovery room in good condition. There were no immediate complications. All counts were correct.          Citlali Eli MD  02/06/25  14:51 EST

## 2025-02-06 NOTE — ANESTHESIA POSTPROCEDURE EVALUATION
Patient: Jamia Hinton    Procedure Summary       Date: 02/06/25 [facility] / [facility]    Anesthesia Start: 1230 Anesthesia Stop: 1501    Procedure: INJECTION OF ICG DYE, TOTAL LAPAROSCOPIC HYSTERECTOMY, BILATERAL SALPINGOOPHORECTOMY, SENTINEL NODE DISSECTION WITH DAVINCI ROBOT, LYSIS OF ADHESIONS (30 minutes) Diagnosis:       Endometrial cancer      (Endometrial cancer [C54.1])    Surgeons: Citlali Eli MD Provider: Gail Doherty DO    Anesthesia Type: general ASA Status: 3            Anesthesia Type: general    Vitals  No vitals data found for the desired time range.          Post Anesthesia Care and Evaluation    Patient location during evaluation: PACU  Patient participation: complete - patient participated  Level of consciousness: awake and alert  Pain management: adequate    Airway patency: patent  Anesthetic complications: No anesthetic complications  PONV Status: none  Cardiovascular status: hemodynamically stable and acceptable  Respiratory status: nonlabored ventilation, acceptable and nasal cannula  Hydration status: acceptable    Comments: 98.1 93% rr15 hr 72 139/76

## 2025-02-07 ENCOUNTER — TELEPHONE (OUTPATIENT)
Dept: GYNECOLOGIC ONCOLOGY | Facility: CLINIC | Age: 60
End: 2025-02-07
Payer: MEDICAID

## 2025-02-07 NOTE — TELEPHONE ENCOUNTER
How is pt managing pain? Currently sore, taking the tylenol and tramadol  Is pt eating and drinking adequately? yes  Is pt urinating(voiding) and moving bowels/passing gas? Yes, passing gas no BM yet.    Is pt taking stool softeners as prescribed? Going to get today  Mobility? Yes moving around at least three times a day.   Questions? N/A  Post-Op appt.: 02/26/2025

## 2025-02-11 LAB
CYTO UR: NORMAL
LAB AP CASE REPORT: NORMAL
LAB AP CLINICAL INFORMATION: NORMAL
LAB AP DIAGNOSIS COMMENT: NORMAL
LAB AP SYNOPTIC CHECKLIST: NORMAL
PATH REPORT.FINAL DX SPEC: NORMAL
PATH REPORT.GROSS SPEC: NORMAL

## 2025-02-13 ENCOUNTER — TELEPHONE (OUTPATIENT)
Dept: GYNECOLOGIC ONCOLOGY | Facility: CLINIC | Age: 60
End: 2025-02-13
Payer: MEDICAID

## 2025-02-13 NOTE — TELEPHONE ENCOUNTER
Patient notified of Providers comments and recommendations for Pathology results with verbalized understanding and offers thank you, next appt verified with patient.

## 2025-02-13 NOTE — TELEPHONE ENCOUNTER
----- Message from Citlali Eli sent at 2/13/2025  1:13 PM EST -----  Please notify patient of noninvasive endometrial cancer, plan for observation.  Thank you!  ----- Message -----  From: Lab, Background User  Sent: 2/11/2025  10:41 AM EST  To: Citlali Eli MD

## 2025-02-17 ENCOUNTER — TELEPHONE (OUTPATIENT)
Dept: PULMONOLOGY | Facility: CLINIC | Age: 60
End: 2025-02-17

## 2025-02-17 NOTE — TELEPHONE ENCOUNTER
Caller: Jamia Hinton    Relationship to patient: Self    Best call back number: [phone]      Patient is needing: PT DIDN'T KNOW IF SHE NEEDED LAB WORK DONE BEFORE HER APPT 2-21-25 WITH DR. CANAS. PLEASE CALL PT TO DISCUSS.

## 2025-02-21 ENCOUNTER — TRANSCRIBE ORDERS (OUTPATIENT)
Dept: GENERAL RADIOLOGY | Facility: HOSPITAL | Age: 60
End: 2025-02-21
Payer: MEDICAID

## 2025-02-21 ENCOUNTER — TRANSCRIBE ORDERS (OUTPATIENT)
Dept: ADMINISTRATIVE | Facility: HOSPITAL | Age: 60
End: 2025-02-21
Payer: MEDICAID

## 2025-02-21 ENCOUNTER — OFFICE VISIT (OUTPATIENT)
Dept: PULMONOLOGY | Facility: CLINIC | Age: 60
End: 2025-02-21
Payer: MEDICAID

## 2025-02-21 ENCOUNTER — TRANSCRIBE ORDERS (OUTPATIENT)
Dept: LAB | Facility: HOSPITAL | Age: 60
End: 2025-02-21
Payer: MEDICAID

## 2025-02-21 ENCOUNTER — LAB (OUTPATIENT)
Dept: LAB | Facility: HOSPITAL | Age: 60
End: 2025-02-21
Payer: MEDICAID

## 2025-02-21 VITALS
HEIGHT: 66 IN | OXYGEN SATURATION: 98 % | WEIGHT: 293 LBS | BODY MASS INDEX: 47.09 KG/M2 | SYSTOLIC BLOOD PRESSURE: 124 MMHG | DIASTOLIC BLOOD PRESSURE: 68 MMHG | HEART RATE: 61 BPM

## 2025-02-21 DIAGNOSIS — N18.32 STAGE 3B CHRONIC KIDNEY DISEASE: Primary | ICD-10-CM

## 2025-02-21 DIAGNOSIS — N18.32 STAGE 3B CHRONIC KIDNEY DISEASE: ICD-10-CM

## 2025-02-21 DIAGNOSIS — E83.52 HYPERCALCEMIA: Primary | ICD-10-CM

## 2025-02-21 DIAGNOSIS — M06.9 RHEUMATOID ARTHRITIS, INVOLVING UNSPECIFIED SITE, UNSPECIFIED WHETHER RHEUMATOID FACTOR PRESENT: ICD-10-CM

## 2025-02-21 DIAGNOSIS — Z78.0 POST-MENOPAUSAL: ICD-10-CM

## 2025-02-21 DIAGNOSIS — M79.10 MYALGIA: ICD-10-CM

## 2025-02-21 DIAGNOSIS — N18.4 CKD (CHRONIC KIDNEY DISEASE) STAGE 4, GFR 15-29 ML/MIN: ICD-10-CM

## 2025-02-21 DIAGNOSIS — D86.9 SARCOIDOSIS: ICD-10-CM

## 2025-02-21 DIAGNOSIS — T38.0X5A: ICD-10-CM

## 2025-02-21 DIAGNOSIS — N18.32 CHRONIC KIDNEY DISEASE (CKD) STAGE G3B/A1, MODERATELY DECREASED GLOMERULAR FILTRATION RATE (GFR) BETWEEN 30-44 ML/MIN/1.73 SQUARE METER AND ALBUMINURIA CREATININE RATIO LESS THAN 30 MG/G (CMS/H*: Primary | ICD-10-CM

## 2025-02-21 DIAGNOSIS — M15.0 PRIMARY GENERALIZED (OSTEO)ARTHRITIS: Primary | ICD-10-CM

## 2025-02-21 DIAGNOSIS — M35.00 SICCA, UNSPECIFIED TYPE: ICD-10-CM

## 2025-02-21 LAB
BACTERIA UR QL AUTO: ABNORMAL /HPF
BILIRUB UR QL STRIP: NEGATIVE
CLARITY UR: CLEAR
COD CRY URNS QL: PRESENT /HPF
COLOR UR: YELLOW
CREAT UR-MCNC: 165.4 MG/DL
DEPRECATED RDW RBC AUTO: 41.5 FL (ref 37–54)
EOSINOPHIL # BLD AUTO: 0.43 10*3/MM3 (ref 0–0.4)
EOSINOPHIL NFR BLD AUTO: 4 % (ref 0.3–6.2)
ERYTHROCYTE [DISTWIDTH] IN BLOOD BY AUTOMATED COUNT: 13.2 % (ref 12.3–15.4)
ERYTHROCYTE [SEDIMENTATION RATE] IN BLOOD: 24 MM/HR (ref 0–30)
GLUCOSE UR STRIP-MCNC: NEGATIVE MG/DL
HCT VFR BLD AUTO: 39 % (ref 34–46.6)
HGB BLD-MCNC: 12.3 G/DL (ref 12–15.9)
HGB UR QL STRIP.AUTO: ABNORMAL
HYALINE CASTS UR QL AUTO: ABNORMAL /LPF
KETONES UR QL STRIP: NEGATIVE
LEUKOCYTE ESTERASE UR QL STRIP.AUTO: ABNORMAL
MAGNESIUM SERPL-MCNC: 2.1 MG/DL (ref 1.6–2.4)
MCH RBC QN AUTO: 27.3 PG (ref 26.6–33)
MCHC RBC AUTO-ENTMCNC: 31.5 G/DL (ref 31.5–35.7)
MCV RBC AUTO: 86.7 FL (ref 79–97)
NITRITE UR QL STRIP: NEGATIVE
PH UR STRIP.AUTO: 5.5 [PH] (ref 5–8)
PHOSPHATE SERPL-MCNC: 2.8 MG/DL (ref 2.5–4.5)
PLATELET # BLD AUTO: 368 10*3/MM3 (ref 140–450)
PMV BLD AUTO: 11.7 FL (ref 6–12)
PROT ?TM UR-MCNC: 20.6 MG/DL
PROT UR QL STRIP: ABNORMAL
PROT/CREAT UR: 124.5 MG/G CREA (ref 0–200)
RBC # BLD AUTO: 4.5 10*6/MM3 (ref 3.77–5.28)
RBC # UR STRIP: ABNORMAL /HPF
REF LAB TEST METHOD: ABNORMAL
SP GR UR STRIP: 1.02 (ref 1–1.03)
SQUAMOUS #/AREA URNS HPF: ABNORMAL /HPF
UROBILINOGEN UR QL STRIP: ABNORMAL
WBC # UR STRIP: ABNORMAL /HPF
WBC NRBC COR # BLD AUTO: 10.79 10*3/MM3 (ref 3.4–10.8)

## 2025-02-21 PROCEDURE — 36415 COLL VENOUS BLD VENIPUNCTURE: CPT

## 2025-02-21 PROCEDURE — 85652 RBC SED RATE AUTOMATED: CPT

## 2025-02-21 PROCEDURE — 80053 COMPREHEN METABOLIC PANEL: CPT | Performed by: INTERNAL MEDICINE

## 2025-02-21 PROCEDURE — 84100 ASSAY OF PHOSPHORUS: CPT | Performed by: INTERNAL MEDICINE

## 2025-02-21 PROCEDURE — 82570 ASSAY OF URINE CREATININE: CPT

## 2025-02-21 PROCEDURE — 81001 URINALYSIS AUTO W/SCOPE: CPT

## 2025-02-21 PROCEDURE — 82610 CYSTATIN C: CPT

## 2025-02-21 PROCEDURE — 84156 ASSAY OF PROTEIN URINE: CPT

## 2025-02-21 PROCEDURE — 84165 PROTEIN E-PHORESIS SERUM: CPT

## 2025-02-21 PROCEDURE — 85027 COMPLETE CBC AUTOMATED: CPT

## 2025-02-21 PROCEDURE — 83735 ASSAY OF MAGNESIUM: CPT

## 2025-02-21 PROCEDURE — 85048 AUTOMATED LEUKOCYTE COUNT: CPT

## 2025-02-21 NOTE — PROGRESS NOTES
Pulmonary follow-up office Visit      Patient Name: Jamia Hinton    Chief Complaint:    Chief Complaint   Patient presents with    Shortness of Breath       Subjective: Jamia Hinton is a 59 y.o. female who is here today for follow-up.    Past medical history:   Patient was initially seen in April 2024 in the hospital secondary to hypercalcemia felt to be related to her history of sarcoidosis.  She was initially diagnosed with sarcoidosis about 3 years ago after an eye exam and there was referred to Dr. Da Silva at [facility] was initially started on methotrexate, folic acid and mycophenolate, but does not appear to have ever done a trial of prednisone.  Her physician was then transitioned to Dr. Ceballos after Dr. Da Silva left the clinic and was continued on the same regimen, but appears that lab work was not done.  She was then referred to Dr. Capone and diagnosed with rheumatoid arthritis and remains on methotrexate and CellCept.  However, she missed her last rheumatology appointment.  She then developed nausea vomiting and fatigue and was seen by primary care where methotrexate and CellCept were stopped secondary to lab abnormalities.  Initially admission calcium was 13.9 and creatinine was 3.7.  Patient was treated with IV fluid hydration and Lasix and creatinine went down to 2.29 and calcium down to 11.2 prior to discharge.  However required readmission secondary to calcium 14.1 and creatinine 3.8 was treated with fluid resuscitation, calcitonin with improvement to creatinine of 2.62 and calcium of 10.  Her labs improved from a calcium of 14 down to 10 within 24 hours and there is some question whether or not the calcium of 14 was not accurate result.  She was also discharged home on prednisone of 50 mg daily.    Since last visit, she started Acthar in December and has been doing well with this.  Her only complaint is bruising at injection site.  She was able to wean off prednisone completely and feels  like she is maintaining her energy level.       She has seen her physician at [facility], but appears that it was nephrology and not a sarcoidosis specialist.  He has labs that he wants her to have done today.  Also wanted an ultrasound done of her kidneys to see if she needs a renal biopsy.    She continues to see Dr. Nicolas for her rheumatoid arthritis.  Not currently on any medications from him for this.  Still has some joint pain/myalgias.  Mostly notable in posterior lower calf/ankles.    She also underwent surgery for endometrial cancer 2 weeks ago.  States that she did well with surgery.  Still has some soreness, but overall doing well.  Surgery was curative and no further treatment is needed.                              Subjective      Review of Systems:   Review of Systems   Constitutional:  Positive for fatigue.   Respiratory:  Negative for cough and shortness of breath.    Psychiatric/Behavioral:  Negative for sleep disturbance and depressed mood.        Social History:   Social History     Socioeconomic History    Marital status:    Tobacco Use    Smoking status: Never     Passive exposure: Past    Smokeless tobacco: Never    Tobacco comments:     Father smoked   Vaping Use    Vaping status: Never Used   Substance and Sexual Activity    Alcohol use: No    Drug use: No    Sexual activity: Not Currently       Medications:     Current Outpatient Medications:     acetaminophen (TYLENOL) 500 MG tablet, Take 2 tablets by mouth Every 6 (Six) Hours As Needed for Mild Pain., Disp: 60 tablet, Rfl: 0    albuterol sulfate  (90 Base) MCG/ACT inhaler, Inhale 2 puffs Every 4 (Four) Hours As Needed for Wheezing., Disp: , Rfl:     allopurinol (ZYLOPRIM) 100 MG tablet, Take 0.5 tablets by mouth Daily. Takes 1/2 tablet daily, Disp: , Rfl:     carvedilol (COREG) 25 MG tablet, Take 1 tablet by mouth 2 (Two) Times a Day With Meals., Disp: , Rfl:     cloNIDine (CATAPRES) 0.1 MG tablet, Take 1 tablet  "by mouth As Needed for High Blood Pressure., Disp: , Rfl:     Corticotropin (ACTHAR GEL SC), Inject 1 dose under the skin into the appropriate area as directed Every 72 (Seventy-Two) Hours., Disp: , Rfl:     iron polysaccharides (NIFEREX) 150 MG capsule, Take 1 capsule by mouth Daily., Disp: 30 capsule, Rfl: 0    omeprazole (priLOSEC) 40 MG capsule, Take 1 capsule by mouth As Needed (states she rarely needs it)., Disp: , Rfl:     polyethylene glycol (MIRALAX) 17 GM/SCOOP powder, Take 17 g by mouth Daily. As needed for constipation, Disp: , Rfl:     tiZANidine (ZANAFLEX) 4 MG tablet, ONE TABLET THREE TIMES A DAY AS NEEDED MUSCLE SPASM, Disp: , Rfl:     docusate sodium (COLACE) 100 MG capsule, Take 2 capsules by mouth 2 (Two) Times a Day As Needed for Constipation. Two capusles (Patient not taking: Reported on 2/21/2025), Disp: , Rfl:     Allergies:   Allergies   Allergen Reactions    Citalopram Hydrobromide Mental Status Change     Couldn't function       Objective     Physical Exam:  Vital Signs:   Vitals:    02/21/25 0825   BP: 124/68   Pulse: 61   SpO2: 98%   Weight: 133 kg (294 lb)   Height: 167.6 cm (66\")         Physical Exam  Vitals and nursing note reviewed.   Constitutional:       General: She is not in acute distress.     Appearance: She is well-developed. She is obese. She is not ill-appearing.   HENT:      Head: Normocephalic and atraumatic.      Comments: Improved steroid facies     Right Ear: External ear normal.      Left Ear: External ear normal.      Nose: Nose normal. No congestion.      Mouth/Throat:      Mouth: Mucous membranes are moist.      Pharynx: Oropharynx is clear.   Eyes:      Extraocular Movements: Extraocular movements intact.      Conjunctiva/sclera: Conjunctivae normal.   Neck:      Trachea: No tracheal deviation.   Cardiovascular:      Rate and Rhythm: Normal rate and regular rhythm.      Heart sounds: No murmur heard.  Pulmonary:      Breath sounds: Normal breath sounds. " "  Abdominal:      General: There is no distension.      Palpations: Abdomen is soft.   Musculoskeletal:         General: No tenderness. Normal range of motion.      Cervical back: Normal range of motion and neck supple.      Right lower leg: No edema.      Left lower leg: No edema.   Skin:     General: Skin is warm and dry.      Findings: No rash.   Neurological:      Mental Status: She is alert and oriented to person, place, and time.      Motor: No weakness.      Coordination: Coordination normal.      Gait: Gait normal.   Psychiatric:         Mood and Affect: Mood normal.         Behavior: Behavior normal.         Thought Content: Thought content normal.         Judgment: Judgment normal.       Mallampati Score: II (hard and soft palate, upper portion of tonsils anduvula visible)    Results Review:   Labs: Reviewed.      Lab Results   Component Value Date    GLUCOSE 101 (H) 01/29/2025    BUN 33 (H) 01/29/2025    CREATININE 2.17 (H) 01/29/2025    EGFR 25.3 (L) 01/29/2025    BCR 15.2 01/29/2025    K 4.1 01/29/2025    CO2 29.0 01/29/2025    CALCIUM 10.8 (H) 01/29/2025    ALBUMIN 4.2 01/29/2025    BILITOT 0.6 01/29/2025    AST 25 01/29/2025    ALT 14 01/29/2025     Lab Results   Component Value Date    WBC 7.52 01/29/2025    HGB 12.9 01/29/2025    HCT 40.6 01/29/2025    MCV 87.3 01/29/2025     01/29/2025         No results found for: \"CBCDIF\", \"CMP\"     Micro: As of February 21, 2025   No results found for: \"RESPCX\"  No results found for: \"BLOODCX\"  Lab Results   Component Value Date    URINECX >100,000 CFU/mL Escherichia coli (A) 12/13/2017     No results found for: \"MRSACX\"  No results found for: \"MRSAPCR\"  No results found for: \"URCX\"  No components found for: \"LOWRESPCF\"  No results found for: \"THROATCX\"  No results found for: \"CULTURES\"  No components found for: \"STREPBCX\"  No results found for: \"STREPPNEUAG\"  No results found for: \"LEGIONELLA\"  No results found for: \"MYCOPLASCX\"  No results found for: " "\"GCCX\"  No results found for: \"WOUNDCX\"  No results found for: \"BODYFLDCX\"    ABG: No results found for: \"PHART\", \"KEW2ODW\", \"PO2ART\", \"HGBBG\", \"N4MGUUXX\", \"CFIO2\", \"FCOHB\", \"CARBOXYHGB\", \"FMETHB\"    Echo:     Radiology Scans:   Last CT scan was reviewed in great detail with the patient. Images reviewed personally.   NM PET/CT SKULL BASE TO MID THIGH     Date of Exam: 6/28/2024 8:43 AM EDT     Indication: r93.1 d86.9 n19. Sarcoidosis, remote history of cervical cancer     Comparison: CT chest, abdomen, and pelvis of 4/7/2024. No prior PET exams.     Technique: 12.38 mCi of F-18 FDG was administered intravenously. PET imaging was obtained from skull base to mid-thigh approximately 60 minutes after radiotracer injection. A low dose non contrast CT was obtained for attenuation correction and anatomic   localization. Fused PET-CT and 3D MIP reconstructions were utilized for image interpretation.  Fasting blood glucose level: 90 mg/dl.     Reference uptake values:  Mediastinum: 4.77 SUVmax  Liver: 5.53 SUVmax  Normalization method: Body Weight     Findings:  Head and neck: There is a calcified hypermetabolic small lesion of the left thyroid lobe with an SUV max of 8.29. There is normal uptake by the muscles of phonation. There is symmetric brain activity. There are no enlarged or hypermetabolic neck nodes.     CHEST: There is no hypermetabolic mass or adenopathy. No pulmonary nodules or masses are identified, given the limitations of nonbreath-hold low-dose technique.     Abdomen and pelvis: There is expected excretion by the GI and  tracts. There is no abdominal mass or adenopathy. There is hypermetabolic activity associated with the uterine fundus which appears lobulated in contour and demonstrates SUV max of 28.42.   There is no hypermetabolic activity of the uterine cervix.     MUSCULOSKELETAL: No hypermetabolic bone lesions are present.     IMPRESSION:  Impression:  Hypermetabolic focus of the left thyroid lobe. " Correlation with thyroid ultrasound is recommended.     Hypermetabolic focus associated with the uterine fundus. Malignancy is not excluded. Correlation with pelvic ultrasound is recommended.     Otherwise negative PET exam.        Electronically Signed: Akanksha Lucas MD    7/3/2024 8:34 AM EDT    Workstation ID: [id number]    Results for orders placed during the hospital encounter of 04/07/24    CT Chest Without Contrast Diagnostic    Narrative  PROCEDURE: CT CHEST WO CONTRAST DIAGNOSTIC-    HISTORY: Weakness, hx of sarcoidosis, s/p endoscopy    COMPARISON: No previous chest CT for comparison.    PROCEDURE: Axial images were obtained from the lung apex to the mid  abdomen by computed tomography. This study was performed with techniques  to keep radiation doses as low as reasonably achievable, (ALARA).  Individualized dose reduction techniques using automated exposure  control or adjustment of mA and/or kV according to the patient size were  employed.    FINDINGS:    CHEST: There is no suspicious axillary adenopathy. Vascular  calcifications noted. There is mildly prominent right pretracheal lymph  node in question fullness of the right hilum. Chest CT with contrast  would be helpful for further evaluation in this patient with known  sarcoidosis. The heart is proper size. There is no pericardial or  pleural effusion.No suspicious infiltrate or nodule identified. Limited  images of the upper abdomen demonstrate cholecystectomy clips. Spleen is  incompletely visualized but appears at least mildly enlarged to 13 cm.  No bony destructive lesion seen. No mediastinal air identified. No  significant wall thickening of the esophagus identified. No pneumothorax  seen.    Impression  No acute infiltrate identified.    Question mild mediastinal and hilar adenopathy, consider chest CT with  intravenous contrast.        CTDI: 7.44 mGy  DLP:1288.51 mGy.cm    This report was signed and finalized on 4/7/2024 3:36 PM by Nova  MD August.        PFT IMPRESSION:   October 2024 FEV1 81%, FEV1/FVC 83. No significant bronchodilator responsiveness noted.  TLC 98%.   DL/VA  114%.     Assessment / Plan      Assessment/Plan:    1. Hypercalcemia  Last labs were done prior to surgery.  Calcium 10.8.  Plan for repeat today.  She already has orders from her nephrologist for this to be done.    If elevated, will need to consider possible increase of Acthar dosing.  I have asked her to reach out to her nurse navigator to discuss alternate injection sites.  Evidently, she is able to inject in her arm, but lives alone and is unable to do this herself.  Only has been using her thighs as an injection site.    2. Sarcoidosis  On Acthar 40 mg every 72 hours  Repeat labs to evaluate for calcium level.  Successfully weaned off prednisone.    3. Rheumatoid arthritis, involving unspecified site, unspecified whether rheumatoid factor present  Follows with Dr. Capone    4. CKD (chronic kidney disease) stage 4, GFR 15-29 ml/min  Establishing care with a different nephrologist in [place].  Lab work and radiology workup being undertaken.      Follow Up:   Return in about 3 months (around 5/21/2025).    Cydney Walsh MD  Pulmonary/Critical Care Physician   [place]    This document was electronically signed by Cydney Walsh MD on 02/21/25 at 09:14 EST        Please note that portions of this note may have been completed with a voice recognition program. Efforts were made to edit the dictations, but occasionally words are mistranscribed.

## 2025-02-22 LAB — CYSTATIN C SERPL-MCNC: 2.21 MG/L (ref 0.72–1.16)

## 2025-02-24 LAB
ALBUMIN SERPL ELPH-MCNC: 3.2 G/DL (ref 2.9–4.4)
ALBUMIN/GLOB SERPL: 0.9 {RATIO} (ref 0.7–1.7)
ALPHA1 GLOB SERPL ELPH-MCNC: 0.4 G/DL (ref 0–0.4)
ALPHA2 GLOB SERPL ELPH-MCNC: 0.9 G/DL (ref 0.4–1)
B-GLOBULIN SERPL ELPH-MCNC: 1.2 G/DL (ref 0.7–1.3)
GAMMA GLOB SERPL ELPH-MCNC: 1 G/DL (ref 0.4–1.8)
GLOBULIN SER CALC-MCNC: 3.5 G/DL (ref 2.2–3.9)
LABORATORY COMMENT REPORT: NORMAL
M PROTEIN SERPL ELPH-MCNC: NORMAL G/DL
PROT PATTERN SERPL ELPH-IMP: NORMAL
PROT SERPL-MCNC: 6.7 G/DL (ref 6–8.5)

## 2025-02-25 ENCOUNTER — LAB (OUTPATIENT)
Dept: LAB | Facility: HOSPITAL | Age: 60
End: 2025-02-25
Payer: MEDICAID

## 2025-02-25 DIAGNOSIS — N18.32 STAGE 3B CHRONIC KIDNEY DISEASE: ICD-10-CM

## 2025-02-25 PROCEDURE — 83521 IG LIGHT CHAINS FREE EACH: CPT

## 2025-02-25 PROCEDURE — 81050 URINALYSIS VOLUME MEASURE: CPT

## 2025-02-26 ENCOUNTER — OFFICE VISIT (OUTPATIENT)
Dept: GYNECOLOGIC ONCOLOGY | Facility: CLINIC | Age: 60
End: 2025-02-26
Payer: MEDICAID

## 2025-02-26 VITALS
WEIGHT: 293 LBS | HEIGHT: 66 IN | HEART RATE: 78 BPM | SYSTOLIC BLOOD PRESSURE: 160 MMHG | BODY MASS INDEX: 47.09 KG/M2 | DIASTOLIC BLOOD PRESSURE: 75 MMHG | OXYGEN SATURATION: 96 % | TEMPERATURE: 97.8 F | RESPIRATION RATE: 18 BRPM

## 2025-02-26 DIAGNOSIS — Z98.890 POST-OPERATIVE STATE: ICD-10-CM

## 2025-02-26 DIAGNOSIS — C54.1 ENDOMETRIAL CANCER: Primary | ICD-10-CM

## 2025-02-26 PROCEDURE — 1159F MED LIST DOCD IN RCRD: CPT | Performed by: OBSTETRICS & GYNECOLOGY

## 2025-02-26 PROCEDURE — 1160F RVW MEDS BY RX/DR IN RCRD: CPT | Performed by: OBSTETRICS & GYNECOLOGY

## 2025-02-26 PROCEDURE — 3078F DIAST BP <80 MM HG: CPT | Performed by: OBSTETRICS & GYNECOLOGY

## 2025-02-26 PROCEDURE — 3077F SYST BP >= 140 MM HG: CPT | Performed by: OBSTETRICS & GYNECOLOGY

## 2025-02-26 PROCEDURE — 1126F AMNT PAIN NOTED NONE PRSNT: CPT | Performed by: OBSTETRICS & GYNECOLOGY

## 2025-02-26 PROCEDURE — 99024 POSTOP FOLLOW-UP VISIT: CPT | Performed by: OBSTETRICS & GYNECOLOGY

## 2025-02-26 RX ORDER — NITROFURANTOIN 25; 75 MG/1; MG/1
100 CAPSULE ORAL 2 TIMES DAILY
Qty: 14 CAPSULE | Refills: 0 | Status: SHIPPED | OUTPATIENT
Start: 2025-02-26

## 2025-02-26 RX ORDER — SULFAMETHOXAZOLE AND TRIMETHOPRIM 800; 160 MG/1; MG/1
1 TABLET ORAL 2 TIMES DAILY
Qty: 20 TABLET | Refills: 0 | Status: SHIPPED | OUTPATIENT
Start: 2025-02-26

## 2025-02-26 NOTE — PROGRESS NOTES
"Jamia ARSHAD [facility]  [id number]  1965      Reason for Visit:  Postoperative evaluation    History of Present Illness:  Patient is a very pleasant 59 y.o. woman who presents for a post operative evaluation status post INJECTION OF ICG DYE, TOTAL LAPAROSCOPIC HYSTERECTOMY, BILATERAL SALPINGOOPHORECTOMY, SENTINEL NODE DISSECTION WITH DAVINCI ROBOT, LYSIS OF ADHESIONS (30 minutes) performed on 6/20/2025.      Surgery and hospital course were uncomplicated.  Today, patient notes normal bowel function.  Complains of urinary leaking which can be significant at times.  She is quite tearful through her visit has complaints regarding lack of social support.  She cares for her 87-year-old mother and has not had support from her siblings.  She states that her family has been asking her why she is not lifting and being more active postoperatively.  Her pain is well controlled. She has questions about resuming normal activities.     Past Medical History, Past Surgical History, Social History, Family History have been reviewed and are without significant changes except as mentioned.    Review of Systems   All other systems were reviewed and are negative except as mentioned above.    Medications:  The current medication list was reviewed in the EMR    ALLERGIES:    Allergies   Allergen Reactions    Citalopram Hydrobromide Mental Status Change     Couldn't function         /75   Pulse 78   Temp 97.8 °F (36.6 °C) (Temporal)   Resp 18   Ht 167.6 cm (66\")   Wt 133 kg (294 lb)   LMP  (LMP Unknown) Comment: posyt menopausal  SpO2 96%   BMI 47.45 kg/m²           Physical Exam  Constitutional:  Patient is a pleasant woman in no acute distress.  Gastrointestinal: Abdomen is soft and appropriately tender.  There is no mass palpated.  There is no rebound or guarding.  Incision(s) is clean, dry and intact.  Extremities:  Bilateral lower extremities are non-tender.  Gynecologic:External genitalia are free from lesion. On speculum " examination, the vaginal cuff was intact and no lesions were appreciated.  Small amount of watery/bloody vaginal discharge without obvious bleeding.  On bimanual examination, no fullness was appreciated.  Uterus, cervix and adnexa were absent.  There was no significant tenderness.  Rectovaginal exam was deferred.      PATHOLOGY:  Final Diagnosis   1.  LYMPH NODE, LEFT PELVIC, SENTINEL NODE EXCISION:  Lymph node with multiple noncaseating granulomata with no evidence of metastatic carcinoma supported by immunohistochemical stain for cytokeratin ALEX with appropriate control (0/1).  See comment.     2.  LYMPH NODE, RIGHT PELVIC, SENTINEL NODE EXCISION:  Lymph node with multiple noncaseating granulomata with no evidence of metastatic carcinoma supported by immunohistochemical stain for cytokeratin ALEX with appropriate control (0/1).  See comment.     3.  UTERUS, CERVIX, BILATERAL OVARIES AND FALLOPIAN TUBES, HYSTERECTOMY AND BILATERAL SALPINGO-OOPHORECTOMY:  Minimal residual noninvasive FIGO grade 1 endometrioid adenocarcinoma.  No involvement of lower uterine segment, cervical stroma or bilateral adnexa.  See comment and tumor synoptic for additional details.   Electronically signed by Willy Antonio MD on 2/11/2025 at 1041   Synoptic Checklist   ENDOMETRIUM   8th Edition - Protocol posted: 12/13/2023ENDOMETRIUM - All Specimens  SPECIMEN   Procedure  Total hysterectomy and bilateral salpingo-oophorectomy   TUMOR   Tumor Site  Endometrium   Tumor Size  Greatest Dimension (Centimeters): 1.2 cm   Additional Dimension (Centimeters)  1 cm     0.4 cm   Histologic Type  Endometrioid carcinoma, NOS   Histologic Grade  FIGO grade 1   Myometrial Invasion  Not identified   Adenomyosis  Not identified   Uterine Serosa Involvement  Not identified   Lower Uterine Segment Involvement  Not identified   Cervical Stromal Involvement  Not identified   Other Tissue / Organ Involvement  Not identified   Peritoneal / Ascitic Fluid   Not submitted / unknown   Lymphatic and / or Vascular Invasion  Not identified   REGIONAL LYMPH NODES   Regional Lymph Node Status  All regional lymph nodes negative for tumor cells   Lymph Nodes Examined     Total Number of Pelvic Nodes Examined  2   Number of Pelvic [place] Nodes Examined  2   Total Number of Para-aortic Nodes Examined  0   pTNM CLASSIFICATION (AJCC 8th Edition)   Reporting of pT, pN, and (when applicable) pM categories is based on information available to the pathologist at the time the report is issued. As per the AJCC (Chapter 1, 8th Ed.) it is the managing physician’s responsibility to establish the final pathologic stage based upon all pertinent information, including but potentially not limited to this pathology report.   pT Category  pT1a   pN Category  pN0   N Suffix  (sn)   .          ASSESSMENT/PLAN:  Jamia Hinton returns for a post-operative evaluation today.  All pathology reports were discussed with the patient.  Encounter Diagnoses   Name Primary?    Endometrial cancer Yes    Post-operative state          Overall, the patient is very pleased with her care.  I recommended continuation of post operative precautions as discussed.  We discussed this is a noninvasive cancer and discussed the plan for observation.  We discussed counseling and the patient would probably be best served by a break from her caregiving duties and taking a vacation.  She states this is not possible.  I provided emotional support and encouraged her to be patient regarding her postoperative recovery and that she is doing fine.  Review of labs and recent urinalysis along with urinary symptoms is suggestive of urinary tract infection.  After clarification with pharmacy regarding patient's chronic kidney disease, Bactrim was prescribed.    She is to follow-up for survivorship in 3 to 6 months and then can return to regular gynecologic care.    Citlali Eli MD  02/26/25  08:22 EST

## 2025-02-28 LAB
KAPPA LC FREE 24H UR-MRATE: 76.45 MG/24 HR
KAPPA LC FREE UR-MCNC: 44.97 MG/L (ref 1.17–86.46)
KAPPA LC FREE/LAMBDA FREE UR: 5.91 (ref 1.83–14.26)
LAMBDA LC FREE 24H UR-MRATE: 12.94 MG/24 HR
LAMBDA LC FREE UR-MCNC: 7.61 MG/L (ref 0.27–15.21)

## 2025-03-10 ENCOUNTER — APPOINTMENT (OUTPATIENT)
Dept: BONE DENSITY | Facility: HOSPITAL | Age: 60
End: 2025-03-10
Payer: MEDICAID

## 2025-03-10 DIAGNOSIS — T38.0X5A: ICD-10-CM

## 2025-03-10 DIAGNOSIS — M15.0 PRIMARY GENERALIZED (OSTEO)ARTHRITIS: ICD-10-CM

## 2025-03-10 DIAGNOSIS — D86.9 SARCOIDOSIS: ICD-10-CM

## 2025-03-10 DIAGNOSIS — Z78.0 POST-MENOPAUSAL: ICD-10-CM

## 2025-03-10 DIAGNOSIS — M35.00 SICCA, UNSPECIFIED TYPE: ICD-10-CM

## 2025-03-10 DIAGNOSIS — M79.10 MYALGIA: ICD-10-CM

## 2025-03-10 PROCEDURE — 77080 DXA BONE DENSITY AXIAL: CPT

## 2025-03-19 ENCOUNTER — HOSPITAL ENCOUNTER (OUTPATIENT)
Dept: ULTRASOUND IMAGING | Facility: HOSPITAL | Age: 60
Discharge: HOME | End: 2025-03-19
Admitting: INTERNAL MEDICINE
Payer: MEDICAID

## 2025-03-19 DIAGNOSIS — N18.32 CHRONIC KIDNEY DISEASE (CKD) STAGE G3B/A1, MODERATELY DECREASED GLOMERULAR FILTRATION RATE (GFR) BETWEEN 30-44 ML/MIN/1.73 SQUARE METER AND ALBUMINURIA CREATININE RATIO LESS THAN 30 MG/G (CMS/H*: ICD-10-CM

## 2025-03-19 PROCEDURE — 76770 US EXAM ABDO BACK WALL COMP: CPT

## 2025-05-16 ENCOUNTER — OFFICE VISIT (OUTPATIENT)
Dept: PULMONOLOGY | Facility: CLINIC | Age: 60
End: 2025-05-16
Payer: MEDICAID

## 2025-05-16 VITALS
BODY MASS INDEX: 46.61 KG/M2 | HEART RATE: 69 BPM | SYSTOLIC BLOOD PRESSURE: 148 MMHG | HEIGHT: 66 IN | RESPIRATION RATE: 18 BRPM | OXYGEN SATURATION: 96 % | DIASTOLIC BLOOD PRESSURE: 72 MMHG | WEIGHT: 290 LBS

## 2025-05-16 DIAGNOSIS — D86.9 SARCOIDOSIS: Primary | ICD-10-CM

## 2025-05-16 DIAGNOSIS — N18.4 CKD (CHRONIC KIDNEY DISEASE) STAGE 4, GFR 15-29 ML/MIN: ICD-10-CM

## 2025-05-16 DIAGNOSIS — E83.52 HYPERCALCEMIA: ICD-10-CM

## 2025-05-16 DIAGNOSIS — M06.9 RHEUMATOID ARTHRITIS, INVOLVING UNSPECIFIED SITE, UNSPECIFIED WHETHER RHEUMATOID FACTOR PRESENT: ICD-10-CM

## 2025-05-16 DIAGNOSIS — F32.9 REACTIVE DEPRESSION: ICD-10-CM

## 2025-05-16 PROCEDURE — 3078F DIAST BP <80 MM HG: CPT | Performed by: INTERNAL MEDICINE

## 2025-05-16 PROCEDURE — 3077F SYST BP >= 140 MM HG: CPT | Performed by: INTERNAL MEDICINE

## 2025-05-16 PROCEDURE — 99214 OFFICE O/P EST MOD 30 MIN: CPT | Performed by: INTERNAL MEDICINE

## 2025-05-16 RX ORDER — PREDNISONE 10 MG/1
TABLET ORAL
COMMUNITY
Start: 2025-02-25 | End: 2025-05-16

## 2025-05-16 RX ORDER — LOSARTAN POTASSIUM 25 MG/1
25 TABLET ORAL DAILY
COMMUNITY
Start: 2025-04-29

## 2025-05-16 NOTE — PROGRESS NOTES
Pulmonary follow-up office Visit      Patient Name: Jamia Hinton    Chief Complaint:    Chief Complaint   Patient presents with    Breathing Problem    Follow-up       Subjective: Jamia Hinton is a 59 y.o. female who is here today for follow-up.    Past medical history:   Patient was initially seen in April 2024 in the hospital secondary to hypercalcemia felt to be related to her history of sarcoidosis.  She was initially diagnosed with sarcoidosis about 3 years ago after an eye exam and there was referred to Dr. Da Silva at [facility] was initially started on methotrexate, folic acid and mycophenolate, but does not appear to have ever done a trial of prednisone.  Her physician was then transitioned to Dr. Ceballos after Dr. Da Silva left the clinic and was continued on the same regimen, but appears that lab work was not done.  She was then referred to Dr. Capone and diagnosed with rheumatoid arthritis and remains on methotrexate and CellCept.  However, she missed her last rheumatology appointment.  She then developed nausea vomiting and fatigue and was seen by primary care where methotrexate and CellCept were stopped secondary to lab abnormalities.  Initially admission calcium was 13.9 and creatinine was 3.7.  Patient was treated with IV fluid hydration and Lasix and creatinine went down to 2.29 and calcium down to 11.2 prior to discharge.  However required readmission secondary to calcium 14.1 and creatinine 3.8 was treated with fluid resuscitation, calcitonin with improvement to creatinine of 2.62 and calcium of 10.  Her labs improved from a calcium of 14 down to 10 within 24 hours and there is some question whether or not the calcium of 14 was not accurate result.  She was also discharged home on prednisone of 50 mg daily.    Since last visit, she continues Acthar and has been doing well with this.  Her only complaint is bruising at injection site which is unchanged and continues to vary injection sites  in her thighs to help with this.   She has remained off steroids since starting Acthar.  Fatigue continues her biggest complaint, but feels like it is at her baseline.  Mentally says she is doing better since being off prednisone and has less labile emotions.  Does not want any medication for this.    Started on losartan with nephrology.   She went for a second opinion in [place] and they felt like sarcoid was impacting the kidney.  Felt like she should continue her Acthar for now.    She continues to see Dr. Nicolas for her rheumatoid arthritis.  Not currently on any medications from him for this.                                Subjective      Review of Systems:   Review of Systems   Constitutional:  Positive for fatigue.   Respiratory:  Negative for cough and shortness of breath.    Psychiatric/Behavioral:  Negative for sleep disturbance and depressed mood.        Social History:   Social History     Socioeconomic History    Marital status:    Tobacco Use    Smoking status: Never     Passive exposure: Past    Smokeless tobacco: Never    Tobacco comments:     Father smoked   Vaping Use    Vaping status: Never Used   Substance and Sexual Activity    Alcohol use: No    Drug use: No    Sexual activity: Not Currently       Medications:     Current Outpatient Medications:     acetaminophen (TYLENOL) 500 MG tablet, Take 2 tablets by mouth Every 6 (Six) Hours As Needed for Mild Pain., Disp: 60 tablet, Rfl: 0    albuterol sulfate  (90 Base) MCG/ACT inhaler, Inhale 2 puffs Every 4 (Four) Hours As Needed for Wheezing., Disp: , Rfl:     allopurinol (ZYLOPRIM) 100 MG tablet, Take 0.5 tablets by mouth Daily. Takes 1/2 tablet daily, Disp: , Rfl:     carvedilol (COREG) 25 MG tablet, Take 1 tablet by mouth 2 (Two) Times a Day With Meals., Disp: , Rfl:     cloNIDine (CATAPRES) 0.1 MG tablet, Take 1 tablet by mouth As Needed for High Blood Pressure., Disp: , Rfl:     Corticotropin (ACTHAR GEL SC), Inject 1 dose under  "the skin into the appropriate area as directed Every 72 (Seventy-Two) Hours., Disp: , Rfl:     docusate sodium (COLACE) 100 MG capsule, Take 2 capsules by mouth 2 (Two) Times a Day As Needed for Constipation. Two capusles, Disp: , Rfl:     iron polysaccharides (NIFEREX) 150 MG capsule, Take 1 capsule by mouth Daily., Disp: 30 capsule, Rfl: 0    losartan (COZAAR) 25 MG tablet, Take 1 tablet by mouth Daily., Disp: , Rfl:     omeprazole (priLOSEC) 40 MG capsule, Take 1 capsule by mouth As Needed (states she rarely needs it)., Disp: , Rfl:     polyethylene glycol (MIRALAX) 17 GM/SCOOP powder, Take 17 g by mouth Daily. As needed for constipation, Disp: , Rfl:     tiZANidine (ZANAFLEX) 4 MG tablet, ONE TABLET THREE TIMES A DAY AS NEEDED MUSCLE SPASM, Disp: , Rfl:     Allergies:   Allergies   Allergen Reactions    Citalopram Hydrobromide Mental Status Change     Couldn't function       Objective     Physical Exam:  Vital Signs:   Vitals:    05/16/25 1012   BP: 148/72   Pulse: 69   Resp: 18   SpO2: 96%   Weight: 132 kg (290 lb)   Height: 167.6 cm (66\")           Physical Exam  Vitals and nursing note reviewed.   Constitutional:       General: She is not in acute distress.     Appearance: She is well-developed. She is obese. She is not ill-appearing.   HENT:      Head: Normocephalic and atraumatic.      Right Ear: External ear normal.      Left Ear: External ear normal.      Nose: Nose normal. No congestion.      Mouth/Throat:      Mouth: Mucous membranes are moist.      Pharynx: Oropharynx is clear.   Eyes:      Extraocular Movements: Extraocular movements intact.      Conjunctiva/sclera: Conjunctivae normal.   Neck:      Trachea: No tracheal deviation.   Cardiovascular:      Rate and Rhythm: Normal rate and regular rhythm.      Heart sounds: No murmur heard.  Pulmonary:      Breath sounds: Normal breath sounds. No wheezing or rhonchi.   Abdominal:      General: There is no distension.      Palpations: Abdomen is soft. " "  Musculoskeletal:         General: No tenderness. Normal range of motion.      Cervical back: Normal range of motion and neck supple.      Right lower leg: No edema.      Left lower leg: No edema.   Skin:     General: Skin is warm and dry.      Findings: No rash.   Neurological:      Mental Status: She is alert and oriented to person, place, and time.      Motor: No weakness.      Coordination: Coordination normal.      Gait: Gait normal.   Psychiatric:         Mood and Affect: Mood normal.         Behavior: Behavior normal.         Thought Content: Thought content normal.         Judgment: Judgment normal.       Mallampati Score: II (hard and soft palate, upper portion of tonsils anduvula visible)    Results Review:   Labs: Reviewed.    March 2025 creatinine 2.67, sodium 142, potassium 4.2, bicarb 25, hemoglobin 12.3, Ca 10.8      Lab Results   Component Value Date    GLUCOSE 120 (H) 02/21/2025    BUN 24 (H) 02/21/2025    CREATININE 1.92 (H) 02/21/2025    EGFR 29.4 (L) 02/21/2025    BCR 12.5 02/21/2025    K 4.2 02/21/2025    CO2 26.9 02/21/2025    CALCIUM 10.4 02/21/2025    ALBUMIN 3.8 02/21/2025    ALBUMIN 3.2 02/21/2025    BILITOT 0.4 02/21/2025    AST 15 02/21/2025    ALT 8 02/21/2025     Lab Results   Component Value Date    WBC 10.79 02/21/2025    HGB 12.3 02/21/2025    HCT 39.0 02/21/2025    MCV 86.7 02/21/2025     02/21/2025         No results found for: \"CBCDIF\", \"CMP\"     Micro: As of May 16, 2025   No results found for: \"RESPCX\"  No results found for: \"BLOODCX\"  Lab Results   Component Value Date    URINECX >100,000 CFU/mL Escherichia coli (A) 12/13/2017     No results found for: \"MRSACX\"  No results found for: \"MRSAPCR\"  No results found for: \"URCX\"  No components found for: \"LOWRESPCF\"  No results found for: \"THROATCX\"  No results found for: \"CULTURES\"  No components found for: \"STREPBCX\"  No results found for: \"STREPPNEUAG\"  No results found for: \"LEGIONELLA\"  No results found for: " "\"MYCOPLASCX\"  No results found for: \"GCCX\"  No results found for: \"WOUNDCX\"  No results found for: \"BODYFLDCX\"    ABG: No results found for: \"PHART\", \"ECL7UEE\", \"PO2ART\", \"HGBBG\", \"H9MSJSBS\", \"CFIO2\", \"FCOHB\", \"CARBOXYHGB\", \"FMETHB\"    Echo:     Radiology Scans:   Last CT scan was reviewed in great detail with the patient. Images reviewed personally.   NM PET/CT SKULL BASE TO MID THIGH     Date of Exam: 6/28/2024 8:43 AM EDT     Indication: r93.1 d86.9 n19. Sarcoidosis, remote history of cervical cancer     Comparison: CT chest, abdomen, and pelvis of 4/7/2024. No prior PET exams.     Technique: 12.38 mCi of F-18 FDG was administered intravenously. PET imaging was obtained from skull base to mid-thigh approximately 60 minutes after radiotracer injection. A low dose non contrast CT was obtained for attenuation correction and anatomic   localization. Fused PET-CT and 3D MIP reconstructions were utilized for image interpretation.  Fasting blood glucose level: 90 mg/dl.     Reference uptake values:  Mediastinum: 4.77 SUVmax  Liver: 5.53 SUVmax  Normalization method: Body Weight     Findings:  Head and neck: There is a calcified hypermetabolic small lesion of the left thyroid lobe with an SUV max of 8.29. There is normal uptake by the muscles of phonation. There is symmetric brain activity. There are no enlarged or hypermetabolic neck nodes.     CHEST: There is no hypermetabolic mass or adenopathy. No pulmonary nodules or masses are identified, given the limitations of nonbreath-hold low-dose technique.     Abdomen and pelvis: There is expected excretion by the GI and  tracts. There is no abdominal mass or adenopathy. There is hypermetabolic activity associated with the uterine fundus which appears lobulated in contour and demonstrates SUV max of 28.42.   There is no hypermetabolic activity of the uterine cervix.     MUSCULOSKELETAL: No hypermetabolic bone lesions are present.   "   IMPRESSION:  Impression:  Hypermetabolic focus of the left thyroid lobe. Correlation with thyroid ultrasound is recommended.     Hypermetabolic focus associated with the uterine fundus. Malignancy is not excluded. Correlation with pelvic ultrasound is recommended.     Otherwise negative PET exam.        Electronically Signed: Akanksha Lucas MD    7/3/2024 8:34 AM EDT    Workstation ID: [id number]    Results for orders placed during the hospital encounter of 04/07/24    CT Chest Without Contrast Diagnostic    Narrative  PROCEDURE: CT CHEST WO CONTRAST DIAGNOSTIC-    HISTORY: Weakness, hx of sarcoidosis, s/p endoscopy    COMPARISON: No previous chest CT for comparison.    PROCEDURE: Axial images were obtained from the lung apex to the mid  abdomen by computed tomography. This study was performed with techniques  to keep radiation doses as low as reasonably achievable, (ALARA).  Individualized dose reduction techniques using automated exposure  control or adjustment of mA and/or kV according to the patient size were  employed.    FINDINGS:    CHEST: There is no suspicious axillary adenopathy. Vascular  calcifications noted. There is mildly prominent right pretracheal lymph  node in question fullness of the right hilum. Chest CT with contrast  would be helpful for further evaluation in this patient with known  sarcoidosis. The heart is proper size. There is no pericardial or  pleural effusion.No suspicious infiltrate or nodule identified. Limited  images of the upper abdomen demonstrate cholecystectomy clips. Spleen is  incompletely visualized but appears at least mildly enlarged to 13 cm.  No bony destructive lesion seen. No mediastinal air identified. No  significant wall thickening of the esophagus identified. No pneumothorax  seen.    Impression  No acute infiltrate identified.    Question mild mediastinal and hilar adenopathy, consider chest CT with  intravenous contrast.        CTDI: 7.44 mGy  DLP:1288.51  [email or web address]    This report was signed and finalized on 4/7/2024 3:36 PM by Nova Koch MD.        PFT IMPRESSION:   October 2024 FEV1 81%, FEV1/FVC 83. No significant bronchodilator responsiveness noted.  TLC 98%.   DL/VA  114%.     Assessment / Plan      Assessment/Plan:    1. Sarcoidosis  Repeat labs to see if calcium is stable.  Symptomatically doing well and calcium has been staying around 10-11.  She much prefers Acthar injections to the prednisone.  Discussed if repeat lab work showed elevated calcium level may need to consider increasing dose of Acthar.   Will be due for PFTs at next clinic visit    - CBC & Differential; Future  - Comprehensive Metabolic Panel; Future  - Complete PFT - Pre & Post Bronchodilator; Future    2. Hypercalcemia  Repeat labs.  Currently asymptomatic.  Still with fatigue, but most of this is a combination of sarcoid, CKD.  - CBC & Differential; Future  - Comprehensive Metabolic Panel; Future    3. Rheumatoid arthritis, involving unspecified site, unspecified whether rheumatoid factor present  Follows with rheumatology    4. CKD (chronic kidney disease) stage 4, GFR 15-29 ml/min  Is much happier now that she got a second opinion in [place].  Will continue to follow with nephrology here locally.    5. Reactive depression  Much better since being off steroids.  Declines any medications.      Follow Up:   Return in about 6 months (around 11/16/2025) for PFT day of clinic appointment.    Cydney Walsh MD  Pulmonary/Critical Care Physician   [place]    This document was electronically signed by Cydney Walsh MD on 05/16/25 at 12:29 EDT        Please note that portions of this note may have been completed with a voice recognition program. Efforts were made to edit the dictations, but occasionally words are mistranscribed.

## 2025-05-20 ENCOUNTER — LAB (OUTPATIENT)
Dept: LAB | Facility: HOSPITAL | Age: 60
End: 2025-05-20
Payer: MEDICAID

## 2025-05-20 PROCEDURE — 80053 COMPREHEN METABOLIC PANEL: CPT | Performed by: INTERNAL MEDICINE

## 2025-05-20 PROCEDURE — 85025 COMPLETE CBC W/AUTO DIFF WBC: CPT | Performed by: INTERNAL MEDICINE

## 2025-05-28 ENCOUNTER — OFFICE VISIT (OUTPATIENT)
Dept: GYNECOLOGIC ONCOLOGY | Facility: CLINIC | Age: 60
End: 2025-05-28
Payer: MEDICAID

## 2025-05-28 VITALS
DIASTOLIC BLOOD PRESSURE: 78 MMHG | OXYGEN SATURATION: 94 % | TEMPERATURE: 98 F | WEIGHT: 287.2 LBS | HEART RATE: 78 BPM | HEIGHT: 66 IN | RESPIRATION RATE: 18 BRPM | BODY MASS INDEX: 46.16 KG/M2 | SYSTOLIC BLOOD PRESSURE: 120 MMHG

## 2025-05-28 DIAGNOSIS — Z85.42 HISTORY OF ENDOMETRIAL CANCER: Primary | ICD-10-CM

## 2025-05-28 NOTE — PROGRESS NOTES
GYN ONCOLOGY CANCER SURVIVORSHIP VISIT    Jamia Hinton  [id number]  1965    Subjective   Chief Complaint: Survivorship       History of present illness:   Jamia Hinton is a 59 y.o. year old female who is here today for her Cancer Survivorship visit, see oncology history below. She is s/p  INJECTION OF ICG DYE, TOTAL LAPAROSCOPIC HYSTERECTOMY, BILATERAL SALPINGOOPHORECTOMY, SENTINEL NODE DISSECTION WITH DAVINCI ROBOT, LYSIS OF ADHESIONS performed on 2/6/2025 by Dr Eli. Final path showed a non invasive endometrial cancer. Last seen here in gyn/onc for post op visit on 2-.     Pt unaccompanied for visit today. She has no acute physical complaints at this time.      Cancer History:   Oncology/Hematology History   Endometrial cancer   1/14/2025 Initial Diagnosis    Endometrial cancer     5/28/2025 Survivorship    Survivorship Care Plan completed and discussed with patient.  Copy of Survivorship Care Plan provided to patient and primary care provider.                       The current medication list and allergy list were reviewed and reconciled.     Past Medical History, Past Surgical History, Social History, Family History have been reviewed and are without significant changes except as mentioned.        Review of Systems   Constitutional: Negative.    Gastrointestinal: Negative.    Genitourinary: Negative.    Psychiatric/Behavioral:  Positive for depressed mood and stress.          Objective   Physical Exam  Vitals and nursing note reviewed.   Constitutional:       General: She is not in acute distress.     Appearance: Normal appearance. She is obese.   Pulmonary:      Effort: Pulmonary effort is normal. No respiratory distress.   Skin:     General: Skin is warm and dry.      Capillary Refill: Capillary refill takes less than 2 seconds.   Neurological:      Mental Status: She is alert and oriented to person, place, and time.      Motor: No weakness.      Gait: Gait normal.   Psychiatric:          "Attention and Perception: Attention and perception normal.         Mood and Affect: Mood normal. Affect is tearful.         Speech: Speech normal.         Behavior: Behavior normal. Behavior is cooperative.         Thought Content: Thought content normal.         Cognition and Memory: Cognition and memory normal.       Vital Signs: /78   Pulse 78   Temp 98 °F (36.7 °C) (Temporal)   Resp 18   Ht 167.6 cm (66\")   Wt 130 kg (287 lb 3.2 oz)   LMP  (LMP Unknown) Comment: posyt menopausal  SpO2 94%   BMI 46.36 kg/m²   Vitals:    05/28/25 1301   PainSc: 0-No pain                                     ECOG score: 1               Survivorship Needs Assessment:  Nutrition Services  Health history, treatment course, and weight trends reviewed. Discussed nutrition services offered by [facility] including oncology nutrition, diabetes management, general outpatient nutrition, exercise counseling, and weight management. The patient is not in need of referral to nutrition services at this time.     PT/Rehab  Health history, treatment course, and current status. The patient declines referral to physical therapy or rehabilitation services for pelvic floor PT.     Behavioral Health  A post-treatment depression screening has been completed. PHQ-9 results show 10-14 (Moderate Depression). The patient has not previously established mental health care. A referral is recommended at this time. The patient declines referral to [facility].      Procedure Note:            Assessment and Plan:     Diagnoses and all orders for this visit:    1. History of endometrial cancer (Primary)      The patient and I have reviewed her Survivorship Care Plan in detail. We discussed her diagnosis, pathology, histology, all treatments, and ongoing surveillance recommendations. All questions were answered to her satisfaction. She is in agreement with our plan for ongoing surveillance as outlined in her plan. A copy of this document was provided " to her at the completion of our visit.  A copy has also been sent to her primary care provider.     -She is currently without clinical evidence of disease. Signs of recurrent disease, such as vaginal bleeding, persistent abdominopelvic pain, urinary or bowel changes, and shortness of breath were reviewed.  She was advised to follow up immediately if she develops any of the above symptoms.  She is understanding to call with any changes in pelvic symptoms or general GYN concerns at any time between regularly scheduled visits.   -Health maintenance: She was reminded to maintain a healthy lifestyle with a well balanced diet, calcium and vitamin D for osteoporosis prevention, and exercise as well as continue with recommended health and cancer screening guidelines.      -f/u in gyn/oncology PRN  -she should call to schedule f/u with Dr Myers and continue once yearly exams with him  -f/u with pcp and all other specialists as scheduled     Pain assessment was performed today as a part of patient’s care. For patients with pain related to surgery, gynecologic malignancy or cancer treatment, the plan is as noted in the assessment/plan.  For patients with pain not related to these issues, they are to seek any further needed care from a more appropriate provider, such as PCP.    I spent 35 minutes caring for Jamia on this date of service. This time includes time spent by me in the following activities: preparing for the visit, reviewing tests, obtaining and/or reviewing a separately obtained history, performing a medically appropriate examination and/or evaluation, counseling and educating the patient/family/caregiver, ordering medications, tests, or procedures, referring and communicating with other health care professionals, documenting information in the medical record, independently interpreting results and communicating that information with the patient/family/caregiver, and care coordination.       Follow Up    PRN      Electronically signed by CLAUDIA Fonseca on 05/28/2025

## 2025-07-08 ENCOUNTER — HOSPITAL ENCOUNTER (OUTPATIENT)
Dept: MAMMOGRAPHY | Facility: HOSPITAL | Age: 60
Discharge: HOME | End: 2025-07-08
Payer: MEDICAID

## 2025-07-08 VITALS — WEIGHT: 288 LBS | BODY MASS INDEX: 46.28 KG/M2 | HEIGHT: 66 IN

## 2025-07-08 DIAGNOSIS — Z12.31 VISIT FOR SCREENING MAMMOGRAM: ICD-10-CM

## 2025-07-08 PROCEDURE — 77063 BREAST TOMOSYNTHESIS BI: CPT

## (undated) DEVICE — UNDERGLV SURG BIOGEL INDICAT PF 61/2 GRN

## (undated) DEVICE — NDL HYPO ECLPS SFTY 22G 1 1/2IN

## (undated) DEVICE — ENDOPATH PNEUMONEEDLE INSUFFLATION NEEDLES WITH LUER LOCK CONNECTORS 150MM: Brand: ENDOPATH

## (undated) DEVICE — CANN HYDRODISSECTION

## (undated) DEVICE — SEAL HYSTERSCOPE/OUTFLOW CHANNEL MYOSURE

## (undated) DEVICE — SOL IRRIG H2O 1000ML STRL

## (undated) DEVICE — SYR LL W/SCALE/MARK 3ML STRL

## (undated) DEVICE — MANIP UTER RUMI TP 6.7MM 10CM GRN

## (undated) DEVICE — RICH MINOR LITHOTOMY: Brand: MEDLINE INDUSTRIES, INC.

## (undated) DEVICE — APPL HEMOS FOR DELIVERY FLOSEAL

## (undated) DEVICE — GLV SURG SENSICARE W/ALOE PF LF 7.5 STRL

## (undated) DEVICE — BNDR ABD PREMIUM/UNIV 10IN 27TO48IN

## (undated) DEVICE — GOWN,NON-REINFORCED,SIRUS,SET IN SLV,XL: Brand: MEDLINE

## (undated) DEVICE — TIP COVER ACCESSORY

## (undated) DEVICE — SEAL

## (undated) DEVICE — PAD,ABDOMINAL,5"X9",STERILE,LF,1/PK: Brand: MEDLINE INDUSTRIES, INC.

## (undated) DEVICE — THE MONARCH® "D" CARTRIDGE IS A SINGLE-USE POLYPROPYLENE CARTRIDGE FOR POSTERIOR CHAMBER IOL DELIVERY: Brand: MONARCH® III

## (undated) DEVICE — SUT PROLN 0/0 CTX 30IN 8454H

## (undated) DEVICE — CANN IRR/INJ AIR ANT CHAMBER 6MM BEND 27G

## (undated) DEVICE — SYR LL 3CC

## (undated) DEVICE — PROXIMATE SKIN STAPLERS (35 WIDE) CONTAINS 35 STAINLESS STEEL STAPLES (FIXED HEAD): Brand: PROXIMATE

## (undated) DEVICE — SLV SCD CALF HEMOFORCE DVT THERP REPROC MD

## (undated) DEVICE — UNDYED BRAIDED (POLYGLACTIN 910), SYNTHETIC ABSORBABLE SUTURE: Brand: COATED VICRYL

## (undated) DEVICE — SUT VICRYL 3/0 CT1 27IN J258H

## (undated) DEVICE — SUT PROLN 0 MO6 30IN 8418H

## (undated) DEVICE — NDL FLTR2 THN 19G 1IN

## (undated) DEVICE — NEEDLE, QUINCKE 22GX3.5": Brand: MEDLINE INDUSTRIES, INC.

## (undated) DEVICE — ANTIBACTERIAL UNDYED BRAIDED (POLYGLACTIN 910), SYNTHETIC ABSORBABLE SURGICAL SUTURE: Brand: COATED VICRYL

## (undated) DEVICE — ARM DRAPE

## (undated) DEVICE — TROC BLADLES ANCHORPORT/OPTI LP 8X120MM 1P/U

## (undated) DEVICE — COLUMN DRAPE

## (undated) DEVICE — HP CONCL INTREPID COAX I/A CRV .3MM

## (undated) DEVICE — CLEARCUT® SLIT KNIFE INTREPID MICRO-COAXIAL SYSTEM 2.4 SB: Brand: CLEARCUT®; INTREPID

## (undated) DEVICE — SYR LUERLOK 50ML

## (undated) DEVICE — MANIP UTER RUMI 2 KOH EFFICIENT 2.5CM BL

## (undated) DEVICE — KNIF INCSN PARACENTESIS CLEARPORT ANGL 23G 0.8MM

## (undated) DEVICE — MICROSURGICAL INSTRUMENT IRR CYSTITOME 27GA FORMED-BAFFLE CUTTING: Brand: ALCON

## (undated) DEVICE — SOL NACL 0.9PCT 1000ML

## (undated) DEVICE — SCRB SURG BACTOSHIELD CHG 4PCT 4OZ

## (undated) DEVICE — OCCL COLPO PNEUMO  STRL

## (undated) DEVICE — DRAPE UNDERBUTTOCKS W FLUID POUCH 40X44IN

## (undated) DEVICE — SYRINGE, LUER LOCK, 5ML: Brand: MEDLINE

## (undated) DEVICE — SET,IRRIGATION,CYSTO/TUR: Brand: MEDLINE

## (undated) DEVICE — EYE CARE POST OP KIT: Brand: MEDLINE INDUSTRIES, INC.

## (undated) DEVICE — ANTIBACTERIAL UNDYED BRAIDED (POLYGLACTIN 910), SYNTHETIC ABSORBABLE SUTURE: Brand: COATED VICRYL

## (undated) DEVICE — ANCHOR TISSUE RETRIEVAL SYSTEM, BAG SIZE 125 ML, PORT SIZE 8 MM: Brand: ANCHOR TISSUE RETRIEVAL SYSTEM

## (undated) DEVICE — TBG CONN INFLOW AQUILEX/MYOSURE

## (undated) DEVICE — SUT MNCRYL PLS ANTIB UD 3/0 PS2 27IN

## (undated) DEVICE — LAPAROVUE VISIBILITY SYSTEM LAPAROSCOPIC SOLUTIONS: Brand: LAPAROVUE

## (undated) DEVICE — APPL CHLORAPREP TINTED 26ML TEAL

## (undated) DEVICE — GLV SURG SENSICARE PI LF PF 7.5 GRN STRL

## (undated) DEVICE — CYSTITOME ANG EYE BAFFLE CUT 40DEG 27G

## (undated) DEVICE — STRIP,CLOSURE,WOUND,MEDI-STRIP,1/4X3: Brand: MEDLINE

## (undated) DEVICE — KNIF OPTH INTREPID SLT SGL/BVL ANGL 2.4

## (undated) DEVICE — KT P/OP EYE W/SHIELD UNIV

## (undated) DEVICE — PATIENT RETURN ELECTRODE, SINGLE-USE, CONTACT QUALITY MONITORING, ADULT, WITH 9FT CORD, FOR PATIENTS WEIGING OVER 33LBS. (15KG): Brand: MEGADYNE

## (undated) DEVICE — SUT SILK 2/0 SUTUPAK TIES 24IN SA75H

## (undated) DEVICE — PAD,ARMBOARD,CONV,FOAM,2X8X20",12PR/CS: Brand: MEDLINE

## (undated) DEVICE — Device

## (undated) DEVICE — RICH MAJOR PROCEDURE: Brand: MEDLINE INDUSTRIES, INC.

## (undated) DEVICE — SPNG GZ WOVN 4X4IN 12PLY 10/BX STRL

## (undated) DEVICE — SYR LUERLOK 30CC

## (undated) DEVICE — CARTRDG LENS MONARCH3 IOL DS

## (undated) DEVICE — 0.8MM CLEARPORT PARA KNIFE: Brand: SHARPOINT

## (undated) DEVICE — TBG CONN OUTFLOW AQUILEX/MYOSURE

## (undated) DEVICE — DEV TISS REMOV MYOSURE REACH

## (undated) DEVICE — SOL IRR NACL 0.9PCT 3000ML

## (undated) DEVICE — PK MAJ GYN DAVINCI 10

## (undated) DEVICE — ST TBG AIRSEAL FLTR TRI LUM

## (undated) DEVICE — SOL IRR H2O BO 1000ML STRL

## (undated) DEVICE — GLV SURG BIOGEL PI ULTRATOUCH G SZ7.5 LF

## (undated) DEVICE — SUT ETHLN 2/0 FSLX 30IN 1674H

## (undated) DEVICE — MICRO HVTSA, 0.5G AND HVTSA SOURCEMARK PRODUCT CODE M1206 AND M1206-01: Brand: EXOFIN MICRO HVTSA, 0.5G

## (undated) DEVICE — SUT VIC 2/0 CT1 27IN J259H

## (undated) DEVICE — GLV SURG SENSICARE PI MIC PF SZ6 LF STRL

## (undated) DEVICE — BLANKT WARM UPPR/BDY ARM/OUT 57X196CM

## (undated) DEVICE — BLADELESS OBTURATOR: Brand: WECK VISTA

## (undated) DEVICE — CATHETER,URETHRAL,REDRUBBER,STRL,16FR: Brand: MEDLINE

## (undated) DEVICE — TRENDELENBURG WINGPAD POSITIONING KIT DELUXE - WITHOUT BODY STRAP: Brand: SOULE MEDICAL

## (undated) DEVICE — CUFF SCD HEMOFORCE SEQ CALF STD MD

## (undated) DEVICE — GLV SURG TRIUMPH PF LTX 8.5 STRL

## (undated) DEVICE — SUT SILK 2/0 SH 30IN K833H